# Patient Record
Sex: MALE | Race: WHITE | NOT HISPANIC OR LATINO | Employment: OTHER | URBAN - NONMETROPOLITAN AREA
[De-identification: names, ages, dates, MRNs, and addresses within clinical notes are randomized per-mention and may not be internally consistent; named-entity substitution may affect disease eponyms.]

---

## 2017-03-01 ENCOUNTER — ANTICOAGULATION THERAPY VISIT (OUTPATIENT)
Dept: ANTICOAGULATION | Facility: CLINIC | Age: 67
End: 2017-03-01
Payer: COMMERCIAL

## 2017-03-01 DIAGNOSIS — Z79.01 LONG TERM CURRENT USE OF ANTICOAGULANT THERAPY: ICD-10-CM

## 2017-03-01 DIAGNOSIS — I48.91 ATRIAL FIBRILLATION (H): Primary | ICD-10-CM

## 2017-03-01 LAB — INR POINT OF CARE: 1.8 (ref 0.86–1.14)

## 2017-03-01 PROCEDURE — 85610 PROTHROMBIN TIME: CPT | Mod: QW

## 2017-03-01 PROCEDURE — 36416 COLLJ CAPILLARY BLOOD SPEC: CPT

## 2017-03-01 PROCEDURE — 99207 ZZC NO CHARGE NURSE ONLY: CPT

## 2017-03-01 NOTE — PROGRESS NOTES
ANTICOAGULATION FOLLOW-UP CLINIC VISIT    Patient Name:  Benjamín Hyman  Date:  3/1/2017  Contact Type:  Face to Face    SUBJECTIVE:     Patient Findings     Positives Change in diet/appetite (had some beer Monday with a friend. Having more salads lately.)    Comments Pt prefers not to change dosing at this time as he is quite used to it. Discussed risk of clots with subtherapeutic INR. Pt will return in 6 weeks vs 12 weeks.           OBJECTIVE    INR Protime   Date Value Ref Range Status   03/01/2017 1.8 (A) 0.86 - 1.14 Final       ASSESSMENT / PLAN  INR assessment SUB no change for INR 1.8 - 3.3   Recheck INR In: 6 WEEKS    INR Location Clinic      Anticoagulation Summary as of 3/1/2017     INR goal 2.0-3.0   Today's INR 1.8!   Maintenance plan 3.75 mg (7.5 mg x 0.5) on Mon, Thu; 7.5 mg (7.5 mg x 1) all other days   Full instructions 3.75 mg on Mon, Thu; 7.5 mg all other days   Weekly total 45 mg   No change documented Jing Lozano RN   Plan last modified Ana Conte, [facility] (4/22/2015)   Next INR check 4/12/2017   Priority INR   Target end date Indefinite    Indications   Atrial fibrillation (H) [I48.91]  Long term current use of anticoagulant therapy [Z79.01]         Anticoagulation Episode Summary     INR check location     Preferred lab     Send INR reminders to JOSSIE WILLIAM    Comments * 7.5mg tablets. Dr. Teran Ok with 12 week rechecks.      Anticoagulation Care Providers     Provider Role Specialty Phone number    Ruben Teran MD [facility] [phone]            See the Encounter Report to view Anticoagulation Flowsheet and Dosing Calendar (Go to Encounters tab in chart review, and find the Anticoagulation Therapy Visit)    Jing Lozano RN

## 2017-03-01 NOTE — MR AVS SNAPSHOT
Benjamín SADI Hyman   3/1/2017 9:30 AM   Anticoagulation Therapy Visit    Description:  66 year old male   Provider:  PI ANTI COAG   Department:  [facility]           INR as of 3/1/2017     Today's INR 1.8!      Anticoagulation Summary as of 3/1/2017     INR goal 2.0-3.0   Today's INR 1.8!   Full instructions 3.75 mg on Mon, Thu; 7.5 mg all other days   Next INR check 4/12/2017    Indications   Atrial fibrillation (H) [I48.91]  Long term current use of anticoagulant therapy [Z79.01]         Description     No change, recheck INR in 6 weeks.      Your next Anticoagulation Clinic appointment(s)     Apr 12, 2017  9:30 AM CDT   [facility]agulation [facility] with PI ANTI COAG   [facility]    [address] [place] [id number] [phone]              Contact Numbers     Please call [phone] to cancel and/or reschedule your appointment.  Please call [phone] with any problems or questions regarding your therapy          March 2017 Details    Sun Mon Tue Wed Thu Fri Sat        1      7.5 mg   See details      2      3.75 mg         3      7.5 mg         4      7.5 mg           5      7.5 mg         6      3.75 mg         7      7.5 mg         8      7.5 mg         9      3.75 mg         10      7.5 mg         11      7.5 mg           12      7.5 mg         13      3.75 mg         14      7.5 mg         15      7.5 mg         16      3.75 mg         17      7.5 mg         18      7.5 mg           19      7.5 mg         20      3.75 mg         21      7.5 mg         22      7.5 mg         23      3.75 mg         24      7.5 mg         25      7.5 mg           26      7.5 mg         27      3.75 mg         28      7.5 mg         29      7.5 mg         30      3.75 mg         31      7.5 mg           Date Details   03/01 This INR check               How to take your warfarin dose     To take:  3.75 mg Take 0.5 of a 7.5 mg tablet.    To take:  7.5 mg Take 1 of the 7.5  mg tablets.           April 2017 Details    Sun Mon Tue Wed Thu Fri Sat           1      7.5 mg           2      7.5 mg         3      3.75 mg         4      7.5 mg         5      7.5 mg         6      3.75 mg         7      7.5 mg         8      7.5 mg           9      7.5 mg         10      3.75 mg         11      7.5 mg         12            13               14               15                 16               17               18               19               20               21               22                 23               24               25               26               27               28               29                 30                      Date Details   No additional details    Date of next INR:  4/12/2017         How to take your warfarin dose     To take:  3.75 mg Take 0.5 of a 7.5 mg tablet.    To take:  7.5 mg Take 1 of the 7.5 mg tablets.

## 2017-04-12 ENCOUNTER — ANTICOAGULATION THERAPY VISIT (OUTPATIENT)
Dept: ANTICOAGULATION | Facility: CLINIC | Age: 67
End: 2017-04-12
Payer: COMMERCIAL

## 2017-04-12 DIAGNOSIS — I48.19 PERSISTENT ATRIAL FIBRILLATION (H): ICD-10-CM

## 2017-04-12 DIAGNOSIS — Z79.01 LONG TERM CURRENT USE OF ANTICOAGULANT THERAPY: ICD-10-CM

## 2017-04-12 LAB — INR POINT OF CARE: 1.8 (ref 0.86–1.14)

## 2017-04-12 PROCEDURE — 36416 COLLJ CAPILLARY BLOOD SPEC: CPT

## 2017-04-12 PROCEDURE — 99207 ZZC NO CHARGE NURSE ONLY: CPT

## 2017-04-12 PROCEDURE — 85610 PROTHROMBIN TIME: CPT | Mod: QW

## 2017-04-12 RX ORDER — WARFARIN SODIUM 7.5 MG/1
TABLET ORAL
Qty: 100 TABLET | Refills: 3 | COMMUNITY
Start: 2017-04-12 | End: 2017-04-12

## 2017-04-12 RX ORDER — WARFARIN SODIUM 7.5 MG/1
TABLET ORAL
Qty: 80 TABLET | Refills: 0 | Status: SHIPPED | OUTPATIENT
Start: 2017-04-12 | End: 2017-05-12

## 2017-04-12 NOTE — PROGRESS NOTES
ANTICOAGULATION FOLLOW-UP CLINIC VISIT    Patient Name:  Benjamín Hyman  Date:  4/12/2017  Contact Type:  Face to Face    SUBJECTIVE:     Patient Findings     Positives No Problem Findings    Comments Pt moved from one apt to another (same building - apt # updated in EPIC).             OBJECTIVE    INR Protime   Date Value Ref Range Status   04/12/2017 1.8 (A) 0.86 - 1.14 Final       ASSESSMENT / PLAN  INR assessment SUB increase maintenance dose 8.3%   Recheck INR In: 6 WEEKS    INR Location Clinic      Anticoagulation Summary as of 4/12/2017     INR goal 2.0-3.0   Today's INR 1.8!   Maintenance plan 3.75 mg (7.5 mg x 0.5) on Mon; 7.5 mg (7.5 mg x 1) all other days   Full instructions 3.75 mg on Mon; 7.5 mg all other days   Weekly total 48.75 mg   Plan last modified Jing Lozano RN (4/12/2017)   Next INR check 5/24/2017   Priority INR   Target end date Indefinite    Indications   Atrial fibrillation (H) [I48.91]  Long term current use of anticoagulant therapy [Z79.01]         Anticoagulation Episode Summary     INR check location     Preferred lab     Send INR reminders to JOSSIE WILLIAM    Comments * 7.5mg tablets. Dr. Teran Ok with 12 week rechecks.      Anticoagulation Care Providers     Provider Role Specialty Phone number    Ruben Teran MD [facility] [phone]            See the Encounter Report to view Anticoagulation Flowsheet and Dosing Calendar (Go to Encounters tab in chart review, and find the Anticoagulation Therapy Visit)    Jing Lozano, GABRIELLE

## 2017-04-12 NOTE — MR AVS SNAPSHOT
Benjamín Hyman   4/12/2017 9:30 AM   Anticoagulation Therapy Visit    Description:  66 year old male   Provider:  PI ANTI COAG   Department:  [facility]           INR as of 4/12/2017     Today's INR 1.8!      Anticoagulation Summary as of 4/12/2017     INR goal 2.0-3.0   Today's INR 1.8!   Full instructions 3.75 mg on Mon; 7.5 mg all other days   Next INR check 5/24/2017    Indications   Atrial fibrillation (H) [I48.91]  Long term current use of anticoagulant therapy [Z79.01]         Description     Increase dose to 3.75mg Mondays and 7.5mg all other days.  Recheck INR in 6 weeks.      Your next Anticoagulation Clinic appointment(s)     Apr 12, 2017  9:30 AM CDT   Anticoagulation Visit with STEVEN ANTI COAG   [facility]    [address] [place] [id number] [phone]            May 24, 2017  9:15 AM CDT   Anticoagulation Visit with PI ANTI COAG   [facility]    [address] [place] [id number] [phone]              Contact Numbers     Please call [phone] to cancel and/or reschedule your appointment.  Please call [phone] with any problems or questions regarding your therapy          April 2017 Details    Sun Mon Tue Wed Thu Fri Sat           1                 2               3               4               5               6               7               8                 9               10               11               12      7.5 mg   See details      13      7.5 mg         14      7.5 mg         15      7.5 mg           16      7.5 mg         17      3.75 mg         18      7.5 mg         19      7.5 mg         20      7.5 mg         21      7.5 mg         22      7.5 mg           23      7.5 mg         24      3.75 mg         25      7.5 mg         26      7.5 mg         27      7.5 mg         28      7.5 mg         29      7.5 mg           30      7.5 mg                Date  Details   04/12 This INR check               How to take your warfarin dose     To take:  3.75 mg Take 0.5 of a 7.5 mg tablet.    To take:  7.5 mg Take 1 of the 7.5 mg tablets.           May 2017 Details    Sun Mon Tue Wed Thu Fri Sat      1      3.75 mg         2      7.5 mg         3      7.5 mg         4      7.5 mg         5      7.5 mg         6      7.5 mg           7      7.5 mg         8      3.75 mg         9      7.5 mg         10      7.5 mg         11      7.5 mg         12      7.5 mg         13      7.5 mg           14      7.5 mg         15      3.75 mg         16      7.5 mg         17      7.5 mg         18      7.5 mg         19      7.5 mg         20      7.5 mg           21      7.5 mg         22      3.75 mg         23      7.5 mg         24            25               26               27                 28               29               30               31                   Date Details   No additional details    Date of next INR:  5/24/2017         How to take your warfarin dose     To take:  3.75 mg Take 0.5 of a 7.5 mg tablet.    To take:  7.5 mg Take 1 of the 7.5 mg tablets.

## 2017-04-24 ENCOUNTER — OFFICE VISIT (OUTPATIENT)
Dept: FAMILY MEDICINE | Facility: CLINIC | Age: 67
End: 2017-04-24
Payer: COMMERCIAL

## 2017-04-24 ENCOUNTER — RADIANT APPOINTMENT (OUTPATIENT)
Dept: GENERAL RADIOLOGY | Facility: CLINIC | Age: 67
End: 2017-04-24
Attending: FAMILY MEDICINE
Payer: COMMERCIAL

## 2017-04-24 VITALS
HEART RATE: 67 BPM | TEMPERATURE: 97.6 F | DIASTOLIC BLOOD PRESSURE: 84 MMHG | RESPIRATION RATE: 22 BRPM | BODY MASS INDEX: 46.5 KG/M2 | OXYGEN SATURATION: 96 % | WEIGHT: 273 LBS | SYSTOLIC BLOOD PRESSURE: 122 MMHG

## 2017-04-24 DIAGNOSIS — M25.572 ACUTE LEFT ANKLE PAIN: Primary | ICD-10-CM

## 2017-04-24 DIAGNOSIS — M25.572 ACUTE LEFT ANKLE PAIN: ICD-10-CM

## 2017-04-24 PROCEDURE — 99213 OFFICE O/P EST LOW 20 MIN: CPT | Performed by: FAMILY MEDICINE

## 2017-04-24 PROCEDURE — 73610 X-RAY EXAM OF ANKLE: CPT | Mod: LT

## 2017-04-24 NOTE — MR AVS SNAPSHOT
After Visit Summary   4/24/2017    Benjamín Hyman    MRN: [id number]           Patient Information     Date Of Birth          1950        Visit Information        Provider Department      4/24/2017 1:40 PM Jem Ryder MD [facility]        Today's Diagnoses     Acute left ankle pain    -  1      Care Instructions      Ankle Sprain (Adult)  An ankle sprain is a stretching or tearing of the ligaments that hold the ankle joint together. There are no broken bones.  An ankle sprain is a common injury for both children and adults. It happens when the ankle turns, twists, or rolls in an awkward way. This can be caused by a sports injury. Or it can happen from doing something as simple as stepping on an uneven surface.  Ligaments are made of tough connective tissue. Normally, ligaments stretch a certain amount and then go back to their normal place. A sprain happens when a ligament is forced to stretch more than the normal amount. A severe sprain can actually tear the ligaments. If you have a severe sprain, you may have felt or heard something like a pop when you were injured.  Ankle sprains are given a grade depending on whether they are mild, moderate, or severe:    Grade 1 sprain. A mild sprain with minor stretching and damage to the ligament.    Grade 2 sprain. A moderate sprain where the ligament is partly torn.    Grade 3 sprain. The most severe kind of sprain. The ligament is completely torn.  Most sprains take about 4 to 6 weeks to heal. A severe sprain can take several months to recover.  Your healthcare provider may order X-rays to be sure you don t have a fracture, or broken bone.  The injured area will feel sore.  Swelling and pain may make it hard to walk. You may need crutches if walking is painful. Or your provider may have you use a cast boot or air splint. This will depend on the grade of ankle sprain that you have.    Home care    For a Grade 1 sprain, use RICE  (Rest, Ice, Compression, and Elevation):    Rest your ankle. Don t walk on it.    Ice should be used right away to help control swelling. Place an ice pack over the injured area for 20 minutes. Do this every 3 to 6 hours for the first 24 to 48 hours. Keep using ice packs to ease pain and swelling as needed. To make an ice pack, put ice cubes in a plastic bag that seals at the top. Wrap the bag in a clean, thin towel or cloth. Never put ice or an ice pack directly on the skin. The ice pack can be put right on the cast, bandage, or splint. As the ice melts, be careful that the cast, bandage, or splint doesn t get wet. If you have a boot, open it to apply an ice pack, unless told otherwise by your provider.    Compression devices help to control swelling. They also keep the ankle from moving and support your injured ankle. These devices include dressings, bandages, and wraps.    Elevate or raise your ankle above the level of your heart when sitting or lying down. This is very important for the first 48 hours.    Follow the RICE guidelines for a Grade 2 sprain. This type of sprain will take longer to heal. Your provider may have you wear a splint, cast, or brace to keep your ankle from moving.     If you have a Grade 3 sprain, you are at risk for long-term ankle instability. In rare cases, surgery may be needed. Your provider may have you wear a short leg cast or a walking boot for 2 to 3 weeks.    After 48 hours, it may be helpful to apply heat for 20 minutes several times a day. You can do this with a heating pad or warm compress. Or you may want to go back and forth between using ice and heat. Never apply heat directly to the skin. Always wrap the heating pad or warm compress in a clean, thin towel or cloth.    You may use over-the-counter pain medicine (NSAIDS or nonsteroidal anti-inflammatory drugs) to control pain, unless another pain medicine was prescribed. Talk with your provider beforeusing these medicines if  you have chronic liver or kidney disease, or have ever had a stomach ulcer or GI (gastrointestinal) bleeding.    Follow any rehabilitation exercises your provider gives you. These can help you be more flexible and improve your balance and coordination. This is helpful in preventing long-term ankle problems.  Prevention  To help prevent ankle sprains, it s important to have good strength, balance, and flexibility. Be sure to:    Always warm up before you exercise or do something very active    Be careful when walking or running on uneven or cracked surfaces    Wear shoes that are in good condition and fit well    Listen to your body s signals to slow down when you are in pain or tired  Follow-up care  Follow up with your healthcare provider, or as advised. Check for any warning signs listed below.  If your symptoms don t improve or they get worse, talk with your provider. You may need an X-ray.  When to seek medical advice  Call your healthcare provider right away if any of these occur:    Fever of 100.4 F (38 C) or higher, or as directed    The injury doesn t seem to be healing    The swelling comes back    The cast has a bad smell    The plaster cast or splint gets wet or soft    The fiberglass cast or splint gets wet and does not dry for 24 hours    The pain or swelling increases, or redness appears    Your toes become cold, blue, numb, or tingly    The skin is discolored (looks blue, purple, or gray), has blisters, or is irritated    You re-injure your ankle    6421-8361 [employer]. [address] [place] [address]. All rights reserved. This information is not intended as a substitute for professional medical care. Always follow your healthcare professional's instructions.              Follow-ups after your visit        Your next 10 appointments already scheduled     May 24, 2017  9:15 AM CDT   Anticoagulation Visit with PI ANTI COAG   [facility] ([facility]  "[facility])    [address] [facility] [address]   [phone]              Future tests that were ordered for you today     Open Future Orders        Priority Expected Expires Ordered    XR Ankle Left G/E 3 Views Routine 2017            Who to contact     If you have questions or need follow up information about today's clinic visit or your schedule please contact [facility] directly at [phone].  Normal or non-critical lab and imaging results will be communicated to you by BOARDZhart, letter or phone within 4 business days after the clinic has received the results. If you do not hear from us within 7 days, please contact the clinic through BOARDZhart or phone. If you have a critical or abnormal lab result, we will notify you by phone as soon as possible.  Submit refill requests through [employer] or call your pharmacy and they will forward the refill request to us. Please allow 3 business days for your refill to be completed.          Additional Information About Your Visit        Loop Survey Information     [employer] Survey lets you send messages to your doctor, view your test results, renew your prescriptions, schedule appointments and more. To sign up, go to [email or web address] . Click on \"Log in\" on the left side of the screen, which will take you to the Welcome page. Then click on \"Sign up Now\" on the right side of the page.     You will be asked to enter the access code listed below, as well as some personal information. Please follow the directions to create your username and password.     Your access code is: [email or web address]  Expires: 2017  2:01 PM     Your access code will  in 90 days. If you need help or a new code, please call your [facility] clinic or [phone].        Care EveryWhere ID     This is your Care EveryWhere ID. This could be used by other organizations to access your [facility] medical records  [id number]        Your Vitals Were     Pulse " Temperature Respirations Pulse Oximetry BMI (Body Mass Index)       67 97.6  F (36.4  C) (Tympanic) 22 96% 46.5 kg/m2        Blood Pressure from Last 3 Encounters:   04/24/17 122/84   10/17/16 136/74   05/11/16 108/64    Weight from Last 3 Encounters:   04/24/17 273 lb (123.8 kg)   10/17/16 269 lb (122 kg)   05/11/16 254 lb (115.2 kg)               Primary Care Provider Office Phone # Fax #    Ruben Teran [phone]       [facility] [id number] [facility] [address]        Thank you!     Thank you for choosing [facility]  for your care. Our goal is always to provide you with excellent care. Hearing back from our patients is one way we can continue to improve our services. Please take a few minutes to complete the written survey that you may receive in the mail after your visit with us. Thank you!             Your Updated Medication List - Protect others around you: Learn how to safely use, store and throw away your medicines at www.disposemymeds.org.          This list is accurate as of: 4/24/17  2:01 PM.  Always use your most recent med list.                   Brand Name Dispense Instructions for use    digoxin 250 MCG tablet    LANOXIN    30 tablet    Take 1 tab on even days and 1/2 tab on odd day of the month.  Patient has been on this medication for year and atrial fibrillation is controlled.       lisinopril 10 MG tablet    PRINIVIL/ZESTRIL    30 tablet    Take 1 tablet (10 mg) by mouth daily       metoprolol 200 MG 24 hr tablet    TOPROL-XL    30 tablet    Take 1 tablet (200 mg) by mouth daily       senna 8.6 MG tablet    SENOKOT    60 tablet    Take 1-2 tablets by mouth daily Hold incase you get diarrhea.  Hold on file until needed       simvastatin 40 MG tablet    ZOCOR    15 tablet    Take 1 tablet (40 mg) by mouth every other day       warfarin 7.5 MG tablet    COUMADIN    80 tablet    Take 3.75mg by mouth every Monday and 7.5mg all other days or as  directed by Anticoagulation Clinic.

## 2017-04-24 NOTE — PATIENT INSTRUCTIONS
Ankle Sprain (Adult)  An ankle sprain is a stretching or tearing of the ligaments that hold the ankle joint together. There are no broken bones.  An ankle sprain is a common injury for both children and adults. It happens when the ankle turns, twists, or rolls in an awkward way. This can be caused by a sports injury. Or it can happen from doing something as simple as stepping on an uneven surface.  Ligaments are made of tough connective tissue. Normally, ligaments stretch a certain amount and then go back to their normal place. A sprain happens when a ligament is forced to stretch more than the normal amount. A severe sprain can actually tear the ligaments. If you have a severe sprain, you may have felt or heard something like a pop when you were injured.  Ankle sprains are given a grade depending on whether they are mild, moderate, or severe:    Grade 1 sprain. A mild sprain with minor stretching and damage to the ligament.    Grade 2 sprain. A moderate sprain where the ligament is partly torn.    Grade 3 sprain. The most severe kind of sprain. The ligament is completely torn.  Most sprains take about 4 to 6 weeks to heal. A severe sprain can take several months to recover.  Your healthcare provider may order X-rays to be sure you don t have a fracture, or broken bone.  The injured area will feel sore.  Swelling and pain may make it hard to walk. You may need crutches if walking is painful. Or your provider may have you use a cast boot or air splint. This will depend on the grade of ankle sprain that you have.    Home care    For a Grade 1 sprain, use RICE (Rest, Ice, Compression, and Elevation):    Rest your ankle. Don t walk on it.    Ice should be used right away to help control swelling. Place an ice pack over the injured area for 20 minutes. Do this every 3 to 6 hours for the first 24 to 48 hours. Keep using ice packs to ease pain and swelling as needed. To make an ice pack, put ice cubes in a plastic bag  that seals at the top. Wrap the bag in a clean, thin towel or cloth. Never put ice or an ice pack directly on the skin. The ice pack can be put right on the cast, bandage, or splint. As the ice melts, be careful that the cast, bandage, or splint doesn t get wet. If you have a boot, open it to apply an ice pack, unless told otherwise by your provider.    Compression devices help to control swelling. They also keep the ankle from moving and support your injured ankle. These devices include dressings, bandages, and wraps.    Elevate or raise your ankle above the level of your heart when sitting or lying down. This is very important for the first 48 hours.    Follow the RICE guidelines for a Grade 2 sprain. This type of sprain will take longer to heal. Your provider may have you wear a splint, cast, or brace to keep your ankle from moving.     If you have a Grade 3 sprain, you are at risk for long-term ankle instability. In rare cases, surgery may be needed. Your provider may have you wear a short leg cast or a walking boot for 2 to 3 weeks.    After 48 hours, it may be helpful to apply heat for 20 minutes several times a day. You can do this with a heating pad or warm compress. Or you may want to go back and forth between using ice and heat. Never apply heat directly to the skin. Always wrap the heating pad or warm compress in a clean, thin towel or cloth.    You may use over-the-counter pain medicine (NSAIDS or nonsteroidal anti-inflammatory drugs) to control pain, unless another pain medicine was prescribed. Talk with your provider beforeusing these medicines if you have chronic liver or kidney disease, or have ever had a stomach ulcer or GI (gastrointestinal) bleeding.    Follow any rehabilitation exercises your provider gives you. These can help you be more flexible and improve your balance and coordination. This is helpful in preventing long-term ankle problems.  Prevention  To help prevent ankle sprains, it s  important to have good strength, balance, and flexibility. Be sure to:    Always warm up before you exercise or do something very active    Be careful when walking or running on uneven or cracked surfaces    Wear shoes that are in good condition and fit well    Listen to your body s signals to slow down when you are in pain or tired  Follow-up care  Follow up with your healthcare provider, or as advised. Check for any warning signs listed below.  If your symptoms don t improve or they get worse, talk with your provider. You may need an X-ray.  When to seek medical advice  Call your healthcare provider right away if any of these occur:    Fever of 100.4 F (38 C) or higher, or as directed    The injury doesn t seem to be healing    The swelling comes back    The cast has a bad smell    The plaster cast or splint gets wet or soft    The fiberglass cast or splint gets wet and does not dry for 24 hours    The pain or swelling increases, or redness appears    Your toes become cold, blue, numb, or tingly    The skin is discolored (looks blue, purple, or gray), has blisters, or is irritated    You re-injure your ankle    5360-7833 [employer]. [address] [place] [address], [place] [address]. All rights reserved. This information is not intended as a substitute for professional medical care. Always follow your healthcare professional's instructions.

## 2017-04-24 NOTE — NURSING NOTE
"Chief Complaint   Patient presents with     Musculoskeletal Problem       Initial /84 (BP Location: Right arm, Patient Position: Chair, Cuff Size: Adult Large)  Pulse 67  Temp 97.6  F (36.4  C) (Tympanic)  Resp 22  Wt 273 lb (123.8 kg)  SpO2 96%  BMI 46.5 kg/m2 Estimated body mass index is 46.5 kg/(m^2) as calculated from the following:    Height as of 10/17/16: 5' 4.25\" (1.632 m).    Weight as of this encounter: 273 lb (123.8 kg).  Medication Reconciliation: complete    Health Maintenance that is potentially due pending provider review:  Tetanus, hep c screening, pneumociccal    Will discuss with provider.      "

## 2017-04-24 NOTE — PROGRESS NOTES
SUBJECTIVE:                                                    Benjamín Hyman is a 66 year old male who presents to clinic today for the following health issues:      Musculoskeletal problem/pain      Duration: 1 week    Description  Location: Left Ankle    Intensity:  moderate    Accompanying signs and symptoms: weakness of ankle and swelling    History  Previous similar problem: no   Previous evaluation:  none    Precipitating or alleviating factors:  Trauma or overuse: YES- patient moved about a week and a half ago, doesn't remember doing anything to ankle  Aggravating factors include: walking and climbing stairs    Therapies tried and outcome: nothing    No fall, trauma or other injury     Drinks alcohol occasionally     No previous history of gout          Problem list and histories reviewed & adjusted, as indicated.  Additional history: as documented    Patient Active Problem List   Diagnosis     Atrial fibrillation (H)     Long term current use of anticoagulant therapy     FAMILY HISTORY OF GI NEOPLASM     Immunization (Tdap) not carried out because of patient refusal     [facility]     Advanced directives, counseling/discussion     Hyperlipidemia with target LDL less than 100     Stomach ulcer     Morbid obesity due to excess calories (H)     History reviewed. No pertinent surgical history.    Social History   Substance Use Topics     Smoking status: Former Smoker     Types: Cigarettes     Quit date: 1/1/1996     Smokeless tobacco: Never Used     Alcohol use Yes      Comment: 9/28/2015 Occsional 6 pack of beer, not often.  Quit drinking 4-1-10/  drank around [place] 2010 4- 2-3 beers every other week.      Family History   Problem Relation Age of Onset     Cancer - colorectal Mother      C.A.D. Father      HEART DISEASE Father      Unknown/Adopted Maternal Grandmother      Unknown/Adopted Paternal Grandmother      CEREBROVASCULAR DISEASE Paternal Grandfather           Current Outpatient Prescriptions   Medication Sig Dispense Refill     warfarin (COUMADIN) 7.5 MG tablet Take 3.75mg by mouth every Monday and 7.5mg all other days or as directed by Anticoagulation Clinic. 80 tablet 0     digoxin (LANOXIN) 250 MCG tablet Take 1 tab on even days and 1/2 tab on odd day of the month.  Patient has been on this medication for year and atrial fibrillation is controlled. 30 tablet 11     metoprolol (TOPROL-XL) 200 MG 24 hr tablet Take 1 tablet (200 mg) by mouth daily 30 tablet 11     lisinopril (PRINIVIL,ZESTRIL) 10 MG tablet Take 1 tablet (10 mg) by mouth daily 30 tablet 11     simvastatin (ZOCOR) 40 MG tablet Take 1 tablet (40 mg) by mouth every other day 15 tablet 11     senna (SENOKOT) 8.6 MG tablet Take 1-2 tablets by mouth daily Hold incase you get diarrhea.  Hold on file until needed (Patient not taking: Reported on 4/24/2017) 60 tablet 11     Allergies   Allergen Reactions     Latex      Adhesive Tape Rash     Reacted to the EKG stickers     Recent Labs   Lab Test  05/11/16   1136  04/22/15   1456  03/03/14   1403   02/06/13   1338  06/20/12   1555  01/11/12   0713  07/06/10   0944  06/29/10   0742   A1C   --    --    --    --    --    --    --   5.6   --    LDL  89  95  99   < >   --    --   67   --   79   HDL   --    --    --    --    --    --   32*   --   32*   TRIG   --    --    --    --    --    --   174*   --   200*   ALT   --    --    --    --   36  14  18   --   20   CR  0.90  0.90  1.09   --   0.78  0.95  0.78   --   0.83   GFRESTIMATED  84  85  68   --   >90  81  >90   --   >90   GFRESTBLACK  >90   GFR Calc    >90   GFR Calc    83   --   >90  >90  >90   --   >90   POTASSIUM  4.4  4.1  4.4   --   4.5  4.3  4.6   --   4.7   TSH  0.44   --    --    --    --    --    --    --    --     < > = values in this interval not displayed.      BP Readings from Last 3 Encounters:   04/24/17 122/84   10/17/16 136/74   05/11/16 108/64    Wt Readings  from Last 3 Encounters:   04/24/17 273 lb (123.8 kg)   10/17/16 269 lb (122 kg)   05/11/16 254 lb (115.2 kg)                  Labs reviewed in EPIC    Reviewed and updated as needed this visit by clinical staff  Tobacco  Allergies  Med Hx  Surg Hx  Fam Hx  Soc Hx      Reviewed and updated as needed this visit by Provider         ROS:  Constitutional, HEENT, cardiovascular, pulmonary, gi and gu systems are negative, except as otherwise noted.    OBJECTIVE:                                                    /84 (BP Location: Right arm, Patient Position: Chair, Cuff Size: Adult Large)  Pulse 67  Temp 97.6  F (36.4  C) (Tympanic)  Resp 22  Wt 273 lb (123.8 kg)  SpO2 96%  BMI 46.5 kg/m2  Body mass index is 46.5 kg/(m^2).  GENERAL: alert, no distress and obese  NECK: no adenopathy, no asymmetry, masses, or scars and thyroid normal to palpation  RESP: lungs clear to auscultation - no rales, rhonchi or wheezes  CV: regular rate and rhythm, normal S1 S2, no S3 or S4, no murmur, click or rub, no peripheral edema and peripheral pulses strong  ABDOMEN: soft, nontender, no hepatosplenomegaly, no masses and bowel sounds normal  MS: subtle swelling over left ankle, no tenderness, warmth or skin discoloration noted, ROM normal, able to weight-bear without significant discomfort, pulses 3+, sensation to touch and pressure intact, no calf swelling      X-ray: Left ankle:  HISTORY: Pain.     COMPARISON: None.     FINDINGS: No fracture or osseous lesion is seen. There is a small  ossicle inferior to the medial malleolus. There are prominent  calcaneal spurs. The joint spaces are well preserved. No adjacent soft  tissue pathology is seen.         IMPRESSION: Prominent calcaneal spurs. No acute abnormality is seen.     ASSESSMENT/PLAN:                                                          ICD-10-CM    1. Acute left ankle pain M25.572 XR Ankle Left G/E 3 Views       Discussed in detail differentials and further  management. Symptoms are likely secondary to ankle sprain. Another possibility includes gout and osteoarthritis. Symptoms ongoing for 2 weeks and physical examination unremarkable for warmth, tenderness or decreased range of motion. X-ray findings consistent with prominent calcaneal spurs without any fracture or dislocation. RICE therapy recommended along with Tylenol. Suggested to follow up if symptoms persist or worsen. Patient understood and in agreement with the above plan. All questions answered.      MEDICATIONS:   No orders of the defined types were placed in this encounter.    Patient Instructions     Ankle Sprain (Adult)  An ankle sprain is a stretching or tearing of the ligaments that hold the ankle joint together. There are no broken bones.  An ankle sprain is a common injury for both children and adults. It happens when the ankle turns, twists, or rolls in an awkward way. This can be caused by a sports injury. Or it can happen from doing something as simple as stepping on an uneven surface.  Ligaments are made of tough connective tissue. Normally, ligaments stretch a certain amount and then go back to their normal place. A sprain happens when a ligament is forced to stretch more than the normal amount. A severe sprain can actually tear the ligaments. If you have a severe sprain, you may have felt or heard something like a pop when you were injured.  Ankle sprains are given a grade depending on whether they are mild, moderate, or severe:    Grade 1 sprain. A mild sprain with minor stretching and damage to the ligament.    Grade 2 sprain. A moderate sprain where the ligament is partly torn.    Grade 3 sprain. The most severe kind of sprain. The ligament is completely torn.  Most sprains take about 4 to 6 weeks to heal. A severe sprain can take several months to recover.  Your healthcare provider may order X-rays to be sure you don t have a fracture, or broken bone.  The injured area will feel  sore.  Swelling and pain may make it hard to walk. You may need crutches if walking is painful. Or your provider may have you use a cast boot or air splint. This will depend on the grade of ankle sprain that you have.    Home care    For a Grade 1 sprain, use RICE (Rest, Ice, Compression, and Elevation):    Rest your ankle. Don t walk on it.    Ice should be used right away to help control swelling. Place an ice pack over the injured area for 20 minutes. Do this every 3 to 6 hours for the first 24 to 48 hours. Keep using ice packs to ease pain and swelling as needed. To make an ice pack, put ice cubes in a plastic bag that seals at the top. Wrap the bag in a clean, thin towel or cloth. Never put ice or an ice pack directly on the skin. The ice pack can be put right on the cast, bandage, or splint. As the ice melts, be careful that the cast, bandage, or splint doesn t get wet. If you have a boot, open it to apply an ice pack, unless told otherwise by your provider.    Compression devices help to control swelling. They also keep the ankle from moving and support your injured ankle. These devices include dressings, bandages, and wraps.    Elevate or raise your ankle above the level of your heart when sitting or lying down. This is very important for the first 48 hours.    Follow the RICE guidelines for a Grade 2 sprain. This type of sprain will take longer to heal. Your provider may have you wear a splint, cast, or brace to keep your ankle from moving.     If you have a Grade 3 sprain, you are at risk for long-term ankle instability. In rare cases, surgery may be needed. Your provider may have you wear a short leg cast or a walking boot for 2 to 3 weeks.    After 48 hours, it may be helpful to apply heat for 20 minutes several times a day. You can do this with a heating pad or warm compress. Or you may want to go back and forth between using ice and heat. Never apply heat directly to the skin. Always wrap the heating  pad or warm compress in a clean, thin towel or cloth.    You may use over-the-counter pain medicine (NSAIDS or nonsteroidal anti-inflammatory drugs) to control pain, unless another pain medicine was prescribed. Talk with your provider beforeusing these medicines if you have chronic liver or kidney disease, or have ever had a stomach ulcer or GI (gastrointestinal) bleeding.    Follow any rehabilitation exercises your provider gives you. These can help you be more flexible and improve your balance and coordination. This is helpful in preventing long-term ankle problems.  Prevention  To help prevent ankle sprains, it s important to have good strength, balance, and flexibility. Be sure to:    Always warm up before you exercise or do something very active    Be careful when walking or running on uneven or cracked surfaces    Wear shoes that are in good condition and fit well    Listen to your body s signals to slow down when you are in pain or tired  Follow-up care  Follow up with your healthcare provider, or as advised. Check for any warning signs listed below.  If your symptoms don t improve or they get worse, talk with your provider. You may need an X-ray.  When to seek medical advice  Call your healthcare provider right away if any of these occur:    Fever of 100.4 F (38 C) or higher, or as directed    The injury doesn t seem to be healing    The swelling comes back    The cast has a bad smell    The plaster cast or splint gets wet or soft    The fiberglass cast or splint gets wet and does not dry for 24 hours    The pain or swelling increases, or redness appears    Your toes become cold, blue, numb, or tingly    The skin is discolored (looks blue, purple, or gray), has blisters, or is irritated    You re-injure your ankle    9785-5652 [employer] [address] [place] [address], [place] [address]. All rights reserved. This information is not intended as a substitute for professional medical care. Always follow  your healthcare professional's instructions.            Jem Ryder MD  [facility]

## 2017-05-12 ENCOUNTER — OFFICE VISIT (OUTPATIENT)
Dept: FAMILY MEDICINE | Facility: CLINIC | Age: 67
End: 2017-05-12
Payer: COMMERCIAL

## 2017-05-12 VITALS
SYSTOLIC BLOOD PRESSURE: 102 MMHG | DIASTOLIC BLOOD PRESSURE: 70 MMHG | HEART RATE: 60 BPM | HEIGHT: 65 IN | TEMPERATURE: 98.1 F | BODY MASS INDEX: 44.98 KG/M2 | WEIGHT: 270 LBS

## 2017-05-12 DIAGNOSIS — Z79.01 LONG TERM CURRENT USE OF ANTICOAGULANT THERAPY: ICD-10-CM

## 2017-05-12 DIAGNOSIS — E78.5 HYPERLIPIDEMIA WITH TARGET LDL LESS THAN 100: ICD-10-CM

## 2017-05-12 DIAGNOSIS — E66.01 MORBID OBESITY DUE TO EXCESS CALORIES (H): ICD-10-CM

## 2017-05-12 DIAGNOSIS — I48.19 PERSISTENT ATRIAL FIBRILLATION (H): Primary | ICD-10-CM

## 2017-05-12 LAB
ANION GAP SERPL CALCULATED.3IONS-SCNC: 11 MMOL/L (ref 3–14)
BUN SERPL-MCNC: 16 MG/DL (ref 7–30)
CALCIUM SERPL-MCNC: 9 MG/DL (ref 8.5–10.1)
CHLORIDE SERPL-SCNC: 103 MMOL/L (ref 94–109)
CO2 SERPL-SCNC: 25 MMOL/L (ref 20–32)
CREAT SERPL-MCNC: 0.86 MG/DL (ref 0.66–1.25)
GFR SERPL CREATININE-BSD FRML MDRD: 89 ML/MIN/1.7M2
GLUCOSE SERPL-MCNC: 91 MG/DL (ref 70–99)
LDLC SERPL DIRECT ASSAY-MCNC: 88 MG/DL
POTASSIUM SERPL-SCNC: 4.5 MMOL/L (ref 3.4–5.3)
SODIUM SERPL-SCNC: 139 MMOL/L (ref 133–144)

## 2017-05-12 PROCEDURE — 80048 BASIC METABOLIC PNL TOTAL CA: CPT | Performed by: FAMILY MEDICINE

## 2017-05-12 PROCEDURE — 83721 ASSAY OF BLOOD LIPOPROTEIN: CPT | Performed by: FAMILY MEDICINE

## 2017-05-12 PROCEDURE — 36415 COLL VENOUS BLD VENIPUNCTURE: CPT | Performed by: FAMILY MEDICINE

## 2017-05-12 PROCEDURE — 99214 OFFICE O/P EST MOD 30 MIN: CPT | Performed by: FAMILY MEDICINE

## 2017-05-12 RX ORDER — DIGOXIN 250 MCG
TABLET ORAL
Qty: 30 TABLET | Refills: 11 | Status: SHIPPED | OUTPATIENT
Start: 2017-05-12 | End: 2018-04-16

## 2017-05-12 RX ORDER — WARFARIN SODIUM 7.5 MG/1
TABLET ORAL
Qty: 80 TABLET | Refills: 8 | Status: SHIPPED | OUTPATIENT
Start: 2017-05-12 | End: 2017-08-16

## 2017-05-12 RX ORDER — SIMVASTATIN 40 MG
40 TABLET ORAL EVERY OTHER DAY
Qty: 15 TABLET | Refills: 11 | Status: SHIPPED | OUTPATIENT
Start: 2017-05-12 | End: 2018-04-16

## 2017-05-12 RX ORDER — METOPROLOL SUCCINATE 200 MG/1
200 TABLET, EXTENDED RELEASE ORAL DAILY
Qty: 30 TABLET | Refills: 11 | Status: SHIPPED | OUTPATIENT
Start: 2017-05-12 | End: 2018-04-16

## 2017-05-12 RX ORDER — LISINOPRIL 10 MG/1
10 TABLET ORAL DAILY
Qty: 30 TABLET | Refills: 11 | Status: SHIPPED | OUTPATIENT
Start: 2017-05-12 | End: 2018-04-16

## 2017-05-12 NOTE — PATIENT INSTRUCTIONS
Please go to lab.    I refilled your medications for the year.    Continue to follow up with the INR clinic nurse.          Thank you for choosing [facility].  You may be receiving a survey in the mail from [employer] regarding your visit today.  Please take a few minutes to complete and return the survey to let us know how we are doing.      If you have questions or concerns, please contact us via [facility] or you can contact your care team at [phone].    Our Clinic hours are:  Monday 6:40 am  to 7:00 pm  Tuesday -Friday 6:40 am to 5:00 pm    The [place] outpatient lab hours are:  Monday - Friday 6:10 am to 4:45 pm  Saturdays 7:00 am to 11:00 am  Appointments are required, call [phone]    If you have clinical questions after hours or would like to schedule an appointment,  call the clinic at [phone].

## 2017-05-12 NOTE — MR AVS SNAPSHOT
After Visit Summary   5/12/2017    Benjamín Hyman    MRN: [id number]           Patient Information     Date Of Birth          1950        Visit Information        Provider Department      5/12/2017 10:40 AM Ruben Teran MD [facility]        Today's Diagnoses     Long term current use of anticoagulant therapy    -  1    Persistent atrial fibrillation (H)        Hyperlipidemia with target LDL less than 100          Care Instructions    Please go to lab.    I refilled your medications for the year.    Continue to follow up with the INR clinic nurse.          Thank you for choosing [facility].  You may be receiving a survey in the mail from [employer] regarding your visit today.  Please take a few minutes to complete and return the survey to let us know how we are doing.      If you have questions or concerns, please contact us via [employer] or you can contact your care team at [phone].    Our Clinic hours are:  Monday 6:40 am  to 7:00 pm  Tuesday -Friday 6:40 am to 5:00 pm    The [place] outpatient lab hours are:  Monday - Friday 6:10 am to 4:45 pm  Saturdays 7:00 am to 11:00 am  Appointments are required, call [phone]    If you have clinical questions after hours or would like to schedule an appointment,  call the clinic at [phone].          Follow-ups after your visit        Your next 10 appointments already scheduled     May 24, 2017  9:15 AM CDT   Anticoagulation Visit with PI ANTI COAG   [facility]    [address] [place] [id number] [phone]              Who to contact     If you have questions or need follow up information about today's clinic visit or your schedule please contact [facility] directly at [phone].  Normal or non-critical lab and imaging results will be communicated to you by MyChart, letter or phone within 4 business days after the clinic has received the  "results. If you do not hear from us within 7 days, please contact the clinic through [employer] or phone. If you have a critical or abnormal lab result, we will notify you by phone as soon as possible.  Submit refill requests through [employer] or call your pharmacy and they will forward the refill request to us. Please allow 3 business days for your refill to be completed.          Additional Information About Your Visit        [employer] Information     [employer] lets you send messages to your doctor, view your test results, renew your prescriptions, schedule appointments and more. To sign up, go to [email or web address] . Click on \"Log in\" on the left side of the screen, which will take you to the Welcome page. Then click on \"Sign up Now\" on the right side of the page.     You will be asked to enter the access code listed below, as well as some personal information. Please follow the directions to create your username and password.     Your access code is: [email or web address]  Expires: 2017  2:01 PM     Your access code will  in 90 days. If you need help or a new code, please call your [place] clinic or [phone].        Care EveryWhere ID     This is your Care EveryWhere ID. This could be used by other organizations to access your [place] medical records  [id number]        Your Vitals Were     Pulse Temperature Height BMI (Body Mass Index)          60 98.1  F (36.7  C) (Tympanic) 5' 4.5\" (1.638 m) 45.63 kg/m2         Blood Pressure from Last 3 Encounters:   17 102/70   17 122/84   10/17/16 136/74    Weight from Last 3 Encounters:   17 270 lb (122.5 kg)   17 273 lb (123.8 kg)   10/17/16 269 lb (122 kg)              We Performed the Following     Basic metabolic panel     LDL cholesterol direct          Today's Medication Changes          These changes are accurate as of: 17 11:02 AM.  If you have any questions, ask your nurse or doctor.               These medicines have changed or " have updated prescriptions.        Dose/Directions    digoxin 250 MCG tablet   Commonly known as:  LANOXIN   This may have changed:  additional instructions   Used for:  Persistent atrial fibrillation (H)   Changed by:  Ruben Teran MD        Take 1 tab on even days & 1/2 tab on odd day of the month. Patient has been on this medication for year & atrial fibrillation is controlled.   Quantity:  30 tablet   Refills:  11            Where to get your medicines      These medications were sent to [id number] - [facility] [address] [facility]  [address] [facility] [address]     Phone:  [phone]     digoxin 250 MCG tablet    lisinopril 10 MG tablet    metoprolol 200 MG 24 hr tablet    simvastatin 40 MG tablet    warfarin 7.5 MG tablet                Primary Care Provider Office Phone # Fax #    Ruben Teran [phone]       [facility] [address] [facility] [address]        Thank you!     Thank you for choosing [facility]  for your care. Our goal is always to provide you with excellent care. Hearing back from our patients is one way we can continue to improve our services. Please take a few minutes to complete the written survey that you may receive in the mail after your visit with us. Thank you!             Your Updated Medication List - Protect others around you: Learn how to safely use, store and throw away your medicines at www.disposemymeds.org.          This list is accurate as of: 5/12/17 11:02 AM.  Always use your most recent med list.                   Brand Name Dispense Instructions for use    digoxin 250 MCG tablet    LANOXIN    30 tablet    Take 1 tab on even days & 1/2 tab on odd day of the month. Patient has been on this medication for year & atrial fibrillation is controlled.       lisinopril 10 MG tablet    PRINIVIL/ZESTRIL    30 tablet    Take 1 tablet (10 mg) by mouth daily       metoprolol 200 MG 24 hr tablet     TOPROL-XL    30 tablet    Take 1 tablet (200 mg) by mouth daily       simvastatin 40 MG tablet    ZOCOR    15 tablet    Take 1 tablet (40 mg) by mouth every other day       warfarin 7.5 MG tablet    COUMADIN    80 tablet    Take 3.75mg by mouth every Monday and 7.5mg all other days or as directed by Anticoagulation Clinic.

## 2017-05-12 NOTE — PROGRESS NOTES
"  SUBJECTIVE:                                                    Benjamín Hyman is a 66 year old male who presents to clinic today for the following health issues:    Hyperlipidemia Follow-Up      Rate your low fat/cholesterol diet?: fair    Taking statin?  Yes, is taking every other day due to leg cramping    Other lipid medications/supplements?:  none     Hypertension Follow-up      Outpatient blood pressures are not being checked.    Low Salt Diet: not monitoring salt       Amount of exercise or physical activity: None-knee and ankle pain    Problems taking medications regularly: No    Medication side effects: none    Diet: regular (no restrictions)    Atrial fib is controlled.  No chest pain pressure or tightness.  Has sharp pain that lasts a second intermittently.  No other associated symptoms.    Has left shoulder pain but it is resolved today.    Still has some left ankle pain and right knee pain but was walking on uneven ground this past week.        Problem list and histories reviewed & adjusted, as indicated.  Additional history: as documented        Reviewed and updated as needed this visit by clinical staff  Tobacco  Allergies  Meds  Med Hx  Surg Hx  Fam Hx  Soc Hx      Reviewed and updated as needed this visit by Provider         ROS:  CONSTITUTIONAL:NEGATIVE for fever, chills, change in weight  INTEGUMENTARY/SKIN: NEGATIVE for worrisome rashes, moles or lesions  RESP:NEGATIVE for significant cough or SOB  CV: as above  MUSCULOSKELETAL: NEGATIVE for significant arthralgias or myalgia  NEURO: NEGATIVE for weakness, dizziness or paresthesias  PSYCHIATRIC: NEGATIVE for changes in mood or affect    OBJECTIVE:                                                    /70 (Cuff Size: Adult Large)  Pulse 60  Temp 98.1  F (36.7  C) (Tympanic)  Ht 5' 4.5\" (1.638 m)  Wt 270 lb (122.5 kg)  BMI 45.63 kg/m2  Body mass index is 45.63 kg/(m^2).  GENERAL APPEARANCE: alert, no distress and cooperative  RESP: " lungs clear to auscultation - no rales, rhonchi or wheezes  CV: irregular rate and rhythm  ABDOMEN: soft, nontender, without hepatosplenomegaly or masses and bowel sounds normal  MS: extremities normal- no gross deformities noted  SKIN: no suspicious lesions or rashes  NEURO: Normal strength and tone, mentation intact and speech normal  PSYCH: mentation appears normal and affect normal/bright    Noted to be morbidly obese     ASSESSMENT/PLAN:                                                    (I48.1) Persistent atrial fibrillation (H)  (primary encounter diagnosis)  Comment: controlled and refilled med and check labs  Plan: warfarin (COUMADIN) 7.5 MG tablet, digoxin         (LANOXIN) 250 MCG tablet, metoprolol         (TOPROL-XL) 200 MG 24 hr tablet, lisinopril         (PRINIVIL/ZESTRIL) 10 MG tablet, simvastatin         (ZOCOR) 40 MG tablet, Basic metabolic panel            (E66.01) Morbid obesity due to excess calories (H)  Comment: discussed losing weight  Plan:     (Z79.01) Long term current use of anticoagulant therapy  Comment: noted on warfarin  Plan:     (E78.5) Hyperlipidemia with target LDL less than 100  Comment: will check lab  Plan: LDL cholesterol direct                See Patient Instructions    Ruben Teran MD  [facility]

## 2017-05-24 ENCOUNTER — ANTICOAGULATION THERAPY VISIT (OUTPATIENT)
Dept: ANTICOAGULATION | Facility: CLINIC | Age: 67
End: 2017-05-24
Payer: COMMERCIAL

## 2017-05-24 DIAGNOSIS — Z79.01 LONG TERM CURRENT USE OF ANTICOAGULANT THERAPY: ICD-10-CM

## 2017-05-24 LAB — INR POINT OF CARE: 2.2 (ref 0.86–1.14)

## 2017-05-24 PROCEDURE — 36416 COLLJ CAPILLARY BLOOD SPEC: CPT

## 2017-05-24 PROCEDURE — 99207 ZZC NO CHARGE NURSE ONLY: CPT

## 2017-05-24 PROCEDURE — 85610 PROTHROMBIN TIME: CPT | Mod: QW

## 2017-05-24 NOTE — PROGRESS NOTES
ANTICOAGULATION FOLLOW-UP CLINIC VISIT    Patient Name:  Benjamín Hyman  Date:  5/24/2017  Contact Type:  Face to Face    SUBJECTIVE:     Patient Findings     Positives Change in diet/appetite (has not had any ETOH in 6 weeks. Only has had salad twice in 6 weeks. Eating a lot of fruit. )    Comments Seen by Dr. Teran 5/12/17.  Med review done today.  270# dressed with shoes on.           OBJECTIVE    INR Protime   Date Value Ref Range Status   05/24/2017 2.2 (A) 0.86 - 1.14 Final       ASSESSMENT / PLAN  INR assessment THER    Recheck INR In: 8 WEEKS 12 weeks   INR Location Clinic      Anticoagulation Summary as of 5/24/2017     INR goal 2.0-3.0   Today's INR 2.2   Maintenance plan 3.75 mg (7.5 mg x 0.5) on Mon; 7.5 mg (7.5 mg x 1) all other days   Full instructions 3.75 mg on Mon; 7.5 mg all other days   Weekly total 48.75 mg   No change documented Jing Lozano RN   Plan last modified Jing Lozano RN (4/12/2017)   Next INR check 8/16/2017   Priority INR   Target end date Indefinite    Indications   Atrial fibrillation (H) [I48.91]  Long term current use of anticoagulant therapy [Z79.01]         Anticoagulation Episode Summary     INR check location     Preferred lab     Send INR reminders to JOSSIE WILLIAM    Comments * 7.5mg tablets. Dr. Teran Ok with 12 week rechecks.      Anticoagulation Care Providers     Provider Role Specialty Phone number    Ruben Teran MD [facility] [phone]            See the Encounter Report to view Anticoagulation Flowsheet and Dosing Calendar (Go to Encounters tab in chart review, and find the Anticoagulation Therapy Visit)    Jing Lozano, GABRIELLE

## 2017-05-24 NOTE — MR AVS SNAPSHOT
Benjamín SADI Hyman   5/24/2017 9:15 AM   Anticoagulation Therapy Visit    Description:  66 year old male   Provider:  PI ANTI COAG   Department:  [facility]           INR as of 5/24/2017     Today's INR 2.2      Anticoagulation Summary as of 5/24/2017     INR goal 2.0-3.0   Today's INR 2.2   Full instructions 3.75 mg on Mon; 7.5 mg all other days   Next INR check 8/16/2017    Indications   Atrial fibrillation (H) [I48.91]  Long term current use of anticoagulant therapy [Z79.01]         Description     No change, recheck INR in 12 weeks.      Your next Anticoagulation Clinic appointment(s)     Aug 16, 2017  9:15 AM CDT   An[facility]agulation [facility] with PI ANTI COAG   [facility]    [address] [place] [id number] [phone]              Contact Numbers     Please call [phone] to cancel and/or reschedule your appointment.  Please call [phone] with any problems or questions regarding your therapy          May 2017 Details    Sun Mon Tue Wed Thu Fri Sat      1               2               3               4               5               6                 7               8               9               10               11               12               13                 14               15               16               17               18               19               20                 21               22               23               24      7.5 mg   See details      25      7.5 mg         26      7.5 mg         27      7.5 mg           28      7.5 mg         29      3.75 mg         30      7.5 mg         31      7.5 mg             Date Details   05/24 This INR check               How to take your warfarin dose     To take:  3.75 mg Take 0.5 of a 7.5 mg tablet.    To take:  7.5 mg Take 1 of the 7.5 mg tablets.           June 2017 Details    Sun Mon Tue Wed Thu Fri Sat         1      7.5 mg         2      7.5 mg         3      7.5 mg           4       7.5 mg         5      3.75 mg         6      7.5 mg         7      7.5 mg         8      7.5 mg         9      7.5 mg         10      7.5 mg           11      7.5 mg         12      3.75 mg         13      7.5 mg         14      7.5 mg         15      7.5 mg         16      7.5 mg         17      7.5 mg           18      7.5 mg         19      3.75 mg         20      7.5 mg         21      7.5 mg         22      7.5 mg         23      7.5 mg         24      7.5 mg           25      7.5 mg         26      3.75 mg         27      7.5 mg         28      7.5 mg         29      7.5 mg         30      7.5 mg           Date Details   No additional details            How to take your warfarin dose     To take:  3.75 mg Take 0.5 of a 7.5 mg tablet.    To take:  7.5 mg Take 1 of the 7.5 mg tablets.           July 2017 Details    Sun Mon Tue Wed Thu Fri Sat           1      7.5 mg           2      7.5 mg         3      3.75 mg         4      7.5 mg         5      7.5 mg         6      7.5 mg         7      7.5 mg         8      7.5 mg           9      7.5 mg         10      3.75 mg         11      7.5 mg         12      7.5 mg         13      7.5 mg         14      7.5 mg         15      7.5 mg           16      7.5 mg         17      3.75 mg         18      7.5 mg         19      7.5 mg         20      7.5 mg         21      7.5 mg         22      7.5 mg           23      7.5 mg         24      3.75 mg         25      7.5 mg         26      7.5 mg         27      7.5 mg         28      7.5 mg         29      7.5 mg           30      7.5 mg         31      3.75 mg               Date Details   No additional details            How to take your warfarin dose     To take:  3.75 mg Take 0.5 of a 7.5 mg tablet.    To take:  7.5 mg Take 1 of the 7.5 mg tablets.           August 2017 Details    Sun Mon Tue Wed Thu Fri Sat       1      7.5 mg         2      7.5 mg         3      7.5 mg         4      7.5 mg         5      7.5 mg            6      7.5 mg         7      3.75 mg         8      7.5 mg         9      7.5 mg         10      7.5 mg         11      7.5 mg         12      7.5 mg           13      7.5 mg         14      3.75 mg         15      7.5 mg         16            17               18               19                 20               21               22               23               24               25               26                 27               28               29               30               31                  Date Details   No additional details    Date of next INR:  8/16/2017         How to take your warfarin dose     To take:  3.75 mg Take 0.5 of a 7.5 mg tablet.    To take:  7.5 mg Take 1 of the 7.5 mg tablets.

## 2017-08-16 ENCOUNTER — ANTICOAGULATION THERAPY VISIT (OUTPATIENT)
Dept: ANTICOAGULATION | Facility: CLINIC | Age: 67
End: 2017-08-16
Payer: COMMERCIAL

## 2017-08-16 DIAGNOSIS — Z79.01 LONG TERM CURRENT USE OF ANTICOAGULANT THERAPY: ICD-10-CM

## 2017-08-16 DIAGNOSIS — I48.19 PERSISTENT ATRIAL FIBRILLATION (H): ICD-10-CM

## 2017-08-16 LAB — INR POINT OF CARE: 3.2 (ref 0.86–1.14)

## 2017-08-16 PROCEDURE — 99207 ZZC NO CHARGE NURSE ONLY: CPT

## 2017-08-16 PROCEDURE — 36416 COLLJ CAPILLARY BLOOD SPEC: CPT

## 2017-08-16 PROCEDURE — 85610 PROTHROMBIN TIME: CPT | Mod: QW

## 2017-08-16 RX ORDER — WARFARIN SODIUM 7.5 MG/1
TABLET ORAL
Qty: 80 TABLET | Refills: 1 | Status: SHIPPED | OUTPATIENT
Start: 2017-08-16 | End: 2018-01-31

## 2017-08-16 NOTE — MR AVS SNAPSHOT
Benjamín AVITIA Boogie   8/16/2017 9:15 AM   Anticoagulation Therapy Visit    Description:  66 year old male   Provider:  PI ANTI COAG   Department:  [facility]           INR as of 8/16/2017     Today's INR 3.2!      Anticoagulation Summary as of 8/16/2017     INR goal 2.0-3.0   Today's INR 3.2!   Full instructions 3.75 mg on Mon; 7.5 mg all other days   Next INR check 11/8/2017    Indications   Atrial fibrillation (H) [I48.91]  Long term current use of anticoagulant therapy [Z79.01]         Description     No change, recheck INR in 12 weeks.      Your next Anticoagulation Clinic appointment(s)     Nov 08, 2017  9:15 AM CST   Anticoagulation Visit with PI ANTI COAG   [facility]    [address] [place] [phone]              Contact Numbers     Please call [phone] to cancel and/or reschedule your appointment.  Please call [phone] with any problems or questions regarding your therapy          August 2017 Details    Sun Mon Tue Wed Thu Fri Sat       1               2               3               4               5                 6               7               8               9               10               11               12                 13               14               15               16      7.5 mg   See details      17      7.5 mg         18      7.5 mg         19      7.5 mg           20      7.5 mg         21      3.75 mg         22      7.5 mg         23      7.5 mg         24      7.5 mg         25      7.5 mg         26      7.5 mg           27      7.5 mg         28      3.75 mg         29      7.5 mg         30      7.5 mg         31      7.5 mg            Date Details   08/16 This INR check               How to take your warfarin dose     To take:  3.75 mg Take 0.5 of a 7.5 mg tablet.    To take:  7.5 mg Take 1 of the 7.5 mg tablets.           September 2017 Details    Sun Mon Tue Wed Thu Fri Sat          1      7.5  mg         2      7.5 mg           3      7.5 mg         4      3.75 mg         5      7.5 mg         6      7.5 mg         7      7.5 mg         8      7.5 mg         9      7.5 mg           10      7.5 mg         11      3.75 mg         12      7.5 mg         13      7.5 mg         14      7.5 mg         15      7.5 mg         16      7.5 mg           17      7.5 mg         18      3.75 mg         19      7.5 mg         20      7.5 mg         21      7.5 mg         22      7.5 mg         23      7.5 mg           24      7.5 mg         25      3.75 mg         26      7.5 mg         27      7.5 mg         28      7.5 mg         29      7.5 mg         30      7.5 mg          Date Details   No additional details            How to take your warfarin dose     To take:  3.75 mg Take 0.5 of a 7.5 mg tablet.    To take:  7.5 mg Take 1 of the 7.5 mg tablets.           October 2017 Details    Sun Mon Tue Wed Thu Fri Sat     1      7.5 mg         2      3.75 mg         3      7.5 mg         4      7.5 mg         5      7.5 mg         6      7.5 mg         7      7.5 mg           8      7.5 mg         9      3.75 mg         10      7.5 mg         11      7.5 mg         12      7.5 mg         13      7.5 mg         14      7.5 mg           15      7.5 mg         16      3.75 mg         17      7.5 mg         18      7.5 mg         19      7.5 mg         20      7.5 mg         21      7.5 mg           22      7.5 mg         23      3.75 mg         24      7.5 mg         25      7.5 mg         26      7.5 mg         27      7.5 mg         28      7.5 mg           29      7.5 mg         30      3.75 mg         31      7.5 mg              Date Details   No additional details            How to take your warfarin dose     To take:  3.75 mg Take 0.5 of a 7.5 mg tablet.    To take:  7.5 mg Take 1 of the 7.5 mg tablets.           November 2017 Details    Sun Mon Tue Wed Thu Fri Sat        1      7.5 mg         2      7.5 mg         3       7.5 mg         4      7.5 mg           5      7.5 mg         6      3.75 mg         7      7.5 mg         8            9               10               11                 12               13               14               15               16               17               18                 19               20               21               22               23               24               25                 26               27               28               29               30                  Date Details   No additional details    Date of next INR:  11/8/2017         How to take your warfarin dose     To take:  3.75 mg Take 0.5 of a 7.5 mg tablet.    To take:  7.5 mg Take 1 of the 7.5 mg tablets.

## 2017-08-16 NOTE — PROGRESS NOTES
ANTICOAGULATION FOLLOW-UP CLINIC VISIT    Patient Name:  Benjamín Hyman  Date:  8/16/2017  Contact Type:  Face to Face    SUBJECTIVE:     Patient Findings     Positives Change in diet/appetite (appetite is down with the warmer weather; eating biggest meal in the AM and just light snacking in the day), Other complaints (had a bout of diarrhea/GI illness approx two weeks ago)           OBJECTIVE    INR Protime   Date Value Ref Range Status   08/16/2017 3.2 (A) 0.86 - 1.14 Final       ASSESSMENT / PLAN  INR assessment SUPRA    Recheck INR In: 8 WEEKS 12 weeks   INR Location Clinic      Anticoagulation Summary as of 8/16/2017     INR goal 2.0-3.0   Today's INR 3.2!   Maintenance plan 3.75 mg (7.5 mg x 0.5) on Mon; 7.5 mg (7.5 mg x 1) all other days   Full instructions 3.75 mg on Mon; 7.5 mg all other days   Weekly total 48.75 mg   No change documented Jing Lozano RN   Plan last modified Jing Lozano RN (4/12/2017)   Next INR check 11/8/2017   Priority INR   Target end date Indefinite    Indications   Atrial fibrillation (H) [I48.91]  Long term current use of anticoagulant therapy [Z79.01]         Anticoagulation Episode Summary     INR check location     Preferred lab     Send INR reminders to JOSSIE WILLIAM    Comments * 7.5mg tablets. Dr. Teran Ok with 12 week rechecks.      Anticoagulation Care Providers     Provider Role Specialty Phone number    Ruben Teran MD [facility] [phone]            See the Encounter Report to view Anticoagulation Flowsheet and Dosing Calendar (Go to Encounters tab in chart review, and find the Anticoagulation Therapy Visit)    [facility] [place].    Jing Lozano, RN

## 2017-09-26 ENCOUNTER — OFFICE VISIT (OUTPATIENT)
Dept: FAMILY MEDICINE | Facility: CLINIC | Age: 67
End: 2017-09-26
Payer: COMMERCIAL

## 2017-09-26 ENCOUNTER — TELEPHONE (OUTPATIENT)
Dept: FAMILY MEDICINE | Facility: CLINIC | Age: 67
End: 2017-09-26

## 2017-09-26 VITALS
SYSTOLIC BLOOD PRESSURE: 122 MMHG | WEIGHT: 263 LBS | HEIGHT: 65 IN | TEMPERATURE: 98.9 F | HEART RATE: 56 BPM | DIASTOLIC BLOOD PRESSURE: 64 MMHG | BODY MASS INDEX: 43.82 KG/M2

## 2017-09-26 DIAGNOSIS — M20.5X9: Primary | ICD-10-CM

## 2017-09-26 DIAGNOSIS — Z12.11 SPECIAL SCREENING FOR MALIGNANT NEOPLASMS, COLON: ICD-10-CM

## 2017-09-26 PROCEDURE — 99213 OFFICE O/P EST LOW 20 MIN: CPT | Performed by: FAMILY MEDICINE

## 2017-09-26 RX ORDER — OMEPRAZOLE 20 MG/1
TABLET, DELAYED RELEASE ORAL DAILY
Qty: 30 TABLET | COMMUNITY
Start: 2017-09-26 | End: 2017-11-08

## 2017-09-26 NOTE — PROGRESS NOTES
"  SUBJECTIVE:   Benjamín Hyman is a 66 year old male who presents to clinic today for the following health issues:      Concern - Bilateral Foot Pain  Onset: Years    Description:   Pt states that he has a curving of his first 2 toes on each foot. He states that this causes him pain when he walks or stands.     Intensity: 1-7/10    Progression of Symptoms:  worsening    Accompanying Signs & Symptoms:  Curving of the fist 2 toes on each foot. Limping    Previous history of similar problem:   None, Mother had the same problem. He believes it is hereditary.    Precipitating factors:   Worsened by: Walking or standing    Alleviating factors:  Improved by: Wearing slippers, resting his feet, soaking feet in warm water, regular toenail trimmings. These help the pain.    Therapies Tried and outcome: None. Pt is open to seeing podiatry.          Problem list and histories reviewed & adjusted, as indicated.  Additional history: as documented        Reviewed and updated as needed this visit by clinical staff     Reviewed and updated as needed this visit by Provider         ROS:  CONSTITUTIONAL:NEGATIVE for fever, chills, change in weight  INTEGUMENTARY/SKIN: NEGATIVE for worrisome rashes, moles or lesions  MUSCULOSKELETAL: as above  NEURO: NEGATIVE for weakness, dizziness or paresthesias  PSYCHIATRIC: NEGATIVE for changes in mood or affect    OBJECTIVE:                                                    /64 (BP Location: Right arm, Patient Position: Sitting, Cuff Size: Adult Large)  Pulse 56  Temp 98.9  F (37.2  C) (Tympanic)  Ht 5' 4.5\" (1.638 m)  Wt 263 lb (119.3 kg)  BMI 44.45 kg/m2  Body mass index is 44.45 kg/(m^2).  GENERAL APPEARANCE: alert, no distress and cooperative  MS: noted that on both feet second toe is over riding on big toes bilaterally  SKIN: no suspicious lesions or rashes  NEURO: Normal strength and tone, mentation intact and speech normal  PSYCH: mentation appears normal and affect " normal/bright         ASSESSMENT/PLAN:                                                    1. Over-riding toe, unspecified laterality  Referral is done, discussed shoes and having a wider toe box etc  - ORTHO  REFERRAL    2. Special screening for malignant neoplasms, colon    - Fecal colorectal cancer screen (FIT); Future      See Patient Instructions    Ruben Teran MD  [facility]

## 2017-09-26 NOTE — MR AVS SNAPSHOT
After Visit Summary   9/26/2017    Benjamín Hyman    MRN: [id number]           Patient Information     Date Of Birth          1950        Visit Information        Provider Department      9/26/2017 9:00 AM Ruben Teran MD [facility]        Today's Diagnoses     Over-riding toe, unspecified laterality    -  1      Care Instructions    Please see podiatry regarding your toes and counseling on a permament solution.          Thank you for choosing [facility].  You may be receiving a survey in the mail from [employer] regarding your visit today.  Please take a few minutes to complete and return the survey to let us know how we are doing.      If you have questions or concerns, please contact us via [employer] or you can contact your care team at [phone].    Our Clinic hours are:  Monday 6:40 am  to 7:00 pm  Tuesday -Friday 6:40 am to 5:00 pm    The [place] outpatient lab hours are:  Monday - Friday 6:10 am to 4:45 pm  Saturdays 7:00 am to 11:00 am  Appointments are required, call [phone]    If you have clinical questions after hours or would like to schedule an appointment,  call the clinic at [phone].            Follow-ups after your visit        Additional Services     ORTHO  REFERRAL       [facility] is referring you to the Orthopedic  Services at [facility].       The  Representative will assist you in the coordination of your Orthopedic and Musculoskeletal Care as prescribed by your physician.    The  Representative will call you within 1 business day to help schedule your appointment, or you may contact the  Representative at:    All areas ~ [phone]     Type of Referral : [facility]       Timeframe requested: Routine    Coverage of these services is subject to the terms and limitations of your health insurance plan.  Please call member  "services at your health plan with any benefit or coverage questions.      If X-rays, CT or MRI's have been performed, please contact the facility where they were done to arrange for , prior to your scheduled appointment.  Please bring this referral request to your appointment and present it to your specialist.    Patient has over riding toes, on both feet.  The second toes are on top of the big toes bilaterally                  Follow-up notes from your care team     Return if symptoms worsen or fail to improve.      Your next 10 appointments already scheduled     Nov 08, 2017  9:15 AM CST   Anticoagulation Visit with PI ANTI COAG   [facility]    [address] [address] [phone]              Who to contact     If you have questions or need follow up information about today's clinic visit or your schedule please contact [facility] directly at [phone].  Normal or non-critical lab and imaging results will be communicated to you by MyChart, letter or phone within 4 business days after the clinic has received the results. If you do not hear from us within 7 days, please contact the clinic through MyChart or phone. If you have a critical or abnormal lab result, we will notify you by phone as soon as possible.  Submit refill requests through [employer] or call your pharmacy and they will forward the refill request to us. Please allow 3 business days for your refill to be completed.          Additional Information About Your Visit        [facility] [employer] [employer] Information     [employer] lets you send messages to your doctor, view your test results, renew your prescriptions, schedule appointments and more. To sign up, go to [email or web address] [email or web address] . Click on \"Log in\" on the left side of the screen, which will take you to the Welcome page. Then click on \"Sign up Now\" on the right side of the page.     You will be asked to enter the access code " "listed below, as well as some personal information. Please follow the directions to create your username and password.     Your access code is: [email or web address]  Expires: 2017  9:44 AM     Your access code will  in 90 days. If you need help or a new code, please call your [place] clinic or [phone].        Care EveryWhere ID     This is your Care EveryWhere ID. This could be used by other organizations to access your [place] medical records  [id number]        Your Vitals Were     Pulse Temperature Height BMI (Body Mass Index)          56 98.9  F (37.2  C) (Tympanic) 5' 4.5\" (1.638 m) 44.45 kg/m2         Blood Pressure from Last 3 Encounters:   17 122/64   17 102/70   17 122/84    Weight from Last 3 Encounters:   17 263 lb (119.3 kg)   17 270 lb (122.5 kg)   17 273 lb (123.8 kg)              We Performed the Following     ORTHO  REFERRAL        Primary Care Provider Office Phone # Fax #    Ruben Teran [phone] [address] [address]        Equal Access to Services     NINA SOLANO AH: Hadii juana bravo hadasho Soomaali, waaxda luqadaha, qaybta kaalmada adeegyada, waxay idiin della galaviz. So [facility] [phone].    ATENCIÓN: Si habla español, tiene a zmimerman disposición servicios gratuitos de asistencia lingüística. Llame al [phone].    We comply with applicable federal civil rights laws and [place] laws. We do not discriminate on the basis of race, color, national origin, age, disability sex, sexual orientation or gender identity.            Thank you!     Thank you for choosing [facility]  for your care. Our goal is always to provide you with excellent care. Hearing back from our patients is one way we can continue to improve our services. Please take a few minutes to complete the written survey that you may receive in the mail after your visit with us. Thank you!             Your " Updated Medication List - Protect others around you: Learn how to safely use, store and throw away your medicines at www.disposemymeds.org.          This list is accurate as of: 9/26/17  9:44 AM.  Always use your most recent med list.                   Brand Name Dispense Instructions for use Diagnosis    digoxin 250 MCG tablet    LANOXIN    30 tablet    Take 1 tab on even days & 1/2 tab on odd day of the month. Patient has been on this medication for year & atrial fibrillation is controlled.    Persistent atrial fibrillation (H)       lisinopril 10 MG tablet    PRINIVIL/ZESTRIL    30 tablet    Take 1 tablet (10 mg) by mouth daily    Persistent atrial fibrillation (H)       metoprolol 200 MG 24 hr tablet    TOPROL-XL    30 tablet    Take 1 tablet (200 mg) by mouth daily    Persistent atrial fibrillation (H)       simvastatin 40 MG tablet    ZOCOR    15 tablet    Take 1 tablet (40 mg) by mouth every other day    Persistent atrial fibrillation (H)       warfarin 7.5 MG tablet    COUMADIN    80 tablet    Take 3.75mg by mouth every Monday and 7.5mg all other days or as directed by Anticoagulation Clinic (file until pt calls for refill)    Persistent atrial fibrillation (H)

## 2017-09-26 NOTE — PATIENT INSTRUCTIONS
Please see podiatry regarding your toes and counseling on a permament solution.          Thank you for choosing [facility].  You may be receiving a survey in the mail from [employer] regarding your visit today.  Please take a few minutes to complete and return the survey to let us know how we are doing.      If you have questions or concerns, please contact us via [employer] or you can contact your care team at [phone].    Our Clinic hours are:  Monday 6:40 am  to 7:00 pm  Tuesday -Friday 6:40 am to 5:00 pm    The [place] outpatient lab hours are:  Monday - Friday 6:10 am to 4:45 pm  Saturdays 7:00 am to 11:00 am  Appointments are required, call [phone]    If you have clinical questions after hours or would like to schedule an appointment,  call the clinic at [phone].

## 2017-09-26 NOTE — NURSING NOTE
"Chief Complaint   Patient presents with     Foot problem     Imm/Inj     Pt declined his Tdap, Flu and PCV13 injections today.     Health Maintenance     Wants a FIT Test       Initial /64 (BP Location: Right arm, Patient Position: Sitting, Cuff Size: Adult Large)  Pulse 56  Temp 98.9  F (37.2  C) (Tympanic)  Ht 5' 4.5\" (1.638 m)  Wt 263 lb (119.3 kg)  BMI 44.45 kg/m2 Estimated body mass index is 44.45 kg/(m^2) as calculated from the following:    Height as of this encounter: 5' 4.5\" (1.638 m).    Weight as of this encounter: 263 lb (119.3 kg).  Medication Reconciliation: complete    "

## 2017-09-26 NOTE — TELEPHONE ENCOUNTER
"\"Omeprazole - it is an over the counter drug I bought at [employer] a year ago. I took one per day. \"  He wasn't able to tell me Mg or any other info on it.   \"I can't read the box, it is too small print\"   Dr Teran he says he was supposed to call you back with the name of med, and you are going to prescribe it.     Thanks,   Muna Nguyen RNC      "

## 2017-09-26 NOTE — TELEPHONE ENCOUNTER
Patient requesting  Medication for acid reflex  Patient had appointment  Today  With Dr Teran  He has used   Omeprazole - magnesium   In the past   Please call and advise  Robina Mcqueen- CSS

## 2017-09-26 NOTE — TELEPHONE ENCOUNTER
He was having some stomach issues and he can take this medication.  He can take omeprazole 20 mg a day.  He took it last year and it seemed to help him .  Sincerely,  Ruben Teran MD

## 2017-10-04 ENCOUNTER — OFFICE VISIT (OUTPATIENT)
Dept: PODIATRY | Facility: CLINIC | Age: 67
End: 2017-10-04
Payer: COMMERCIAL

## 2017-10-04 ENCOUNTER — RADIANT APPOINTMENT (OUTPATIENT)
Dept: GENERAL RADIOLOGY | Facility: CLINIC | Age: 67
End: 2017-10-04
Attending: PODIATRIST
Payer: COMMERCIAL

## 2017-10-04 VITALS — HEIGHT: 64 IN | HEART RATE: 64 BPM | WEIGHT: 263 LBS | BODY MASS INDEX: 44.9 KG/M2

## 2017-10-04 DIAGNOSIS — M20.11 HALLUX VALGUS, ACQUIRED, BILATERAL: ICD-10-CM

## 2017-10-04 DIAGNOSIS — M79.672 BILATERAL FOOT PAIN: Primary | ICD-10-CM

## 2017-10-04 DIAGNOSIS — M20.12 HALLUX VALGUS, ACQUIRED, BILATERAL: ICD-10-CM

## 2017-10-04 DIAGNOSIS — M79.671 BILATERAL FOOT PAIN: Primary | ICD-10-CM

## 2017-10-04 DIAGNOSIS — M79.672 BILATERAL FOOT PAIN: ICD-10-CM

## 2017-10-04 DIAGNOSIS — M20.41 HAMMERTOE OF SECOND TOE OF RIGHT FOOT: ICD-10-CM

## 2017-10-04 DIAGNOSIS — M79.671 BILATERAL FOOT PAIN: ICD-10-CM

## 2017-10-04 PROCEDURE — 99204 OFFICE O/P NEW MOD 45 MIN: CPT | Performed by: PODIATRIST

## 2017-10-04 PROCEDURE — 73630 X-RAY EXAM OF FOOT: CPT | Mod: RT

## 2017-10-04 NOTE — PROGRESS NOTES
PATIENT HISTORY:  Benjamín Hyman is a 66 year old male who presents to clinic for a painful right foot .  The patient describes the pain as sharp aching.  The patient relates the pain level is moderate.  The patient relates pain is located over the big toe joint and second toe on the right..  The patient relates the pain has been present for the past several months.  The patient relates pain with ambulation.  The patient has tried wider shoes with little relief.       REVIEW OF SYSTEMS:  Constitutional, HEENT, cardiovascular, pulmonary, GI, , musculoskeletal, neuro, skin, endocrine and psych systems are negative, except as otherwise noted.     PAST MEDICAL HISTORY:   Past Medical History:   Diagnosis Date     Atrial fibrillation (H)      Cardiomyopathy in other diseases classified elsewhere         PAST SURGICAL HISTORY: No past surgical history on file.     MEDICATIONS:   Current Outpatient Prescriptions:      omeprazole (PRILOSEC OTC) 20 MG tablet, Take by mouth daily, Disp: 30 tablet, Rfl:      warfarin (COUMADIN) 7.5 MG tablet, Take 3.75mg by mouth every Monday and 7.5mg all other days or as directed by Anticoagulation Clinic (file until pt calls for refill), Disp: 80 tablet, Rfl: 1     digoxin (LANOXIN) 250 MCG tablet, Take 1 tab on even days & 1/2 tab on odd day of the month. Patient has been on this medication for year & atrial fibrillation is controlled., Disp: 30 tablet, Rfl: 11     metoprolol (TOPROL-XL) 200 MG 24 hr tablet, Take 1 tablet (200 mg) by mouth daily, Disp: 30 tablet, Rfl: 11     lisinopril (PRINIVIL/ZESTRIL) 10 MG tablet, Take 1 tablet (10 mg) by mouth daily, Disp: 30 tablet, Rfl: 11     simvastatin (ZOCOR) 40 MG tablet, Take 1 tablet (40 mg) by mouth every other day, Disp: 15 tablet, Rfl: 11     ALLERGIES:    Allergies   Allergen Reactions     Latex      Adhesive Tape Rash     Reacted to the EKG stickers        SOCIAL HISTORY:   Social History     Social History     Marital status: Single  "    Spouse name: N/A     Number of children: N/A     Years of education: N/A     Occupational History     Not on file.     Social History Main Topics     Smoking status: Former Smoker     Types: Cigarettes     Quit date: 1/1/1996     Smokeless tobacco: Never Used     Alcohol use Yes      Comment: 9/28/2015 Occsional beer, not often.  Quit drinking 4-1-10/  drank around [place] 2010 4- 2-3 beers every other week.      Drug use: No     Sexual activity: Not Currently     Other Topics Concern     Parent/Sibling W/ Cabg, Mi Or Angioplasty Before 65f 55m? Yes     Father fatal MI at 46yo     Social History Narrative        FAMILY HISTORY:   Family History   Problem Relation Age of Onset     Cancer - colorectal Mother      C.A.D. Father      HEART DISEASE Father      Unknown/Adopted Maternal Grandmother      Unknown/Adopted Paternal Grandmother      CEREBROVASCULAR DISEASE Paternal Grandfather         EXAM:Vitals: Pulse 64  Ht 1.626 m (5' 4\")  Wt 119.3 kg (263 lb)  BMI 45.14 kg/m2  BMI= Body mass index is 45.14 kg/(m^2).    Weight management plan: Patient was referred to their PCP to discuss a diet and exercise plan.    General appearance: Patient is alert and fully cooperative with history & exam.  No sign of distress is noted during the visit.     Psychiatric: Affect is pleasant & appropriate.  Patient appears motivated to improve health.     Respiratory: Breathing is regular & unlabored while sitting.     HEENT: Hearing is intact to spoken word.  Speech is clear.  No gross evidence of visual impairment that would impact ambulation.     Dermatologic: Skin is intact to both lower extremities without significant lesions, rash or abrasion.  No paronychia or evidence of soft tissue infection is noted.     Vascular: DP & PT pulses are intact & regular bilaterally.  No significant edema or varicosities noted.  CFT and skin temperature is normal to both lower extremities.     Neurologic: Lower extremity sensation " is intact to light touch.  No evidence of weakness or contracture in the lower extremities.  No evidence of neuropathy.     Musculoskeletal: Patient is ambulatory without assistive device or brace.  No gross ankle deformity noted.  No foot or ankle joint effusion is noted.  Noted moderate bunion deformity with an overlaping second toe hammertoe deformity on the right.  No surrounding erythema or edema noted.    Radiographs were evaluated including AP, lateral and medial oblique views of the right foot reveals a moderate bunion deformity and hammertoe deformity of the second toe.  No cortical erosions or periosteal elevation.  All joint margins appear stable.  There is no apparent fracture or tumor formation noted.  There is no evidence of foreign body.    ASSESSMENT / PLAN:     ICD-10-CM    1. Bilateral foot pain M79.671 XR Foot Bilateral G/E 3 Views    M79.672    2. Hallux valgus, acquired, bilateral M20.11     M20.12    3. Hammertoe of second toe of right foot M20.41        I have explained to Benjamín  about the conditions.  We discussed the nature of the condition as well as the treatment plan and expected length of recovery.  At this time, the patient would like to proceed with surgery on the right foot.  At this point, I am recommending surgical treatment of the condition involving a Lapidus bunionectomy and second toe hammertoe correction on the right foot.  I informed the patient in risks and benefits of the procedure including but not limited to infection, wound complications, swelling, pain, diminished range of motion and function, DVT and reoccurrence of condition.  The procedure will be performed under local MAC with popliteal block anesthesia.  The patient will obtain a preoperative history and physical by the primary care provider as well as .  Consents will be reviewed and signed on the day of surgery.        Disclaimer: This note consists of symbols derived from keyboarding, dictation and/or voice  recognition software. As a result, there may be errors in the script that have gone undetected. Please consider this when interpreting information found in this chart.       MODE Lxu D.P.M., BIRD.F.A.S.

## 2017-10-04 NOTE — PATIENT INSTRUCTIONS
You have elected to proceed with Surgery for a Lapidus bunionectomy and second toe hammertoe repair of the right foot.  Surgeries are performed on Tuesdays at [facility].   To schedule your surgery date please call [phone].  Please leave a message with a good time for our staff to call you back.    - Please have a date in mind for your surgery, you can feel free to leave that date on the message, and we will schedule and call back to confirm.     You can expect receive a call back the same day or on the next business day from Dr. Lux s team to assist in the scheduling.   - We will schedule the date of your surgery.  The time will be determined a few days ahead of time.  You can expect a call from Same Day Surgery 2-3 days ahead of time with specific instructions for what time to arrive at the hospital as well as any other preparations you should take prior to surgery.    - You may need to obtain a pre-operative physical from your primary medical provider. This must be done within 30 days of your surgery date.    - We will also schedule your first post-operative appointment for a bandage and wound check for the Monday following your surgery at the [place] location.    - You may be non-weight bearing for a period of up to 6 weeks.  Options for this include: (Please indicate which you would prefer so we can provide you with an order and instructions)  o Crutches  o Walker  o Roll-a-bout knee walker.    - If you will need paperwork filled out for your employer you may drop those off at the clinic directly or you may have those faxed to us at [phone].  Please indicate on the form the date you would like the FMLA to begin if it will not be your surgery date.    The forms are typically filled out for up to 12 weeks, however you may be cleared to return prior to that time depending on your individual healing and job requirements.

## 2017-10-04 NOTE — LETTER
SURGERYPLANNING/SCHEDULING WORKSHEET                              [facility]  [address] [place] [address]  [phone]                          Benjamín Hyman                :  1950  MRN:  [id number]  Phone: [phone] (home)     Same Day Surgery   Surgeon: Mandeep Lux DPM  Diagnosis:   1.  Hallux valgus right foot.  2. Hammertoe second toe right foot  Allergies:  Latex and Adhesive tape   A preoperative evaluation by the primary care provider has been requested to summarize and modify, where possible, medical risks to the proposed surgery.  Specific risks requiring evaluation include   Patient Active Problem List    Diagnosis     Morbid obesity due to excess calories (H)     Stomach ulcer     Advanced directives, counseling/discussion     Hyperlipidemia with target LDL less than 100     Health Care Home     Umbilical hernia     Immunization (Tdap) not carried out because of patient refusal     FAMILY HISTORY OF GI NEOPLASM     Atrial fibrillation (H)     Long term current use of anticoagulant therapy     ====================================================  Surgical Procedure:  Orthopedics:  1.  Lapidus bunionectomy right foot  2. Hammertoe correction, second toe right foot  Length of Procedure:  90  Type of anesthesia:  Local with MAC and Popliteal block    The proposed surgical procedure is considered INTERMEDIATE risk.  Date of Procedure:________________    Time: _____________________       Special Equipment: Arthrex large comprehensive plate set; mini c-arm  Informed Consent Obtained and Signed:  NO  ====================================================  Instructions to Same Day Surgery Staff  none  Preop Antibiotic:  Ancef 2 gm IV  pre-op within one hour prior to incision For > 80 kg  Preop Pain Meds:  None  Preop Orders:  Routine Standing  Orders.  ====================================================  Instructions to the patient:  Preop physical exam scheduled (within 30 days or 7 days prior) with:   ____________________  Clinic:  ____________________                                         Date______________Time_________________________  Come to the hospital at: ________________________________  HOME PREPARATION:   Shower with Hibiclens the night before or the morning of surgery, gently cleaning skin from neck to feet  Bathe and brush teeth the morning of surgery.  Take medications with a sip of water the morning of surgery:   Check with  if taking insulin.  May have  a light meal, toast and clear liquids, up to 8 hrs before surgery  May have clear liquids (liquids one can read through) up to 4 hrs before surgery  NOTHING after 4 hrs before surgery  Stop aspirin 7-10 days before surgery  Stop NSAIDS (Ibuproven, Naproxen, etc) 5 days before surgery  Stop Plavix 7-10 days before surgery      Mandeep Lux DPM    10/4/2017  This form was electronically signed at chart closure                                                                        Chart Copy

## 2017-10-04 NOTE — MR AVS SNAPSHOT
After Visit Summary   10/4/2017    Benjamín Hyman    MRN: [id number]           Patient Information     Date Of Birth          1950        Visit Information        Provider Department      10/4/2017 10:20 AM Mandeep Lux DPM [facility]        Today's Diagnoses     Bilateral foot pain    -  1    Hallux valgus, acquired, bilateral        Hammertoe of second toe of right foot          Care Instructions    You have elected to proceed with Surgery for a Lapidus bunionectomy and second toe hammertoe repair of the right foot.  Surgeries are performed on Tuesdays at [facility].   To schedule your surgery date please call [phone].  Please leave a message with a good time for our staff to call you back.    - Please have a date in mind for your surgery, you can feel free to leave that date on the message, and we will schedule and call back to confirm.     You can expect receive a call back the same day or on the next business day from Dr. Lux s team to assist in the scheduling.   - We will schedule the date of your surgery.  The time will be determined a few days ahead of time.  You can expect a call from Same Day Surgery 2-3 days ahead of time with specific instructions for what time to arrive at the hospital as well as any other preparations you should take prior to surgery.    - You may need to obtain a pre-operative physical from your primary medical provider. This must be done within 30 days of your surgery date.    - We will also schedule your first post-operative appointment for a bandage and wound check for the Monday following your surgery at the [place] location.    - You may be non-weight bearing for a period of up to 6 weeks.  Options for this include: (Please indicate which you would prefer so we can provide you with an order and instructions)  o Crutches  o Walker  o Roll-a-bout knee walker.    - If you will need paperwork filled out for  "your employer you may drop those off at the clinic directly or you may have those faxed to us at [phone].  Please indicate on the form the date you would like the FMLA to begin if it will not be your surgery date.    The forms are typically filled out for up to 12 weeks, however you may be cleared to return prior to that time depending on your individual healing and job requirements.              Follow-ups after your visit        Your next 10 appointments already scheduled     Nov 08, 2017  9:15 AM CST   [facility] with [facility]   [facility]    [address] [facility] [id number] [phone]              Who to contact     If you have questions or need follow up information about today's clinic visit or your schedule please contact [facility] directly at [phone].  Normal or non-critical lab and imaging results will be communicated to you by MyChart, letter or phone within 4 business days after the clinic has received the results. If you do not hear from us within 7 days, please contact the clinic through MyChart or phone. If you have a critical or abnormal lab result, we will notify you by phone as soon as possible.  Submit refill requests through [employer] or call your pharmacy and they will forward the refill request to us. Please allow 3 business days for your refill to be completed.          Additional Information About Your Visit        MyChart Information     [employer] lets you send messages to your doctor, view your test results, renew your prescriptions, schedule appointments and more. To sign up, go to [email or web address] . Click on \"Log in\" on the left side of the screen, which will take you to the Welcome page. Then click on \"Sign up Now\" on the right side of the page.     You will be asked to enter the access code listed below, as well as some personal information. Please follow the directions to " "create your username and password.     Your access code is: [email or web address]  Expires: 2017  9:44 AM     Your access code will  in 90 days. If you need help or a new code, please call your [place] clinic or [phone].        Care EveryWhere ID     This is your Care EveryWhere ID. This could be used by other organizations to access your [facility] medical records  [id number]        Your Vitals Were     Pulse Height BMI (Body Mass Index)             64 1.626 m (5' 4\") 45.14 kg/m2          Blood Pressure from Last 3 Encounters:   17 122/64   17 102/70   17 122/84    Weight from Last 3 Encounters:   10/04/17 119.3 kg (263 lb)   17 119.3 kg (263 lb)   17 122.5 kg (270 lb)               Primary Care Provider Office Phone # Fax #    Ruben Teran [phone] [address] [address]        Equal Access to Services     [facility] : Hadii juana ku hadasho Soomaali, waaxda luqadaha, qaybta kaalmada desire, sagar galaviz. So [facility] [place] [phone].    ATENCIÓN: Si habla español, tiene a zimmerman disposición servicios gratuitos de asistencia lingüística. [facility] [phone].    We comply with applicable federal civil rights laws and [place] laws. We do not discriminate on the basis of race, color, national origin, age, disability, sex, sexual orientation, or gender identity.            Thank you!     Thank you for choosing [facility]  for your care. Our goal is always to provide you with excellent care. Hearing back from our patients is one way we can continue to improve our services. Please take a few minutes to complete the written survey that you may receive in the mail after your visit with us. Thank you!             Your Updated Medication List - Protect others around you: Learn how to safely use, store and throw away your medicines at www.disposemymeds.org.          This list is accurate as of: " 10/4/17 11:11 AM.  Always use your most recent med list.                   Brand Name Dispense Instructions for use Diagnosis    digoxin 250 MCG tablet    LANOXIN    30 tablet    Take 1 tab on even days & 1/2 tab on odd day of the month. Patient has been on this medication for year & atrial fibrillation is controlled.    Persistent atrial fibrillation (H)       lisinopril 10 MG tablet    PRINIVIL/ZESTRIL    30 tablet    Take 1 tablet (10 mg) by mouth daily    Persistent atrial fibrillation (H)       metoprolol 200 MG 24 hr tablet    TOPROL-XL    30 tablet    Take 1 tablet (200 mg) by mouth daily    Persistent atrial fibrillation (H)       omeprazole 20 MG tablet    priLOSEC OTC    30 tablet    Take by mouth daily        simvastatin 40 MG tablet    ZOCOR    15 tablet    Take 1 tablet (40 mg) by mouth every other day    Persistent atrial fibrillation (H)       warfarin 7.5 MG tablet    COUMADIN    80 tablet    Take 3.75mg by mouth every Monday and 7.5mg all other days or as directed by Anticoagulation Clinic (file until pt calls for refill)    Persistent atrial fibrillation (H)

## 2017-10-04 NOTE — LETTER
10/4/2017         RE: Benjamín Hyman  [address] [place] [address] [address] BRIAN TOMLIN [address] [place] [address]        Dear Colleague,    Thank you for referring your patient, Benjamín Hyman, to the [facility]. Please see a copy of my visit note below.    PATIENT HISTORY:  Benjamín Hyman is a 66 year old male who presents to clinic for a painful right foot .  The patient describes the pain as sharp aching.  The patient relates the pain level is moderate.  The patient relates pain is located over the big toe joint and second toe on the right..  The patient relates the pain has been present for the past several months.  The patient relates pain with ambulation.  The patient has tried wider shoes with little relief.       REVIEW OF SYSTEMS:  Constitutional, HEENT, cardiovascular, pulmonary, GI, , musculoskeletal, neuro, skin, endocrine and psych systems are negative, except as otherwise noted.     PAST MEDICAL HISTORY:   Past Medical History:   Diagnosis Date     Atrial fibrillation (H)      Cardiomyopathy in other diseases classified elsewhere         PAST SURGICAL HISTORY: No past surgical history on file.     MEDICATIONS:   Current Outpatient Prescriptions:      omeprazole (PRILOSEC OTC) 20 MG tablet, Take by mouth daily, Disp: 30 tablet, Rfl:      warfarin (COUMADIN) 7.5 MG tablet, Take 3.75mg by mouth every Monday and 7.5mg all other days or as directed by Anticoagulation Clinic (file until pt calls for refill), Disp: 80 tablet, Rfl: 1     digoxin (LANOXIN) 250 MCG tablet, Take 1 tab on even days & 1/2 tab on odd day of the month. Patient has been on this medication for year & atrial fibrillation is controlled., Disp: 30 tablet, Rfl: 11     metoprolol (TOPROL-XL) 200 MG 24 hr tablet, Take 1 tablet (200 mg) by mouth daily, Disp: 30 tablet, Rfl: 11     lisinopril (PRINIVIL/ZESTRIL) 10 MG tablet, Take 1 tablet (10 mg) by mouth daily, Disp: 30 tablet, Rfl: 11     simvastatin (ZOCOR) 40 MG tablet, Take 1 tablet (40  "mg) by mouth every other day, Disp: 15 tablet, Rfl: 11     ALLERGIES:    Allergies   Allergen Reactions     Latex      Adhesive Tape Rash     Reacted to the EKG stickers        SOCIAL HISTORY:   Social History     Social History     Marital status: Single     Spouse name: N/A     Number of children: N/A     Years of education: N/A     Occupational History     Not on file.     Social History Main Topics     Smoking status: Former Smoker     Types: Cigarettes     Quit date: 1/1/1996     Smokeless tobacco: Never Used     Alcohol use Yes      Comment: 9/28/2015 Occsional beer, not often.  Quit drinking 4-1-10/  drank around [place] 2010 4- 2-3 beers every other week.      Drug use: No     Sexual activity: Not Currently     Other Topics Concern     Parent/Sibling W/ Cabg, Mi Or Angioplasty Before 65f 55m? Yes     Father fatal MI at 46yo     Social History Narrative        FAMILY HISTORY:   Family History   Problem Relation Age of Onset     Cancer - colorectal Mother      C.A.D. Father      HEART DISEASE Father      Unknown/Adopted Maternal Grandmother      Unknown/Adopted Paternal Grandmother      CEREBROVASCULAR DISEASE Paternal Grandfather         EXAM:Vitals: Pulse 64  Ht 1.626 m (5' 4\")  Wt 119.3 kg (263 lb)  BMI 45.14 kg/m2  BMI= Body mass index is 45.14 kg/(m^2).    Weight management plan: Patient was referred to their PCP to discuss a diet and exercise plan.    General appearance: Patient is alert and fully cooperative with history & exam.  No sign of distress is noted during the visit.     Psychiatric: Affect is pleasant & appropriate.  Patient appears motivated to improve health.     Respiratory: Breathing is regular & unlabored while sitting.     HEENT: Hearing is intact to spoken word.  Speech is clear.  No gross evidence of visual impairment that would impact ambulation.     Dermatologic: Skin is intact to both lower extremities without significant lesions, rash or abrasion.  No paronychia or " evidence of soft tissue infection is noted.     Vascular: DP & PT pulses are intact & regular bilaterally.  No significant edema or varicosities noted.  CFT and skin temperature is normal to both lower extremities.     Neurologic: Lower extremity sensation is intact to light touch.  No evidence of weakness or contracture in the lower extremities.  No evidence of neuropathy.     Musculoskeletal: Patient is ambulatory without assistive device or brace.  No gross ankle deformity noted.  No foot or ankle joint effusion is noted.  Noted moderate bunion deformity with an overlaping second toe hammertoe deformity on the right.  No surrounding erythema or edema noted.    Radiographs were evaluated including AP, lateral and medial oblique views of the right foot reveals a moderate bunion deformity and hammertoe deformity of the second toe.  No cortical erosions or periosteal elevation.  All joint margins appear stable.  There is no apparent fracture or tumor formation noted.  There is no evidence of foreign body.    ASSESSMENT / PLAN:     ICD-10-CM    1. Bilateral foot pain M79.671 XR Foot Bilateral G/E 3 Views    M79.672    2. Hallux valgus, acquired, bilateral M20.11     M20.12    3. Hammertoe of second toe of right foot M20.41        I have explained to Benjamín  about the conditions.  We discussed the nature of the condition as well as the treatment plan and expected length of recovery.  At this time, the patient would like to proceed with surgery on the right foot.  At this point, I am recommending surgical treatment of the condition involving a Lapidus bunionectomy and second toe hammertoe correction on the right foot.  I informed the patient in risks and benefits of the procedure including but not limited to infection, wound complications, swelling, pain, diminished range of motion and function, DVT and reoccurrence of condition.  The procedure will be performed under local MAC with popliteal block anesthesia.  The  patient will obtain a preoperative history and physical by the primary care provider as well as .  Consents will be reviewed and signed on the day of surgery.        Disclaimer: This note consists of symbols derived from keyboarding, dictation and/or voice recognition software. As a result, there may be errors in the script that have gone undetected. Please consider this when interpreting information found in this chart.       MODE Lux D.P.M., F.A.C.F.A.S.        Again, thank you for allowing me to participate in the care of your patient.        Sincerely,        Mandeep Lux DPM

## 2017-11-08 ENCOUNTER — ANTICOAGULATION THERAPY VISIT (OUTPATIENT)
Dept: ANTICOAGULATION | Facility: CLINIC | Age: 67
End: 2017-11-08
Payer: COMMERCIAL

## 2017-11-08 DIAGNOSIS — Z79.01 LONG TERM CURRENT USE OF ANTICOAGULANT THERAPY: ICD-10-CM

## 2017-11-08 LAB — INR POINT OF CARE: 3.1 (ref 0.86–1.14)

## 2017-11-08 PROCEDURE — 99207 ZZC NO CHARGE NURSE ONLY: CPT

## 2017-11-08 PROCEDURE — 85610 PROTHROMBIN TIME: CPT | Mod: QW

## 2017-11-08 PROCEDURE — 36416 COLLJ CAPILLARY BLOOD SPEC: CPT

## 2017-11-08 RX ORDER — OMEPRAZOLE 20 MG/1
20 TABLET, DELAYED RELEASE ORAL DAILY PRN
Qty: 30 TABLET | COMMUNITY
Start: 2017-11-08

## 2017-11-08 NOTE — MR AVS SNAPSHOT
Benjamín AVITIA Boogie   11/8/2017 9:15 AM   Anticoagulation Therapy Visit    Description:  66 year old male   Provider:  PI ANTI COAG   Department:  [facility]           INR as of 11/8/2017     Today's INR 3.1!      Anticoagulation Summary as of 11/8/2017     INR goal 2.0-3.0   Today's INR 3.1!   Full instructions 3.75 mg on Mon; 7.5 mg all other days   Next INR check 1/31/2018    Indications   Atrial fibrillation (H) [I48.91]  Long term current use of anticoagulant therapy [Z79.01]         Description     No change, recheck INR in 12 weeks.      Your next Anticoagulation Clinic appointment(s)     Jan 31, 2018  9:15 AM CST   [facility]agulation [facility] with PI ANTI COAG   [facility]    [address] [place] [id number] [phone]              Contact Numbers     Please call [phone] to cancel and/or reschedule your appointment.  Please call [phone] with any problems or questions regarding your therapy          November 2017 Details    Sun Mon Tue Wed Thu Fri Sat        1               2               3               4                 5               6               7               8      7.5 mg   See details      9      7.5 mg         10      7.5 mg         11      7.5 mg           12      7.5 mg         13      3.75 mg         14      7.5 mg         15      7.5 mg         16      7.5 mg         17      7.5 mg         18      7.5 mg           19      7.5 mg         20      3.75 mg         21      7.5 mg         22      7.5 mg         23      7.5 mg         24      7.5 mg         25      7.5 mg           26      7.5 mg         27      3.75 mg         28      7.5 mg         29      7.5 mg         30      7.5 mg            Date Details   11/08 This INR check               How to take your warfarin dose     To take:  3.75 mg Take 0.5 of a 7.5 mg tablet.    To take:  7.5 mg Take 1 of the 7.5 mg tablets.           December 2017 Details    Sun Mon Tue Wed Thu  Fri Sat          1      7.5 mg         2      7.5 mg           3      7.5 mg         4      3.75 mg         5      7.5 mg         6      7.5 mg         7      7.5 mg         8      7.5 mg         9      7.5 mg           10      7.5 mg         11      3.75 mg         12      7.5 mg         13      7.5 mg         14      7.5 mg         15      7.5 mg         16      7.5 mg           17      7.5 mg         18      3.75 mg         19      7.5 mg         20      7.5 mg         21      7.5 mg         22      7.5 mg         23      7.5 mg           24      7.5 mg         25      3.75 mg         26      7.5 mg         27      7.5 mg         28      7.5 mg         29      7.5 mg         30      7.5 mg           31      7.5 mg                Date Details   No additional details            How to take your warfarin dose     To take:  3.75 mg Take 0.5 of a 7.5 mg tablet.    To take:  7.5 mg Take 1 of the 7.5 mg tablets.           January 2018 Details    [facility] Sat      1      3.75 mg         2      7.5 mg         3      7.5 mg         4      7.5 mg         5      7.5 mg         6      7.5 mg           7      7.5 mg         8      3.75 mg         9      7.5 mg         10      7.5 mg         11      7.5 mg         12      7.5 mg         13      7.5 mg           14      7.5 mg         15      3.75 mg         16      7.5 mg         17      7.5 mg         18      7.5 mg         19      7.5 mg         20      7.5 mg           21      7.5 mg         22      3.75 mg         23      7.5 mg         24      7.5 mg         25      7.5 mg         26      7.5 mg         27      7.5 mg           28      7.5 mg         29      3.75 mg         30      7.5 mg         31                Date Details   No additional details    Date of next INR:  1/31/2018         How to take your warfarin dose     To take:  3.75 mg Take 0.5 of a 7.5 mg tablet.    To take:  7.5 mg Take 1 of the 7.5 mg tablets.

## 2017-11-20 ENCOUNTER — MEDICAL CORRESPONDENCE (OUTPATIENT)
Dept: HEALTH INFORMATION MANAGEMENT | Facility: CLINIC | Age: 67
End: 2017-11-20

## 2017-11-20 ENCOUNTER — TELEPHONE (OUTPATIENT)
Dept: FAMILY MEDICINE | Facility: CLINIC | Age: 67
End: 2017-11-20

## 2018-01-25 ENCOUNTER — TELEPHONE (OUTPATIENT)
Dept: FAMILY MEDICINE | Facility: CLINIC | Age: 68
End: 2018-01-25

## 2018-01-25 NOTE — TELEPHONE ENCOUNTER
Reason for call:  Patient reporting a symptom    Symptom or request: cough    Duration (how long have symptoms been present): 7 days    Have you been treated for this before? No    Additional comments: pt calling stating he has had a cold for about 7 days and is still having trouble with the cough. It's worse at night and he has tried cough syrup, lozenges and nothing seems to help. He is wondering if there is something else he can try. He states he usually can shake this but the cough won't go away.    Phone Number patient can be reached at:  Home number on file [phone] (home)    Best Time:  any    Can we leave a detailed message on this number:  YES    Call taken on 1/25/2018 at 11:40 AM by Alessandra Morel

## 2018-01-25 NOTE — TELEPHONE ENCOUNTER
Advised appt due to wheeze occasionally. No sob, sits up at times at night. Seems better today but nights are bad. He will get in today or tomorrow depending on his car being in the shop.No chest pain no fever aches or chills.  Cary Lee RN

## 2018-01-26 ENCOUNTER — OFFICE VISIT (OUTPATIENT)
Dept: FAMILY MEDICINE | Facility: CLINIC | Age: 68
End: 2018-01-26
Payer: COMMERCIAL

## 2018-01-26 VITALS
HEIGHT: 65 IN | OXYGEN SATURATION: 98 % | TEMPERATURE: 97.2 F | WEIGHT: 263 LBS | SYSTOLIC BLOOD PRESSURE: 128 MMHG | DIASTOLIC BLOOD PRESSURE: 52 MMHG | HEART RATE: 68 BPM | BODY MASS INDEX: 43.82 KG/M2

## 2018-01-26 DIAGNOSIS — Z12.11 SPECIAL SCREENING FOR MALIGNANT NEOPLASMS, COLON: ICD-10-CM

## 2018-01-26 DIAGNOSIS — J20.8 ACUTE VIRAL BRONCHITIS: Primary | ICD-10-CM

## 2018-01-26 PROCEDURE — 99213 OFFICE O/P EST LOW 20 MIN: CPT | Performed by: FAMILY MEDICINE

## 2018-01-26 PROCEDURE — 82274 ASSAY TEST FOR BLOOD FECAL: CPT | Performed by: FAMILY MEDICINE

## 2018-01-26 RX ORDER — ALBUTEROL SULFATE 90 UG/1
2 AEROSOL, METERED RESPIRATORY (INHALATION) EVERY 4 HOURS PRN
Qty: 1 INHALER | Refills: 3 | Status: SHIPPED | OUTPATIENT
Start: 2018-01-26 | End: 2018-04-16

## 2018-01-26 RX ORDER — PREDNISONE 20 MG/1
60 TABLET ORAL DAILY
Qty: 15 TABLET | Refills: 0 | Status: SHIPPED | OUTPATIENT
Start: 2018-01-26 | End: 2018-01-31

## 2018-01-26 NOTE — PROGRESS NOTES
SUBJECTIVE:   Benjamín Hyman is a 67 year old male who presents to clinic today for the following health issues:    ENT Symptoms    Patient had a cold about a week ago but can not get rid of the cough.  Pt. States symptoms have improved but he still has the cough.             Symptoms: cc Present Absent Comment   Fever/Chills   x    Fatigue   x    Muscle Aches   x    Eye Irritation   x    Sneezing   x    Nasal Freeman/Drg  x     Sinus Pressure/Pain   x    Loss of smell   x    Dental pain   x    Sore Throat   x    Swollen Glands   x    Ear Pain/Fullness   x    Cough  x     Wheeze  x     Chest Pain   x    Shortness of breath  x     Rash   x    Other   x      Symptom duration:  a little over a week   Symptom severity:  moderate   Treatments tried:  OTC robatussin   Contacts:  people in his housing complex         Patient is a 67 yr old male here for a cough and URI symptoms. Ongoing for about a week. Mostly dry cough. Denies any fevers. Has tried over the counter cough syrup.     Problem list and histories reviewed & adjusted, as indicated.  Additional history: as documented    Patient Active Problem List   Diagnosis     Atrial fibrillation (H)     Long term current use of anticoagulant therapy     FAMILY HISTORY OF GI NEOPLASM     Immunization (Tdap) not carried out because of patient refusal     [facility]     Advanced directives, counseling/discussion     Hyperlipidemia with target LDL less than 100     Stomach ulcer     Morbid obesity due to excess calories (H)     History reviewed. No pertinent surgical history.    Social History   Substance Use Topics     Smoking status: Former Smoker     Types: Cigarettes     Quit date: 1/1/1996     Smokeless tobacco: Never Used     Alcohol use Yes      Comment: 9/28/2015 Occsional beer, not often.  Quit drinking 4-1-10/  drank around [place] 2010 4- 2-3 beers every other week.      Family History   Problem Relation Age of Onset     Cancer  "- colorectal Mother      C.A.D. Father      HEART DISEASE Father      Unknown/Adopted Maternal Grandmother      Unknown/Adopted Paternal Grandmother      CEREBROVASCULAR DISEASE Paternal Grandfather          Current Outpatient Prescriptions   Medication Sig Dispense Refill     albuterol (PROAIR HFA/PROVENTIL HFA/VENTOLIN HFA) 108 (90 BASE) MCG/ACT Inhaler Inhale 2 puffs into the lungs every 4 hours as needed for shortness of breath / dyspnea 1 Inhaler 3     predniSONE (DELTASONE) 20 MG tablet Take 3 tablets (60 mg) by mouth daily 15 tablet 0     omeprazole (PRILOSEC OTC) 20 MG tablet Takes PRN 30 tablet      warfarin (COUMADIN) 7.5 MG tablet Take 3.75mg by mouth every Monday and 7.5mg all other days or as directed by Anticoagulation Clinic (file until pt calls for refill) 80 tablet 1     digoxin (LANOXIN) 250 MCG tablet Take 1 tab on even days & 1/2 tab on odd day of the month. Patient has been on this medication for year & atrial fibrillation is controlled. 30 tablet 11     metoprolol (TOPROL-XL) 200 MG 24 hr tablet Take 1 tablet (200 mg) by mouth daily 30 tablet 11     lisinopril (PRINIVIL/ZESTRIL) 10 MG tablet Take 1 tablet (10 mg) by mouth daily 30 tablet 11     simvastatin (ZOCOR) 40 MG tablet Take 1 tablet (40 mg) by mouth every other day 15 tablet 11     Allergies   Allergen Reactions     Latex      Adhesive Tape Rash     Reacted to the EKG stickers       Reviewed and updated as needed this visit by clinical staff       Reviewed and updated as needed this visit by Provider         ROS:  Constitutional, HEENT, cardiovascular, pulmonary, gi and gu systems are negative, except as otherwise noted.    OBJECTIVE:     /52  Pulse 68  Temp 97.2  F (36.2  C) (Tympanic)  Ht 5' 4.5\" (1.638 m)  Wt 263 lb (119.3 kg)  SpO2 98%  BMI 44.45 kg/m2  Body mass index is 44.45 kg/(m^2).  GENERAL: healthy, alert and no distress  EYES: Eyes grossly normal to inspection, PERRL and conjunctivae and sclerae normal  HENT: ear " canals and TM's normal, nose and mouth without ulcers or lesions  NECK: no adenopathy, no asymmetry, masses, or scars and thyroid normal to palpation  RESP: lungs clear to auscultation - no rales, rhonchi or wheezes  CV: regular rate and rhythm, normal S1 S2, no S3 or S4, no murmur, click or rub, no peripheral edema and peripheral pulses strong  MS: no gross musculoskeletal defects noted, no edema    Diagnostic Test Results:  none     ASSESSMENT/PLAN:   1. Acute viral bronchitis  Asked to use prednisone and also an albuterol inhaler, if symptoms persist to call the clinic  - albuterol (PROAIR HFA/PROVENTIL HFA/VENTOLIN HFA) 108 (90 BASE) MCG/ACT Inhaler; Inhale 2 puffs into the lungs every 4 hours as needed for shortness of breath / dyspnea  Dispense: 1 Inhaler; Refill: 3  - predniSONE (DELTASONE) 20 MG tablet; Take 3 tablets (60 mg) by mouth daily  Dispense: 15 tablet; Refill: 0    2. Special screening for malignant neoplasms, colon  FIT screen ordered.   - Fecal colorectal cancer screen (FIT); Future    FUTURE APPOINTMENTS:       - Follow-up visit as needed.    Mk Babin MD  [facility]

## 2018-01-26 NOTE — NURSING NOTE
"Chief Complaint   Patient presents with     Cough       Initial There were no vitals taken for this visit. Estimated body mass index is 45.14 kg/(m^2) as calculated from the following:    Height as of 10/4/17: 5' 4\" (1.626 m).    Weight as of 10/4/17: 263 lb (119.3 kg).  Medication Reconciliation: complete   Bing Vasquez CMA      "

## 2018-01-26 NOTE — MR AVS SNAPSHOT
After Visit Summary   1/26/2018    Benjamín Hyman    MRN: [id number]           Patient Information     Date Of Birth          1950        Visit Information        Provider Department      1/26/2018 10:40 AM Mk Babin MD [facility]        Today's Diagnoses     Acute viral bronchitis    -  1      Care Instructions      Bronchitis with Wheezing (Viral or Bacterial: Adult)    Bronchitis is an infection of the air passages. It often occurs during a cold and is usually caused by a virus. Symptoms include cough with mucus (phlegm) and low-grade fever. This illness is contagious during the first few days and is spread through the air by coughing and sneezing, or by direct contact (touching the sick person and then touching your own eyes, nose, or mouth).  If there is a lot of inflammation, air flow is restricted. The air passages may also go into spasm, especially if you have asthma. This causes wheezing and difficulty breathing even in people who do not have asthma.  Bronchitis usually lasts 7 to 14 days. The wheezing should improve with treatment during the first week. An inhaler is often prescribed to relax the air passages and stop wheezing. Antibiotics will be prescribed if your doctor thinks there is also a secondary bacterial infection.  Home care    If symptoms are severe, rest at home for the first 2 to 3 days. When you go back to your usual activities, don't let yourself get too tired.    Do not smoke. Also avoid being exposed to secondhand smoke.    You may use over-the-counter medicine to control fever or pain, unless another medicine was prescribed. Note: If you have chronic liver or kidney disease or have ever had a stomach ulcer or gastrointestinal bleeding, talk with your healthcare provider before using these medicines. Also talk to your provider if you are taking medicine to prevent blood clots.) Aspirin should never be given to anyone younger than 18  years of age who is ill with a viral infection or fever. It may cause severe liver or brain damage.    Your appetite may be poor, so a light diet is fine. Avoid dehydration by drinking 6 to 8 glasses of fluids per day (such as water, soft drinks, sports drinks, juices, tea, or soup). Extra fluids will help loosen secretions in the nose and lungs.    Over-the-counter cough, cold, and sore-throat medicines will not shorten the length of the illness, but they may be helpful to reduce symptoms. (Note: Do not use decongestants if you have high blood pressure.)    If you were given an inhaler, use it exactly as directed. If you need to use it more often than prescribed, your condition may be worsening. If this happens, contact your healthcare provider.    If prescribed, finish all antibiotic medicine, even if you are feeling better after only a few days.  Follow-up care  Follow up with your healthcare provider, or as advised. If you had an X-ray or ECG (electrocardiogram), a specialist will review it. You will be notified of any new findings that may affect your care.  Note: If you are age 65 or older, or if you have a chronic lung disease or condition that affects your immune system, or you smoke, talk to your healthcare provider about having a pneumococcal vaccinations and a yearly influenza vaccination (flu shot).  When to seek medical advice  Call your healthcare provider right away if any of these occur:    Fever of 100.4 F (38 C) or higher    Coughing up increasing amounts of colored sputum    Weakness, drowsiness, headache, facial pain, ear pain, or a stiff neck  Call 911, or get immediate medical care  Contact emergency services right away if any of these occur.    Coughing up blood    Worsening weakness, drowsiness, headache, or stiff neck    Increased wheezing not helped with medication, shortness of breath, or pain with breathing  Date Last Reviewed: 9/13/2015 2000-2017 [employer] [address]  "[place] [address]. All rights reserved. This information is not intended as a substitute for professional medical care. Always follow your healthcare professional's instructions.                Follow-ups after your visit        Your next 10 appointments already scheduled     2018  9:15 AM CST   [facility] with PI ANTI COAG   [facility]    [address] [id number] [phone]              Who to contact     If you have questions or need follow up information about today's clinic visit or your schedule please contact [facility] directly at [phone].  Normal or non-critical lab and imaging results will be communicated to you by MyChart, letter or phone within 4 business days after the clinic has received the results. If you do not hear from us within 7 days, please contact the clinic through Info Assemblyhart or phone. If you have a critical or abnormal lab result, we will notify you by phone as soon as possible.  Submit refill requests through 7write or call your pharmacy and they will forward the refill request to us. Please allow 3 business days for your refill to be completed.          Additional Information About Your Visit        Info Assemblyhart Information     7write lets you send messages to your doctor, view your test results, renew your prescriptions, schedule appointments and more. To sign up, go to [email or web address] . Click on \"Log in\" on the left side of the screen, which will take you to the Welcome page. Then click on \"Sign up Now\" on the right side of the page.     You will be asked to enter the access code listed below, as well as some personal information. Please follow the directions to create your username and password.     Your access code is: [email or web address]  Expires: 2018 10:48 AM     Your access code will  in 90 days. If you need help or a new code, please call your " "[facility] or [phone].        Care EveryWhere ID     This is your Care EveryWhere ID. This could be used by other organizations to access your [place] medical records  [id number]        Your Vitals Were     Pulse Temperature Height Pulse Oximetry BMI (Body Mass Index)       68 97.2  F (36.2  C) (Tympanic) 5' 4.5\" (1.638 m) 98% 44.45 kg/m2        Blood Pressure from Last 3 Encounters:   01/26/18 128/52   09/26/17 122/64   05/12/17 102/70    Weight from Last 3 Encounters:   01/26/18 263 lb (119.3 kg)   10/04/17 263 lb (119.3 kg)   09/26/17 263 lb (119.3 kg)              Today, you had the following     No orders found for display         Today's Medication Changes          These changes are accurate as of 1/26/18 10:48 AM.  If you have any questions, ask your nurse or doctor.               Start taking these medicines.        Dose/Directions    albuterol 108 (90 BASE) MCG/ACT Inhaler   Commonly known as:  PROAIR HFA/PROVENTIL HFA/VENTOLIN HFA   Used for:  Acute viral bronchitis   Started by:  Mk Babin MD        Dose:  2 puff   Inhale 2 puffs into the lungs every 4 hours as needed for shortness of breath / dyspnea   Quantity:  1 Inhaler   Refills:  3       predniSONE 20 MG tablet   Commonly known as:  DELTASONE   Used for:  Acute viral bronchitis   Started by:  Mk Babin MD        Dose:  60 mg   Take 3 tablets (60 mg) by mouth daily   Quantity:  15 tablet   Refills:  0            Where to get your medicines      [facility] [address] [facility]  [address] [facility] [address]     Phone:  [phone]     albuterol 108 (90 BASE) MCG/ACT Inhaler    predniSONE 20 MG tablet                Primary Care Provider Office Phone # Fax #    Ruben Teran [phone] [address] [facility] [address]        Equal Access to Services     NINA SOLANO AH: kj Christopher " davey fadisilvia yesagar garcia ah. So [facility] [phone].    ATENCIÓN: Si naresh ward, tiene a zimmerman disposición servicios gratuitos de asistencia lingüística. Elsie al [phone].    We comply with applicable federal civil rights laws and [place] laws. We do not discriminate on the basis of race, color, national origin, age, disability, sex, sexual orientation, or gender identity.            Thank you!     Thank you for choosing [facility]  for your care. Our goal is always to provide you with excellent care. Hearing back from our patients is one way we can continue to improve our services. Please take a few minutes to complete the written survey that you may receive in the mail after your visit with us. Thank you!             Your Updated Medication List - Protect others around you: Learn how to safely use, store and throw away your medicines at www.disposemymeds.org.          This list is accurate as of 1/26/18 10:48 AM.  Always use your most recent med list.                   Brand Name Dispense Instructions for use Diagnosis    albuterol 108 (90 BASE) MCG/ACT Inhaler    PROAIR HFA/PROVENTIL HFA/VENTOLIN HFA    1 Inhaler    Inhale 2 puffs into the lungs every 4 hours as needed for shortness of breath / dyspnea    Acute viral bronchitis       digoxin 250 MCG tablet    LANOXIN    30 tablet    Take 1 tab on even days & 1/2 tab on odd day of the month. Patient has been on this medication for year & atrial fibrillation is controlled.    Persistent atrial fibrillation (H)       lisinopril 10 MG tablet    PRINIVIL/ZESTRIL    30 tablet    Take 1 tablet (10 mg) by mouth daily    Persistent atrial fibrillation (H)       metoprolol succinate 200 MG 24 hr tablet    TOPROL-XL    30 tablet    Take 1 tablet (200 mg) by mouth daily    Persistent atrial fibrillation (H)       omeprazole 20 MG tablet    priLOSEC OTC    30 tablet    Takes PRN        predniSONE 20 MG  tablet    DELTASONE    15 tablet    Take 3 tablets (60 mg) by mouth daily    Acute viral bronchitis       simvastatin 40 MG tablet    ZOCOR    15 tablet    Take 1 tablet (40 mg) by mouth every other day    Persistent atrial fibrillation (H)       warfarin 7.5 MG tablet    COUMADIN    80 tablet    Take 3.75mg by mouth every Monday and 7.5mg all other days or as directed by Anticoagulation Clinic (file until pt calls for refill)    Persistent atrial fibrillation (H)

## 2018-01-26 NOTE — PATIENT INSTRUCTIONS
Bronchitis with Wheezing (Viral or Bacterial: Adult)    Bronchitis is an infection of the air passages. It often occurs during a cold and is usually caused by a virus. Symptoms include cough with mucus (phlegm) and low-grade fever. This illness is contagious during the first few days and is spread through the air by coughing and sneezing, or by direct contact (touching the sick person and then touching your own eyes, nose, or mouth).  If there is a lot of inflammation, air flow is restricted. The air passages may also go into spasm, especially if you have asthma. This causes wheezing and difficulty breathing even in people who do not have asthma.  Bronchitis usually lasts 7 to 14 days. The wheezing should improve with treatment during the first week. An inhaler is often prescribed to relax the air passages and stop wheezing. Antibiotics will be prescribed if your doctor thinks there is also a secondary bacterial infection.  Home care    If symptoms are severe, rest at home for the first 2 to 3 days. When you go back to your usual activities, don't let yourself get too tired.    Do not smoke. Also avoid being exposed to secondhand smoke.    You may use over-the-counter medicine to control fever or pain, unless another medicine was prescribed. Note: If you have chronic liver or kidney disease or have ever had a stomach ulcer or gastrointestinal bleeding, talk with your healthcare provider before using these medicines. Also talk to your provider if you are taking medicine to prevent blood clots.) Aspirin should never be given to anyone younger than 18 years of age who is ill with a viral infection or fever. It may cause severe liver or brain damage.    Your appetite may be poor, so a light diet is fine. Avoid dehydration by drinking 6 to 8 glasses of fluids per day (such as water, soft drinks, sports drinks, juices, tea, or soup). Extra fluids will help loosen secretions in the nose and lungs.    Over-the-counter  cough, cold, and sore-throat medicines will not shorten the length of the illness, but they may be helpful to reduce symptoms. (Note: Do not use decongestants if you have high blood pressure.)    If you were given an inhaler, use it exactly as directed. If you need to use it more often than prescribed, your condition may be worsening. If this happens, contact your healthcare provider.    If prescribed, finish all antibiotic medicine, even if you are feeling better after only a few days.  Follow-up care  Follow up with your healthcare provider, or as advised. If you had an X-ray or ECG (electrocardiogram), a specialist will review it. You will be notified of any new findings that may affect your care.  Note: If you are age 65 or older, or if you have a chronic lung disease or condition that affects your immune system, or you smoke, talk to your healthcare provider about having a pneumococcal vaccinations and a yearly influenza vaccination (flu shot).  When to seek medical advice  Call your healthcare provider right away if any of these occur:    Fever of 100.4 F (38 C) or higher    Coughing up increasing amounts of colored sputum    Weakness, drowsiness, headache, facial pain, ear pain, or a stiff neck  Call 911, or get immediate medical care  Contact emergency services right away if any of these occur.    Coughing up blood    Worsening weakness, drowsiness, headache, or stiff neck    Increased wheezing not helped with medication, shortness of breath, or pain with breathing  Date Last Reviewed: 9/13/2015 2000-2017 [employer]. [address] [place] [address], [place] [address]. All rights reserved. This information is not intended as a substitute for professional medical care. Always follow your healthcare professional's instructions.

## 2018-01-27 ASSESSMENT — PATIENT HEALTH QUESTIONNAIRE - PHQ9: SUM OF ALL RESPONSES TO PHQ QUESTIONS 1-9: 8

## 2018-01-28 LAB — HEMOCCULT STL QL IA: NEGATIVE

## 2018-01-29 DIAGNOSIS — Z12.11 SPECIAL SCREENING FOR MALIGNANT NEOPLASMS, COLON: ICD-10-CM

## 2018-01-30 NOTE — PROGRESS NOTES
Please inform patient that test result ( FIT )  was within normal parameters.   Thank you.     Mk Babin M.D.

## 2018-01-31 ENCOUNTER — ANTICOAGULATION THERAPY VISIT (OUTPATIENT)
Dept: ANTICOAGULATION | Facility: CLINIC | Age: 68
End: 2018-01-31
Payer: COMMERCIAL

## 2018-01-31 DIAGNOSIS — Z79.01 LONG TERM CURRENT USE OF ANTICOAGULANT THERAPY: ICD-10-CM

## 2018-01-31 DIAGNOSIS — I48.19 PERSISTENT ATRIAL FIBRILLATION (H): ICD-10-CM

## 2018-01-31 LAB — INR POINT OF CARE: 2.3 (ref 0.86–1.14)

## 2018-01-31 PROCEDURE — 85610 PROTHROMBIN TIME: CPT | Mod: QW

## 2018-01-31 PROCEDURE — 99207 ZZC NO CHARGE NURSE ONLY: CPT

## 2018-01-31 PROCEDURE — 36416 COLLJ CAPILLARY BLOOD SPEC: CPT

## 2018-01-31 RX ORDER — WARFARIN SODIUM 7.5 MG/1
TABLET ORAL
Qty: 80 TABLET | Refills: 0 | Status: SHIPPED | OUTPATIENT
Start: 2018-01-31 | End: 2018-04-16

## 2018-01-31 NOTE — MR AVS SNAPSHOT
"              After Visit Summary   1/31/2018    Benjamín Hyman    MRN: [id number]           Patient Information     Date Of Birth          1950        Visit Information        Provider Department      1/31/2018 9:15 AM JOSSIE ANTI SHANA [facility]        Today's Diagnoses     Long term current use of anticoagulant therapy           Follow-ups after your visit        Your next 10 appointments already scheduled     Apr 25, 2018  9:15 AM CDT   [facility]    [address] [facility] [id number] [phone]              Who to contact     If you have questions or need follow up information about today's clinic visit or your schedule please contact [facility] directly at [phone].  Normal or non-critical lab and imaging results will be communicated to you by MyChart, letter or phone within 4 business days after the clinic has received the results. If you do not hear from us within 7 days, please contact the clinic through MyChart or phone. If you have a critical or abnormal lab result, we will notify you by phone as soon as possible.  Submit refill requests through MyoKardia or call your pharmacy and they will forward the refill request to us. Please allow 3 business days for your refill to be completed.          Additional Information About Your Visit        MyCharWiredBenefits Information     MyoKardia lets you send messages to your doctor, view your test results, renew your prescriptions, schedule appointments and more. To sign up, go to [email or web address] . Click on \"Log in\" on the left side of the screen, which will take you to the Welcome page. Then click on \"Sign up Now\" on the right side of the page.     You will be asked to enter the access code listed below, as well as some personal information. Please follow the directions to create your username and password.     Your access code is: " [email or web address]  Expires: 2018 10:48 AM     Your access code will  in 90 days. If you need help or a new code, please call your [place] clinic or [phone].        Care EveryWhere ID     This is your Care EveryWhere ID. This could be used by other organizations to access your [place] medical records  [id number]         Blood Pressure from Last 3 Encounters:   18 128/52   17 122/64   17 102/70    Weight from Last 3 Encounters:   18 263 lb (119.3 kg)   10/04/17 263 lb (119.3 kg)   17 263 lb (119.3 kg)              We Performed the Following     INR point of care        Primary Care Provider Office Phone # Fax #    Ruben Teran [phone] [address] [facility] [address]        Equal Access to Services     NINA SOLANO AH: Hadii aad ku hadasho Soomaali, waaxda luqadaha, qaybta kaalmada adeegyada, waxay idiin hayayeshan katie villegas . So [facility] [phone].    ATENCIÓN: Si habla español, tiene a zimmerman disposición servicios gratuitos de asistencia lingüística. Llame al [phone].    We comply with applicable federal civil rights laws and [place] laws. We do not discriminate on the basis of race, color, national origin, age, disability, sex, sexual orientation, or gender identity.            Thank you!     Thank you for choosing [facility]  for your care. Our goal is always to provide you with excellent care. Hearing back from our patients is one way we can continue to improve our services. Please take a few minutes to complete the written survey that you may receive in the mail after your visit with us. Thank you!             Your Updated Medication List - Protect others around you: Learn how to safely use, store and throw away your medicines at www.disposemymeds.org.          This list is accurate as of 18  9:21 AM.  Always use your most recent med list.                   Brand Name Dispense Instructions for use  Diagnosis    albuterol 108 (90 BASE) MCG/ACT Inhaler    PROAIR HFA/PROVENTIL HFA/VENTOLIN HFA    1 Inhaler    Inhale 2 puffs into the lungs every 4 hours as needed for shortness of breath / dyspnea    Acute viral bronchitis       digoxin 250 MCG tablet    LANOXIN    30 tablet    Take 1 tab on even days & 1/2 tab on odd day of the month. Patient has been on this medication for year & atrial fibrillation is controlled.    Persistent atrial fibrillation (H)       lisinopril 10 MG tablet    PRINIVIL/ZESTRIL    30 tablet    Take 1 tablet (10 mg) by mouth daily    Persistent atrial fibrillation (H)       metoprolol succinate 200 MG 24 hr tablet    TOPROL-XL    30 tablet    Take 1 tablet (200 mg) by mouth daily    Persistent atrial fibrillation (H)       omeprazole 20 MG tablet    priLOSEC OTC    30 tablet    Takes PRN        simvastatin 40 MG tablet    ZOCOR    15 tablet    Take 1 tablet (40 mg) by mouth every other day    Persistent atrial fibrillation (H)       warfarin 7.5 MG tablet    COUMADIN    80 tablet    Take 3.75mg by mouth every Monday and 7.5mg all other days or as directed by Anticoagulation Clinic (file until pt calls for refill)    Persistent atrial fibrillation (H)

## 2018-01-31 NOTE — PROGRESS NOTES
ANTICOAGULATION FOLLOW-UP CLINIC VISIT    Patient Name:  Benjamín Hyman  Date:  1/31/2018  Contact Type:  Face to Face    SUBJECTIVE:     Patient Findings     Positives Change in medications (1/26/18: predniSONE (DELTASONE) 20 MG tablet; Take 3 tablets (60 mg) by mouth daily  (5 day course, took last dose today). Also, albuterol inhaler. ), Change in diet/appetite (reports he had a poor appetite but now is getting back to normal), Inflammation (bronchitis)    Comments Seen in clinic 1/26/18.   Refill sent to [facility].  Med review done today.            OBJECTIVE    INR Protime   Date Value Ref Range Status   01/31/2018 2.3 (A) 0.86 - 1.14 Final       ASSESSMENT / PLAN  INR assessment THER    Recheck INR In: 12 WEEKS    INR Location Clinic      Anticoagulation Summary as of 1/31/2018     INR goal 2.0-3.0   Today's INR 2.3   Maintenance plan 3.75 mg (7.5 mg x 0.5) on Mon; 7.5 mg (7.5 mg x 1) all other days   Full instructions 3.75 mg on Mon; 7.5 mg all other days   Weekly total 48.75 mg   No change documented Jing Lozano RN   Plan last modified Jing Lozano RN (4/12/2017)   Next INR check 4/25/2018   Priority INR   Target end date Indefinite    Indications   Atrial fibrillation (H) [I48.91]  Long term current use of anticoagulant therapy [Z79.01]         Anticoagulation Episode Summary     INR check location     Preferred lab     Send INR reminders to JOSSIE WILLIAM    Comments * 7.5mg tablets. Dr. Teran Ok with 12 week rechecks.      Anticoagulation Care Providers     Provider Role Specialty Phone number    Ruben Teran MD [facility] [phone]            See the Encounter Report to view Anticoagulation Flowsheet and Dosing Calendar (Go to Encounters tab in chart review, and find the Anticoagulation Therapy Visit)      Jing Lozano, GABRIELLE

## 2018-01-31 NOTE — MR AVS SNAPSHOT
Benjamín Hyman   1/31/2018 9:15 AM   Anticoagulation Therapy Visit    Description:  67 year old male   Provider:  PI ANTI COAG   Department:  [facility]           INR as of 1/31/2018     Today's INR 2.3      Anticoagulation Summary as of 1/31/2018     INR goal 2.0-3.0   Today's INR 2.3   Full instructions 3.75 mg on Mon; 7.5 mg all other days   Next INR check 4/25/2018    Indications   Atrial fibrillation (H) [I48.91]  Long term current use of anticoagulant therapy [Z79.01]         Description     No change, recheck INR in 12 weeks.      Your next Anticoagulation Clinic appointment(s)     Apr 25, 2018  9:15 AM CDT   [facility]agulation Visit with PI ANTI COAG   [facility]    [address] [address] [phone]              Contact Numbers     Please call [phone] with any problems or questions regarding your therapy.    If you need to cancel and/or reschedule your appointment please call one of the following numbers:  [facility] [phone]  [place] - [phone]  [facility] [phone]  [facility] [place] [facility] [phone]  [place] - [phone]            January 2018 Details    Sun Mon Tue Wed Thu Fri Sat      1               2               3               4               5               6                 7               8               9               10               11               12               13                 14               15               16               17               18               19               20                 21               22               23               24               25               26               27                 28               29               30               31      7.5 mg   See details          Date Details   01/31 This INR check               How to take your warfarin dose     To take:  7.5 mg Take 1 of the 7.5 mg tablets.           February 2018 Details    Sun Mon Tue Wed Thu Fri Sat          1      7.5 mg         2      7.5 mg         3      7.5 mg           4      7.5 mg         5      3.75 mg         6      7.5 mg         7      7.5 mg         8      7.5 mg         9      7.5 mg         10      7.5 mg           11      7.5 mg         12      3.75 mg         13      7.5 mg         14      7.5 mg         15      7.5 mg         16      7.5 mg         17      7.5 mg           18      7.5 mg         19      3.75 mg         20      7.5 mg         21      7.5 mg         22      7.5 mg         23      7.5 mg         24      7.5 mg           25      7.5 mg         26      3.75 mg         27      7.5 mg         28      7.5 mg             Date Details   No additional details            How to take your warfarin dose     To take:  3.75 mg Take 0.5 of a 7.5 mg tablet.    To take:  7.5 mg Take 1 of the 7.5 mg tablets.           March 2018 Details    Sun Mon Tue Wed Thu Fri Sat         1      7.5 mg         2      7.5 mg         3      7.5 mg           4      7.5 mg         5      3.75 mg         6      7.5 mg         7      7.5 mg         8      7.5 mg         9      7.5 mg         10      7.5 mg           11      7.5 mg         12      3.75 mg         13      7.5 mg         14      7.5 mg         15      7.5 mg         16      7.5 mg         17      7.5 mg           18      7.5 mg         19      3.75 mg         20      7.5 mg         21      7.5 mg         22      7.5 mg         23      7.5 mg         24      7.5 mg           25      7.5 mg         26      3.75 mg         27      7.5 mg         28      7.5 mg         29      7.5 mg         30      7.5 mg         31      7.5 mg          Date Details   No additional details            How to take your warfarin dose     To take:  3.75 mg Take 0.5 of a 7.5 mg tablet.    To take:  7.5 mg Take 1 of the 7.5 mg tablets.           April 2018 Details    Sun Mon Tue Wed Thu Fri Sat     1      7.5 mg         2      3.75 mg         3      7.5 mg         4      7.5 mg          5      7.5 mg         6      7.5 mg         7      7.5 mg           8      7.5 mg         9      3.75 mg         10      7.5 mg         11      7.5 mg         12      7.5 mg         13      7.5 mg         14      7.5 mg           15      7.5 mg         16      3.75 mg         17      7.5 mg         18      7.5 mg         19      7.5 mg         20      7.5 mg         21      7.5 mg           22      7.5 mg         23      3.75 mg         24      7.5 mg         25            26               27               28                 29               30                     Date Details   No additional details    Date of next INR:  4/25/2018         How to take your warfarin dose     To take:  3.75 mg Take 0.5 of a 7.5 mg tablet.    To take:  7.5 mg Take 1 of the 7.5 mg tablets.

## 2018-04-16 ENCOUNTER — OFFICE VISIT (OUTPATIENT)
Dept: FAMILY MEDICINE | Facility: CLINIC | Age: 68
End: 2018-04-16
Payer: COMMERCIAL

## 2018-04-16 VITALS
SYSTOLIC BLOOD PRESSURE: 128 MMHG | HEIGHT: 65 IN | TEMPERATURE: 98.7 F | DIASTOLIC BLOOD PRESSURE: 76 MMHG | HEART RATE: 68 BPM | WEIGHT: 265 LBS | BODY MASS INDEX: 44.15 KG/M2

## 2018-04-16 DIAGNOSIS — Z12.5 SCREENING FOR PROSTATE CANCER: ICD-10-CM

## 2018-04-16 DIAGNOSIS — E78.5 HYPERLIPIDEMIA WITH TARGET LDL LESS THAN 100: ICD-10-CM

## 2018-04-16 DIAGNOSIS — E66.01 MORBID OBESITY DUE TO EXCESS CALORIES (H): ICD-10-CM

## 2018-04-16 DIAGNOSIS — I48.21 PERMANENT ATRIAL FIBRILLATION (H): Primary | ICD-10-CM

## 2018-04-16 DIAGNOSIS — I48.19 PERSISTENT ATRIAL FIBRILLATION (H): ICD-10-CM

## 2018-04-16 DIAGNOSIS — Z79.01 LONG TERM CURRENT USE OF ANTICOAGULANT THERAPY: ICD-10-CM

## 2018-04-16 LAB
ANION GAP SERPL CALCULATED.3IONS-SCNC: 4 MMOL/L (ref 3–14)
BUN SERPL-MCNC: 14 MG/DL (ref 7–30)
CALCIUM SERPL-MCNC: 8.4 MG/DL (ref 8.5–10.1)
CHLORIDE SERPL-SCNC: 104 MMOL/L (ref 94–109)
CHOLEST SERPL-MCNC: 129 MG/DL
CO2 SERPL-SCNC: 25 MMOL/L (ref 20–32)
CREAT SERPL-MCNC: 0.8 MG/DL (ref 0.66–1.25)
DIGOXIN SERPL-MCNC: 0.6 UG/L (ref 0.5–2)
GFR SERPL CREATININE-BSD FRML MDRD: >90 ML/MIN/1.7M2
GLUCOSE SERPL-MCNC: 96 MG/DL (ref 70–99)
HDLC SERPL-MCNC: 29 MG/DL
LDLC SERPL CALC-MCNC: 56 MG/DL
NONHDLC SERPL-MCNC: 100 MG/DL
POTASSIUM SERPL-SCNC: 4.6 MMOL/L (ref 3.4–5.3)
PSA SERPL-ACNC: 0.98 UG/L (ref 0–4)
SODIUM SERPL-SCNC: 133 MMOL/L (ref 133–144)
TRIGL SERPL-MCNC: 222 MG/DL

## 2018-04-16 PROCEDURE — 80162 ASSAY OF DIGOXIN TOTAL: CPT | Performed by: FAMILY MEDICINE

## 2018-04-16 PROCEDURE — 36415 COLL VENOUS BLD VENIPUNCTURE: CPT | Performed by: FAMILY MEDICINE

## 2018-04-16 PROCEDURE — 80048 BASIC METABOLIC PNL TOTAL CA: CPT | Performed by: FAMILY MEDICINE

## 2018-04-16 PROCEDURE — G0103 PSA SCREENING: HCPCS | Performed by: FAMILY MEDICINE

## 2018-04-16 PROCEDURE — 80061 LIPID PANEL: CPT | Performed by: FAMILY MEDICINE

## 2018-04-16 PROCEDURE — 99214 OFFICE O/P EST MOD 30 MIN: CPT | Performed by: FAMILY MEDICINE

## 2018-04-16 RX ORDER — WARFARIN SODIUM 7.5 MG/1
TABLET ORAL
Qty: 80 TABLET | Refills: 0 | Status: SHIPPED | OUTPATIENT
Start: 2018-04-16 | End: 2018-05-02

## 2018-04-16 RX ORDER — LISINOPRIL 10 MG/1
10 TABLET ORAL DAILY
Qty: 30 TABLET | Refills: 11 | Status: SHIPPED | OUTPATIENT
Start: 2018-04-16 | End: 2019-02-21

## 2018-04-16 RX ORDER — DIGOXIN 250 MCG
TABLET ORAL
Qty: 30 TABLET | Refills: 11 | Status: SHIPPED | OUTPATIENT
Start: 2018-04-16 | End: 2019-04-22

## 2018-04-16 RX ORDER — METOPROLOL SUCCINATE 200 MG/1
200 TABLET, EXTENDED RELEASE ORAL DAILY
Qty: 30 TABLET | Refills: 11 | Status: SHIPPED | OUTPATIENT
Start: 2018-04-16 | End: 2019-04-22

## 2018-04-16 RX ORDER — SIMVASTATIN 40 MG
40 TABLET ORAL EVERY OTHER DAY
Qty: 15 TABLET | Refills: 11 | Status: SHIPPED | OUTPATIENT
Start: 2018-04-16 | End: 2019-04-22

## 2018-04-16 NOTE — LETTER
"[facility]  [address]  [facility] [address]  Phone: [phone]    April 17, 2018    Benjamín Hyman  [address] [place] [address] [address]  [facility] [address]          Dear Delmy Hyman,    I am writing to inform you of the results of the laboratory tests you had done recently.     Lab Results   Component Value Date    CHOL 129 04/16/2018          Lab Results   Component Value Date    HDL 29 04/16/2018            Lab Results   Component Value Date    LDL 56 04/16/2018        Lab Results   Component Value Date    TRIG 222 04/16/2018         HDL is the \"good\" cholesterol and when it is high (over 60), it decreases the risk for heart attack and stroke. When the HDL is less than 40, it increases the risk for these problems. The HDL can be raised by regular exercise and sometimes medications, such as niacin.    LDL is the \"bad\" cholesterol linked to heart disease and stroke. For those who have heart disease or diabetes, their LDL should be less than 100. For most everyone else, the LDL should be less than 130. For those with no risk factors, under 160 is acceptable.    Your triglycerides should be less than 150. These can be lowered with a low fat diet, reducing alcohol use, losing weight and, for those with diabetes, by improving blood sugar control.    Slight variations and daily fluctuations are normal and should cause little concern. As you know, an elevated cholesterol is one factor in increasing your risk of heart disease or stroke. You can improve your cholesterol by controlling the amount and type of fat you eat and by increasing your daily activity level.    Based on these results:      Please follow these recommendations:  {FL NB RECOMMENDATIONS:967177}    It is my pleasure to help partake in your healthcare needs. Please feel free to call the clinic if you have any further questions or problems.    Sincerely,      {PROVIDERS ALL CLINICS:549388}/ ***        "

## 2018-04-16 NOTE — PATIENT INSTRUCTIONS
Please go to lab.    I refilled your medications.          Thank you for choosing [facility].  You may be receiving a survey in the mail from [facility] regarding your visit today.  Please take a few minutes to complete and return the survey to let us know how we are doing.      If you have questions or concerns, please contact us via Tellja or you can contact your care team at [phone].    Our Clinic hours are:  Monday 6:40 am  to 7:00 pm  Tuesday -Friday 6:40 am to 5:00 pm    The [place] outpatient lab hours are:  Monday - Friday 6:10 am to 4:45 pm  Saturdays 7:00 am to 11:00 am  Appointments are required, call [phone]    If you have clinical questions after hours or would like to schedule an appointment,  call the clinic at [phone].

## 2018-04-16 NOTE — PROGRESS NOTES
"  SUBJECTIVE:   Benjamín Hyman is a 67 year old male who presents to clinic today for the following health issues:  Chief Complaint   Patient presents with     Hypertension     needs refill of meds     Lipids     refill med         Hyperlipidemia Follow-Up      Rate your low fat/cholesterol diet?: fair    Taking statin?  Yes, muscle aches after starting statin- he has gone to taking it every other day-that helps with the pain in his legs    Other lipid medications/supplements?:  none    Hypertension Follow-up      Outpatient blood pressures are not being checked.    Low Salt Diet: low salt      Amount of exercise or physical activity: tries to do some movement every day    Problems taking medications regularly: No    Medication side effects: leg aches    Diet: low salt            Problem list and histories reviewed & adjusted, as indicated.  Additional history: as documented        Reviewed and updated as needed this visit by clinical staff       Reviewed and updated as needed this visit by Provider         ROS:  CONSTITUTIONAL:NEGATIVE for fever, chills, change in weight  INTEGUMENTARY/SKIN: NEGATIVE for worrisome rashes, moles or lesions  RESP:NEGATIVE for significant cough or SOB  CV: NEGATIVE for chest pain, palpitations or peripheral edema  MUSCULOSKELETAL: NEGATIVE for significant arthralgias or myalgia  NEURO: NEGATIVE for weakness, dizziness or paresthesias  HEME/ALLERGY/IMMUNE: sees inr clinic, no problems    OBJECTIVE:                                                    /76 (Cuff Size: Adult Large)  Pulse 68  Temp 98.7  F (37.1  C) (Tympanic)  Ht 5' 4.5\" (1.638 m)  Wt 265 lb (120.2 kg)  BMI 44.78 kg/m2  Body mass index is 44.78 kg/(m^2).  GENERAL APPEARANCE: alert, no distress and cooperative  RESP: lungs clear to auscultation - no rales, rhonchi or wheezes  CV: irregular rate and rhythm, no murmur  ABDOMEN: soft, nontender, without hepatosplenomegaly or masses and bowel sounds normal  MS: " extremities normal- no gross deformities noted  SKIN: no suspicious lesions or rashes  NEURO: Normal strength and tone, mentation intact and speech normal  PSYCH: mentation appears normal and affect normal/bright         ASSESSMENT/PLAN:                                                    1. Permanent atrial fibrillation (H)  Stable on meds, refilled  - Basic metabolic panel  - Lipid panel reflex to direct LDL Fasting  - Digoxin level    2. Long term current use of anticoagulant therapy  noted    3. Hyperlipidemia with target LDL less than 100  Check lab  - Lipid panel reflex to direct LDL Fasting    4. Screening for prostate cancer  Check lab  - Prostate spec antigen screen    5. Persistent atrial fibrillation (H)  No issues, check labs,   - digoxin (LANOXIN) 250 MCG tablet; Take 1 tab on even days & 1/2 tab on odd day of the month. Patient has been on this medication for year & atrial fibrillation is controlled.  Dispense: 30 tablet; Refill: 11  - metoprolol succinate (TOPROL-XL) 200 MG 24 hr tablet; Take 1 tablet (200 mg) by mouth daily  Dispense: 30 tablet; Refill: 11  - lisinopril (PRINIVIL/ZESTRIL) 10 MG tablet; Take 1 tablet (10 mg) by mouth daily  Dispense: 30 tablet; Refill: 11  - simvastatin (ZOCOR) 40 MG tablet; Take 1 tablet (40 mg) by mouth every other day  Dispense: 15 tablet; Refill: 11  - warfarin (COUMADIN) 7.5 MG tablet; Take 3.75mg by mouth every Monday and 7.5mg all other days or as directed by Anticoagulation Clinic (file until pt calls for refill)  Dispense: 80 tablet; Refill: 0    Recommend to lose weight and discussed diet changes, he is working on, he knows he is obese.    See Patient Instructions    Ruben Teran MD  [facility]

## 2018-04-16 NOTE — NURSING NOTE
"Chief Complaint   Patient presents with     Hypertension     needs refill of meds     Lipids     refill med     questions about prostate     hasn't had PSA done \"forever\"       Initial /76 (Cuff Size: Adult Large)  Pulse 68  Temp 98.7  F (37.1  C) (Tympanic)  Ht 5' 4.5\" (1.638 m)  Wt 265 lb (120.2 kg)  BMI 44.78 kg/m2 Estimated body mass index is 44.78 kg/(m^2) as calculated from the following:    Height as of this encounter: 5' 4.5\" (1.638 m).    Weight as of this encounter: 265 lb (120.2 kg).  Medication Reconciliation: complete  "

## 2018-04-16 NOTE — MR AVS SNAPSHOT
After Visit Summary   4/16/2018    Benjamín Hyman    MRN: [id number]           Patient Information     Date Of Birth          1950        Visit Information        Provider Department      4/16/2018 3:20 PM Ruben Teran MD [facility]        Today's Diagnoses     Permanent atrial fibrillation (H)    -  1    Long term current use of anticoagulant therapy        Hyperlipidemia with target LDL less than 100        Screening for prostate cancer        Persistent atrial fibrillation (H)          Care Instructions    Please go to lab.    I refilled your medications.          Thank you for choosing [facility].  You may be receiving a survey in the mail from [email or web address] regarding your visit today.  Please take a few minutes to complete and return the survey to let us know how we are doing.      If you have questions or concerns, please contact us via Olark or you can contact your care team at [phone].    Our Clinic hours are:  Monday 6:40 am  to 7:00 pm  Tuesday -Friday 6:40 am to 5:00 pm    The [place] outpatient lab hours are:  Monday - Friday 6:10 am to 4:45 pm  Saturdays 7:00 am to 11:00 am  Appointments are required, call [phone]    If you have clinical questions after hours or would like to schedule an appointment,  call the clinic at [phone].            Follow-ups after your visit        Your next 10 appointments already scheduled     Apr 25, 2018  9:15 AM CDT   An[facility]agulation Visit with PI ANTI COAG   [facility]    [address] [place] [id number] [phone]              Who to contact     If you have questions or need follow up information about today's clinic visit or your schedule please contact [facility] directly at [phone].  Normal or non-critical lab and imaging results will be communicated to you by MyChart, letter or phone within 4 business days after the clinic  "has received the results. If you do not hear from us within 7 days, please contact the clinic through [employer] or phone. If you have a critical or abnormal lab result, we will notify you by phone as soon as possible.  Submit refill requests through [employer] or call your pharmacy and they will forward the refill request to us. Please allow 3 business days for your refill to be completed.          Additional Information About Your Visit        [employer] Information     [employer] lets you send messages to your doctor, view your test results, renew your prescriptions, schedule appointments and more. To sign up, go to [email or web address] . Click on \"Log in\" on the left side of the screen, which will take you to the Welcome page. Then click on \"Sign up Now\" on the right side of the page.     You will be asked to enter the access code listed below, as well as some personal information. Please follow the directions to create your username and password.     Your access code is: [email or web address]  Expires: 2018 11:48 AM     Your access code will  in 90 days. If you need help or a new code, please call your [place] clinic or [phone].        Care EveryWhere ID     This is your Care EveryWhere ID. This could be used by other organizations to access your [place] medical records  [id number]        Your Vitals Were     Pulse Temperature Height BMI (Body Mass Index)          68 98.7  F (37.1  C) (Tympanic) 5' 4.5\" (1.638 m) 44.78 kg/m2         Blood Pressure from Last 3 Encounters:   18 128/76   18 128/52   17 122/64    Weight from Last 3 Encounters:   18 265 lb (120.2 kg)   18 263 lb (119.3 kg)   10/04/17 263 lb (119.3 kg)              We Performed the Following     Basic metabolic panel     Digoxin level     Lipid panel reflex to direct LDL Fasting     Prostate spec antigen screen          Where to get your medicines      These medications were sent to [facility], " [place] - [address] [place] [address]  [address] [facility] [address]     Phone:  [phone]     digoxin 250 MCG tablet    lisinopril 10 MG tablet    metoprolol succinate 200 MG 24 hr tablet    simvastatin 40 MG tablet    warfarin 7.5 MG tablet          Primary Care Provider Office Phone # Fax #    Ruben Teran [phone] [address] [facility] [address]        Equal Access to Services     NINA SOLANO : Hadii aad ku hadasho Soomaali, waaxda luqadaha, qaybta kaalmada adeegyada, waxay idiin hayaan adeeg kharash la'veronika . So [facility] [phone].    ATENCIÓN: Si naresh ward, tiene a zimmerman disposición servicios gratuitos de asistencia lingüística. [facility] [phone].    We comply with applicable federal civil rights laws and [place] laws. We do not discriminate on the basis of race, color, national origin, age, disability, sex, sexual orientation, or gender identity.            Thank you!     Thank you for choosing [facility]  for your care. Our goal is always to provide you with excellent care. Hearing back from our patients is one way we can continue to improve our services. Please take a few minutes to complete the written survey that you may receive in the mail after your visit with us. Thank you!             Your Updated Medication List - Protect others around you: Learn how to safely use, store and throw away your medicines at www.disposemymeds.org.          This list is accurate as of 4/16/18  3:48 PM.  Always use your most recent med list.                   Brand Name Dispense Instructions for use Diagnosis    digoxin 250 MCG tablet    LANOXIN    30 tablet    Take 1 tab on even days & 1/2 tab on odd day of the month. Patient has been on this medication for year & atrial fibrillation is controlled.    Persistent atrial fibrillation (H)       lisinopril 10 MG tablet    PRINIVIL/ZESTRIL    30 tablet    Take 1 tablet (10 mg) by mouth daily    Persistent atrial fibrillation  (H)       metoprolol succinate 200 MG 24 hr tablet    TOPROL-XL    30 tablet    Take 1 tablet (200 mg) by mouth daily    Persistent atrial fibrillation (H)       omeprazole 20 MG tablet    priLOSEC OTC    30 tablet    Takes PRN        simvastatin 40 MG tablet    ZOCOR    15 tablet    Take 1 tablet (40 mg) by mouth every other day    Persistent atrial fibrillation (H)       warfarin 7.5 MG tablet    COUMADIN    80 tablet    Take 3.75mg by mouth every Monday and 7.5mg all other days or as directed by Anticoagulation Clinic (file until pt calls for refill)    Persistent atrial fibrillation (H)

## 2018-04-25 ENCOUNTER — ANTICOAGULATION THERAPY VISIT (OUTPATIENT)
Dept: ANTICOAGULATION | Facility: CLINIC | Age: 68
End: 2018-04-25
Payer: COMMERCIAL

## 2018-04-25 DIAGNOSIS — Z79.01 LONG TERM CURRENT USE OF ANTICOAGULANT THERAPY: ICD-10-CM

## 2018-04-25 DIAGNOSIS — I48.21 PERMANENT ATRIAL FIBRILLATION (H): ICD-10-CM

## 2018-04-25 LAB — INR POINT OF CARE: 2.7 (ref 0.86–1.14)

## 2018-04-25 PROCEDURE — 99207 ZZC NO CHARGE NURSE ONLY: CPT

## 2018-04-25 PROCEDURE — 36416 COLLJ CAPILLARY BLOOD SPEC: CPT

## 2018-04-25 PROCEDURE — 85610 PROTHROMBIN TIME: CPT | Mod: QW

## 2018-04-25 NOTE — MR AVS SNAPSHOT
Benjamín Hyman   4/25/2018 9:15 AM   Anticoagulation Therapy Visit    Description:  67 year old male   Provider:  PI ANTI COAG   Department:  [facility]           INR as of 4/25/2018     Today's INR 2.7      Anticoagulation Summary as of 4/25/2018     INR goal 2.0-3.0   Today's INR 2.7   Full instructions 3.75 mg on Mon; 7.5 mg all other days   Next INR check 7/18/2018    Indications   Atrial fibrillation (H) [I48.91]  Long term current use of anticoagulant therapy [Z79.01]         Your next Anticoagulation Clinic appointment(s)     Jul 18, 2018  8:30 AM CDT   [facility]agulation [facility] with PI ANTI COAG   [facility]    [address] [facility] [address] [phone]              Contact Numbers     Please call [phone] with any problems or questions regarding your therapy.    If you need to cancel and/or reschedule your appointment please call one of the following numbers:  [facility] [phone]  [place] - [phone]  [place] - [phone]  \[facility] [place] [facility]\"" [phone]  [place] - [phone]            April 2018 Details    Sun Mon Tue Wed Thu Fri Sat     1               2               3               4               5               6               7                 8               9               10               11               12               13               14                 15               16               17               18               19               20               21                 22               23               24               25      7.5 mg   See details      26      7.5 mg         27      7.5 mg         28      7.5 mg           29      7.5 mg         30      3.75 mg               Date Details   04/25 This INR check               How to take your warfarin dose     To take:  3.75 mg Take 0.5 of a 7.5 mg tablet.    To take:  7.5 mg Take 1 of the 7.5 mg tablets.           May 2018 Details    Sun Mon Tue Wed Thu Fri Sat        1      7.5 mg         2      7.5 mg         3      7.5 mg         4      7.5 mg         5      7.5 mg           6      7.5 mg         7      3.75 mg         8      7.5 mg         9      7.5 mg         10      7.5 mg         11      7.5 mg         12      7.5 mg           13      7.5 mg         14      3.75 mg         15      7.5 mg         16      7.5 mg         17      7.5 mg         18      7.5 mg         19      7.5 mg           20      7.5 mg         21      3.75 mg         22      7.5 mg         23      7.5 mg         24      7.5 mg         25      7.5 mg         26      7.5 mg           27      7.5 mg         28      3.75 mg         29      7.5 mg         30      7.5 mg         31      7.5 mg            Date Details   No additional details            How to take your warfarin dose     To take:  3.75 mg Take 0.5 of a 7.5 mg tablet.    To take:  7.5 mg Take 1 of the 7.5 mg tablets.           June 2018 Details    Sun Mon Tue Wed Thu Fri Sat          1      7.5 mg         2      7.5 mg           3      7.5 mg         4      3.75 mg         5      7.5 mg         6      7.5 mg         7      7.5 mg         8      7.5 mg         9      7.5 mg           10      7.5 mg         11      3.75 mg         12      7.5 mg         13      7.5 mg         14      7.5 mg         15      7.5 mg         16      7.5 mg           17      7.5 mg         18      3.75 mg         19      7.5 mg         20      7.5 mg         21      7.5 mg         22      7.5 mg         23      7.5 mg           24      7.5 mg         25      3.75 mg         26      7.5 mg         27      7.5 mg         28      7.5 mg         29      7.5 mg         30      7.5 mg          Date Details   No additional details            How to take your warfarin dose     To take:  3.75 mg Take 0.5 of a 7.5 mg tablet.    To take:  7.5 mg Take 1 of the 7.5 mg tablets.           July 2018 Details    Sun Mon Tue Wed Thu Fri Sat     1      7.5 mg         2      3.75 mg          3      7.5 mg         4      7.5 mg         5      7.5 mg         6      7.5 mg         7      7.5 mg           8      7.5 mg         9      3.75 mg         10      7.5 mg         11      7.5 mg         12      7.5 mg         13      7.5 mg         14      7.5 mg           15      7.5 mg         16      3.75 mg         17      7.5 mg         18            19               20               21                 22               23               24               25               26               27               28                 29               30               31                    Date Details   No additional details    Date of next INR:  7/18/2018         How to take your warfarin dose     To take:  3.75 mg Take 0.5 of a 7.5 mg tablet.    To take:  7.5 mg Take 1 of the 7.5 mg tablets.

## 2018-04-25 NOTE — PROGRESS NOTES
ANTICOAGULATION FOLLOW-UP CLINIC VISIT    Patient Name:  Benjamín Hyman  Date:  4/25/2018  Contact Type:  Face to Face    SUBJECTIVE:     Patient Findings     Positives No Problem Findings    Comments No changes in medications, activity, or diet noted. No bleeding or increased bruising noted. Took warfarin as prescribed.  Patient is to continue maintenance warfarin plan, and check INR in 12 weeks - okay in chart per Dr. Teran.  Patient verbalizes understanding and agrees to plan. No further questions or concerns.             OBJECTIVE    INR Protime   Date Value Ref Range Status   04/25/2018 2.7 (A) 0.86 - 1.14 Final       ASSESSMENT / PLAN  INR assessment THER    Recheck INR In: 12 WEEKS    INR Location Clinic      Anticoagulation Summary as of 4/25/2018     INR goal 2.0-3.0   Today's INR 2.7   Maintenance plan 3.75 mg (7.5 mg x 0.5) on Mon; 7.5 mg (7.5 mg x 1) all other days   Full instructions 3.75 mg on Mon; 7.5 mg all other days   Weekly total 48.75 mg   No change documented Kirti Merrill RN   Plan last modified Jing Lozano RN (4/12/2017)   Next INR check 7/18/2018   Priority INR   Target end date Indefinite    Indications   Atrial fibrillation (H) [I48.91]  Long term current use of anticoagulant therapy [Z79.01]         Anticoagulation Episode Summary     INR check location     Preferred lab     Send INR reminders to JOSSIE WILLIAM    Comments * 7.5mg tablets. Dr. Teran Ok with 12 week rechecks.      Anticoagulation Care Providers     Provider Role Specialty Phone number    Ruben Teran MD [facility] [phone]            See the Encounter Report to view Anticoagulation Flowsheet and Dosing Calendar (Go to Encounters tab in chart review, and find the Anticoagulation Therapy Visit)        Kirti Merrill, RN

## 2018-05-02 ENCOUNTER — ANTICOAGULATION THERAPY VISIT (OUTPATIENT)
Dept: ANTICOAGULATION | Facility: CLINIC | Age: 68
End: 2018-05-02

## 2018-05-02 DIAGNOSIS — Z79.01 LONG TERM CURRENT USE OF ANTICOAGULANT THERAPY: ICD-10-CM

## 2018-05-02 DIAGNOSIS — I48.19 PERSISTENT ATRIAL FIBRILLATION (H): ICD-10-CM

## 2018-05-02 DIAGNOSIS — I48.21 PERMANENT ATRIAL FIBRILLATION (H): ICD-10-CM

## 2018-05-02 RX ORDER — WARFARIN SODIUM 7.5 MG/1
TABLET ORAL
Qty: 85 TABLET | Refills: 0 | Status: SHIPPED | OUTPATIENT
Start: 2018-05-02 | End: 2018-09-05

## 2018-05-02 NOTE — PROGRESS NOTES
Pt walked into the clinic and stated that he had a question for the INR nurse. RN was unavailable to talk with patient.  May have had a medication refill request.  VM left for pt to please call [phone] for further questions.  Kirti Merrill RN on 5/2/2018 at 12:46 PM    Pt returned call to clinic and left a VM. RN returned call to pt and he requested a refill to be sent in to the pharmacy for his coumadin.  Refill sent.  Patient verbalizes understanding and agrees to plan. No further questions or concerns.  Kirti Merrill RN on 5/2/2018 at 1:25 PM

## 2018-05-02 NOTE — MR AVS SNAPSHOT
Benjamín Hyman   5/2/2018   Anticoagulation Therapy Visit    Description:  67 year old male   Provider:  Kirti Merrill, RN   Department:  [facility]           INR as of 5/2/2018         The patient was not given dosing instructions in this encounter.      Anticoagulation Summary as of 5/2/2018         The patient was not given dosing instructions in this encounter.      Your next Anticoagulation Clinic appointment(s)     Jul 18, 2018  8:30 AM CDT   [facility]    [address] [address]   [phone]              April 2018 Details    Sun Mon Tue Wed Thu Fri Sat     1               2               3               4               5               6               7                 8               9               10               11               12               13               14                 15               16               17               18               19               20               21                 22               23               24               25      7.5 mg   See details      26      7.5 mg         27      7.5 mg         28      7.5 mg           29      7.5 mg         30      3.75 mg               Date Details   04/25 This INR check               How to take your warfarin dose     To take:  3.75 mg Take 0.5 of a 7.5 mg tablet.    To take:  7.5 mg Take 1 of the 7.5 mg tablets.           May 2018 Details    Sun Mon Tue Wed Thu Fri Sat       1      7.5 mg         2      7.5 mg         3      7.5 mg         4      7.5 mg         5      7.5 mg           6      7.5 mg         7      3.75 mg         8      7.5 mg         9      7.5 mg         10      7.5 mg         11      7.5 mg         12      7.5 mg           13      7.5 mg         14      3.75 mg         15      7.5 mg         16      7.5 mg         17      7.5 mg         18      7.5 mg         19      7.5 mg           20      7.5 mg          21      3.75 mg         22      7.5 mg         23      7.5 mg         24      7.5 mg         25      7.5 mg         26      7.5 mg           27      7.5 mg         28      3.75 mg         29      7.5 mg         30      7.5 mg         31      7.5 mg            Date Details   No additional details            How to take your warfarin dose     To take:  3.75 mg Take 0.5 of a 7.5 mg tablet.    To take:  7.5 mg Take 1 of the 7.5 mg tablets.           June 2018 Details    Sun Mon Tue Wed Thu Fri Sat          1      7.5 mg         2      7.5 mg           3      7.5 mg         4      3.75 mg         5      7.5 mg         6      7.5 mg         7      7.5 mg         8      7.5 mg         9      7.5 mg           10      7.5 mg         11      3.75 mg         12      7.5 mg         13      7.5 mg         14      7.5 mg         15      7.5 mg         16      7.5 mg           17      7.5 mg         18      3.75 mg         19      7.5 mg         20      7.5 mg         21      7.5 mg         22      7.5 mg         23      7.5 mg           24      7.5 mg         25      3.75 mg         26      7.5 mg         27      7.5 mg         28      7.5 mg         29      7.5 mg         30      7.5 mg          Date Details   No additional details            How to take your warfarin dose     To take:  3.75 mg Take 0.5 of a 7.5 mg tablet.    To take:  7.5 mg Take 1 of the 7.5 mg tablets.           July 2018 Details    Sun Mon Tue Wed Thu Fri Sat     1      7.5 mg         2      3.75 mg         3      7.5 mg         4      7.5 mg         5      7.5 mg         6      7.5 mg         7      7.5 mg           8      7.5 mg         9      3.75 mg         10      7.5 mg         11      7.5 mg         12      7.5 mg         13      7.5 mg         14      7.5 mg           15      7.5 mg         16      3.75 mg         17      7.5 mg         18            19               20               21                 22               23               24                25               26               27               28                 29               30               31                    Date Details   No additional details    Date of next INR:  7/18/2018         How to take your warfarin dose     To take:  3.75 mg Take 0.5 of a 7.5 mg tablet.    To take:  7.5 mg Take 1 of the 7.5 mg tablets.

## 2018-07-18 ENCOUNTER — ANTICOAGULATION THERAPY VISIT (OUTPATIENT)
Dept: ANTICOAGULATION | Facility: CLINIC | Age: 68
End: 2018-07-18
Payer: COMMERCIAL

## 2018-07-18 DIAGNOSIS — I48.21 PERMANENT ATRIAL FIBRILLATION (H): ICD-10-CM

## 2018-07-18 DIAGNOSIS — Z79.01 LONG TERM CURRENT USE OF ANTICOAGULANT THERAPY: ICD-10-CM

## 2018-07-18 LAB — INR POINT OF CARE: 2.7 (ref 0.86–1.14)

## 2018-07-18 PROCEDURE — 99207 ZZC NO CHARGE NURSE ONLY: CPT

## 2018-07-18 PROCEDURE — 85610 PROTHROMBIN TIME: CPT | Mod: QW

## 2018-07-18 PROCEDURE — 36416 COLLJ CAPILLARY BLOOD SPEC: CPT

## 2018-07-18 NOTE — MR AVS SNAPSHOT
Benjamín Hyman   7/18/2018 8:30 AM   Anticoagulation Therapy Visit    Description:  67 year old male   Provider:  [facility]   Department:  [facility]           INR as of 7/18/2018     Today's INR 2.7      Anticoagulation Summary as of 7/18/2018     INR goal 2.0-3.0   Today's INR 2.7   Full warfarin instructions 3.75 mg on Mon; 7.5 mg all other days   Next INR check 10/10/2018    Indications   Atrial fibrillation (H) [I48.91]  Long term current use of anticoagulant therapy [Z79.01]         Your next Anticoagulation Clinic appointment(s)     Oct 10, 2018  8:30 AM CDT   Anticoagulation Visit with [facility]   [facility]    [address] [address] [phone]              Contact Numbers     Please call [phone] with any problems or questions regarding your therapy.    If you need to cancel and/or reschedule your appointment please call one of the following numbers:  [facility] [phone]  [place] - [phone]  [place] - [phone]  \[facility]\"" [phone]  [place] - [phone]            July 2018 Details    Sun Mon Tue Wed Thu Fri Sat     1               2               3               4               5               6               7                 8               9               10               11               12               13               14                 15               16               17               18      7.5 mg   See details      19      7.5 mg         20      7.5 mg         21      7.5 mg           22      7.5 mg         23      3.75 mg         24      7.5 mg         25      7.5 mg         26      7.5 mg         27      7.5 mg         28      7.5 mg           29      7.5 mg         30      3.75 mg         31      7.5 mg              Date Details   07/18 This INR check               How to take your warfarin dose     To take:  3.75 mg Take 0.5 of a 7.5 mg tablet.    To take:  7.5 mg Take 1 of the  7.5 mg tablets.           August 2018 Details    Sun Mon Tue Wed Thu Fri Sat        1      7.5 mg         2      7.5 mg         3      7.5 mg         4      7.5 mg           5      7.5 mg         6      3.75 mg         7      7.5 mg         8      7.5 mg         9      7.5 mg         10      7.5 mg         11      7.5 mg           12      7.5 mg         13      3.75 mg         14      7.5 mg         15      7.5 mg         16      7.5 mg         17      7.5 mg         18      7.5 mg           19      7.5 mg         20      3.75 mg         21      7.5 mg         22      7.5 mg         23      7.5 mg         24      7.5 mg         25      7.5 mg           26      7.5 mg         27      3.75 mg         28      7.5 mg         29      7.5 mg         30      7.5 mg         31      7.5 mg           Date Details   No additional details            How to take your warfarin dose     To take:  3.75 mg Take 0.5 of a 7.5 mg tablet.    To take:  7.5 mg Take 1 of the 7.5 mg tablets.           September 2018 Details    Sun Mon Tue Wed u Fri Sat           1      7.5 mg           2      7.5 mg         3      3.75 mg         4      7.5 mg         5      7.5 mg         6      7.5 mg         7      7.5 mg         8      7.5 mg           9      7.5 mg         10      3.75 mg         11      7.5 mg         12      7.5 mg         13      7.5 mg         14      7.5 mg         15      7.5 mg           16      7.5 mg         17      3.75 mg         18      7.5 mg         19      7.5 mg         20      7.5 mg         21      7.5 mg         22      7.5 mg           23      7.5 mg         24      3.75 mg         25      7.5 mg         26      7.5 mg         27      7.5 mg         28      7.5 mg         29      7.5 mg           30      7.5 mg                Date Details   No additional details            How to take your warfarin dose     To take:  3.75 mg Take 0.5 of a 7.5 mg tablet.    To take:  7.5 mg Take 1 of the 7.5 mg tablets.            October 2018 Details    Sun Mon Tue Wed Thu Fri Sat      1      3.75 mg         2      7.5 mg         3      7.5 mg         4      7.5 mg         5      7.5 mg         6      7.5 mg           7      7.5 mg         8      3.75 mg         9      7.5 mg         10            11               12               13                 14               15               16               17               18               19               20                 21               22               23               24               25               26               27                 28               29               30               31                   Date Details   No additional details    Date of next INR:  10/10/2018         How to take your warfarin dose     To take:  3.75 mg Take 0.5 of a 7.5 mg tablet.    To take:  7.5 mg Take 1 of the 7.5 mg tablets.

## 2018-07-18 NOTE — PROGRESS NOTES
ANTICOAGULATION FOLLOW-UP CLINIC VISIT    Patient Name:  Benjamín Hyman  Date:  7/18/2018  Contact Type:  Face to Face    SUBJECTIVE:     Patient Findings     Positives No Problem Findings    Comments No changes in medications, activity, or diet noted. No bleeding or increased bruising noted. Took warfarin as prescribed.  Patient is to continue maintenance warfarin plan, and check INR in 12 weeks.  Patient verbalizes understanding and agrees to plan. No further questions or concerns.           OBJECTIVE    INR Protime   Date Value Ref Range Status   07/18/2018 2.7 (A) 0.86 - 1.14 Final       ASSESSMENT / PLAN  INR assessment THER    Recheck INR In: 12 WEEKS    INR Location Clinic      Anticoagulation Summary as of 7/18/2018     INR goal 2.0-3.0   Today's INR 2.7   Warfarin maintenance plan 3.75 mg (7.5 mg x 0.5) on Mon; 7.5 mg (7.5 mg x 1) all other days   Full warfarin instructions 3.75 mg on Mon; 7.5 mg all other days   Weekly warfarin total 48.75 mg   No change documented Kirti Merrill RN   Plan last modified Jing Lozano RN (4/12/2017)   Next INR check 10/10/2018   Priority INR   Target end date Indefinite    Indications   Atrial fibrillation (H) [I48.91]  Long term current use of anticoagulant therapy [Z79.01]         Anticoagulation Episode Summary     INR check location     Preferred lab     Send INR reminders to JOSSIE WILLIAM    Comments * 7.5mg tablets. Dr. Teran Ok with 12 week rechecks.      Anticoagulation Care Providers     Provider Role Specialty Phone number    Ruben Teran MD [facility] [phone]            See the Encounter Report to view Anticoagulation Flowsheet and Dosing Calendar (Go to Encounters tab in chart review, and find the Anticoagulation Therapy Visit)        Kirti Merrill, GABRIELLE

## 2018-09-05 DIAGNOSIS — I48.19 PERSISTENT ATRIAL FIBRILLATION (H): ICD-10-CM

## 2018-09-05 RX ORDER — WARFARIN SODIUM 7.5 MG/1
TABLET ORAL
Qty: 85 TABLET | Refills: 0 | Status: SHIPPED | OUTPATIENT
Start: 2018-09-05 | End: 2018-12-06

## 2018-10-10 ENCOUNTER — ANTICOAGULATION THERAPY VISIT (OUTPATIENT)
Dept: ANTICOAGULATION | Facility: CLINIC | Age: 68
End: 2018-10-10
Payer: COMMERCIAL

## 2018-10-10 DIAGNOSIS — I48.19 PERSISTENT ATRIAL FIBRILLATION (H): ICD-10-CM

## 2018-10-10 DIAGNOSIS — Z79.01 LONG TERM CURRENT USE OF ANTICOAGULANT THERAPY: ICD-10-CM

## 2018-10-10 DIAGNOSIS — I48.21 PERMANENT ATRIAL FIBRILLATION (H): ICD-10-CM

## 2018-10-10 LAB — INR POINT OF CARE: 2.9 (ref 0.86–1.14)

## 2018-10-10 PROCEDURE — 85610 PROTHROMBIN TIME: CPT | Mod: QW

## 2018-10-10 PROCEDURE — 36416 COLLJ CAPILLARY BLOOD SPEC: CPT

## 2018-10-10 PROCEDURE — 99207 ZZC NO CHARGE NURSE ONLY: CPT

## 2018-10-10 NOTE — PROGRESS NOTES
Addendum 12/6/2018:  Pt called [facility] requesting a refill of his coumadin - refill sent in and pt notified.  Patient verbalizes understanding and agrees to plan. No further questions or concerns.  Kirti Merrill, GABRIELLE on 12/6/2018 at 9:16 AM      ANTICOAGULATION FOLLOW-UP CLINIC VISIT    Patient Name:  Benjamín Hyman  Date:  10/10/2018  Contact Type:  Face to Face    SUBJECTIVE:     Patient Findings     Positives No Problem Findings    Comments No changes in medications, diet, or activity. No concerns with bleeding or bruising. Took warfarin as prescribed.   Continue maintenance warfarin dose. Will recheck INR in 12 weeks per MD order.   Patient verbalizes understanding and agrees with plan. No further questions at this time.              OBJECTIVE    INR Protime   Date Value Ref Range Status   10/10/2018 2.9 (A) 0.86 - 1.14 Final       ASSESSMENT / PLAN  INR assessment THER    Recheck INR In: 12 WEEKS per MD order   INR Location Clinic      Anticoagulation Summary as of 10/10/2018     INR goal 2.0-3.0   Today's INR 2.9   Warfarin maintenance plan 3.75 mg (7.5 mg x 0.5) on Mon; 7.5 mg (7.5 mg x 1) all other days   Full warfarin instructions 3.75 mg on Mon; 7.5 mg all other days   Weekly warfarin total 48.75 mg   No change documented Emily Schreiber RN   Plan last modified Jing Lozano RN (4/12/2017)   Next INR check 1/2/2019   Priority INR   Target end date Indefinite    Indications   Atrial fibrillation (H) [I48.91]  Long term current use of anticoagulant therapy [Z79.01]         Anticoagulation Episode Summary     INR check location     Preferred lab     Send INR reminders to JOSSIE WILLIAM    Comments * 7.5mg tablets. Dr. Teran Ok with 12 week rechecks.      Anticoagulation Care Providers     Provider Role Specialty Phone number    Ruben Teran MD [facility] [phone]            See the Encounter Report to view Anticoagulation Flowsheet and Dosing Calendar (Go to Encounters tab in  chart review, and find the Anticoagulation Therapy Visit)        Emily Schreiber RN

## 2018-10-10 NOTE — MR AVS SNAPSHOT
Benjamín AVITIA Boogie   10/10/2018 8:30 AM   Anticoagulation Therapy Visit    Description:  67 year old male   Provider:  [facility]   Department:  [facility]           INR as of 10/10/2018     Today's INR 2.9      Anticoagulation Summary as of 10/10/2018     INR goal 2.0-3.0   Today's INR 2.9   Full warfarin instructions 3.75 mg on Mon; 7.5 mg all other days   Next INR check 1/2/2019    Indications   Atrial fibrillation (H) [I48.91]  Long term current use of anticoagulant therapy [Z79.01]         Your next Anticoagulation Clinic appointment(s)     Jan 02, 2019  8:30 AM CST   Anticoagulation Visit with [facility]   [facility]    [address] [id number] [phone]              Contact Numbers     Please call [phone] with any problems or questions regarding your therapy.    If you need to cancel and/or reschedule your appointment please call one of the following numbers:  [facility] [phone]  [place] - [phone]  [facility] [phone]  \[facility]\"" [phone]  [place] - [phone]            October 2018 Details    Sun Mon Tue Wed Thu Fri Sat      1               2               3               4               5               6                 7               8               9               10      7.5 mg   See details      11      7.5 mg         12      7.5 mg         13      7.5 mg           14      7.5 mg         15      3.75 mg         16      7.5 mg         17      7.5 mg         18      7.5 mg         19      7.5 mg         20      7.5 mg           21      7.5 mg         22      3.75 mg         23      7.5 mg         24      7.5 mg         25      7.5 mg         26      7.5 mg         27      7.5 mg           28      7.5 mg         29      3.75 mg         30      7.5 mg         31      7.5 mg             Date Details   10/10 This INR check               How to take your warfarin dose     To take:  3.75 mg Take 0.5  of a 7.5 mg tablet.    To take:  7.5 mg Take 1 of the 7.5 mg tablets.           November 2018 Details    Sun Mon Tue Wed Thu Fri Sat         1      7.5 mg         2      7.5 mg         3      7.5 mg           4      7.5 mg         5      3.75 mg         6      7.5 mg         7      7.5 mg         8      7.5 mg         9      7.5 mg         10      7.5 mg           11      7.5 mg         12      3.75 mg         13      7.5 mg         14      7.5 mg         15      7.5 mg         16      7.5 mg         17      7.5 mg           18      7.5 mg         19      3.75 mg         20      7.5 mg         21      7.5 mg         22      7.5 mg         23      7.5 mg         24      7.5 mg           25      7.5 mg         26      3.75 mg         27      7.5 mg         28      7.5 mg         29      7.5 mg         30      7.5 mg           Date Details   No additional details            How to take your warfarin dose     To take:  3.75 mg Take 0.5 of a 7.5 mg tablet.    To take:  7.5 mg Take 1 of the 7.5 mg tablets.           December 2018 Details    Sun Mon Tue Wed Thu Fri Sat           1      7.5 mg           2      7.5 mg         3      3.75 mg         4      7.5 mg         5      7.5 mg         6      7.5 mg         7      7.5 mg         8      7.5 mg           9      7.5 mg         10      3.75 mg         11      7.5 mg         12      7.5 mg         13      7.5 mg         14      7.5 mg         15      7.5 mg           16      7.5 mg         17      3.75 mg         18      7.5 mg         19      7.5 mg         20      7.5 mg         21      7.5 mg         22      7.5 mg           23      7.5 mg         24      3.75 mg         25      7.5 mg         26      7.5 mg         27      7.5 mg         28      7.5 mg         29      7.5 mg           30      7.5 mg         31      3.75 mg               Date Details   No additional details            How to take your warfarin dose     To take:  3.75 mg Take 0.5 of a 7.5 mg tablet.    To  take:  7.5 mg Take 1 of the 7.5 mg tablets.           January 2019 Details    Sun Mon Tue Wed Thu Fri Sat       1      7.5 mg         2            3               4               5                 6               7               8               9               10               11               12                 13               14               15               16               17               18               19                 20               21               22               23               24               25               26                 27               28               29               30               31                  Date Details   No additional details    Date of next INR:  1/2/2019         How to take your warfarin dose     To take:  7.5 mg Take 1 of the 7.5 mg tablets.

## 2018-10-24 ENCOUNTER — TELEPHONE (OUTPATIENT)
Dept: FAMILY MEDICINE | Facility: CLINIC | Age: 68
End: 2018-10-24

## 2018-10-24 NOTE — TELEPHONE ENCOUNTER
"S-(situation): diarrhea x1    B-(background): LOV 4/16/18    A-(assessment): Patient calling stating he had an episode of diarrhea this morning. He states he must have \"ate some bad food, or something\". No abdominal pain, swelling, fever, weakness, SOB, signs of dehydration, vomiting, or recent changes in diet.    R-(recommendations): Drink fluids to stay hydrated. Avoid dairy products, citrus juices, raw furits and vegetables, and fried or spicy foods for 2 to 5 days. As advised by Telephone Triage Protocols for Nurses (Dereck, 2016) if diarrhea continues for > 5 days call back and seek medical care within 24 hours.  Arlen SELBY RN      "

## 2018-10-24 NOTE — TELEPHONE ENCOUNTER
Reason for call:  Patient reporting a symptom    Symptom or request: Pt had diarrhea x1 this am, 30 mins after taking his regular morning meds.  This has never happened before.  He is concerned - No fever, no vomiting, no other symptoms and no new meds.  Please call patient and advise.      Duration (how long have symptoms been present): today    Have you been treated for this before? Yes    Additional comments:     Phone Number patient can be reached at:  Home number on file [phone] (home)    Best Time:  any    Can we leave a detailed message on this number:  YES    Call taken on 10/24/2018 at 11:13 AM by Natalie Lehman

## 2018-11-13 DIAGNOSIS — I48.19 PERSISTENT ATRIAL FIBRILLATION (H): ICD-10-CM

## 2018-11-13 RX ORDER — LISINOPRIL 10 MG/1
10 TABLET ORAL DAILY
Qty: 30 TABLET | Refills: 11 | Status: CANCELLED | OUTPATIENT
Start: 2018-11-13

## 2018-11-13 RX ORDER — SIMVASTATIN 40 MG
40 TABLET ORAL EVERY OTHER DAY
Qty: 15 TABLET | Refills: 11 | Status: CANCELLED | OUTPATIENT
Start: 2018-11-13

## 2018-11-13 NOTE — TELEPHONE ENCOUNTER
"Requested Prescriptions   Pending Prescriptions Disp Refills     lisinopril (PRINIVIL/ZESTRIL) 10 MG tablet 30 tablet 11    Last Written Prescription Date:  4/16/18  Last Fill Quantity: 30,  # refills: 11   Last office visit: 4/16/2018 with prescribing provider: Jeffry  Future Office Visit:     Sig: Take 1 tablet (10 mg) by mouth daily    ACE Inhibitors (Including Combos) Protocol Passed    11/13/2018  2:54 PM       Passed - Blood pressure under 140/90 in past 12 months    BP Readings from Last 3 Encounters:   04/16/18 128/76   01/26/18 128/52   09/26/17 122/64                Passed - Recent (12 mo) or future (30 days) visit within the authorizing provider's specialty    Patient had office visit in the last 12 months or has a visit in the next 30 days with authorizing provider or within the authorizing provider's specialty.  See \"Patient Info\" tab in inbasket, or \"Choose Columns\" in Meds & Orders section of the refill encounter.             Passed - Patient is age 18 or older       Passed - Normal serum creatinine on file in past 12 months    Recent Labs   Lab Test  04/16/18   1546   CR  0.80            Passed - Normal serum potassium on file in past 12 months    Recent Labs   Lab Test  04/16/18   1546   POTASSIUM  4.6             simvastatin (ZOCOR) 40 MG tablet 15 tablet 11    Last Written Prescription Date:  4/16/18  Last Fill Quantity: 15,  # refills: 11   Last office visit: 4/16/2018 with prescribing provider:  Jeffry   Future Office Visit:     Sig: Take 1 tablet (40 mg) by mouth every other day    Statins Protocol Passed    11/13/2018  2:54 PM       Passed - LDL on file in past 12 months    Recent Labs   Lab Test  04/16/18   1546   LDL  56            Passed - No abnormal creatine kinase in past 12 months    No lab results found.            Passed - Recent (12 mo) or future (30 days) visit within the authorizing provider's specialty    Patient had office visit in the last 12 months or has a visit in the " "next 30 days with authorizing provider or within the authorizing provider's specialty.  See \"Patient Info\" tab in inbasket, or \"Choose Columns\" in Meds & Orders section of the refill encounter.             Passed - Patient is age 18 or older          "

## 2018-12-06 RX ORDER — WARFARIN SODIUM 7.5 MG/1
TABLET ORAL
Qty: 90 TABLET | Refills: 0 | Status: SHIPPED | OUTPATIENT
Start: 2018-12-06 | End: 2019-04-22

## 2019-01-02 ENCOUNTER — ANTICOAGULATION THERAPY VISIT (OUTPATIENT)
Dept: ANTICOAGULATION | Facility: CLINIC | Age: 69
End: 2019-01-02
Payer: COMMERCIAL

## 2019-01-02 DIAGNOSIS — I48.21 PERMANENT ATRIAL FIBRILLATION (H): ICD-10-CM

## 2019-01-02 DIAGNOSIS — Z79.01 LONG TERM CURRENT USE OF ANTICOAGULANT THERAPY: ICD-10-CM

## 2019-01-02 LAB — INR POINT OF CARE: 2 (ref 0.86–1.14)

## 2019-01-02 PROCEDURE — 99207 ZZC NO CHARGE NURSE ONLY: CPT

## 2019-01-02 PROCEDURE — 85610 PROTHROMBIN TIME: CPT | Mod: QW

## 2019-01-02 PROCEDURE — 36416 COLLJ CAPILLARY BLOOD SPEC: CPT

## 2019-01-02 NOTE — PROGRESS NOTES
ANTICOAGULATION FOLLOW-UP CLINIC VISIT    Patient Name:  Benjamín Hyman  Date:  2019  Contact Type:  Face to Face    SUBJECTIVE:     Patient Findings     Positives:   No Problem Findings    Comments:   No changes in medications, diet, or activity. No concerns with bleeding or bruising. Took warfarin as prescribed.   Continue maintenance warfarin dose. Will recheck INR in 12 weeks with MD fiore.   Patient verbalizes understanding and agrees with plan. No further questions at this time.              OBJECTIVE    INR Protime   Date Value Ref Range Status   2019 2.0 (A) 0.86 - 1.14 Final       ASSESSMENT / PLAN  INR assessment THER    Recheck INR In: 12 WEEKS With MD fiore   INR Location Clinic      Anticoagulation Summary  As of 2019    INR goal:   2.0-3.0   TTR:   74.9 % (3.7 y)   INR used for dosin.0 (2019)   Warfarin maintenance plan:   3.75 mg (7.5 mg x 0.5) every Mon; 7.5 mg (7.5 mg x 1) all other days   Full warfarin instructions:   3.75 mg every Mon; 7.5 mg all other days   Weekly warfarin total:   48.75 mg   No change documented:   Emily Schreiber RN   Plan last modified:   Jing Lozano RN (2017)   Next INR check:   3/27/2019   Priority:   INR   Target end date:   Indefinite    Indications    Atrial fibrillation (H) [I48.91]  Long term current use of anticoagulant therapy [Z79.01]             Anticoagulation Episode Summary     INR check location:       Preferred lab:       Send INR reminders to:   PI ANTICOAG POOL    Comments:   * 7.5mg tablets. Dr. Jeffry English with 12 week rechecks.      Anticoagulation Care Providers     Provider Role Specialty Phone number    Ruben Teran MD [facility] [phone]            See the Encounter Report to view Anticoagulation Flowsheet and Dosing Calendar (Go to Encounters tab in chart review, and find the Anticoagulation Therapy Visit)        Emily Schreiber, GABRIELLE

## 2019-02-21 DIAGNOSIS — I48.19 PERSISTENT ATRIAL FIBRILLATION (H): ICD-10-CM

## 2019-02-21 RX ORDER — LISINOPRIL 10 MG/1
10 TABLET ORAL DAILY
Qty: 90 TABLET | Refills: 0 | Status: SHIPPED | OUTPATIENT
Start: 2019-02-21 | End: 2019-04-22

## 2019-02-21 NOTE — LETTER
February 21, 2019      Benjamín Hyman  [address] [place] [address] [place] [id number]        Dear Benjamín,     Your lisinopril medication is being filled for one 90-day refill only due to:  reminder that pt will be due to be seen again in April/May.    Please call [phone] to arrange your appointment with Dr Teran.    Thank you.    Sincerely,        Ruben Teran MD/Blanca Voss RN        lar

## 2019-02-22 NOTE — TELEPHONE ENCOUNTER
lisinopril  Last Written Prescription Date:  4/16/18  Last Fill Quantity: 30,  # refills: 11   Last office visit: 4/16/2018 with prescribing provider:      Future Office Visit:      Prescription approved per [facility] Refill Protocol.

## 2019-02-22 NOTE — TELEPHONE ENCOUNTER
"Requested Prescriptions   Pending Prescriptions Disp Refills     lisinopril (PRINIVIL/ZESTRIL) 10 MG tablet 30 tablet 11     Sig: Take 1 tablet (10 mg) by mouth daily    ACE Inhibitors (Including Combos) Protocol Passed - 2/21/2019  6:04 PM       Passed - Blood pressure under 140/90 in past 12 months    BP Readings from Last 3 Encounters:   04/16/18 128/76   01/26/18 128/52   09/26/17 122/64                Passed - Recent (12 mo) or future (30 days) visit within the authorizing provider's specialty    Patient had office visit in the last 12 months or has a visit in the next 30 days with authorizing provider or within the authorizing provider's specialty.  See \"Patient Info\" tab in inbasket, or \"Choose Columns\" in Meds & Orders section of the refill encounter.             Passed - Medication is active on med list       Passed - Patient is age 18 or older       Passed - Normal serum creatinine on file in past 12 months    Recent Labs   Lab Test 04/16/18  1546   CR 0.80            Passed - Normal serum potassium on file in past 12 months    Recent Labs   Lab Test 04/16/18  1546   POTASSIUM 4.6               "

## 2019-03-27 ENCOUNTER — ANTICOAGULATION THERAPY VISIT (OUTPATIENT)
Dept: ANTICOAGULATION | Facility: CLINIC | Age: 69
End: 2019-03-27
Payer: COMMERCIAL

## 2019-03-27 DIAGNOSIS — I48.21 PERMANENT ATRIAL FIBRILLATION (H): ICD-10-CM

## 2019-03-27 DIAGNOSIS — Z79.01 LONG TERM CURRENT USE OF ANTICOAGULANT THERAPY: ICD-10-CM

## 2019-03-27 LAB — INR POINT OF CARE: 2.5 (ref 0.86–1.14)

## 2019-03-27 PROCEDURE — 36416 COLLJ CAPILLARY BLOOD SPEC: CPT

## 2019-03-27 PROCEDURE — 85610 PROTHROMBIN TIME: CPT | Mod: QW

## 2019-03-27 PROCEDURE — 99207 ZZC NO CHARGE NURSE ONLY: CPT

## 2019-03-27 NOTE — PROGRESS NOTES
ANTICOAGULATION FOLLOW-UP CLINIC VISIT    Patient Name:  Benjamín Hymna  Date:  3/27/2019  Contact Type:  Face to Face    SUBJECTIVE:     Patient Findings     Comments:   No changes in medications, diet, or activity. No concerns with bleeding or bruising. Took warfarin as prescribed.   Continue maintenance warfarin dose. Will recheck INR in 12 weeks per PCP approval.   Patient verbalizes understanding and agrees with plan. No further questions at this time.              OBJECTIVE    INR Protime   Date Value Ref Range Status   2019 2.5 (A) 0.86 - 1.14 Final       ASSESSMENT / PLAN  INR assessment THER    Recheck INR In: 12 WEEKS per PCP approval   INR Location Clinic      Anticoagulation Summary  As of 3/27/2019    INR goal:   2.0-3.0   TTR:   76.4 % (4 y)   INR used for dosin.5 (3/27/2019)   Warfarin maintenance plan:   3.75 mg (7.5 mg x 0.5) every Mon; 7.5 mg (7.5 mg x 1) all other days   Full warfarin instructions:   3.75 mg every Mon; 7.5 mg all other days   Weekly warfarin total:   48.75 mg   No change documented:   Emily Schreiber RN   Plan last modified:   Jing Lozano RN (2017)   Next INR check:   2019   Priority:   INR   Target end date:   Indefinite    Indications    Atrial fibrillation (H) [I48.91]  Long term current use of anticoagulant therapy [Z79.01]             Anticoagulation Episode Summary     INR check location:       Preferred lab:       Send INR reminders to:   PI ANTICOAG POOL    Comments:   * 7.5mg tablets. Dr. Teran Ok with 12 week rechecks.      Anticoagulation Care Providers     Provider Role Specialty Phone number    Ruben Teran MD [facility] [phone]            See the Encounter Report to view Anticoagulation Flowsheet and Dosing Calendar (Go to Encounters tab in chart review, and find the Anticoagulation Therapy Visit)        Emily Schreiber, GABRIELLE

## 2019-04-22 ENCOUNTER — OFFICE VISIT (OUTPATIENT)
Dept: FAMILY MEDICINE | Facility: CLINIC | Age: 69
End: 2019-04-22
Payer: COMMERCIAL

## 2019-04-22 ENCOUNTER — ANCILLARY PROCEDURE (OUTPATIENT)
Dept: GENERAL RADIOLOGY | Facility: CLINIC | Age: 69
End: 2019-04-22
Attending: FAMILY MEDICINE
Payer: COMMERCIAL

## 2019-04-22 VITALS
SYSTOLIC BLOOD PRESSURE: 118 MMHG | DIASTOLIC BLOOD PRESSURE: 68 MMHG | OXYGEN SATURATION: 99 % | HEART RATE: 49 BPM | WEIGHT: 267 LBS | BODY MASS INDEX: 45.58 KG/M2 | TEMPERATURE: 98.7 F | HEIGHT: 64 IN

## 2019-04-22 DIAGNOSIS — E66.01 MORBID OBESITY DUE TO EXCESS CALORIES (H): ICD-10-CM

## 2019-04-22 DIAGNOSIS — I48.19 PERSISTENT ATRIAL FIBRILLATION (H): ICD-10-CM

## 2019-04-22 DIAGNOSIS — E78.5 HYPERLIPIDEMIA WITH TARGET LDL LESS THAN 100: ICD-10-CM

## 2019-04-22 DIAGNOSIS — Z12.11 COLON CANCER SCREENING: ICD-10-CM

## 2019-04-22 DIAGNOSIS — R07.9 CHEST PAIN, UNSPECIFIED TYPE: Primary | ICD-10-CM

## 2019-04-22 DIAGNOSIS — I48.91 ATRIAL FIBRILLATION, UNSPECIFIED TYPE (H): ICD-10-CM

## 2019-04-22 DIAGNOSIS — R07.9 CHEST PAIN, UNSPECIFIED TYPE: ICD-10-CM

## 2019-04-22 LAB
ANION GAP SERPL CALCULATED.3IONS-SCNC: 5 MMOL/L (ref 3–14)
BASOPHILS # BLD AUTO: 0 10E9/L (ref 0–0.2)
BASOPHILS NFR BLD AUTO: 0.3 %
BUN SERPL-MCNC: 15 MG/DL (ref 7–30)
CALCIUM SERPL-MCNC: 8.9 MG/DL (ref 8.5–10.1)
CHLORIDE SERPL-SCNC: 104 MMOL/L (ref 94–109)
CHOLEST SERPL-MCNC: 135 MG/DL
CO2 SERPL-SCNC: 28 MMOL/L (ref 20–32)
CREAT SERPL-MCNC: 0.86 MG/DL (ref 0.66–1.25)
DIFFERENTIAL METHOD BLD: ABNORMAL
DIGOXIN SERPL-MCNC: 0.6 UG/L (ref 0.5–2)
EOSINOPHIL # BLD AUTO: 0.1 10E9/L (ref 0–0.7)
EOSINOPHIL NFR BLD AUTO: 1.1 %
ERYTHROCYTE [DISTWIDTH] IN BLOOD BY AUTOMATED COUNT: 12.2 % (ref 10–15)
GFR SERPL CREATININE-BSD FRML MDRD: 89 ML/MIN/{1.73_M2}
GLUCOSE SERPL-MCNC: 99 MG/DL (ref 70–99)
HCT VFR BLD AUTO: 47.6 % (ref 40–53)
HDLC SERPL-MCNC: 33 MG/DL
HGB BLD-MCNC: 15.8 G/DL (ref 13.3–17.7)
LDLC SERPL CALC-MCNC: 64 MG/DL
LYMPHOCYTES # BLD AUTO: 2.4 10E9/L (ref 0.8–5.3)
LYMPHOCYTES NFR BLD AUTO: 20.3 %
MCH RBC QN AUTO: 31.5 PG (ref 26.5–33)
MCHC RBC AUTO-ENTMCNC: 33.2 G/DL (ref 31.5–36.5)
MCV RBC AUTO: 95 FL (ref 78–100)
MONOCYTES # BLD AUTO: 0.8 10E9/L (ref 0–1.3)
MONOCYTES NFR BLD AUTO: 6.9 %
NEUTROPHILS # BLD AUTO: 8.3 10E9/L (ref 1.6–8.3)
NEUTROPHILS NFR BLD AUTO: 71.4 %
NONHDLC SERPL-MCNC: 102 MG/DL
PLATELET # BLD AUTO: 243 10E9/L (ref 150–450)
POTASSIUM SERPL-SCNC: 4.2 MMOL/L (ref 3.4–5.3)
RBC # BLD AUTO: 5.02 10E12/L (ref 4.4–5.9)
SODIUM SERPL-SCNC: 137 MMOL/L (ref 133–144)
TRIGL SERPL-MCNC: 192 MG/DL
WBC # BLD AUTO: 11.7 10E9/L (ref 4–11)

## 2019-04-22 PROCEDURE — 85025 COMPLETE CBC W/AUTO DIFF WBC: CPT | Performed by: FAMILY MEDICINE

## 2019-04-22 PROCEDURE — 99214 OFFICE O/P EST MOD 30 MIN: CPT | Performed by: FAMILY MEDICINE

## 2019-04-22 PROCEDURE — 80162 ASSAY OF DIGOXIN TOTAL: CPT | Performed by: FAMILY MEDICINE

## 2019-04-22 PROCEDURE — 80048 BASIC METABOLIC PNL TOTAL CA: CPT | Performed by: FAMILY MEDICINE

## 2019-04-22 PROCEDURE — 36415 COLL VENOUS BLD VENIPUNCTURE: CPT | Performed by: FAMILY MEDICINE

## 2019-04-22 PROCEDURE — 93000 ELECTROCARDIOGRAM COMPLETE: CPT | Performed by: FAMILY MEDICINE

## 2019-04-22 PROCEDURE — 71046 X-RAY EXAM CHEST 2 VIEWS: CPT

## 2019-04-22 PROCEDURE — 80061 LIPID PANEL: CPT | Performed by: FAMILY MEDICINE

## 2019-04-22 PROCEDURE — 82274 ASSAY TEST FOR BLOOD FECAL: CPT | Performed by: FAMILY MEDICINE

## 2019-04-22 RX ORDER — SIMVASTATIN 40 MG
40 TABLET ORAL EVERY OTHER DAY
Qty: 15 TABLET | Refills: 11 | Status: SHIPPED | OUTPATIENT
Start: 2019-04-22 | End: 2020-01-16

## 2019-04-22 RX ORDER — WARFARIN SODIUM 7.5 MG/1
TABLET ORAL
Qty: 90 TABLET | Refills: 0 | Status: SHIPPED | OUTPATIENT
Start: 2019-04-22 | End: 2019-05-23

## 2019-04-22 RX ORDER — METOPROLOL SUCCINATE 200 MG/1
200 TABLET, EXTENDED RELEASE ORAL DAILY
Qty: 30 TABLET | Refills: 11 | Status: SHIPPED | OUTPATIENT
Start: 2019-04-22 | End: 2020-04-24

## 2019-04-22 RX ORDER — DIGOXIN 250 MCG
TABLET ORAL
Qty: 30 TABLET | Refills: 11 | Status: SHIPPED | OUTPATIENT
Start: 2019-04-22 | End: 2020-04-24

## 2019-04-22 RX ORDER — LISINOPRIL 10 MG/1
10 TABLET ORAL DAILY
Qty: 30 TABLET | Refills: 11 | Status: SHIPPED | OUTPATIENT
Start: 2019-04-22 | End: 2019-06-12

## 2019-04-22 ASSESSMENT — MIFFLIN-ST. JEOR: SCORE: 1896.07

## 2019-04-22 NOTE — PROGRESS NOTES
"  SUBJECTIVE:   Benjamín Hyman is a 68 year old male who presents to clinic today for the following   health issues:  Chief Complaint   Patient presents with     Medication Refill     is fasting for labs.     Chest Pain     with exertion he has been having pain in left axillary region. No SOB when it occurs. No pain in the chest when it occurs, but is always on the left side.         Hyperlipidemia Follow-Up      Rate your low fat/cholesterol diet?: fair    Taking statin?  Yes, muscle aches after starting statin-so he taking every other day due to the muscle cramping    Other lipid medications/supplements?:  none    Hypertension Follow-up      Outpatient blood pressures are not being checked.    Low Salt Diet: no added salt      Amount of exercise or physical activity: occasional walks    Problems taking medications regularly: No-using an zeb on his phone    Medication side effects: none    Diet: low salt and low fat/cholesterol      He also reports having some chest pain with activity.  Seems to be in left axilla.  No nausea vomiting or sweats.  This has been going for some time.  No trauma or overuse.  No pedal edema.       Additional history:     Reviewed  and updated as needed this visit by clinical staff  Tobacco  Allergies  Meds  Med Hx  Surg Hx  Fam Hx  Soc Hx        Reviewed and updated as needed this visit by Provider             ROS:  CONSTITUTIONAL:NEGATIVE for fever, chills, change in weight  INTEGUMENTARY/SKIN: NEGATIVE for worrisome rashes, moles or lesions  ENT/MOUTH: NEGATIVE for ear, mouth and throat problems  RESP:as above  CV: as above  MUSCULOSKELETAL: no trauma to left side of chest  NEURO: NEGATIVE for weakness, dizziness or paresthesias  PSYCHIATRIC: NEGATIVE for changes in mood or affect    OBJECTIVE:                                                    /68 (Cuff Size: Adult Large)   Pulse (!) 49   Temp 98.7  F (37.1  C) (Tympanic)   Ht 1.632 m (5' 4.25\")   Wt 121.1 kg (267 lb) "   SpO2 99%   BMI 45.47 kg/m    Body mass index is 45.47 kg/m .  GENERAL APPEARANCE: alert, no distress, cooperative and Nourishment morbidly obese   RESP: lungs clear to auscultation - no rales, rhonchi or wheezes  CV: irregular rate and rhythm, rate controlled  ABDOMEN: mild left sided abdominal pain no rebound or guarding  MS: extremities normal- no gross deformities noted  SKIN: no suspicious lesions or rashes  NEURO: Normal strength and tone, mentation intact and speech normal  PSYCH: mentation appears normal and affect normal/bright    I have personally reviewed the xray and my interpretation is the following:  Atrial fib, rate controlled, st changes noted non diagnostic      I have personally reviewed the xray and my interpretation is the following:  Mild cardiomegaly, no signs of heart failure       ASSESSMENT/PLAN:                                                    1. Chest pain, unspecified type  Unclear etiology, concern for cardiac etiology, get stress test  - EKG 12-lead complete w/read - Clinics  - XR Chest 2 Views; Future  - CBC with platelets differential    2. Atrial fibrillation, unspecified type (H)  stable  - Basic metabolic panel  - CBC with platelets differential  - Digoxin level    3. Hyperlipidemia with target LDL less than 100  Check level and labs  - Digoxin level  - Lipid panel reflex to direct LDL Fasting    4. Morbid obesity due to excess calories (H)  Work on weight loss    5. Colon cancer screening  He will return in the near future  - Fecal colorectal cancer screen (FIT); Future      See Patient Instructions    Ruben Teran MD  [facility]

## 2019-04-22 NOTE — PATIENT INSTRUCTIONS
Please go to lab.    I refilled your medications.    Due to your symptoms, I do want to get a stress test with imaging.  Call cardiology at [phone] to schedule the test.    Please schedule a stress with images.    Follow up in 4 weeks to see how you are doing.        Thank you for choosing [facility].  You may be receiving an email and/or telephone survey request from [facility] Customer Experience regarding your visit today.  Please take a few minutes to respond to the survey to let us know how we are doing.      If you have questions or concerns, please contact us via Advanced Patient Care or you can contact your care team at [phone].    Our Clinic hours are:  Monday 6:40 am  to 7:00 pm  Tuesday -Friday 6:40 am to 5:00 pm    The [place] outpatient lab hours are:  Monday - Friday 6:10 am to 4:45 pm  Saturdays 7:00 am to 11:00 am  Appointments are required, call [phone]    If you have clinical questions after hours or would like to schedule an appointment,  call the clinic at [phone].

## 2019-04-26 DIAGNOSIS — Z12.11 COLON CANCER SCREENING: ICD-10-CM

## 2019-04-26 LAB — HEMOCCULT STL QL IA: NEGATIVE

## 2019-05-15 ENCOUNTER — HOSPITAL ENCOUNTER (OUTPATIENT)
Dept: NUCLEAR MEDICINE | Facility: CLINIC | Age: 69
Setting detail: NUCLEAR MEDICINE
Discharge: HOME | End: 2019-05-15
Attending: FAMILY MEDICINE | Admitting: FAMILY MEDICINE
Payer: MEDICARE

## 2019-05-15 DIAGNOSIS — R07.9 CHEST PAIN, UNSPECIFIED TYPE: ICD-10-CM

## 2019-05-15 DIAGNOSIS — I48.91 ATRIAL FIBRILLATION, UNSPECIFIED TYPE (H): ICD-10-CM

## 2019-05-15 DIAGNOSIS — E66.01 MORBID OBESITY DUE TO EXCESS CALORIES (H): ICD-10-CM

## 2019-05-15 PROCEDURE — 93017 CV STRESS TEST TRACING ONLY: CPT

## 2019-05-15 PROCEDURE — 78452 HT MUSCLE IMAGE SPECT MULT: CPT | Mod: 26 | Performed by: INTERNAL MEDICINE

## 2019-05-15 PROCEDURE — 93016 CV STRESS TEST SUPVJ ONLY: CPT

## 2019-05-15 PROCEDURE — 34300033 ZZH RX 343

## 2019-05-15 PROCEDURE — A9502 TC99M TETROFOSMIN: HCPCS

## 2019-05-15 PROCEDURE — 78452 HT MUSCLE IMAGE SPECT MULT: CPT

## 2019-05-15 PROCEDURE — 25000128 H RX IP 250 OP 636: Performed by: FAMILY MEDICINE

## 2019-05-15 PROCEDURE — 93018 CV STRESS TEST I&R ONLY: CPT | Performed by: INTERNAL MEDICINE

## 2019-05-15 RX ORDER — REGADENOSON 0.08 MG/ML
0.4 INJECTION, SOLUTION INTRAVENOUS ONCE
Status: COMPLETED | OUTPATIENT
Start: 2019-05-15 | End: 2019-05-15

## 2019-05-15 RX ADMIN — TETROFOSMIN 36.6 MCI.: 1.38 INJECTION, POWDER, LYOPHILIZED, FOR SOLUTION INTRAVENOUS at 13:44

## 2019-05-15 RX ADMIN — REGADENOSON 0.4 MG: 0.08 INJECTION, SOLUTION INTRAVENOUS at 13:36

## 2019-05-15 RX ADMIN — TETROFOSMIN 12.2 MCI.: 1.38 INJECTION, POWDER, LYOPHILIZED, FOR SOLUTION INTRAVENOUS at 12:20

## 2019-05-16 DIAGNOSIS — I99.8 ISCHEMIA: Primary | ICD-10-CM

## 2019-05-20 ENCOUNTER — OFFICE VISIT (OUTPATIENT)
Dept: CARDIOLOGY | Facility: CLINIC | Age: 69
End: 2019-05-20
Attending: FAMILY MEDICINE
Payer: COMMERCIAL

## 2019-05-20 VITALS
SYSTOLIC BLOOD PRESSURE: 110 MMHG | HEART RATE: 45 BPM | HEIGHT: 65 IN | WEIGHT: 271 LBS | DIASTOLIC BLOOD PRESSURE: 70 MMHG | OXYGEN SATURATION: 96 % | BODY MASS INDEX: 45.15 KG/M2

## 2019-05-20 DIAGNOSIS — I25.10 CORONARY ARTERY DISEASE INVOLVING NATIVE CORONARY ARTERY, ANGINA PRESENCE UNSPECIFIED, UNSPECIFIED WHETHER NATIVE OR TRANSPLANTED HEART: Primary | ICD-10-CM

## 2019-05-20 DIAGNOSIS — I48.20 CHRONIC ATRIAL FIBRILLATION (H): ICD-10-CM

## 2019-05-20 DIAGNOSIS — I50.22 CHRONIC SYSTOLIC HEART FAILURE (H): ICD-10-CM

## 2019-05-20 PROCEDURE — 99204 OFFICE O/P NEW MOD 45 MIN: CPT | Performed by: INTERNAL MEDICINE

## 2019-05-20 ASSESSMENT — MIFFLIN-ST. JEOR: SCORE: 1918.19

## 2019-05-20 NOTE — PROGRESS NOTES
Service Date: 05/20/2019      PRIMARY CARE PHYSICIAN:  Dr. Ruben Teran.      HISTORY OF PRESENT ILLNESS:  Benjamín Hyman, a 68-year-old man with morbid obesity, dyslipidemia, and chronic atrial fibrillation, was evaluated in consultation at your request for exertional left  axillary discomfort and an abnormal nuclear stress test.      For the last 2-3 years, Mr. Hyman has noted left axillary pressure with exertion.  This discomfort would occur on an intermittent basis 2-3 years ago, but over the last year occurs on a regular basis with more than moderate activity.  For example, when he goes out to take care of his car in cold weather he notes left arm and axillary discomfort relieved by rest. Although the symptoms are more easily provoked than in the past, there have been no rest symptoms..      The patient saw you in late April 2019 and you ordered a nuclear stress test that showed an ejection fraction of 33% with LV chamber dilation and reduced left ventricular systolic performance.  There were reversible distal anterior and  apical defects  and a second fixed inferior wall defect.  Cardiology consultation was requested.        SOCIAL HISTORY:  The patient is retired.  He is a .  He has 3 children.  He describes himself as sedentary and enjoys reading, but does not have regular physical activity.      HABITS:  The patient smoked for about 30 years, 1 pack per day, but stopped 20 years ago.  He used to drink alcohol more heavily, but has not drank any alcohol for 2 years.      SURGICAL HISTORY:  None.      ALLERGIES:  None known.      CARDIAC RISK FACTORS:   1.  Hypertension.   2.  Elevated cholesterol level.  The patient denies diabetes mellitus, known previous infarction or a family history of premature coronary disease.      REVIEW OF SYSTEMS:  A 12-point review of systems was performed.  Outside the issues mentioned in the HPI, there are no other complaints.      PAST MEDICAL HISTORY:   1.   Chronic atrial fibrillation - diagnosed 2008.  On rate control and warfarin strategy.   2.  Morbid obesity.   3.  Hypertension - well controlled.      PHYSICAL EXAMINATION:   GENERAL:  Exam today demonstrates a very pleasant and cooperative 68-year-old man.   VITAL SIGNS:  His blood pressure was 110/70, his heart rate is 45 and irregular.  His height is 1.6 meters, his weight is 122.9 kg for a BMI of 45.   LUNGS:  Clear to percussion and auscultation.   CARDIOVASCULAR:  Shows an irregular S1 and S2.  There is no S3.  There is no murmur, rub or click.   ABDOMEN:  There is morbid obesity.  I cannot palpate any internal organs.   NEUROLOGIC:  Cranial nerves II-XII are intact.  His strength is equal and symmetrical.  He displays normal insight and judgment.      LABORATORY STUDIES:  His cholesterol level was 135 with an HDL of 33, LDL 64.  His potassium is 4.2 with a creatinine of 0.86.  His most recent INR was 2.5.  His ECG shows atrial fibrillation with a ventricular response rate of 94 beats per minute and occasional PVCs.  There is poor R-wave progression and a nonspecific intraventricular conduction delay.      The results of the patient's nuclear stress test are in the above text.  The ejection fraction was 33% with 2 defects seen.      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  Nuclear stress test shows left ventricular dysfunction with 2 perfusion defects.  He is on excellent and appropriate medical therapy including a beta blocker, ACE inhibitor, statin drug and warfarin.  His systolic blood pressure and LDL cholesterols are optimal.      In view of his left ventricular function and worsening anginal symptoms, I would advise diagnostic left heart catheterization, left ventriculogram, coronary angiography and then a decision regarding his need for mechanical revascularization. Given the challenges associated with assessing LV function with  nuclear stress testing in face of morbid obesity, I would advise an LV gram at the time of his diagnostic procedure. The patient will need to hold his warfarin prior to the procedure.      RECOMMENDATIONS:   1.  Continue present excellent medical therapy prescribed by Dr. Teran.   2.  Diagnostic left heart catheterization, left ventricular angiography and possible intervention.  The patient will need to hold his warfarin for 3 days prior to the procedure.  I have explained the risks of death, myocardial infarction, stroke, hematoma, bleeding, embolus, arrhythmia, need for bypass surgery, the use of dual antiplatelet therapy should a stent be placed and the option of medical therapy alone.  The patient voiced understanding and wishes to proceed.   3.  Continue present medical therapy.       We greatly appreciate the opportunity to be involved in the care of your patient, Mr. Marian Mohan.      cc:   Ruben Teran MD   [facility]    [address] [place]  [address]         JE PERRY MD             D: 2019   T: 2019   MT: MARELY      Name:     MARIAN MOHAN   MRN:      [id number]        Account:      [id number]   :      1950           Service Date: 2019      Document: [id number]

## 2019-05-20 NOTE — PROGRESS NOTES
"HISTORY OF PRESENT ILLNESS:  Exertional axillary discomfort. No rest pain. Worse over last year. Nuclear stress test shows two defects and decreased LVEF.     Orders this Visit:  No orders of the defined types were placed in this encounter.    No orders of the defined types were placed in this encounter.    There are no discontinued medications.    No diagnosis found.    CURRENT MEDICATIONS:  Current Outpatient Medications   Medication Sig Dispense Refill     digoxin (LANOXIN) 250 MCG tablet Take 1 tab on even days & 1/2 tab on odd day of the month. Patient has been on this medication for year & atrial fibrillation is controlled. 30 tablet 11     lisinopril (PRINIVIL/ZESTRIL) 10 MG tablet Take 1 tablet (10 mg) by mouth daily 30 tablet 11     metoprolol succinate ER (TOPROL-XL) 200 MG 24 hr tablet Take 1 tablet (200 mg) by mouth daily 30 tablet 11     omeprazole (PRILOSEC OTC) 20 MG tablet Takes PRN 30 tablet      simvastatin (ZOCOR) 40 MG tablet Take 1 tablet (40 mg) by mouth every other day 15 tablet 11     warfarin (COUMADIN) 7.5 MG tablet Take 3.75mg by mouth every Monday and 7.5mg all other days or as directed by Anticoagulation Clinic (file until pt calls for refill) 90 tablet 0       ALLERGIES     Allergies   Allergen Reactions     Latex      Adhesive Tape Rash     Reacted to the EKG stickers       PAST MEDICAL, SURGICAL, FAMILY, SOCIAL HISTORY:  History was reviewed and updated as needed, see medical record.    Review of Systems:  A 12-point review of systems was completed, see medical record for detailed review of systems information.    Physical Exam:  Vitals: /70 (BP Location: Right arm, Patient Position: Sitting, Cuff Size: Adult Regular)   Pulse (!) 45   Ht 1.638 m (5' 4.5\")   Wt 122.9 kg (271 lb)   SpO2 96%   BMI 45.80 kg/m      Constitutional:           Skin:           Head:           Eyes:           ENT:           Neck:           Chest:           Cardiac:                    Abdomen:       "     Vascular:                                        Extremities and Back:           Neurological:           ASSESSMENT:  Continue current guideline directed medical therapy         RECOMMENDATIONS:   Heart cath possible intervention  Need to hold warfarin before      Recent Lab Results:  LIPID RESULTS:  Lab Results   Component Value Date    CHOL 135 04/22/2019    HDL 33 (L) 04/22/2019    LDL 64 04/22/2019    TRIG 192 (H) 04/22/2019    CHOLHDLRATIO 4.0 01/11/2012       LIVER ENZYME RESULTS:  Lab Results   Component Value Date    AST 24 02/06/2013    ALT 36 02/06/2013       CBC RESULTS:  Lab Results   Component Value Date    WBC 11.7 (H) 04/22/2019    RBC 5.02 04/22/2019    HGB 15.8 04/22/2019    HCT 47.6 04/22/2019    MCV 95 04/22/2019    MCH 31.5 04/22/2019    MCHC 33.2 04/22/2019    RDW 12.2 04/22/2019     04/22/2019       BMP RESULTS:  Lab Results   Component Value Date     04/22/2019    POTASSIUM 4.2 04/22/2019    CHLORIDE 104 04/22/2019    CO2 28 04/22/2019    ANIONGAP 5 04/22/2019    GLC 99 04/22/2019    BUN 15 04/22/2019    CR 0.86 04/22/2019    GFRESTIMATED 89 04/22/2019    GFRESTBLACK >90 04/22/2019    LIMA 8.9 04/22/2019        A1C RESULTS:  Lab Results   Component Value Date    A1C 5.6 07/06/2010       INR RESULTS:  Lab Results   Component Value Date    INR 2.5 (A) 03/27/2019    INR 2.0 (A) 01/02/2019    INR 2.15 (H) 05/11/2016    INR 1.86 (H) 07/19/2013       We greatly appreciate the opportunity to be involved in the care of your patient, Benjamín Hyman.    Sincerely,  Bogdan Ernandez MD        Ruben Teran MD  [address] [place] [address]

## 2019-05-20 NOTE — LETTER
5/20/2019      Ruben Teran MD  [address] [facility] [address]      RE: Benjamín Hyman       Dear Colleague,    I had the pleasure of seeing Benjamín Hyman in the [facility].    Service Date: 05/20/2019      PRIMARY CARE PHYSICIAN:  Dr. Ruben Teran.      HISTORY OF PRESENT ILLNESS:  Benjamín Hyman, a 68-year-old man with morbid obesity, dyslipidemia, and chronic atrial fibrillation, was evaluated in consultation at your request for exertional left  axillary discomfort and an abnormal nuclear stress test.      For the last 2-3 years, Mr. Hyman has noted left axillary pressure with exertion.  This discomfort would occur on an intermittent basis 2-3 years ago, but over the last year occurs on a regular basis with more than moderate activity.  For example, when he goes out to take care of his car in cold weather he notes left arm and axillary discomfort relieved by rest. Although the symptoms are more easily provoked than in the past, there have been no rest symptoms..      The patient saw you in late April 2019 and you ordered a nuclear stress test that showed an ejection fraction of 33% with LV chamber dilation and reduced left ventricular systolic performance.  There were reversible distal anterior and  apical defects  and a second fixed inferior wall defect.  Cardiology consultation was requested.        SOCIAL HISTORY:  The patient is retired.  He is a .  He has 3 children.  He describes himself as sedentary and enjoys reading, but does not have regular physical activity.      HABITS:  The patient smoked for about 30 years, 1 pack per day, but stopped 20 years ago.  He used to drink alcohol more heavily, but has not drank any alcohol for 2 years.      SURGICAL HISTORY:  None.      ALLERGIES:  None known.      CARDIAC RISK FACTORS:   1.  Hypertension.   2.  Elevated cholesterol level.  The patient denies diabetes mellitus, known previous infarction or a  family history of premature coronary disease.      REVIEW OF SYSTEMS:  A 12-point review of systems was performed.  Outside the issues mentioned in the HPI, there are no other complaints.      PAST MEDICAL HISTORY:   1.  Chronic atrial fibrillation - diagnosed 2008.  On rate control and warfarin strategy.   2.  Morbid obesity.   3.  Hypertension - well controlled.      PHYSICAL EXAMINATION:   GENERAL:  Exam today demonstrates a very pleasant and cooperative 68-year-old man.   VITAL SIGNS:  His blood pressure was 110/70, his heart rate is 45 and irregular.  His height is 1.6 meters, his weight is 122.9 kg for a BMI of 45.   LUNGS:  Clear to percussion and auscultation.   CARDIOVASCULAR:  Shows an irregular S1 and S2.  There is no S3.  There is no murmur, rub or click.   ABDOMEN:  There is morbid obesity.  I cannot palpate any internal organs.   NEUROLOGIC:  Cranial nerves II-XII are intact.  His strength is equal and symmetrical.  He displays normal insight and judgment.      LABORATORY STUDIES:  His cholesterol level was 135 with an HDL of 33, LDL 64.  His potassium is 4.2 with a creatinine of 0.86.  His most recent INR was 2.5.  His ECG shows atrial fibrillation with a ventricular response rate of 94 beats per minute and occasional PVCs.  There is poor R-wave progression and a nonspecific intraventricular conduction delay.      The results of the patient's nuclear stress test are in the above text.  The ejection fraction was 33% with 2 defects seen.      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  Nuclear stress test shows left ventricular dysfunction with 2 perfusion defects.  He is on excellent and appropriate medical therapy including a beta blocker, ACE inhibitor, statin drug and warfarin.  His systolic blood pressure and LDL cholesterols are optimal.      In view of his left ventricular function and worsening anginal symptoms, I would  advise diagnostic left heart catheterization, left ventriculogram, coronary angiography and then a decision regarding his need for mechanical revascularization. Given the challenges associated with assessing LV function with nuclear stress testing in face of morbid obesity, I would advise an LV gram at the time of his diagnostic procedure. The patient will need to hold his warfarin prior to the procedure.      RECOMMENDATIONS:   1.  Continue present excellent medical therapy prescribed by Dr. Teran.   2.  Diagnostic left heart catheterization, left ventricular angiography and possible intervention.  The patient will need to hold his warfarin for 3 days prior to the procedure.  I have explained the risks of death, myocardial infarction, stroke, hematoma, bleeding, embolus, arrhythmia, need for bypass surgery, the use of dual antiplatelet therapy should a stent be placed and the option of medical therapy alone.  The patient voiced understanding and wishes to proceed.   3.  Continue present medical therapy.       We greatly appreciate the opportunity to be involved in the care of your patient, Mr. Marian Mohan.      cc:   Ruben Teran MD   [facility]    [address] [place] [address]         JE PERRY MD             D: 2019   T: 2019   MT: MARELY      Name:     MARIAN MOHAN   MRN:      [id number]        Account:      [id number]   :      1950           Service Date: 2019      Document: [id number]           Outpatient Encounter Medications as of 2019   Medication Sig Dispense Refill     digoxin (LANOXIN) 250 MCG tablet Take 1 tab on even days & 1/2 tab on odd day of the month. Patient has been on this medication for year & atrial fibrillation is controlled. 30 tablet 11     lisinopril (PRINIVIL/ZESTRIL) 10 MG tablet Take 1 tablet (10 mg) by mouth daily 30 tablet 11     metoprolol succinate ER (TOPROL-XL) 200 MG 24 hr tablet  Take 1 tablet (200 mg) by mouth daily 30 tablet 11     omeprazole (PRILOSEC OTC) 20 MG tablet Takes PRN 30 tablet      simvastatin (ZOCOR) 40 MG tablet Take 1 tablet (40 mg) by mouth every other day 15 tablet 11     [DISCONTINUED] warfarin (COUMADIN) 7.5 MG tablet Take 3.75mg by mouth every Monday and 7.5mg all other days or as directed by Anticoagulation Clinic (file until pt calls for refill) 90 tablet 0     No facility-administered encounter medications on file as of 5/20/2019.        Again, thank you for allowing me to participate in the care of your patient.      Sincerely,    Bogdan Ernandez MD     [facility]

## 2019-05-20 NOTE — LETTER
"5/20/2019    Ruben Teran MD  [address] [facility] [address]    RE: Benjamín AVITIA Boogie       Dear Colleague,    I had the pleasure of seeing Benjamín AVITIA Boogie in the [facility].    HISTORY OF PRESENT ILLNESS:  Exertional axillary discomfort. No rest pain. Worse over last year. Nuclear stress test shows two defects and decreased LVEF.     Orders this Visit:  No orders of the defined types were placed in this encounter.    No orders of the defined types were placed in this encounter.    There are no discontinued medications.    No diagnosis found.    CURRENT MEDICATIONS:  Current Outpatient Medications   Medication Sig Dispense Refill     digoxin (LANOXIN) 250 MCG tablet Take 1 tab on even days & 1/2 tab on odd day of the month. Patient has been on this medication for year & atrial fibrillation is controlled. 30 tablet 11     lisinopril (PRINIVIL/ZESTRIL) 10 MG tablet Take 1 tablet (10 mg) by mouth daily 30 tablet 11     metoprolol succinate ER (TOPROL-XL) 200 MG 24 hr tablet Take 1 tablet (200 mg) by mouth daily 30 tablet 11     omeprazole (PRILOSEC OTC) 20 MG tablet Takes PRN 30 tablet      simvastatin (ZOCOR) 40 MG tablet Take 1 tablet (40 mg) by mouth every other day 15 tablet 11     warfarin (COUMADIN) 7.5 MG tablet Take 3.75mg by mouth every Monday and 7.5mg all other days or as directed by Anticoagulation Clinic (file until pt calls for refill) 90 tablet 0       ALLERGIES     Allergies   Allergen Reactions     Latex      Adhesive Tape Rash     Reacted to the EKG stickers       PAST MEDICAL, SURGICAL, FAMILY, SOCIAL HISTORY:  History was reviewed and updated as needed, see medical record.    Review of Systems:  A 12-point review of systems was completed, see medical record for detailed review of systems information.    Physical Exam:  Vitals: /70 (BP Location: Right arm, Patient Position: Sitting, Cuff Size: Adult Regular)   Pulse (!) 45   Ht 1.638 m (5' 4.5\")   Wt " 122.9 kg (271 lb)   SpO2 96%   BMI 45.80 kg/m       Constitutional:           Skin:           Head:           Eyes:           ENT:           Neck:           Chest:           Cardiac:                    Abdomen:           Vascular:                                        Extremities and Back:           Neurological:           ASSESSMENT:  Continue current guideline directed medical therapy         RECOMMENDATIONS:   Heart cath possible intervention  Need to hold warfarin before      Recent Lab Results:  LIPID RESULTS:  Lab Results   Component Value Date    CHOL 135 04/22/2019    HDL 33 (L) 04/22/2019    LDL 64 04/22/2019    TRIG 192 (H) 04/22/2019    CHOLHDLRATIO 4.0 01/11/2012       LIVER ENZYME RESULTS:  Lab Results   Component Value Date    AST 24 02/06/2013    ALT 36 02/06/2013       CBC RESULTS:  Lab Results   Component Value Date    WBC 11.7 (H) 04/22/2019    RBC 5.02 04/22/2019    HGB 15.8 04/22/2019    HCT 47.6 04/22/2019    MCV 95 04/22/2019    MCH 31.5 04/22/2019    MCHC 33.2 04/22/2019    RDW 12.2 04/22/2019     04/22/2019       BMP RESULTS:  Lab Results   Component Value Date     04/22/2019    POTASSIUM 4.2 04/22/2019    CHLORIDE 104 04/22/2019    CO2 28 04/22/2019    ANIONGAP 5 04/22/2019    GLC 99 04/22/2019    BUN 15 04/22/2019    CR 0.86 04/22/2019    GFRESTIMATED 89 04/22/2019    GFRESTBLACK >90 04/22/2019    LIMA 8.9 04/22/2019        A1C RESULTS:  Lab Results   Component Value Date    A1C 5.6 07/06/2010       INR RESULTS:  Lab Results   Component Value Date    INR 2.5 (A) 03/27/2019    INR 2.0 (A) 01/02/2019    INR 2.15 (H) 05/11/2016    INR 1.86 (H) 07/19/2013       We greatly appreciate the opportunity to be involved in the care of your patient, Benjamín Hyman.    Sincerely,  Bogdan Ernadnez MD      CC  Ruben Teran MD  [address] [place] [address]                                                                       Thank you for allowing me to participate in  the care of your patient.      Sincerely,     Bogdan Ernandez MD     [facility]    cc:   Ruben Teran MD  [address] [place] [address]

## 2019-05-23 DIAGNOSIS — I48.19 PERSISTENT ATRIAL FIBRILLATION (H): ICD-10-CM

## 2019-05-23 RX ORDER — WARFARIN SODIUM 7.5 MG/1
TABLET ORAL
Qty: 90 TABLET | Refills: 0 | Status: SHIPPED | OUTPATIENT
Start: 2019-05-23 | End: 2019-07-09

## 2019-05-23 NOTE — TELEPHONE ENCOUNTER
Requested Prescriptions   Pending Prescriptions Disp Refills     warfarin (COUMADIN) 7.5 MG tablet 90 tablet      Sig: Take 3.75mg by mouth every Monday and 7.5mg all other days or as directed by Anticoagulation Clinic (file until pt calls for refill)       There is no refill protocol information for this order        Last Written Prescription Date:  4/22/19  Last Fill Quantity: 90,  # refills: 0   Last office visit: 4/22/2019 with prescribing provider:     Future Office Visit:   Next 5 appointments (look out 90 days)    Jun 12, 2019  1:00 PM CDT  Return Visit with BRENNA Alberto CNP  [facility] ([facility]) [address] [place] [id number] [phone]

## 2019-05-29 ENCOUNTER — TELEPHONE (OUTPATIENT)
Dept: CARDIOLOGY | Facility: CLINIC | Age: 69
End: 2019-05-29

## 2019-05-29 RX ORDER — LIDOCAINE 40 MG/G
CREAM TOPICAL
Status: CANCELLED | OUTPATIENT
Start: 2019-05-29

## 2019-05-29 RX ORDER — ASPIRIN 81 MG/1
325 TABLET ORAL DAILY
Status: CANCELLED | OUTPATIENT
Start: 2019-05-29

## 2019-05-29 RX ORDER — SODIUM CHLORIDE 9 MG/ML
INJECTION, SOLUTION INTRAVENOUS CONTINUOUS
Status: CANCELLED | OUTPATIENT
Start: 2019-05-29

## 2019-05-29 RX ORDER — POTASSIUM CHLORIDE 750 MG/1
20 TABLET, EXTENDED RELEASE ORAL
Status: CANCELLED | OUTPATIENT
Start: 2019-05-29

## 2019-05-29 NOTE — TELEPHONE ENCOUNTER
Patient called stating his son will be able to stay overnight, but will need to leave at 5 am for work. Patient will have either have his daughter or landlord keep an eye on him the rest of the time. Zulma ANTHONY

## 2019-05-29 NOTE — TELEPHONE ENCOUNTER
Left Coronary Angiogram    Scheduled: 05-31-19  Location: [facility]  Check-in time: 10 am   Procedure time: 12 noon    Prep instructions were reviewed with patient over the phone. Patient had no questions.     See instructions below...      NPO after midnight, the night before the procedure.     Hold Warfarin for 3 days before the procedure, per Dr. Ernandez. Last dose was taken on 05-27-19.     Take 325 mg of Aspirin on the day before the procedure, and 325 mg of Aspirin on the morning of the procedure.     Other medications that can be taken on the morning of the procedure (with small sips of water): Digoxin, Lisinopril, Toprol XL .     Other medications should be taken when patient returns home.      Patient will need a ride home, and a person to stay with him for 24 hours after the procedure.       Zulma ANTHONY

## 2019-05-29 NOTE — TELEPHONE ENCOUNTER
Received a call from patient. Patient's stated his son may not be able to stay with him after his procedure. Patient was informed that his procedure will be need be cancelled and rescheduled if he does not have someone to stay with him for 24 hours. Patient is waiting to hear back from his son. Patient was advised to call our scheduling dept if his LHC needs to be rescheduled. Zulma ANTHONY

## 2019-05-31 ENCOUNTER — SURGERY (OUTPATIENT)
Age: 69
End: 2019-05-31
Payer: COMMERCIAL

## 2019-05-31 ENCOUNTER — HOSPITAL ENCOUNTER (OUTPATIENT)
Facility: CLINIC | Age: 69
Discharge: HOME | End: 2019-05-31
Admitting: INTERNAL MEDICINE
Payer: MEDICARE

## 2019-05-31 VITALS
RESPIRATION RATE: 16 BRPM | DIASTOLIC BLOOD PRESSURE: 51 MMHG | HEART RATE: 84 BPM | TEMPERATURE: 97.1 F | SYSTOLIC BLOOD PRESSURE: 114 MMHG | OXYGEN SATURATION: 96 %

## 2019-05-31 DIAGNOSIS — Z98.61 POSTSURGICAL PERCUTANEOUS TRANSLUMINAL CORONARY ANGIOPLASTY STATUS: ICD-10-CM

## 2019-05-31 DIAGNOSIS — I48.20 CHRONIC ATRIAL FIBRILLATION (H): ICD-10-CM

## 2019-05-31 DIAGNOSIS — I25.10 CORONARY ARTERY DISEASE INVOLVING NATIVE CORONARY ARTERY, ANGINA PRESENCE UNSPECIFIED, UNSPECIFIED WHETHER NATIVE OR TRANSPLANTED HEART: ICD-10-CM

## 2019-05-31 DIAGNOSIS — I50.22 CHRONIC SYSTOLIC HEART FAILURE (H): ICD-10-CM

## 2019-05-31 PROBLEM — Z98.890 STATUS POST CORONARY ANGIOGRAM: Status: ACTIVE | Noted: 2019-05-31

## 2019-05-31 LAB
ANION GAP SERPL CALCULATED.3IONS-SCNC: 4 MMOL/L (ref 3–14)
APTT PPP: 31 SEC (ref 22–37)
APTT PPP: 31 SEC (ref 22–37)
BUN SERPL-MCNC: 17 MG/DL (ref 7–30)
CALCIUM SERPL-MCNC: 8.6 MG/DL (ref 8.5–10.1)
CATH EF ESTIMATED: 35 %
CHLORIDE SERPL-SCNC: 106 MMOL/L (ref 94–109)
CO2 SERPL-SCNC: 28 MMOL/L (ref 20–32)
CREAT SERPL-MCNC: 0.86 MG/DL (ref 0.66–1.25)
ERYTHROCYTE [DISTWIDTH] IN BLOOD BY AUTOMATED COUNT: 12.1 % (ref 10–15)
GFR SERPL CREATININE-BSD FRML MDRD: 89 ML/MIN/{1.73_M2}
GLUCOSE SERPL-MCNC: 105 MG/DL (ref 70–99)
HCT VFR BLD AUTO: 50.3 % (ref 40–53)
HGB BLD-MCNC: 16.1 G/DL (ref 13.3–17.7)
INR PPP: 1.18 (ref 0.86–1.14)
INR PPP: 1.25 (ref 0.86–1.14)
KCT BLD-ACNC: 371 SEC (ref 75–150)
MCH RBC QN AUTO: 31.4 PG (ref 26.5–33)
MCHC RBC AUTO-ENTMCNC: 32 G/DL (ref 31.5–36.5)
MCV RBC AUTO: 98 FL (ref 78–100)
PLATELET # BLD AUTO: 274 10E9/L (ref 150–450)
POTASSIUM SERPL-SCNC: 4.4 MMOL/L (ref 3.4–5.3)
RBC # BLD AUTO: 5.12 10E12/L (ref 4.4–5.9)
SODIUM SERPL-SCNC: 138 MMOL/L (ref 133–144)
WBC # BLD AUTO: 11.5 10E9/L (ref 4–11)

## 2019-05-31 PROCEDURE — 25000132 ZZH RX MED GY IP 250 OP 250 PS 637: Mod: GY | Performed by: INTERNAL MEDICINE

## 2019-05-31 PROCEDURE — 25000128 H RX IP 250 OP 636: Performed by: INTERNAL MEDICINE

## 2019-05-31 PROCEDURE — C1769 GUIDE WIRE: HCPCS | Performed by: INTERNAL MEDICINE

## 2019-05-31 PROCEDURE — A9270 NON-COVERED ITEM OR SERVICE: HCPCS | Mod: GY | Performed by: INTERNAL MEDICINE

## 2019-05-31 PROCEDURE — 40000065 ZZH STATISTIC EKG NON-CHARGEABLE

## 2019-05-31 PROCEDURE — 93005 ELECTROCARDIOGRAM TRACING: CPT

## 2019-05-31 PROCEDURE — 27210794 ZZH OR GENERAL SUPPLY STERILE: Performed by: INTERNAL MEDICINE

## 2019-05-31 PROCEDURE — 93010 ELECTROCARDIOGRAM REPORT: CPT | Performed by: INTERNAL MEDICINE

## 2019-05-31 PROCEDURE — 99152 MOD SED SAME PHYS/QHP 5/>YRS: CPT | Performed by: INTERNAL MEDICINE

## 2019-05-31 PROCEDURE — 85610 PROTHROMBIN TIME: CPT

## 2019-05-31 PROCEDURE — C1725 CATH, TRANSLUMIN NON-LASER: HCPCS | Performed by: INTERNAL MEDICINE

## 2019-05-31 PROCEDURE — 36415 COLL VENOUS BLD VENIPUNCTURE: CPT | Performed by: INTERNAL MEDICINE

## 2019-05-31 PROCEDURE — 85347 COAGULATION TIME ACTIVATED: CPT

## 2019-05-31 PROCEDURE — C1894 INTRO/SHEATH, NON-LASER: HCPCS | Performed by: INTERNAL MEDICINE

## 2019-05-31 PROCEDURE — C1887 CATHETER, GUIDING: HCPCS | Performed by: INTERNAL MEDICINE

## 2019-05-31 PROCEDURE — 85027 COMPLETE CBC AUTOMATED: CPT | Performed by: INTERNAL MEDICINE

## 2019-05-31 PROCEDURE — C9600 PERC DRUG-EL COR STENT SING: HCPCS | Mod: LD | Performed by: INTERNAL MEDICINE

## 2019-05-31 PROCEDURE — 36415 COLL VENOUS BLD VENIPUNCTURE: CPT

## 2019-05-31 PROCEDURE — 92928 PRQ TCAT PLMT NTRAC ST 1 LES: CPT | Mod: LD | Performed by: INTERNAL MEDICINE

## 2019-05-31 PROCEDURE — 93458 L HRT ARTERY/VENTRICLE ANGIO: CPT | Mod: 26 | Performed by: INTERNAL MEDICINE

## 2019-05-31 PROCEDURE — 25000125 ZZHC RX 250: Performed by: INTERNAL MEDICINE

## 2019-05-31 PROCEDURE — C1874 STENT, COATED/COV W/DEL SYS: HCPCS | Performed by: INTERNAL MEDICINE

## 2019-05-31 PROCEDURE — 93458 L HRT ARTERY/VENTRICLE ANGIO: CPT | Performed by: INTERNAL MEDICINE

## 2019-05-31 PROCEDURE — 25800030 ZZH RX IP 258 OP 636: Performed by: INTERNAL MEDICINE

## 2019-05-31 PROCEDURE — 80048 BASIC METABOLIC PNL TOTAL CA: CPT

## 2019-05-31 PROCEDURE — 40000275 ZZH STATISTIC RCP TIME EA 10 MIN

## 2019-05-31 PROCEDURE — 85730 THROMBOPLASTIN TIME PARTIAL: CPT

## 2019-05-31 PROCEDURE — 99153 MOD SED SAME PHYS/QHP EA: CPT | Performed by: INTERNAL MEDICINE

## 2019-05-31 DEVICE — STENT SYNERGY DRUG ELUTING 3.00X16MM  H7493926016300: Type: IMPLANTABLE DEVICE | Status: FUNCTIONAL

## 2019-05-31 RX ORDER — HEPARIN SODIUM 1000 [USP'U]/ML
INJECTION, SOLUTION INTRAVENOUS; SUBCUTANEOUS
Status: DISCONTINUED
Start: 2019-05-31 | End: 2019-05-31 | Stop reason: HOSPADM

## 2019-05-31 RX ORDER — NITROGLYCERIN 0.4 MG/1
0.4 TABLET SUBLINGUAL EVERY 5 MIN PRN
Status: DISCONTINUED | OUTPATIENT
Start: 2019-05-31 | End: 2019-05-31 | Stop reason: HOSPADM

## 2019-05-31 RX ORDER — ACETAMINOPHEN 325 MG/1
650 TABLET ORAL EVERY 4 HOURS PRN
Status: DISCONTINUED | OUTPATIENT
Start: 2019-05-31 | End: 2019-05-31 | Stop reason: HOSPADM

## 2019-05-31 RX ORDER — NALOXONE HYDROCHLORIDE 0.4 MG/ML
.1-.4 INJECTION, SOLUTION INTRAMUSCULAR; INTRAVENOUS; SUBCUTANEOUS
Status: DISCONTINUED | OUTPATIENT
Start: 2019-05-31 | End: 2019-05-31 | Stop reason: HOSPADM

## 2019-05-31 RX ORDER — VERAPAMIL HYDROCHLORIDE 2.5 MG/ML
INJECTION, SOLUTION INTRAVENOUS
Status: DISCONTINUED
Start: 2019-05-31 | End: 2019-05-31 | Stop reason: HOSPADM

## 2019-05-31 RX ORDER — LIDOCAINE 40 MG/G
CREAM TOPICAL
Status: DISCONTINUED | OUTPATIENT
Start: 2019-05-31 | End: 2019-05-31 | Stop reason: HOSPADM

## 2019-05-31 RX ORDER — NITROGLYCERIN 5 MG/ML
VIAL (ML) INTRAVENOUS
Status: DISCONTINUED
Start: 2019-05-31 | End: 2019-05-31 | Stop reason: HOSPADM

## 2019-05-31 RX ORDER — SODIUM CHLORIDE 9 MG/ML
INJECTION, SOLUTION INTRAVENOUS CONTINUOUS
Status: DISCONTINUED | OUTPATIENT
Start: 2019-05-31 | End: 2019-05-31 | Stop reason: HOSPADM

## 2019-05-31 RX ORDER — HYDROCODONE BITARTRATE AND ACETAMINOPHEN 5; 325 MG/1; MG/1
1-2 TABLET ORAL EVERY 4 HOURS PRN
Status: DISCONTINUED | OUTPATIENT
Start: 2019-05-31 | End: 2019-05-31 | Stop reason: HOSPADM

## 2019-05-31 RX ORDER — HEPARIN SODIUM 1000 [USP'U]/ML
INJECTION, SOLUTION INTRAVENOUS; SUBCUTANEOUS
Status: DISCONTINUED | OUTPATIENT
Start: 2019-05-31 | End: 2019-05-31 | Stop reason: HOSPADM

## 2019-05-31 RX ORDER — FENTANYL CITRATE 50 UG/ML
INJECTION, SOLUTION INTRAMUSCULAR; INTRAVENOUS
Status: DISCONTINUED | OUTPATIENT
Start: 2019-05-31 | End: 2019-05-31 | Stop reason: HOSPADM

## 2019-05-31 RX ORDER — CLOPIDOGREL BISULFATE 75 MG/1
75 TABLET ORAL DAILY
Qty: 90 TABLET | Refills: 3 | Status: SHIPPED | OUTPATIENT
Start: 2019-06-01 | End: 2020-08-13 | Stop reason: ALTCHOICE

## 2019-05-31 RX ORDER — CLOPIDOGREL BISULFATE 75 MG/1
TABLET ORAL
Status: DISCONTINUED | OUTPATIENT
Start: 2019-05-31 | End: 2019-05-31 | Stop reason: HOSPADM

## 2019-05-31 RX ORDER — LIDOCAINE HYDROCHLORIDE 10 MG/ML
INJECTION, SOLUTION EPIDURAL; INFILTRATION; INTRACAUDAL; PERINEURAL
Status: DISCONTINUED
Start: 2019-05-31 | End: 2019-05-31 | Stop reason: HOSPADM

## 2019-05-31 RX ORDER — CLOPIDOGREL BISULFATE 75 MG/1
75 TABLET ORAL DAILY
Status: DISCONTINUED | OUTPATIENT
Start: 2019-06-01 | End: 2019-05-31 | Stop reason: HOSPADM

## 2019-05-31 RX ORDER — VERAPAMIL HYDROCHLORIDE 2.5 MG/ML
INJECTION, SOLUTION INTRAVENOUS
Status: DISCONTINUED | OUTPATIENT
Start: 2019-05-31 | End: 2019-05-31 | Stop reason: HOSPADM

## 2019-05-31 RX ORDER — ASPIRIN 81 MG/1
81 TABLET ORAL DAILY
Status: DISCONTINUED | OUTPATIENT
Start: 2019-05-31 | End: 2019-05-31 | Stop reason: HOSPADM

## 2019-05-31 RX ORDER — IOPAMIDOL 755 MG/ML
INJECTION, SOLUTION INTRAVASCULAR
Status: DISCONTINUED | OUTPATIENT
Start: 2019-05-31 | End: 2019-05-31 | Stop reason: HOSPADM

## 2019-05-31 RX ORDER — NALOXONE HYDROCHLORIDE 0.4 MG/ML
.2-.4 INJECTION, SOLUTION INTRAMUSCULAR; INTRAVENOUS; SUBCUTANEOUS
Status: DISCONTINUED | OUTPATIENT
Start: 2019-05-31 | End: 2019-05-31 | Stop reason: HOSPADM

## 2019-05-31 RX ORDER — POTASSIUM CHLORIDE 1500 MG/1
20 TABLET, EXTENDED RELEASE ORAL
Status: DISCONTINUED | OUTPATIENT
Start: 2019-05-31 | End: 2019-05-31 | Stop reason: HOSPADM

## 2019-05-31 RX ORDER — FENTANYL CITRATE 50 UG/ML
25-50 INJECTION, SOLUTION INTRAMUSCULAR; INTRAVENOUS
Status: DISCONTINUED | OUTPATIENT
Start: 2019-05-31 | End: 2019-05-31 | Stop reason: HOSPADM

## 2019-05-31 RX ORDER — CLOPIDOGREL 300 MG/1
TABLET, FILM COATED ORAL
Status: DISCONTINUED
Start: 2019-05-31 | End: 2019-05-31 | Stop reason: HOSPADM

## 2019-05-31 RX ORDER — FLUMAZENIL 0.1 MG/ML
0.2 INJECTION, SOLUTION INTRAVENOUS
Status: DISCONTINUED | OUTPATIENT
Start: 2019-05-31 | End: 2019-05-31 | Stop reason: HOSPADM

## 2019-05-31 RX ORDER — ATROPINE SULFATE 0.1 MG/ML
0.5 INJECTION INTRAVENOUS EVERY 5 MIN PRN
Status: DISCONTINUED | OUTPATIENT
Start: 2019-05-31 | End: 2019-05-31 | Stop reason: HOSPADM

## 2019-05-31 RX ORDER — NITROGLYCERIN 5 MG/ML
VIAL (ML) INTRAVENOUS
Status: DISCONTINUED | OUTPATIENT
Start: 2019-05-31 | End: 2019-05-31 | Stop reason: HOSPADM

## 2019-05-31 RX ORDER — FENTANYL CITRATE 50 UG/ML
INJECTION, SOLUTION INTRAMUSCULAR; INTRAVENOUS
Status: DISCONTINUED
Start: 2019-05-31 | End: 2019-05-31 | Stop reason: HOSPADM

## 2019-05-31 RX ADMIN — FENTANYL CITRATE 25 MCG: 50 INJECTION, SOLUTION INTRAMUSCULAR; INTRAVENOUS at 13:19

## 2019-05-31 RX ADMIN — MIDAZOLAM 0.5 MG: 1 INJECTION INTRAMUSCULAR; INTRAVENOUS at 13:41

## 2019-05-31 RX ADMIN — NITROGLYCERIN 400 MCG: 5 INJECTION, SOLUTION INTRAVENOUS at 13:02

## 2019-05-31 RX ADMIN — MIDAZOLAM 0.5 MG: 1 INJECTION INTRAMUSCULAR; INTRAVENOUS at 13:45

## 2019-05-31 RX ADMIN — SODIUM CHLORIDE: 9 INJECTION, SOLUTION INTRAVENOUS at 14:37

## 2019-05-31 RX ADMIN — HEPARIN SODIUM 4000 UNITS: 1000 INJECTION, SOLUTION INTRAVENOUS; SUBCUTANEOUS at 13:12

## 2019-05-31 RX ADMIN — MIDAZOLAM 1 MG: 1 INJECTION INTRAMUSCULAR; INTRAVENOUS at 13:01

## 2019-05-31 RX ADMIN — CLOPIDOGREL BISULFATE 600 MG: 75 TABLET ORAL at 13:12

## 2019-05-31 RX ADMIN — HEPARIN SODIUM 5000 UNITS: 1000 INJECTION, SOLUTION INTRAVENOUS; SUBCUTANEOUS at 13:04

## 2019-05-31 RX ADMIN — MIDAZOLAM 0.5 MG: 1 INJECTION INTRAMUSCULAR; INTRAVENOUS at 13:49

## 2019-05-31 RX ADMIN — MIDAZOLAM 0.5 MG: 1 INJECTION INTRAMUSCULAR; INTRAVENOUS at 13:24

## 2019-05-31 RX ADMIN — MIDAZOLAM 0.5 MG: 1 INJECTION INTRAMUSCULAR; INTRAVENOUS at 13:06

## 2019-05-31 RX ADMIN — FENTANYL CITRATE 50 MCG: 50 INJECTION, SOLUTION INTRAMUSCULAR; INTRAVENOUS at 13:01

## 2019-05-31 RX ADMIN — LIDOCAINE HYDROCHLORIDE 2 ML: 10 INJECTION, SOLUTION INFILTRATION; PERINEURAL at 13:01

## 2019-05-31 RX ADMIN — VERAPAMIL HYDROCHLORIDE 2.5 MG: 2.5 INJECTION, SOLUTION INTRAVENOUS at 13:02

## 2019-05-31 RX ADMIN — IOPAMIDOL 240 ML: 755 INJECTION, SOLUTION INTRAVENOUS at 13:58

## 2019-05-31 RX ADMIN — SODIUM CHLORIDE: 9 INJECTION, SOLUTION INTRAVENOUS at 10:17

## 2019-05-31 RX ADMIN — MIDAZOLAM 0.5 MG: 1 INJECTION INTRAMUSCULAR; INTRAVENOUS at 13:13

## 2019-05-31 RX ADMIN — FENTANYL CITRATE 25 MCG: 50 INJECTION, SOLUTION INTRAMUSCULAR; INTRAVENOUS at 13:14

## 2019-05-31 NOTE — Clinical Note
The first balloon was inserted into the left anterior descending and middle left anterior descending.Max pressure = 20 arcadio. Total duration = 25 seconds.

## 2019-05-31 NOTE — DISCHARGE INSTRUCTIONS
Going Home after an Angioplasty or Stent Placement (Cardiac)  ______________________________________________    Patient Name: Benjamín Hyman  Date of Procedure: May 31, 2019    Doctor: Oma      After you go home:    Have an adult stay with you for 24 hours.    Drink plenty of fluids.    You may eat your normal diet, unless your doctor tells you otherwise.    For 24 hours:    Relax and take it easy.    Do NOT smoke.    Do NOT make any important or legal decisions.    Do NOT drive or operate machines at home or at work.    Do NOT drink alcohol.    Remove the Band-Aid after 24 hours. If there is minor oozing, apply another Band-aid and remove it after 12 hours.    For 2 days, do NOT have sex or do any heavy exercise.    Do NOT take a bath, or use a hot tub or pool for at least 3 days. You may shower.    Care of groin site  It is normal to have a small bruise or lump at the site.    Do not scrub the site.    For the first 2 days: Do not stoop or squat. When you cough, sneeze or move your bowels, hold your hand over the puncture site and press gently.    Do not lift more than 10 pounds for at least 3 to 5 days.    Do not use lotion or powder near the puncture site for 3 days.    If you start bleeding from the site in your groin, lie down flat and press firmly  on the site. Call your doctor as soon as you can.    Care of wrist or arm site  It is normal to have soreness at the puncture site and mild tingling in your hand for up to 3 days.    For 2 days, do not use your hand or arm to support your weight (such as rising from a chair) or bend your wrist (such as lifting a garage door).    For 2 days, do not lift more than 5 pounds or exercise your arm (tennis, golf or bowling).    If you start bleeding from the site in your arm:    Sit down and press firmly on the site with your fingers for 10 minutes. Call your doctor as soon as you can.    If the bleeding stops, sit still and keep your wrist straight for 2  hours.    Medicines    If you have started taking Plavix or Effient, do not stop taking it until you talk to your heart doctor (cardiologist).    If you are on metformin (Glucophage), do not restart it until you have blood tests (within 2 to 3 days after discharge). When your doctor tells you it is safe, you may restart the metformin.    If you have stopped any other medicines, check with your nurse or provider about when to restart them.    Call 911 right away if you have bleeding that is heavy or does not stop.    Call your doctor if:    You have a large or growing hard lump around the site.    The site is red, swollen, hot or tender.    Blood or fluid is draining from the site.    You have chills or a fever greater than 101 F (38 C).    Your leg or arm feels numb or cool.    You have hives, a rash or unusual itching.      [facility] [place]:  [phone] (7 days a week)

## 2019-05-31 NOTE — Clinical Note
Stent deployed in the middle left anterior descending. Max pressure = 11 arcadio. Total duration = 25 seconds.

## 2019-05-31 NOTE — PLAN OF CARE
PRIMARY DIAGNOSIS: TR BAND RECOVERY   OUTPATIENT/OBSERVATION GOALS TO BE MET BEFORE DISCHARGE:  TR band status: in place  Radial pulse and CMS (circulation, motion, sensation) are WDL: Yes, hand dusky and cool to the touch. Using doppler to find pulse.   Bleeding or hematoma present at site: No      Activity restriction education reviewed with patient: Yes. Pink wrist band applied, arm board applied  Stable vital signs:  Yes  ADLs back to baseline:  in bed   Activity and level of assistance: has not been out of bed.    Interpretation of rhythm per telemetry tech: Normal sinus  and Atrial fibrillation - controlled     Discharge Planner Nurse   Safe discharge environment identified: Yes  Barriers to discharge: Yes       Entered by: Susan Mata 05/31/2019 3:15 PM     Please review provider order for any additional goals.   Nurse to notify provider when observation goals have been met and patient is ready for discharge.    /51 (BP Location: Left arm)   Pulse 79   Temp 96.1  F (35.6  C) (Oral)   Resp 16   SpO2 97%

## 2019-05-31 NOTE — PLAN OF CARE
PRIMARY DIAGNOSIS: TR BAND RECOVERY   OUTPATIENT/OBSERVATION GOALS TO BE MET BEFORE DISCHARGE:  TR band status: removing air every 15 minutes  Radial pulse and CMS (circulation, motion, sensation) are WDL: Yes- hand cold and dusky, no N/T, pulse present with doppler  Bleeding or hematoma present at site: No      Activity restriction education reviewed with patient: Yes. Pink wrist band applied  Stable vital signs:  Yes  ADLs back to baseline:  Bed rest until 1615   Activity and level of assistance: Up with standby assistance.  Interpretation of rhythm per telemetry tech: Atrial fibrillation - controlled and with PVCs      Discharge Planner Nurse   Safe discharge environment identified: Yes  Barriers to discharge: Yes       Entered by: Benita Baker 05/31/2019 6:44 PM     Please review provider order for any additional goals.   Nurse to notify provider when observation goals have been met and patient is ready for discharge.

## 2019-05-31 NOTE — Clinical Note
The first balloon was inserted into the left anterior descending and middle left anterior descending.Max pressure = 12 arcadio. Total duration = 40 seconds.

## 2019-05-31 NOTE — PLAN OF CARE
Patient's After Visit Summary was reviewed with patient.  Patient verbalized understanding of After Visit Summary, recommended follow up and was given an opportunity to ask questions.   Discharge medications sent home with patient/family: YES, Paper scripts. Pt educated on how to take medication and when.     Discharged with other:Friend.    OBSERVATION patient END time: [id number]

## 2019-06-04 ENCOUNTER — TELEPHONE (OUTPATIENT)
Dept: FAMILY MEDICINE | Facility: CLINIC | Age: 69
End: 2019-06-04

## 2019-06-04 LAB — INTERPRETATION ECG - MUSE: NORMAL

## 2019-06-04 NOTE — TELEPHONE ENCOUNTER
"Reason for Call:  Other stent placememt    Detailed comments: Patient states he feels somewhat better but has may questions.  he had a stent placed on May 31, \"in my heart\"    Phone Number Patient can be reached at: Home number on file [phone] (home)    Best Time: any    Can we leave a detailed message on this number? YES    Call taken on 6/4/2019 at 9:47 AM by Celine Grande      "

## 2019-06-04 NOTE — TELEPHONE ENCOUNTER
Feels an occasional pinch on left arm. No pain at time of call. Wondering if this is normal after having procedure angiogram procedure. Was trying to find number on discharge paperwork to call back Cardiology Team. He has other questions regarding the procedure as well.     Routing to Amaya Choudhary RN that assisted patient prior to procedure.      JAD MacdonaldN, RN

## 2019-06-05 ENCOUNTER — ANTICOAGULATION THERAPY VISIT (OUTPATIENT)
Dept: ANTICOAGULATION | Facility: CLINIC | Age: 69
End: 2019-06-05
Payer: COMMERCIAL

## 2019-06-05 ENCOUNTER — ALLIED HEALTH/NURSE VISIT (OUTPATIENT)
Dept: FAMILY MEDICINE | Facility: CLINIC | Age: 69
End: 2019-06-05
Payer: COMMERCIAL

## 2019-06-05 VITALS
HEART RATE: 56 BPM | DIASTOLIC BLOOD PRESSURE: 76 MMHG | BODY MASS INDEX: 45.12 KG/M2 | SYSTOLIC BLOOD PRESSURE: 128 MMHG | WEIGHT: 267 LBS

## 2019-06-05 DIAGNOSIS — I48.20 CHRONIC ATRIAL FIBRILLATION (H): ICD-10-CM

## 2019-06-05 DIAGNOSIS — Z79.01 LONG TERM CURRENT USE OF ANTICOAGULANT THERAPY: ICD-10-CM

## 2019-06-05 DIAGNOSIS — I48.19 PERSISTENT ATRIAL FIBRILLATION (H): Primary | ICD-10-CM

## 2019-06-05 LAB — INR POINT OF CARE: 1.8 (ref 0.86–1.14)

## 2019-06-05 PROCEDURE — 99207 ZZC NO CHARGE NURSE ONLY: CPT

## 2019-06-05 PROCEDURE — 36416 COLLJ CAPILLARY BLOOD SPEC: CPT

## 2019-06-05 PROCEDURE — 85610 PROTHROMBIN TIME: CPT | Mod: QW

## 2019-06-05 NOTE — PROGRESS NOTES
ANTICOAGULATION FOLLOW-UP CLINIC VISIT    Patient Name:  Benjamín Hyman  Date:  2019  Contact Type:  Face to Face    SUBJECTIVE:  Patient Findings     Positives:   Missed doses (held warfarin for 4 days prior to his angiogram on ), Bruising (bruise on left antecubital area from IV insertion)    Comments:   Patient had an angiogram on , he had a stent placed to the LAD coronary artery. Per cardiology notes, target INR 2.0-2.5 (likely due to the triple therapy, aspirin and plavix in addition to the warfarin).     Recheck the INR in 1 week at [facility] after his cardiology appt. If his INR is therapeutic, consider extending INR check to 3-4 weeks, depending on the result. Due to triple anticoag therapy, closer monitoring may be necessary at least initially.         Clinical Outcomes     Comments:   Patient had an angiogram on , he had a stent placed to the LAD coronary artery. Per cardiology notes, target INR 2.0-2.5 (likely due to the triple therapy, aspirin and plavix in addition to the warfarin).     Recheck the INR in 1 week at [facility] after his cardiology appt. If his INR is therapeutic, consider extending INR check to 3-4 weeks, depending on the result. Due to triple anticoag therapy, closer monitoring may be necessary at least initially.            OBJECTIVE    INR Protime   Date Value Ref Range Status   2019 1.8 (A) 0.86 - 1.14 Final       ASSESSMENT / PLAN  INR assessment SUB    Recheck INR In: 1 WEEK    INR Location Clinic      Anticoagulation Summary  As of 2019    INR goal:   2.0-3.0   TTR:   74.5 % (4.2 y)   INR used for dosin.8! (2019)   Warfarin maintenance plan:   3.75 mg (7.5 mg x 0.5) every Mon; 7.5 mg (7.5 mg x 1) all other days   Full warfarin instructions:   3.75 mg every Mon; 7.5 mg all other days   Weekly warfarin total:   48.75 mg   No change documented:   Melia Villagran RN   Plan last modified:   Jing Lozano RN (2017)   Next INR  check:   6/12/2019   Priority:   INR   Target end date:   Indefinite    Indications    Atrial fibrillation (H) [I48.91]  Long term current use of anticoagulant therapy [Z79.01]             Anticoagulation Episode Summary     INR check location:       Preferred lab:       Send INR reminders to:   [facility]    Comments:   * 7.5mg tablets. Dr. Teran Ok with 12 week rechecks. 6/5/19 - ASA and Plavix after stents, target INR 2.0-2.5      Anticoagulation Care Providers     Provider Role Specialty Phone number    Ruben Teran MD [facility] [phone]            See the Encounter Report to view Anticoagulation Flowsheet and Dosing Calendar (Go to Encounters tab in chart review, and find the Anticoagulation Therapy Visit)        Melia Villagran RN [facility]

## 2019-06-05 NOTE — NURSING NOTE
Patient presents to clinic for a blood pressure check. Today 128/76 with pulse of 56. Weight is 267 Lbs. Today also.    Debo Villanueva RN

## 2019-06-06 NOTE — TELEPHONE ENCOUNTER
"Spoke w/ pt regarding sxs. States this pinch in left arm comes and goes. Started yesterday 6/6/19 and is better today. Pt feels a little \"off\" since angio. Denies feeling unbalanced or one-sided weakness. Initially felt fatigued right after but as time goes on he feels better. BP yesterday was 128/76 and HR 56. Denies lightheadedness, dizziness, increased bleeding, SOB, CP. Encouraged pt to continue to monitoring sxs. Can further discuss at OV 6/12/19 w/ Roseann Perkins RN    "

## 2019-06-06 NOTE — TELEPHONE ENCOUNTER
Attempted to call pt to review message received regarding occasional left arm pinch post angiogram. Pt did not answer. Left VM for callback.    More information is needed. However, insertion site for angiogram was right radial region. Unlikely this is a result of angio but will need more information to further assess.     Post angio follow up scheduled for 6/12/19 reese/ Roseann Perkins, GABRIELLE

## 2019-06-12 ENCOUNTER — ANTICOAGULATION THERAPY VISIT (OUTPATIENT)
Dept: ANTICOAGULATION | Facility: CLINIC | Age: 69
End: 2019-06-12
Payer: COMMERCIAL

## 2019-06-12 ENCOUNTER — OFFICE VISIT (OUTPATIENT)
Dept: CARDIOLOGY | Facility: CLINIC | Age: 69
End: 2019-06-12
Payer: COMMERCIAL

## 2019-06-12 VITALS
OXYGEN SATURATION: 97 % | HEART RATE: 51 BPM | SYSTOLIC BLOOD PRESSURE: 126 MMHG | DIASTOLIC BLOOD PRESSURE: 84 MMHG | WEIGHT: 270.6 LBS | BODY MASS INDEX: 45.73 KG/M2

## 2019-06-12 DIAGNOSIS — Z98.61 STATUS POST PERCUTANEOUS TRANSLUMINAL CORONARY ANGIOPLASTY: ICD-10-CM

## 2019-06-12 DIAGNOSIS — E78.5 HYPERLIPIDEMIA LDL GOAL <70: ICD-10-CM

## 2019-06-12 DIAGNOSIS — I48.20 CHRONIC ATRIAL FIBRILLATION (H): ICD-10-CM

## 2019-06-12 DIAGNOSIS — I10 BENIGN ESSENTIAL HYPERTENSION: ICD-10-CM

## 2019-06-12 DIAGNOSIS — I25.10 CORONARY ARTERY DISEASE INVOLVING NATIVE CORONARY ARTERY OF NATIVE HEART WITHOUT ANGINA PECTORIS: Primary | ICD-10-CM

## 2019-06-12 DIAGNOSIS — Z79.01 LONG TERM CURRENT USE OF ANTICOAGULANT THERAPY: ICD-10-CM

## 2019-06-12 DIAGNOSIS — I42.9 CARDIOMYOPATHY, UNSPECIFIED TYPE (H): ICD-10-CM

## 2019-06-12 LAB — INR POINT OF CARE: 1.9 (ref 0.86–1.14)

## 2019-06-12 PROCEDURE — 36416 COLLJ CAPILLARY BLOOD SPEC: CPT

## 2019-06-12 PROCEDURE — 99207 ZZC NO CHARGE NURSE ONLY: CPT

## 2019-06-12 PROCEDURE — 85610 PROTHROMBIN TIME: CPT | Mod: QW

## 2019-06-12 PROCEDURE — 99215 OFFICE O/P EST HI 40 MIN: CPT | Performed by: NURSE PRACTITIONER

## 2019-06-12 RX ORDER — LISINOPRIL 20 MG/1
20 TABLET ORAL DAILY
Qty: 30 TABLET | Refills: 11 | Status: SHIPPED | OUTPATIENT
Start: 2019-06-12 | End: 2019-07-31

## 2019-06-12 NOTE — LETTER
6/12/2019    Ruben Teran MD  [address] [facility] [address]    RE: Benjamín Hyman       Dear Colleague,    I had the pleasure of seeing Benjamín Hyman in the [facility].    Cardiology Clinic Progress Note  Benjamín Hyman MRN# [id number]   YOB: 1950 Age: 68 year old     Reason For Visit: Angiogram f/u   Primary Cardiologist:   Dr. Ernandez           History of Presenting Illness:    Benjamín Hyman is a pleasant 68 year old patient with a past cardiac history significant for chronic atrial fibrillation, CAD, cardiomyopathy, hypertension, and hyperlipidemia.     Pt was last seen by Dr. Ernandez on 5/20/2019 in consultation for abnormal stress test.   He complained of left axillary pressure on exertion for the last 2-3 years. This had become more persistent over the prior year. Nuclear stress test was abnormal and coronary angiogram was recommended.    Pt presents today for angiogram follow-up.  Coronary angiogram 5/31/2019 with ROSY ×1 to the mid LAD with residual 20% mid LCx and he was noted to have small diagonals and a small distal LCx.  His LVEF on LV gram was 30% with LV dilation and diffuse LV hypokinesis.  It was noted that the degree of LV dysfunction exceeded the degree of coronary disease. Triple therapy was recommended for one month, then discontinue aspirin and continue Plavix and Coumadin. These results were reviewed with him today.    He denies any further angina.  He has not had any bleeding difficulties on triple therapy.  We discussed stopping aspirin on July 1 2019 and then continue Plavix and Coumadin, uninterrupted, for at least one year.   He has not heard from cardiac rehabilitation so I asked him to call them to set up appointments.   His right radial angiogram site is without bleeding, ecchymosis, hematoma, or bruit.    In regards to his cardiomyopathy, his weight has been stable and is 270 pounds today in clinic.  He does not  currently check weights at home.  His apartment building has a scale that he can use and he will start checking these weekly.  We briefly reviewed low-sodium diet and I've given him some written education about this today.  He has not had any alcohol in two years and does not have plans to start.  He denies any heart failure symptoms.  He is agreeable to increasing lisinopril for cardiomyopathy.  He is on a fixed Social Security income and will not be able to have his echocardiogram until July as he does not have enough gas money to get to [place]. Patient reports no chest pain, shortness of breath, PND, orthopnea, presyncope, syncope, edema, heart racing, or palpitations.    Current Cardiac Medications   Aspirin 81 mg daily  Plavix 75 mg daily  Digoxin 250  g alternating with 125 mcg every other day  Lisinopril 10 mg daily  Metoprolol  mg daily  Simvastatin 40 mg every other day   Coumadin                   Assessment and Plan:     Plan  1.  Increase lisinopril to 20 mg daily for cardiomyopathy  2.  BMP one week  3.  Stop aspirin on 7/1/2019 and continue Plavix and Coumadin  4.  Call cardiac rehabilitation to set up appointments  5.  Follow-up with cardiologist in one month with echocardiogram prior to reassess LVEF- consider adding spironolactone for cardiomyopathy if needed.       1. CAD    Angio 5/2019 ROSY ×1 to the mid LAD with residual 20% mid LCx and he was noted to have small diagonals and a small distal LCx    No angina ( prior angina Left axillary pressure on exertion)    Continue statin, aspirin, ACE inhibitor, beta blocker    Continue triple therapy with ASA/ Plavix/ Coumadin for one month (6/30/2019), and then Plavix/ Coumadin, uninterrupted, for at least one year (5/2020)      2. Cardiomyopathy     LVEF 30% on LV gram during angiogram and 33% on prior nuclear stress testing- has not had echocardiogram to confirm    no signs of heart failure    Dry weight: 270 pounds    continue ACE inhibitor,  beta blocker    Consider adding spironolactone    Check daily weights and call the clinic if your weight has increased more than 2 lbs in one day or 5 lbs in one week.    2000 mg Na diet       3. Chronic atrial fibrillation    Asymptomatic    Elevated [id number] score  For age, hypertension, CAD, heart failure    Continue metoprolol and digoxin for rate control and Coumadin for anticoagulation      4. hypertension    controlled    Continue lisinopril, metoprolol      5. hyperlipidemia    last LDL 64 on 4/2019    Continue simvastatin 40 mg daily      Greater than 50% of visit spent in face-to-face counseling, 40 mins.       Thank you for allowing me to participate in this delightful patient's care.      This note was completed in part using Dragon voice recognition software. Although reviewed after completion, some word and grammatical errors may occur.    BRENNA Craig, CNP           Data:   All laboratory data reviewed        HPI and Plan:   See dictation    Orders Placed This Encounter   Procedures     Basic metabolic panel     Follow-Up with Cardiologist     Echocardiogram Complete       Orders Placed This Encounter   Medications     lisinopril (PRINIVIL/ZESTRIL) 20 MG tablet     Sig: Take 1 tablet (20 mg) by mouth daily     Dispense:  30 tablet     Refill:  11       Medications Discontinued During This Encounter   Medication Reason     lisinopril (PRINIVIL/ZESTRIL) 10 MG tablet          Encounter Diagnoses   Name Primary?     Chronic atrial fibrillation (H)      Coronary artery disease involving native coronary artery of native heart without angina pectoris Yes     Cardiomyopathy, unspecified type (H)      Benign essential hypertension      Hyperlipidemia LDL goal <70      Status post percutaneous transluminal coronary angioplasty        CURRENT MEDICATIONS:  Current Outpatient Medications   Medication Sig Dispense Refill     aspirin (ASA) 81 MG EC tablet Take 1 tablet (81 mg) by mouth  daily Start tomorrow morning. 90 tablet 3     clopidogrel (PLAVIX) 75 MG tablet Take 1 tablet (75 mg) by mouth daily 90 tablet 3     digoxin (LANOXIN) 250 MCG tablet Take 1 tab on even days & 1/2 tab on odd day of the month. Patient has been on this medication for year & atrial fibrillation is controlled. 30 tablet 11     lisinopril (PRINIVIL/ZESTRIL) 20 MG tablet Take 1 tablet (20 mg) by mouth daily 30 tablet 11     metoprolol succinate ER (TOPROL-XL) 200 MG 24 hr tablet Take 1 tablet (200 mg) by mouth daily 30 tablet 11     omeprazole (PRILOSEC OTC) 20 MG tablet Takes PRN 30 tablet      simvastatin (ZOCOR) 40 MG tablet Take 1 tablet (40 mg) by mouth every other day 15 tablet 11     warfarin (COUMADIN) 7.5 MG tablet Take 3.75mg by mouth every Monday and 7.5mg all other days or as directed by Anticoagulation Clinic (file until pt calls for refill) 90 tablet 0       ALLERGIES     Allergies   Allergen Reactions     Latex      Adhesive Tape Rash     Reacted to the EKG stickers       PAST MEDICAL HISTORY:  Past Medical History:   Diagnosis Date     Atrial fibrillation (H)      Cardiomyopathy in other diseases classified elsewhere      Chest pain      HLD (hyperlipidemia)      HTN (hypertension)      Ischemia      Morbid obesity (H)        PAST SURGICAL HISTORY:  Past Surgical History:   Procedure Laterality Date     CV CORONARY ANGIOGRAM Left 5/31/2019    Procedure: Coronary Angiogram;  Surgeon: Bogdan Ernandez MD;  Location:  HEART CARDIAC CATH LAB     CV LEFT HEART CATH N/A 5/31/2019    Procedure: Left Heart Cath;  Surgeon: Bogdan Ernandez MD;  Location:  HEART CARDIAC CATH LAB     CV LEFT VENTRICULOGRAM N/A 5/31/2019    Procedure: Left Ventriculogram;  Surgeon: Bogdan Ernandez MD;  Location:  HEART CARDIAC CATH LAB     CV PCI STENT DRUG ELUTING N/A 5/31/2019    Procedure: PCI Stent Drug Eluting;  Surgeon: Bogdan Ernandez MD;  Location:  HEART CARDIAC CATH LAB       FAMILY  HISTORY:  Family History   Problem Relation Age of Onset     Cancer - colorectal Mother      C.A.D. Father      Heart Disease Father      Unknown/Adopted Maternal Grandmother      Unknown/Adopted Paternal Grandmother      Cerebrovascular Disease Paternal Grandfather      Depression Daughter        SOCIAL HISTORY:  Social History     Socioeconomic History     Marital status: Single     Spouse name: None     Number of children: None     Years of education: None     Highest education level: None   Occupational History     None   Social Needs     Financial resource strain: None     Food insecurity:     Worry: None     Inability: None     Transportation needs:     Medical: None     Non-medical: None   Tobacco Use     Smoking status: Former Smoker     Types: Cigarettes     Last attempt to quit: 1996     Years since quittin.4     Smokeless tobacco: Never Used   Substance and Sexual Activity     Alcohol use: Yes     Comment: Hasn't had anything since 2015 Occsional beer, not often.  Quit drinking 4-1-10/  drank around [place]2015 2-3 beers every other week.      Drug use: No     Sexual activity: Not Currently   Lifestyle     Physical activity:     Days per week: None     Minutes per session: None     Stress: None   Relationships     Social connections:     Talks on phone: None     Gets together: None     Attends Anabaptist service: None     Active member of club or organization: None     Attends meetings of clubs or organizations: None     Relationship status: None     Intimate partner violence:     Fear of current or ex partner: None     Emotionally abused: None     Physically abused: None     Forced sexual activity: None   Other Topics Concern     Parent/sibling w/ CABG, MI or angioplasty before 65F 55M? Yes     Comment: Father fatal MI at 48yo   Social History Narrative     None       Review of Systems:  Skin:  Negative       Eyes:  Positive for glasses    ENT:  Negative       Respiratory:  Negative       Cardiovascular:  Negative      Gastroenterology: Negative      Genitourinary:  Negative      Musculoskeletal:  Negative      Neurologic:  Positive for numbness or tingling of feet;numbness or tingling of hands    Psychiatric:  Negative      Heme/Lymph/Imm:  Negative      Endocrine:  Negative        Physical Exam:  Vitals: /84 (BP Location: Right arm, Patient Position: Sitting, Cuff Size: Adult Regular)   Pulse 51   Wt 122.7 kg (270 lb 9.6 oz)   SpO2 97%   BMI 45.73 kg/m       Constitutional:  cooperative;well developed;in no acute distress morbidly obese      Skin:  warm and dry to the touch          Head:  normocephalic        Eyes:  sclera white        Lymph:      ENT:  no pallor or cyanosis        Neck:  no stiffness        Respiratory:  clear to auscultation;normal symmetry diminished breath sounds bilaterally       Cardiac:   irregularly irregular rhythm distant heart sounds            pulses full and equal                                        GI:  abdomen soft        Extremities and Muscular Skeletal:  no edema              Neurological:  no gross motor deficits;affect appropriate        Psych:  Alert and Oriented x 3          Thank you for allowing me to participate in the care of your patient.    Sincerely,     BRENNA Craig [facility]

## 2019-06-12 NOTE — PROGRESS NOTES
Cardiology Clinic Progress Note  Benjamín Hyman MRN# [id number]   YOB: 1950 Age: 68 year old     Reason For Visit: Angiogram f/u   Primary Cardiologist:   Dr. Ernandez           History of Presenting Illness:    Benjamín Hyman is a pleasant 68 year old patient with a past cardiac history significant for chronic atrial fibrillation, CAD, cardiomyopathy, hypertension, and hyperlipidemia.     Pt was last seen by Dr. Ernandez on 5/20/2019 in consultation for abnormal stress test.   He complained of left axillary pressure on exertion for the last 2-3 years. This had become more persistent over the prior year. Nuclear stress test was abnormal and coronary angiogram was recommended.    Pt presents today for angiogram follow-up.  Coronary angiogram 5/31/2019 with ROSY ×1 to the mid LAD with residual 20% mid LCx and he was noted to have small diagonals and a small distal LCx.  His LVEF on LV gram was 30% with LV dilation and diffuse LV hypokinesis.  It was noted that the degree of LV dysfunction exceeded the degree of coronary disease. Triple therapy was recommended for one month, then discontinue aspirin and continue Plavix and Coumadin. These results were reviewed with him today.    He denies any further angina.  He has not had any bleeding difficulties on triple therapy.  We discussed stopping aspirin on July 1 2019 and then continue Plavix and Coumadin, uninterrupted, for at least one year.   He has not heard from cardiac rehabilitation so I asked him to call them to set up appointments.   His right radial angiogram site is without bleeding, ecchymosis, hematoma, or bruit.    In regards to his cardiomyopathy, his weight has been stable and is 270 pounds today in clinic.  He does not currently check weights at home.  His apartment building has a scale that he can use and he will start checking these weekly.  We briefly reviewed low-sodium diet and I've given him some written education about this today.  He  has not had any alcohol in two years and does not have plans to start.  He denies any heart failure symptoms.  He is agreeable to increasing lisinopril for cardiomyopathy.  He is on a fixed Social Security income and will not be able to have his echocardiogram until July as he does not have enough gas money to get to [place]. Patient reports no chest pain, shortness of breath, PND, orthopnea, presyncope, syncope, edema, heart racing, or palpitations.    Current Cardiac Medications   Aspirin 81 mg daily  Plavix 75 mg daily  Digoxin 250  g alternating with 125 mcg every other day  Lisinopril 10 mg daily  Metoprolol  mg daily  Simvastatin 40 mg every other day   Coumadin                   Assessment and Plan:     Plan  1.  Increase lisinopril to 20 mg daily for cardiomyopathy  2.  BMP one week  3.  Stop aspirin on 7/1/2019 and continue Plavix and Coumadin  4.  Call cardiac rehabilitation to set up appointments  5.  Follow-up with cardiologist in one month with echocardiogram prior to reassess LVEF- consider adding spironolactone for cardiomyopathy if needed.       1. CAD    Angio 5/2019 ROSY ×1 to the mid LAD with residual 20% mid LCx and he was noted to have small diagonals and a small distal LCx    No angina ( prior angina Left axillary pressure on exertion)    Continue statin, aspirin, ACE inhibitor, beta blocker    Continue triple therapy with ASA/ Plavix/ Coumadin for one month (6/30/2019), and then Plavix/ Coumadin, uninterrupted, for at least one year (5/2020)      2. Cardiomyopathy     LVEF 30% on LV gram during angiogram and 33% on prior nuclear stress testing- has not had echocardiogram to confirm    no signs of heart failure    Dry weight: 270 pounds    continue ACE inhibitor, beta blocker    Consider adding spironolactone    Check daily weights and call the clinic if your weight has increased more than 2 lbs in one day or 5 lbs in one week.    2000 mg Na diet       3. Chronic atrial  fibrillation    Asymptomatic    Elevated [id number] score  For age, hypertension, CAD, heart failure    Continue metoprolol and digoxin for rate control and Coumadin for anticoagulation      4. hypertension    controlled    Continue lisinopril, metoprolol      5. hyperlipidemia    last LDL 64 on 4/2019    Continue simvastatin 40 mg daily      Greater than 50% of visit spent in face-to-face counseling, 40 mins.       Thank you for allowing me to participate in this delightful patient's care.      This note was completed in part using Dragon voice recognition software. Although reviewed after completion, some word and grammatical errors may occur.    Roseann Briggs, APRN, CNP           Data:   All laboratory data reviewed        HPI and Plan:   See dictation    Orders Placed This Encounter   Procedures     Basic metabolic panel     Follow-Up with Cardiologist     Echocardiogram Complete       Orders Placed This Encounter   Medications     lisinopril (PRINIVIL/ZESTRIL) 20 MG tablet     Sig: Take 1 tablet (20 mg) by mouth daily     Dispense:  30 tablet     Refill:  11       Medications Discontinued During This Encounter   Medication Reason     lisinopril (PRINIVIL/ZESTRIL) 10 MG tablet          Encounter Diagnoses   Name Primary?     Chronic atrial fibrillation (H)      Coronary artery disease involving native coronary artery of native heart without angina pectoris Yes     Cardiomyopathy, unspecified type (H)      Benign essential hypertension      Hyperlipidemia LDL goal <70      Status post percutaneous transluminal coronary angioplasty        CURRENT MEDICATIONS:  Current Outpatient Medications   Medication Sig Dispense Refill     aspirin (ASA) 81 MG EC tablet Take 1 tablet (81 mg) by mouth daily Start tomorrow morning. 90 tablet 3     clopidogrel (PLAVIX) 75 MG tablet Take 1 tablet (75 mg) by mouth daily 90 tablet 3     digoxin (LANOXIN) 250 MCG tablet Take 1 tab on even days & 1/2 tab on odd day  of the month. Patient has been on this medication for year & atrial fibrillation is controlled. 30 tablet 11     lisinopril (PRINIVIL/ZESTRIL) 20 MG tablet Take 1 tablet (20 mg) by mouth daily 30 tablet 11     metoprolol succinate ER (TOPROL-XL) 200 MG 24 hr tablet Take 1 tablet (200 mg) by mouth daily 30 tablet 11     omeprazole (PRILOSEC OTC) 20 MG tablet Takes PRN 30 tablet      simvastatin (ZOCOR) 40 MG tablet Take 1 tablet (40 mg) by mouth every other day 15 tablet 11     warfarin (COUMADIN) 7.5 MG tablet Take 3.75mg by mouth every Monday and 7.5mg all other days or as directed by Anticoagulation Clinic (file until pt calls for refill) 90 tablet 0       ALLERGIES     Allergies   Allergen Reactions     Latex      Adhesive Tape Rash     Reacted to the EKG stickers       PAST MEDICAL HISTORY:  Past Medical History:   Diagnosis Date     Atrial fibrillation (H)      Cardiomyopathy in other diseases classified elsewhere      Chest pain      HLD (hyperlipidemia)      HTN (hypertension)      Ischemia      Morbid obesity (H)        PAST SURGICAL HISTORY:  Past Surgical History:   Procedure Laterality Date     CV CORONARY ANGIOGRAM Left 5/31/2019    Procedure: Coronary Angiogram;  Surgeon: Bogdan Ernandez MD;  Location:  HEART CARDIAC CATH LAB     CV LEFT HEART CATH N/A 5/31/2019    Procedure: Left Heart Cath;  Surgeon: Bogdan Ernandez MD;  Location:  HEART CARDIAC CATH LAB     CV LEFT VENTRICULOGRAM N/A 5/31/2019    Procedure: Left Ventriculogram;  Surgeon: Bogdan Ernandez MD;  Location:  HEART CARDIAC CATH LAB     CV PCI STENT DRUG ELUTING N/A 5/31/2019    Procedure: PCI Stent Drug Eluting;  Surgeon: Bogdan Ernandez MD;  Location:  HEART CARDIAC CATH LAB       FAMILY HISTORY:  Family History   Problem Relation Age of Onset     Cancer - colorectal Mother      C.A.D. Father      Heart Disease Father      Unknown/Adopted Maternal Grandmother      Unknown/Adopted Paternal Grandmother       Cerebrovascular Disease Paternal Grandfather      Depression Daughter        SOCIAL HISTORY:  Social History     Socioeconomic History     Marital status: Single     Spouse name: None     Number of children: None     Years of education: None     Highest education level: None   Occupational History     None   Social Needs     Financial resource strain: None     Food insecurity:     Worry: None     Inability: None     Transportation needs:     Medical: None     Non-medical: None   Tobacco Use     Smoking status: Former Smoker     Types: Cigarettes     Last attempt to quit: 1996     Years since quittin.4     Smokeless tobacco: Never Used   Substance and Sexual Activity     Alcohol use: Yes     Comment: Hasn't had anything since 2015 Occsional beer, not often.  Quit drinking 4-1-10/  drank around [place] 2015 2-3 beers every other week.      Drug use: No     Sexual activity: Not Currently   Lifestyle     Physical activity:     Days per week: None     Minutes per session: None     Stress: None   Relationships     Social connections:     Talks on phone: None     Gets together: None     Attends Evangelical service: None     Active member of club or organization: None     Attends meetings of clubs or organizations: None     Relationship status: None     Intimate partner violence:     Fear of current or ex partner: None     Emotionally abused: None     Physically abused: None     Forced sexual activity: None   Other Topics Concern     Parent/sibling w/ CABG, MI or angioplasty before 65F 55M? Yes     Comment: Father fatal MI at 48yo   Social History Narrative     None       Review of Systems:  Skin:  Negative       Eyes:  Positive for glasses    ENT:  Negative      Respiratory:  Negative       Cardiovascular:  Negative      Gastroenterology: Negative      Genitourinary:  Negative      Musculoskeletal:  Negative      Neurologic:  Positive for numbness or tingling of feet;numbness or  tingling of hands    Psychiatric:  Negative      Heme/Lymph/Imm:  Negative      Endocrine:  Negative        Physical Exam:  Vitals: /84 (BP Location: Right arm, Patient Position: Sitting, Cuff Size: Adult Regular)   Pulse 51   Wt 122.7 kg (270 lb 9.6 oz)   SpO2 97%   BMI 45.73 kg/m      Constitutional:  cooperative;well developed;in no acute distress morbidly obese      Skin:  warm and dry to the touch          Head:  normocephalic        Eyes:  sclera white        Lymph:      ENT:  no pallor or cyanosis        Neck:  no stiffness        Respiratory:  clear to auscultation;normal symmetry diminished breath sounds bilaterally       Cardiac:   irregularly irregular rhythm distant heart sounds            pulses full and equal                                        GI:  abdomen soft        Extremities and Muscular Skeletal:  no edema              Neurological:  no gross motor deficits;affect appropriate        Psych:  Alert and Oriented x 3        CC  No referring provider defined for this encounter.

## 2019-06-12 NOTE — PATIENT INSTRUCTIONS
"[facility] HEART CARE  [facility]~[address]~[place], [place]~[address]  Thank you for your  Heart Care visit today. If you have questions regarding your visit, please contact your cardiology RN's, Kayleen Kaplan or Ruby Combs, at [phone]. Your provider has recommended the following:  Medication Changes:  STOP aspirin 7/1 and continue plavix and coumadin   INCREASE lisinopril to 20 mg daily    Recommendations:  1.  Check daily weights and call the clinic if your weight has increased more than 2 lbs in one day or 5 lbs in one week.  2. Don't hold Aspirin or plavix without checking with cardiology first   3. Call cardiac rehab to set up appts   Follow-up:  1. nonfasting lab work in 1 week at [facility]  2. Echocardiogram at beginning of July   3. See cardiologist for follow up at [facility]: 1 month     To schedule a future appointment, we kindly ask that you call cardiology scheduling at [phone] three months prior to requested revisit date.      [facility] cardiology clinic is staffed with \"Advance Practice Providers\". These are our cardiology Physician Assistants and Nurse Practitioners.   Please call cardiology scheduling if you feel you need clinical evaluation with them at any time for any cardiac reason.   Reminder:  For your safety, we ask that you bring in your current medication(s) or an updated list of your medications with you to EACH office visit. Include the medication name, dose of pill on bottle and how you are taking it. Include over-the-counter medications or supplements. Your provider will review this at each visit and plan your care based on your current information.   ~~~~~~~~~~~~~~~~~~~~~~~~~~~~~~~~~~~~~~~  \"[facility]\" [place] telephone numbers for reference:  Cardiology Scheduling~[phone]  Diagnostic Imaging Scheduling~[phone]  Lab Scheduling~[phone]  Anticoagulation Clinic~[phone]  Cardiac " "Rehabilitation~[phone]  [facility] RN's~[phone] (at [facility])  Cardiology Clinic RN's~[phone] (Ruby Combs, GABRIELLE & Kayleen Kaplan RN)  ~~~~~~~~~~~~~~~~~~~~~~~~~~~~~~~~~~~~~~~~        You have been diagnosed with \"Heart Failure.\" This does not mean the heart is about to stop working, rather it means the heart's pumping power is weaker than normal. You may hear different terms that mean the same thing. Some terms you may hear are: Cardiomyopathy, Diastolic Dysfunction, Systolic Dysfunction, Congestive Heart Failure.   Heart failure is a condition that is manageable by paying attention to your signs and symptoms and treating them before they get too severe.   Below, you will find information that will help you learn how to identify these signs and symptoms.   If you have questions, you should write these down and bring them to your next visit so they can be addressed.   If you have urgent questions and would like to speak with a cardiology RN, please call [phone].      Left- or Right- Side Congestive Heart Failure (CHF)    The heart is a large muscle that acts as a pump to circulate blood throughout the body. Blood carries oxygen to all of the organs, including the brain, muscles, and skin. After your body takes the oxygen out of the blood, the blood returns to the heart. The right side of the heart collects the blood from the body and pumps it to the lungs. In the lungs, it gets fresh oxygen and gives up carbon dioxide. The oxygen-rich blood from the lungs then returns to the left side of the heart, where it is pumped back out to the rest of your body, starting the process all over.  Congestive heart failure (CHF) occurs when the heart muscle does not function normally, leading to fluid retention or reduces blood flow. This can be caused by heart muscle weakness or stiffness, or a heart valve problem. Heart failure can affect the right side of the heart or the left side. But heart failure may affect " not only the right side of the heart or only the left side. Although it may have started on one side, it can and often eventually does affect both sides.  Right-side heart failure  When the right side of the heart is failing, it can t handle the blood it is getting from the rest of the body. This blood returns to the heart through veins. When too much pressure builds up in the veins, fluid leaks out into the tissues. Gravity then causes that fluid to move to those parts of the body that are the lowest. So one of the first symptoms of right-side CHF can include swelling in the feet and ankles. If the condition gets worse, the swelling can even go up past the knees. Sometimes it gets so severe, the liver and intestines can get congested as well.  Left-side heart failure  When the left side of the heart is failing, it can t handle the blood it gets from the lungs. Pressure then builds up in the veins of the lungs, causing fluid to leak into the lung tissues. This may cause CHF and pulmonary edema. This causes you to feel short of breath, weak, or dizzy. These symptoms are often worse with exertion, such as when climbing stairs or walking up hills. Lying with your head flat is uncomfortable and can make your breathing worse. This may make sleeping difficult. You may need to use extra pillows to elevate your upper body to sleep well. The same is true when just resting during the daytime. You may also feel weak or tired and have less energy during exertion.  There are many causes of heart failure including:    Coronary artery disease    Past heart attack (also known as acute myocardial infarction, or AMI)    High blood pressure    Damaged heart valve    Diabetes    Obesity    Cigarette smoking    Alcohol abuse  Heart failure is usually a chronic condition. The purpose of medical treatment is to improve the pumping action of the heart and to remove excess water from the body. A number of medicines can help reach this  goal, improve symptoms, and prevent the heart from becoming weaker. Sometimes, heart failure can become so severe that a device is placed in the heart to help with pumping. Another major goal is to better treat the causes of heart failure, such as diabetes and high blood pressure, by making changes in your lifestyle and maximizing medical control when needed.  Home care  Follow these guidelines when caring for yourself at home:    Check your weight every day. This is very important because a sudden increase in weight gain could mean worsening heart failure. Keep these things in mind:  ? Use the same scale every day.  ? Weigh yourself at the same time every day.  ? Make sure the scale is on a hard floor surface, not on a rug or carpet.  ? Keep a record of your weight every day so your healthcare provider can see it. If you are not given a log sheet for this, keep a separate journal for this purpose.     Cut back on the amount of salt (sodium) you eat. Follow your healthcare provider's recommendation on how much salt or sodium you should have each day.  ? Limit high-salt foods. These include olives, pickles, smoked meats, salted potato chips, and most prepared foods.  ? Don't add salt to your food at the table. Use only small amounts of salt when cooking.  ? Read the labels carefully on food packages to learn how much salt or sodium is in each serving in the package. Remember, a can or package of food may contain more than 1 serving. So if you eat all the food in the package, you may be getting more salt than you think.    Follow your healthcare provider's recommendations about how much fluid you should have. Be aware that some foods, such as soup, pudding, and juicy fruits like oranges or melons, contain liquid. You'll need to count the liquid in those foods as part of your daily fluid intake. Your provider can help you with this.    Stop smoking.    Cut back on how much alcohol you drink.    Lose weight if you are  overweight. The excess weight adds a lot of stress on the workload of the heart.    Stay active. Talk with your provider about an exercise program that is safe for your heart.    Keep your feet elevated to reduce swelling. Ask your provider about support hose as a preventive treatment for daytime leg swelling.  Besides taking your medicine as instructed, an important part of treatment is lifestyle changes. These include diet, physical activity, stopping smoking, and weight control.  Improve your diet by including more fresh foods, cutting back on how much sugar and saturated fat you eat, and eating fewer processed foods and less salt.  Follow-up care  Follow up with your healthcare provider, or as advised.  Make sure to keep any appointments that were made for you. These can help better control your congestive heart failure. You will need to follow up with your provider on a routine basis to make sure your heart failure is well managed.  If an X-ray, electrocardiogram (ECG), or other tests were done, you will be told of any new findings that may affect your care.  Call 911  Call 911 if you:        * Become severely short of breath        * Feel lightheaded, or feel like you might pass out or faint        * Have chest pain or discomfort that is different than usual, medicines your doctor            told you to use for this don't help or the pain lasts longer than 10 to 15 minutes        * You suddenly develop a rapid heart rate   When to seek medical advice  The following may be signs that your heart failure is getting worse. Call your healthcare provider right away if any of these happen:    Sudden weight gain. This means 3 or more pounds in one day, or 5 or more pounds in 1 week    Trouble breathing not related to being active    New or increased swelling of your legs or ankles    Swelling or pain in your abdomen    Breathing trouble at night. This means waking up short of breath or needing more pillows to  breathe.    Frequent coughing that doesn t go away    Feeling much more tired than usual  Date Last Reviewed: 5/1/2018 2000-2018 [employer]. [address] [place] [address], [place] [address]. All rights reserved. This information is not intended as a substitute for professional medical care. Always follow your healthcare professional's instructions.     Heart Failure: Warning Signs of a Flare-Up    You have a condition called heart failure. Once you have heart failure, flare-ups can happen. Below are signs that can mean your heart failure is getting worse. If you notice any of these warning signs, call your healthcare provider.  Swelling    Your feet, ankles, or lower legs get puffier.    You notice skin changes on your lower legs.    Your shoes feel too tight.    Your clothes are tighter in the waist.    You have trouble getting rings on or off your fingers.  Shortness of breath    You have to breathe harder even when you re doing your normal activities or when you re resting.    You are short of breath walking up stairs or even short distances.    You wake up at night short of breath or coughing.    You need to use more pillows or sit up to sleep.    You wake up tired or restless.  Other warning signs    You feel weaker, dizzy, or more tired.    You have chest pain or changes in your heartbeat.    You have a cough that won t go away.    You can t remember things or don t feel like eating.  Tracking your weight  Gaining weight is often the first warning sign that heart failure is getting worse. Gaining even a few pounds can be a sign that your body is retaining excess water and salt. Weighing yourself each day in the morning after you urinate and before you eat, is the best way to know if you're retaining water. Get a scale that is easy to read and make sure you wear the same clothes and use the same scale every time you weigh. Your healthcare provider will show you how to track your weight. Call your  doctor if you gain more than 2 pounds in 1 day, 5 pounds in 1 week, or whatever weight gain you were told to report by your doctor. This is often a sign of worsening heart failure and needs to be evaluated and treated before it compromises your breathing. Your doctor will tell you what to do next.    Date Last Reviewed: 3/15/2016    [id number] [employer]. [address] [place] [address], [place] [address]. All rights reserved. This information is not intended as a substitute for professional medical care. Always follow your healthcare professional's instructions.      WEIGHT CHART for Patients with Heart Failure  Instructions: Weigh yourself every morning at the same time, on the same scale and in the same clothing. Write your weight on the weight chart. Bring this chart with you to your cardiology clinic visits. Call us if:     You gain more than 2 pounds in one day or 5 pounds in one week.    Have increased shortness of breath.    Wake up short of breath or cannot sleep lying down.    Have increased swelling in your legs, ankles or abdomen (belly).     SUN MON TUES WED THURS FRI SAT   Date          Weight          Date          Weight          Date          Weight          Date          Weight          Date          Weight          Date          Weight          Date          Weight          Date          Weight          Date          Weight          Date          Weight          Date          Weight            Tips for a Low-Sodium Diet  If you have high blood pressure, heart failure, liver problems or kidney problems, you may need to watch your sodium intake. Too much sodium can cause thirst and shortness of breath. It can also make your body retain extra fluids. You should limit the amount of sodium in your diet  to _________ mg per day. Sodium is found in many foods. Most of our sodium comes from  processed  foods like canned soups, lunch meats and TV dinners.  A major source of sodium is salt, or sodium  chloride. Salt is often used to preserve foods (extend their shelf life). We have gotten used to the taste of salt in our foods. When you start to limit your salt intake, you will notice the lack of salt. Give yourself time to adjust to the change.  Tips    To keep track of your sodium intake, write down the amount of sodium you eat for a of couple days. This gives you a good idea of which foods are high in sodium--and where you can cut back.    Do not add salt while cooking or at the table. In recipes, you can often use half the amount of salt without giving up flavor.    Do not add salt to water when making rice, pasta or potatoes.    Do not use lemon pepper--this is made with salt.    Use spices and herbs without the word  salt  in their names. Use herb blends like Mrs. Hill.    When eating vegetables, meats, poultry or fish, choose fresh or frozen instead of canned foods.    Eat more homemade foods that are made from scratch. Avoid boxed rice, noodles or potato dishes--these often contain salty seasonings.    Choose foods with the least amount of packaging. These are often lower in sodium.    Read food labels to learn the sodium content for suggested serving sizes. Choose foods with the least amount of sodium.  - Choose foods labeled  low sodium.  These have less than 140 mg of sodium per serving.  - Always double-check serving sizes. If you eat two servings of a food, you will get twice as much sodium.    When you go out to eat, ask that foods be made without added salt. Ask for condiments on the side, so you can control how much you use.    You will find foods with less sodium if you shop along the outer walls of the grocery store.    Do not use a salt substitute without your doctor s okay. Some products (like [place] Lite Salt) contain potassium. If you are taking certain medicines, these products could raise the level of potassium in your blood.  High-sodium foods    Salt or kosher salt (one teaspoon = 2,300  mg of sodium)    Seasonings (lemon pepper, garlic salt, sea salt, seasoning salt, etc.)    Meat tenderizers and marinades    Packaged sauces or gravies    Snack foods (chips, crackers, pork rinds, salted nuts)    Cheese (natural and processed)    Cottage cheese    Fast-food and restaurant meals    Most canned vegetables and vegetable juices    Pickled and cured foods (pickles, olives, sauerkraut)    Condiments (BBQ sauce, soy sauce, teriyaki sauce, steak sauce, salad dressing, ketchup, etc.)    Canned or boxed side dishes, such as macaroni and cheese, ramen noodles, Hamburger [place] and Rice-A-Alexandre    Frozen meals with more than 500 mg of sodium per serving    Monosodium glutamate (MSG)    Processed meats (bologna, ham, heck) and  canned meats (SPAM, corned beef)    Soups and bouillon (canned, frozen or dried)    Canned tomato products (juice, spaghetti sauce, etc.)    Sports drinks such as Gatorade or Powerade    Foods covered in sauce, gravy or other coatings (broccoli with cheese sauce, chicken fingers, etc.)    Biscuits and refrigerated rolls or breads  For informational purposes only. Not to replace the advice of your health care provider.  Copyright   2005 [facility]. All rights reserved. [employer] 156328 - REV 01/16.

## 2019-06-12 NOTE — PROGRESS NOTES
ANTICOAGULATION FOLLOW-UP CLINIC VISIT    Patient Name:  Benjamín Hyman  Date:  2019  Contact Type:  Face to Face    SUBJECTIVE:  Patient Findings     Positives:   Change in medications (Lisinopril increased)    Comments:   No changes in activity, health, or diet noted. No bleeding or increased bruising noted. Took warfarin as prescribed.  Patient will continue on maintenance dose.  Recheck in 4 weeks. Maintenance dose may need to be increased.  Patient verbalizes understanding and agrees to plan. No further questions or concerns.          Clinical Outcomes     Comments:   No changes in activity, health, or diet noted. No bleeding or increased bruising noted. Took warfarin as prescribed.  Patient will continue on maintenance dose.  Recheck in 4 weeks. Maintenance dose may need to be increased.  Patient verbalizes understanding and agrees to plan. No further questions or concerns.             OBJECTIVE    INR Protime   Date Value Ref Range Status   2019 1.9 (A) 0.86 - 1.14 Final       ASSESSMENT / PLAN  INR assessment SUB    Recheck INR In: 4 WEEKS    INR Location Clinic      Anticoagulation Summary  As of 2019    INR goal:   2.0-3.0   TTR:   74.1 % (4.2 y)   INR used for dosin.9! (2019)   Warfarin maintenance plan:   3.75 mg (7.5 mg x 0.5) every Mon; 7.5 mg (7.5 mg x 1) all other days   Full warfarin instructions:   3.75 mg every Mon; 7.5 mg all other days   Weekly warfarin total:   48.75 mg   No change documented:   Horace Barnett RN   Plan last modified:   Jing Lozano RN (2017)   Next INR check:   2019   Priority:   INR   Target end date:   Indefinite    Indications    Atrial fibrillation (H) [I48.91]  Long term current use of anticoagulant therapy [Z79.01]             Anticoagulation Episode Summary     INR check location:       Preferred lab:       Send INR reminders to:   [facility]    Comments:   * 7.5mg tablets. Dr. Jeffry English with 12 week  rechecks. 6/5/19 - ASA and Plavix after stents, target INR 2.0-2.5. Stop ASA 7/1/19.      Anticoagulation Care Providers     Provider Role Specialty Phone number    Ruben Teran MD [facility] [phone]            See the Encounter Report to view Anticoagulation Flowsheet and Dosing Calendar (Go to Encounters tab in chart review, and find the Anticoagulation Therapy Visit)        Horace Barnett RN

## 2019-06-18 ENCOUNTER — APPOINTMENT (OUTPATIENT)
Dept: CT IMAGING | Facility: CLINIC | Age: 69
End: 2019-06-18
Attending: NURSE PRACTITIONER
Payer: MEDICARE

## 2019-06-18 ENCOUNTER — APPOINTMENT (OUTPATIENT)
Dept: GENERAL RADIOLOGY | Facility: CLINIC | Age: 69
End: 2019-06-18
Attending: NURSE PRACTITIONER
Payer: MEDICARE

## 2019-06-18 ENCOUNTER — HOSPITAL ENCOUNTER (EMERGENCY)
Facility: CLINIC | Age: 69
Discharge: HOME | End: 2019-06-18
Attending: NURSE PRACTITIONER | Admitting: NURSE PRACTITIONER
Payer: MEDICARE

## 2019-06-18 ENCOUNTER — NURSE TRIAGE (OUTPATIENT)
Dept: FAMILY MEDICINE | Facility: CLINIC | Age: 69
End: 2019-06-18

## 2019-06-18 VITALS
BODY MASS INDEX: 45.93 KG/M2 | HEIGHT: 64 IN | DIASTOLIC BLOOD PRESSURE: 75 MMHG | WEIGHT: 269 LBS | SYSTOLIC BLOOD PRESSURE: 127 MMHG | HEART RATE: 84 BPM | RESPIRATION RATE: 18 BRPM | OXYGEN SATURATION: 97 % | TEMPERATURE: 97.6 F

## 2019-06-18 DIAGNOSIS — R07.89 LEFT-SIDED CHEST WALL PAIN: ICD-10-CM

## 2019-06-18 LAB
ALBUMIN SERPL-MCNC: 3.7 G/DL (ref 3.4–5)
ALP SERPL-CCNC: 100 U/L (ref 40–150)
ALT SERPL W P-5'-P-CCNC: 25 U/L (ref 0–70)
ANION GAP SERPL CALCULATED.3IONS-SCNC: 6 MMOL/L (ref 3–14)
AST SERPL W P-5'-P-CCNC: 18 U/L (ref 0–45)
BASOPHILS # BLD AUTO: 0.1 10E9/L (ref 0–0.2)
BASOPHILS NFR BLD AUTO: 0.8 %
BILIRUB SERPL-MCNC: 0.4 MG/DL (ref 0.2–1.3)
BUN SERPL-MCNC: 19 MG/DL (ref 7–30)
CALCIUM SERPL-MCNC: 9 MG/DL (ref 8.5–10.1)
CHLORIDE SERPL-SCNC: 106 MMOL/L (ref 94–109)
CO2 SERPL-SCNC: 29 MMOL/L (ref 20–32)
CREAT SERPL-MCNC: 0.93 MG/DL (ref 0.66–1.25)
DIFFERENTIAL METHOD BLD: ABNORMAL
EOSINOPHIL # BLD AUTO: 0 10E9/L (ref 0–0.7)
EOSINOPHIL NFR BLD AUTO: 0 %
ERYTHROCYTE [DISTWIDTH] IN BLOOD BY AUTOMATED COUNT: 11.9 % (ref 10–15)
GFR SERPL CREATININE-BSD FRML MDRD: 84 ML/MIN/{1.73_M2}
GLUCOSE SERPL-MCNC: 91 MG/DL (ref 70–99)
HCT VFR BLD AUTO: 49.7 % (ref 40–53)
HGB BLD-MCNC: 15.7 G/DL (ref 13.3–17.7)
IMM GRANULOCYTES # BLD: 0 10E9/L (ref 0–0.4)
IMM GRANULOCYTES NFR BLD: 0.3 %
INR PPP: 1.95 (ref 0.86–1.14)
LIPASE SERPL-CCNC: 92 U/L (ref 73–393)
LYMPHOCYTES # BLD AUTO: 2.2 10E9/L (ref 0.8–5.3)
LYMPHOCYTES NFR BLD AUTO: 18.3 %
MCH RBC QN AUTO: 30.7 PG (ref 26.5–33)
MCHC RBC AUTO-ENTMCNC: 31.6 G/DL (ref 31.5–36.5)
MCV RBC AUTO: 97 FL (ref 78–100)
MONOCYTES # BLD AUTO: 1.1 10E9/L (ref 0–1.3)
MONOCYTES NFR BLD AUTO: 9.5 %
NEUTROPHILS # BLD AUTO: 8.4 10E9/L (ref 1.6–8.3)
NEUTROPHILS NFR BLD AUTO: 71.1 %
NRBC # BLD AUTO: 0 10*3/UL
NRBC BLD AUTO-RTO: 0 /100
NT-PROBNP SERPL-MCNC: 1688 PG/ML (ref 0–900)
PLATELET # BLD AUTO: 256 10E9/L (ref 150–450)
POTASSIUM SERPL-SCNC: 4.7 MMOL/L (ref 3.4–5.3)
PROT SERPL-MCNC: 7.9 G/DL (ref 6.8–8.8)
RBC # BLD AUTO: 5.11 10E12/L (ref 4.4–5.9)
SODIUM SERPL-SCNC: 141 MMOL/L (ref 133–144)
TROPONIN I SERPL-MCNC: <0.015 UG/L (ref 0–0.04)
WBC # BLD AUTO: 11.9 10E9/L (ref 4–11)

## 2019-06-18 PROCEDURE — 93005 ELECTROCARDIOGRAM TRACING: CPT

## 2019-06-18 PROCEDURE — 99285 EMERGENCY DEPT VISIT HI MDM: CPT | Mod: 25

## 2019-06-18 PROCEDURE — 25000125 ZZHC RX 250: Performed by: NURSE PRACTITIONER

## 2019-06-18 PROCEDURE — 99285 EMERGENCY DEPT VISIT HI MDM: CPT | Mod: 25 | Performed by: NURSE PRACTITIONER

## 2019-06-18 PROCEDURE — 84484 ASSAY OF TROPONIN QUANT: CPT | Performed by: NURSE PRACTITIONER

## 2019-06-18 PROCEDURE — 93010 ELECTROCARDIOGRAM REPORT: CPT | Mod: Z6 | Performed by: NURSE PRACTITIONER

## 2019-06-18 PROCEDURE — 80053 COMPREHEN METABOLIC PANEL: CPT | Performed by: NURSE PRACTITIONER

## 2019-06-18 PROCEDURE — 85025 COMPLETE CBC W/AUTO DIFF WBC: CPT | Performed by: NURSE PRACTITIONER

## 2019-06-18 PROCEDURE — 85610 PROTHROMBIN TIME: CPT | Performed by: NURSE PRACTITIONER

## 2019-06-18 PROCEDURE — 83690 ASSAY OF LIPASE: CPT | Performed by: NURSE PRACTITIONER

## 2019-06-18 PROCEDURE — 83880 ASSAY OF NATRIURETIC PEPTIDE: CPT | Performed by: NURSE PRACTITIONER

## 2019-06-18 PROCEDURE — 71046 X-RAY EXAM CHEST 2 VIEWS: CPT

## 2019-06-18 PROCEDURE — 74177 CT ABD & PELVIS W/CONTRAST: CPT

## 2019-06-18 PROCEDURE — 25000128 H RX IP 250 OP 636: Performed by: NURSE PRACTITIONER

## 2019-06-18 RX ORDER — IOPAMIDOL 755 MG/ML
100 INJECTION, SOLUTION INTRAVASCULAR ONCE
Status: COMPLETED | OUTPATIENT
Start: 2019-06-18 | End: 2019-06-18

## 2019-06-18 RX ADMIN — SODIUM CHLORIDE 73 ML: 9 INJECTION, SOLUTION INTRAVENOUS at 11:57

## 2019-06-18 RX ADMIN — IOPAMIDOL 100 ML: 755 INJECTION, SOLUTION INTRAVENOUS at 11:57

## 2019-06-18 ASSESSMENT — ENCOUNTER SYMPTOMS
HEADACHES: 0
DIFFICULTY URINATING: 0
ABDOMINAL PAIN: 0
BACK PAIN: 0
UNEXPECTED WEIGHT CHANGE: 0
DYSURIA: 0
CONSTIPATION: 0
VOMITING: 0
SHORTNESS OF BREATH: 0
DIARRHEA: 0
NAUSEA: 0
LIGHT-HEADEDNESS: 0
COUGH: 0
BLOOD IN STOOL: 0
CHILLS: 0
FEVER: 0
DIZZINESS: 0

## 2019-06-18 ASSESSMENT — MIFFLIN-ST. JEOR: SCORE: 1901.18

## 2019-06-18 NOTE — DISCHARGE INSTRUCTIONS
Warm packs, ice packs.  Tylenol for pain.  INR recheck as scheduled.  Follow-up with Cardiology and echocardiogram as scheduled.  Follow-up with Dr. Teran for recheck on Friday at 9:40am.    Return for the emergency department for fevers, increased or persistent chest pain, vomiting, short of breath, or worse in any way.

## 2019-06-18 NOTE — ED PROVIDER NOTES
"  History     Chief Complaint   Patient presents with     Chest Pain     c/o chest pressure       HPI  Benjamín Hyman is a 68 year old male with history of atrial fibrillation (on Coumadin), hyperlipidemia, coronary artery disease (recent coronary stent placement on 5/31, on Plavix), CHF (EF 35-40%, see echo below from 2008, has another echo scheduled next month), cardiomyopathy, and Htn who presents to the emergency department for persistent left low chest \"pressure\".  Patient reports this is been ongoing since he had his stents placed on 5/31.  Denies exertional chest pain.  Denies shortness of breath or difficulty breathing.  Denies diaphoresis.  Denies fever or chills.  Denies nausea or vomiting.  Patient states he has been able to sleep during the night and is not woken up by pressure or pain in his chest.  Patient states he is pain-free at this time and is not feeling the chest pressure.        3/17/08  9:12 AM 18151    Echo        Interpretation Summary  Left ventricular systolic function is mild to moderately reduced and the   visual ejection fraction is estimated at 35-40%. There is mild-moderate   global hypokinesia of the left ventricle but there may be slightly worse   thickening of the basal septum and inferior walls. The left ventricle is   mildly dilated and there is mild concentric left ventricular hypertrophy.   Moderately decreased right ventricular systolic function and mildly dilation   were noted as well. The rhythm was atrial fibrillation and the rate was 80 to   95 bpm at rest.  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       Allergies:  Allergies   Allergen Reactions     Latex      Adhesive Tape Rash     Reacted to the EKG stickers       Problem List:    Patient Active Problem List    Diagnosis Date Noted     Cardiomyopathy, unspecified type (H) 06/12/2019     Priority: Medium     Benign essential hypertension 06/12/2019     Priority: Medium     Status post coronary " angiogram 05/31/2019     Priority: Medium     Coronary artery disease involving native coronary artery of native heart without angina pectoris 05/20/2019     Priority: Medium     Added automatically from request for surgery [id number]       Chronic systolic heart failure (H) 05/20/2019     Priority: Medium     Added automatically from request for surgery [id number]       Chronic atrial fibrillation (H) 05/20/2019     Priority: Medium     Added automatically from request for surgery [id number]       Morbid obesity due to excess calories (H) 05/11/2016     Priority: Medium     Stomach ulcer 10/17/2014     Priority: Medium     Advanced directives, counseling/discussion 03/03/2014     Priority: Medium     [place] [facility] [place]       Hyperlipidemia LDL goal <70 03/03/2014     Priority: Medium     Diagnosis updated by automated process. Provider to review and confirm.       Health Care Home 07/24/2013     Priority: Medium     EMERGENCY CARE PLAN  August 19, 2013: No current Care Coordination follow up planned. Please refer if Care Coordination services are needed.      Presenting Problem Signs and Symptoms Treatment Plan   Questions or concerns   during clinic hours   I will call my clinic directly:  [facility]  [address] [place] [id number]  [phone].   Questions or concerns outside clinic hours   I will call the 24 hour nurse line at   [phone] or [address] [place] [address].   Need to schedule an appointment   I will call the 24 hour scheduling team at [phone] or my clinic directly at [phone].   Same day treatment     I will call my clinic first, nurse line if after hours, urgent care and express care if needed.   Clinic care coordination services (regular clinic hours)   I will call a clinic care coordinator directly:     Mari Allan RN  [phone]    MICHAEL Simon  [phone]        Or call my clinic at [phone] and ask to speak with care  coordination.   Crisis Services: Behavioral or Mental Health  [facility] 24 Hour Phone Line  [phone]    [facility] 24 Hour Crisis Services  [phone]    [facility] [phone]    [facility]   [phone]     Emergency treatment -- Immediately    [facility] 911              Umbilical hernia 07/19/2013     Priority: Medium     Immunization (Tdap) not carried out because of patient refusal 01/19/2012     Priority: Medium     FAMILY HISTORY OF GI NEOPLASM 01/15/2008     Priority: Medium     Atrial fibrillation (H) 04/19/2006     Priority: Medium     Long term current use of anticoagulant therapy 04/19/2006     Priority: Medium     Problem list name updated by automated process. Provider to review          Past Medical History:    Past Medical History:   Diagnosis Date     Atrial fibrillation (H)      Cardiomyopathy in other diseases classified elsewhere      Chest pain      HLD (hyperlipidemia)      HTN (hypertension)      Ischemia      Morbid obesity (H)        Past Surgical History:    Past Surgical History:   Procedure Laterality Date     CV CORONARY ANGIOGRAM Left 5/31/2019    Procedure: Coronary Angiogram;  Surgeon: Bogdan Ernandez MD;  Location:  HEART CARDIAC CATH LAB     CV LEFT HEART CATH N/A 5/31/2019    Procedure: Left Heart Cath;  Surgeon: Bogdan Ernandez MD;  Location: [facility] HEART CARDIAC CATH LAB     CV LEFT VENTRICULOGRAM N/A 5/31/2019    Procedure: Left Ventriculogram;  Surgeon: Bogdan Ernandez MD;  Location:  HEART CARDIAC CATH LAB     CV PCI STENT DRUG ELUTING N/A 5/31/2019    Procedure: PCI Stent Drug Eluting;  Surgeon: Bogdan Ernandez MD;  Location:  HEART CARDIAC CATH LAB       Family History:    Family History   Problem Relation Age of Onset     Cancer - colorectal Mother      C.A.D. Father      Heart Disease Father      Unknown/Adopted Maternal Grandmother      Unknown/Adopted Paternal Grandmother       "Cerebrovascular Disease Paternal Grandfather      Depression Daughter        Social History:  Marital Status:  Single [1]  Social History     Tobacco Use     Smoking status: Former Smoker     Types: Cigarettes     Last attempt to quit: 1996     Years since quittin.4     Smokeless tobacco: Never Used   Substance Use Topics     Alcohol use: Yes     Comment: Hasn't had anything since 2015 Occsional beer, not often.  Quit drinking 4-1-10/  drank around [place] 2015 2-3 beers every other week.      Drug use: No        Medications:      aspirin (ASA) 81 MG EC tablet   clopidogrel (PLAVIX) 75 MG tablet   digoxin (LANOXIN) 250 MCG tablet   lisinopril (PRINIVIL/ZESTRIL) 20 MG tablet   metoprolol succinate ER (TOPROL-XL) 200 MG 24 hr tablet   omeprazole (PRILOSEC OTC) 20 MG tablet   simvastatin (ZOCOR) 40 MG tablet   warfarin (COUMADIN) 7.5 MG tablet         Review of Systems   Constitutional: Negative for chills, fever and unexpected weight change.   HENT: Negative for congestion.    Respiratory: Negative for cough and shortness of breath.    Cardiovascular: Positive for chest pain (\"pressure\").   Gastrointestinal: Negative for abdominal pain, blood in stool, constipation, diarrhea, nausea and vomiting.   Genitourinary: Negative for difficulty urinating and dysuria.   Musculoskeletal: Negative for back pain.   Skin: Negative for rash.   Neurological: Negative for dizziness, light-headedness and headaches.       Physical Exam   BP: 114/84  Pulse: 72  Heart Rate: 92  Temp: 97.6  F (36.4  C)  Resp: 18  Height: 162.6 cm (5' 4\")  Weight: 122 kg (269 lb)  SpO2: 97 %      Physical Exam   Constitutional: He is oriented to person, place, and time. He appears well-developed and well-nourished. No distress.   HENT:   Head: Normocephalic and atraumatic.   Eyes: Conjunctivae are normal.   Cardiovascular: Normal rate. An irregularly irregular rhythm present.   Atrial Fibrillation-rate controlled " "at this time.   Pulmonary/Chest: Effort normal and breath sounds normal. No respiratory distress.   Abdominal: Soft. Normal appearance and bowel sounds are normal. There is tenderness in the left upper quadrant.       Patient is profoundly tender with palpation of the left upper quadrant. Jumps with light palpation to the LUQ, which is the region he complains of feeling \"pressure\".   Musculoskeletal: Normal range of motion. He exhibits no edema.   Neurological: He is alert and oriented to person, place, and time.   Skin: Skin is warm and dry.       ED Course        Procedures       EKG Interpretation:        Interpreted by: Dr. Jayden Koroma at 1134am  Symptoms at time of EKG: None   Rhythm: Atrial fibrillation - controlled  Rate: Normal  Axis: Left Axis Deviation  Ectopy: Premature ventricular contractions (frequent)  Conduction: Left anterior fasciclar block  ST Segments/ T Waves: No ST-T wave changes and No acute ischemic changes  Q Waves: None  Comparison to prior: Unchanged from 5/31/2019    Clinical Impression: atrial fibrillation (chronic), unchanged from previous               Results for orders placed or performed during the hospital encounter of 06/18/19 (from the past 24 hour(s))   CBC with platelets differential   Result Value Ref Range    WBC 11.9 (H) 4.0 - 11.0 10e9/L    RBC Count 5.11 4.4 - 5.9 10e12/L    Hemoglobin 15.7 13.3 - 17.7 g/dL    Hematocrit 49.7 40.0 - 53.0 %    MCV 97 78 - 100 fl    MCH 30.7 26.5 - 33.0 pg    MCHC 31.6 31.5 - 36.5 g/dL    RDW 11.9 10.0 - 15.0 %    Platelet Count 256 150 - 450 10e9/L    Diff Method Automated Method     % Neutrophils 71.1 %    % Lymphocytes 18.3 %    % Monocytes 9.5 %    % Eosinophils 0.0 %    % Basophils 0.8 %    % Immature Granulocytes 0.3 %    Nucleated RBCs 0 0 /100    Absolute Neutrophil 8.4 (H) 1.6 - 8.3 10e9/L    Absolute Lymphocytes 2.2 0.8 - 5.3 10e9/L    Absolute Monocytes 1.1 0.0 - 1.3 10e9/L    Absolute Eosinophils 0.0 0.0 - 0.7 10e9/L    " Absolute Basophils 0.1 0.0 - 0.2 10e9/L    Abs Immature Granulocytes 0.0 0 - 0.4 10e9/L    Absolute Nucleated RBC 0.0    Comprehensive metabolic panel   Result Value Ref Range    Sodium 141 133 - 144 mmol/L    Potassium 4.7 3.4 - 5.3 mmol/L    Chloride 106 94 - 109 mmol/L    Carbon Dioxide 29 20 - 32 mmol/L    Anion Gap 6 3 - 14 mmol/L    Glucose 91 70 - 99 mg/dL    Urea Nitrogen 19 7 - 30 mg/dL    Creatinine 0.93 0.66 - 1.25 mg/dL    GFR Estimate 84 >60 mL/min/[1.73_m2]    GFR Estimate If Black >90 >60 mL/min/[1.73_m2]    Calcium 9.0 8.5 - 10.1 mg/dL    Bilirubin Total 0.4 0.2 - 1.3 mg/dL    Albumin 3.7 3.4 - 5.0 g/dL    Protein Total 7.9 6.8 - 8.8 g/dL    Alkaline Phosphatase 100 40 - 150 U/L    ALT 25 0 - 70 U/L    AST 18 0 - 45 U/L   Troponin I   Result Value Ref Range    Troponin I ES <0.015 0.000 - 0.045 ug/L   Nt probnp inpatient (BNP)   Result Value Ref Range    N-Terminal Pro BNP Inpatient 1,688 (H) 0 - 900 pg/mL   INR   Result Value Ref Range    INR 1.95 (H) 0.86 - 1.14   Lipase   Result Value Ref Range    Lipase 92 73 - 393 U/L   CT Abdomen Pelvis w Contrast    Narrative    CT ABDOMEN AND PELVIS WITH CONTRAST   6/18/2019 12:06 PM     HISTORY: Left upper quadrant abdominal pain. Rule out splenic  infarction, pancreatitis.    TECHNIQUE: CT abdomen and pelvis with 100 mL Isovue-370 IV. Radiation  dose for this scan was reduced using automated exposure control,  adjustment of the mA and/or kV according to patient size, or iterative  reconstruction technique.    COMPARISON: 2/6/2013.    FINDINGS:  Abdomen: There is mild scarring at the left lung base. Small calcified  granuloma in the right lower lobe. The heart size is normal. There are  multiple splenic granulomas. The spleen otherwise appears normal.  Single granuloma in the right lobe of the liver. The gallbladder is  partially contracted. No calcified gallstones. The pancreas, adrenal  glands and kidneys are normal in appearance. There is no abdominal  "or  pelvic lymph node enlargement.    Pelvis: There is a midline ventral hernia containing multiple loops of  small bowel. This does not cause a bowel obstruction. The appendix is  dilated but there is no surrounding inflammation. There are colonic  diverticula without acute diverticulitis. No free intraperitoneal gas  or fluid. Degenerative disease in the spine and hips.      Impression    IMPRESSION:  1. Large midline ventral hernia containing multiple small bowel loops.  No bowel obstruction.  2. Colonic diverticula without acute diverticulitis.  3. The appendix is mildly dilated but there is no surrounding  inflammation and no convincing evidence of appendicitis.    BRIANNE BUSTAMANTE MD   XR Chest 2 Views    Narrative    CHEST TWO VIEWS June 18, 2019 12:48 PM     HISTORY: Left lower rib pain, left upper quadrant abdominal pain.    COMPARISON: 4/22/2019.    FINDINGS: The heart is at the upper limits of normal in size. There is  no pulmonary edema. There are again few small dense lung nodules  bilaterally consistent with granulomas. Lungs are otherwise clear. No  pneumothorax or pleural effusion.       Impression    IMPRESSION: No acute abnormality.    BRIANNE BUSTAMANTE MD       Medications   iopamidol (ISOVUE-370) solution 100 mL (100 mLs Intravenous Given 6/18/19 1157)   sodium chloride 0.9 % bag 500mL for CT scan flush use (73 mLs Intravenous Given 6/18/19 1157)       Assessments & Plan (with Medical Decision Making)   68 year old male with history of atrial fibrillation (on Coumadin), hyperlipidemia, coronary artery disease (recent coronary stent placement on 5/31, on Plavix), CHF (EF 35-40%, scheduled for another echo next month), cardiomyopathy, and Htn who presents to the emergency department for persistent left low chest \"pressure\".  Patient reports this has been ongoing since he had his stents placed on 5/31.  No other accompanying symptoms.    On exam patient does not appear distressed. Alert and oriented. " Afebrile. Normotensive. No tachycardia. Lung sounds CTA. No hypoxia. Patient states he is not having any pain or pressure at time of exam, however, when palpating his abdomen he displays significant tenderness with light palpation to the left lateral upper quadrant. No overlying skin changes, rash,  or ecchymosis to this region. No crepitus.     EKG reveals A-fib, rate controlled, frequent PVCs and is unchanged compared to 5/31/19. No evidence of acute ischemia. WBC mildly elevated at 11.9 (nonspecific).  Electrolytes, kidney function tests, and LFTs are normal.  INR is subtherapeutic at 1.95.  BNP is 1688.  Troponin is normal.  Chest xray is normal. Abd/pelvis CT as interpreted by radiologist reveals mildly dilated appendix, but no surrounding inflammation convincing evidence of appendicitis. Patient is not tender to this region. Large midline ventral hernia with multiple small bowel loops-no bowel obstruction.  Colonic diverticula but no diverticulitis. No evidence of splenic infarct is reported.  Discussed the lab and imaging results with patient.  Unclear etiology for his left lateral low chest wall pain.  I have low suspicion for cardiac cause for his pain.  I recommend close follow-up and discussed worrisome reasons to return.      Plan as follows:     Warm packs, ice packs.  Tylenol for pain.  INR recheck as scheduled.  Follow-up with Cardiology and echocardiogram as scheduled.  Follow-up with Dr. Teran for recheck on Friday at 9:40am.    Return for the emergency department for fevers, increased or persistent chest pain, vomiting, short of breath, or worse in any way.      I have reviewed the findings, diagnosis, plan and need for follow up with the patient.         Medication List      There are no discharge medications for this visit.         Final diagnoses:   Left-sided chest wall pain       6/18/2019   [facility] EMERGENCY DEPARTMENT     Meli, Lia Townsend, BRENNA CNP  06/18/19 1713

## 2019-06-18 NOTE — ED AVS SNAPSHOT
[facility]  [address] [address]  Phone:  [phone]  Fax:  [phone]                                    Benjamín Hyman   MRN: [id number]    Department:  [facility]   Date of Visit:  6/18/2019           After Visit Summary Signature Page    I have received my discharge instructions, and my questions have been answered. I have discussed any challenges I see with this plan with the nurse or doctor.    ..........................................................................................................................................  Patient/Patient Representative Signature      ..........................................................................................................................................  Patient Representative Print Name and Relationship to Patient    ..................................................               ................................................  Date                                   Time    ..........................................................................................................................................  Reviewed by Signature/Title    ...................................................              ..............................................  Date                                               Time          22EPIC Rev 08/18

## 2019-06-18 NOTE — TELEPHONE ENCOUNTER
Reason for call:  Patient reporting a symptom    Symptom or request: Patient is calling stating that the pain on his side it worse. It is all over. Patient said the pain is gone when he goes to bed. Patient now gets it all the time. Patient said he has this pain since his angiogram on 5/31/19.      Duration (how long have symptoms been present): 5/31/19    Have you been treated for this before? No    Phone Number patient can be reached at:  Home number on file [phone] (home)    Best Time:  any    Can we leave a detailed message on this number:  YES    Call taken on 6/18/2019 at 9:43 AM by Agueda Grant

## 2019-06-18 NOTE — TELEPHONE ENCOUNTER
"  Reason for Disposition    Major surgery in the past month    Additional Information    Intermittent chest pain and pain has been increasing in severity or frequency    Chest pain lasting longer than 5 minutes    Negative: Dizziness or lightheadedness    Negative: Coughing up blood    Negative: Patient sounds very sick or weak to the triager    Negative: SEVERE chest pain    Negative: Pain also present in shoulder(s) or arm(s) or jaw    Negative: Difficulty breathing    Negative: Cocaine use within last 3 days    Negative: History of prior 'blood clot' in leg or lungs (i.e., deep vein thrombosis, pulmonary embolism)    Negative: Recent illness requiring prolonged bed rest (i.e., immobilization)    Negative: Hip or leg fracture in past 2 months (e.g, or had cast on leg or ankle)    Negative: Recent long-distance travel with prolonged time in car, bus, plane, or train (i.e., within past 2 weeks; 6 or more hours duration)    Negative: Heart beating irregularly or very rapidly    Negative: Severe difficulty breathing (e.g., struggling for each breath, speaks in single words)    Negative: Passed out (i.e., fainted, collapsed and was not responding)    Negative: Chest pain lasting longer than 5 minutes and ANY of the following:* Over 50 years old* Over 30 years old and at least one cardiac risk factor (i.e., high blood pressure, diabetes, high cholesterol, obesity, smoker or strong family history of heart disease)* Pain is crushing, pressure-like, or heavy * Took nitroglycerin and chest pain was not relieved* History of heart disease (i.e., angina, heart attack, bypass surgery, angioplasty, CHF)    Negative: Visible sweat on face or sweat dripping down face    Negative: Sounds like a life-threatening emergency to the triager    Negative: Followed an injury to chest    Answer Assessment - Initial Assessment Questions  1. LOCATION: \"Where does it hurt?\"        Left rib pain.   2. RADIATION: \"Does the pain go anywhere " "else?\" (e.g., into neck, jaw, arms, back)      no  3. ONSET: \"When did the chest pain begin?\" (Minutes, hours or days)       Following angiogram, 5/31/19.   4. PATTERN \"Does the pain come and go, or has it been constant since it started?\"  \"Does it get worse with exertion?\"       Intermittent, goes away during sleep and at rest, occurs with movement.   5. DURATION: \"How long does it last\" (e.g., seconds, minutes, hours)      Hours.   6. SEVERITY: \"How bad is the pain?\"  (e.g., Scale 1-10; mild, moderate, or severe)     - MILD (1-3): doesn't interfere with normal activities      - MODERATE (4-7): interferes with normal activities or awakens from sleep     - SEVERE (8-10): excruciating pain, unable to do any normal activities        Mild, a constant pressure.   7. CARDIAC RISK FACTORS: \"Do you have any history of heart problems or risk factors for heart disease?\" (e.g., prior heart attack, angina; high blood pressure, diabetes, being overweight, high cholesterol, smoking, or strong family history of heart disease)      Yes.   8. PULMONARY RISK FACTORS: \"Do you have any history of lung disease?\"  (e.g., blood clots in lung, asthma, emphysema, birth control pills)      no  9. CAUSE: \"What do you think is causing the chest pain?\"      Happened following angiogram procedure.   10. OTHER SYMPTOMS: \"Do you have any other symptoms?\" (e.g., dizziness, nausea, vomiting, sweating, fever, difficulty breathing, cough)        no  11. PREGNANCY: \"Is there any chance you are pregnant?\" \"When was your last menstrual period?\"        N/A    Protocols used: CHEST PAIN-A-OH    "

## 2019-06-21 ENCOUNTER — OFFICE VISIT (OUTPATIENT)
Dept: FAMILY MEDICINE | Facility: CLINIC | Age: 69
End: 2019-06-21
Payer: COMMERCIAL

## 2019-06-21 VITALS
DIASTOLIC BLOOD PRESSURE: 74 MMHG | SYSTOLIC BLOOD PRESSURE: 118 MMHG | HEIGHT: 65 IN | HEART RATE: 72 BPM | BODY MASS INDEX: 44.98 KG/M2 | WEIGHT: 270 LBS | TEMPERATURE: 97.2 F

## 2019-06-21 DIAGNOSIS — R07.9 LEFT-SIDED CHEST PAIN: Primary | ICD-10-CM

## 2019-06-21 DIAGNOSIS — R79.1 SUPRATHERAPEUTIC INR: ICD-10-CM

## 2019-06-21 LAB — INR PPP: 1.83 (ref 0.86–1.14)

## 2019-06-21 PROCEDURE — 36415 COLL VENOUS BLD VENIPUNCTURE: CPT | Performed by: FAMILY MEDICINE

## 2019-06-21 PROCEDURE — 85610 PROTHROMBIN TIME: CPT | Performed by: FAMILY MEDICINE

## 2019-06-21 PROCEDURE — 99214 OFFICE O/P EST MOD 30 MIN: CPT | Performed by: FAMILY MEDICINE

## 2019-06-21 ASSESSMENT — MIFFLIN-ST. JEOR: SCORE: 1913.65

## 2019-06-21 NOTE — NURSING NOTE
"Initial /74   Pulse 72   Temp 97.2  F (36.2  C) (Tympanic)   Ht 1.638 m (5' 4.5\")   Wt 122.5 kg (270 lb)   BMI 45.63 kg/m   Estimated body mass index is 45.63 kg/m  as calculated from the following:    Height as of this encounter: 1.638 m (5' 4.5\").    Weight as of this encounter: 122.5 kg (270 lb). .      "

## 2019-06-21 NOTE — PATIENT INSTRUCTIONS
Please go to lab.    Please go to cardiac rehab.    Please see INR clinic as planned.      Thank you for choosing [facility].  You may be receiving an email and/or telephone survey request from [facility] Experience regarding your visit today.  Please take a few minutes to respond to the survey to let us know how we are doing.      If you have questions or concerns, please contact us via [employer] or you can contact your care team at [phone].    Our Clinic hours are:  Monday 6:40 am  to 7:00 pm  Tuesday -Friday 6:40 am to 5:00 pm    The [place] outpatient lab hours are:  Monday - Friday 6:10 am to 4:45 pm  Saturdays 7:00 am to 11:00 am  Appointments are required, call [phone]    If you have clinical questions after hours or would like to schedule an appointment,  call the clinic at [phone].

## 2019-06-21 NOTE — PROGRESS NOTES
"Subjective     Benjamín Hyman is a 68 year old male who presents to clinic today for the following health issues:    HPI   ED/UC Followup:    Facility:  [facility]  Date of visit: 6/18/19   Reason for visit: chest wall pain   Current Status: still has intermittent pressure     His left side chest pain is better.  Last seconds to less than 1 minute.  No other symptoms.  Getting less frequent.  He has many questions on meds and what occurred in hospital.  Needs to still go to cardiac rehab.              Reviewed and updated as needed this visit by Provider  Tobacco  Allergies  Meds  Problems  Med Hx  Surg Hx  Fam Hx         Review of Systems   ROS COMP: CONSTITUTIONAL:NEGATIVE for fever, chills, change in weight  INTEGUMENTARY/SKIN: NEGATIVE for worrisome rashes, moles or lesions  RESP:NEGATIVE for significant cough or SOB  CV: NEGATIVE for chest pain, palpitations or peripheral edema  GI: NEGATIVE for nausea, abdominal pain, heartburn, or change in bowel habits  MUSCULOSKELETAL: NEGATIVE for significant arthralgias or myalgia  NEURO: NEGATIVE for weakness, dizziness or paresthesias  HEME/ALLERGY/IMMUNE: NEGATIVE for bleeding problems  PSYCHIATRIC: NEGATIVE for changes in mood or affect      Objective    /74   Pulse 72   Temp 97.2  F (36.2  C) (Tympanic)   Ht 1.638 m (5' 4.5\")   Wt 122.5 kg (270 lb)   BMI 45.63 kg/m    Body mass index is 45.63 kg/m .  Physical Exam   GENERAL APPEARANCE: alert, no distress, cooperative and Nourishment morbidly obese   RESP: lungs clear to auscultation - no rales, rhonchi or wheezes  CV: irregular rates and rhythm slightly no murmur, click or rub  ABDOMEN: soft, nontender, without hepatosplenomegaly or masses and bowel sounds normal  MS: extremities normal- no gross deformities noted  SKIN: no suspicious lesions or rashes  NEURO: Normal strength and tone, mentation intact and speech normal  PSYCH: mentation appears normal and affect normal/bright        " "    Assessment & Plan     (R07.9) Left-sided chest pain  (primary encounter diagnosis)  Comment: discussed his CAD, stent placement, what his medications are for and not to interrupt plavix, needs to take med for one year etc.  Needs to start cardiac rehab  Plan:     (R79.1) Supratherapeutic INR  Comment: needs to be rechecked  Plan: INR               BMI:   Estimated body mass index is 45.63 kg/m  as calculated from the following:    Height as of this encounter: 1.638 m (5' 4.5\").    Weight as of this encounter: 122.5 kg (270 lb).         Counseling/Coordination of care is over 15 min in 25 min appt.    See Patient Instructions    Return in about 3 months (around 9/21/2019).    Ruben Teran MD  [facility]        "

## 2019-06-24 ENCOUNTER — ANTICOAGULATION THERAPY VISIT (OUTPATIENT)
Dept: ANTICOAGULATION | Facility: CLINIC | Age: 69
End: 2019-06-24
Payer: COMMERCIAL

## 2019-06-24 DIAGNOSIS — Z79.01 LONG TERM CURRENT USE OF ANTICOAGULANT THERAPY: ICD-10-CM

## 2019-06-24 DIAGNOSIS — I48.20 CHRONIC ATRIAL FIBRILLATION (H): ICD-10-CM

## 2019-06-24 PROCEDURE — 99207 ZZC NO CHARGE NURSE ONLY: CPT

## 2019-06-24 NOTE — PROGRESS NOTES
ANTICOAGULATION FOLLOW-UP CLINIC VISIT    Patient Name:  Benjamín Hyman  Date:  2019  Contact Type:  Telephone    SUBJECTIVE:  Patient Findings     Comments:   INR is still low, it is unclear why. Patient denies any identifiable changes that caused the subtherapeutic INR. Patient is nervous about taking so many blood thinners (also taking aspirin and plavix). Writer explained that the aspirin and plavix are not going to stop a clot from forming due to the Afib, but they are protecting his stents from platelets sticking. If his INR remains low at his next visit he will need to increase his warfarin dose. Because he has 7.5mg tablets, smaller adjustments are more difficult. Depending on the INR reading, he may benefit from a different tablet strength.           Clinical Outcomes     Negatives:   Major bleeding event, Thromboembolic event, Anticoagulation-related hospital admission, Anticoagulation-related ED visit, Anticoagulation-related fatality    Comments:   INR is still low, it is unclear why. Patient denies any identifiable changes that caused the subtherapeutic INR. Patient is nervous about taking so many blood thinners (also taking aspirin and plavix). Writer explained that the aspirin and plavix are not going to stop a clot from forming due to the Afib, but they are protecting his stents from platelets sticking. If his INR remains low at his next visit he will need to increase his warfarin dose. Because he has 7.5mg tablets, smaller adjustments are more difficult. Depending on the INR reading, he may benefit from a different tablet strength.              OBJECTIVE    INR   Date Value Ref Range Status   2019 1.83 (H) 0.86 - 1.14 Final       ASSESSMENT / PLAN  INR assessment SUB      Anticoagulation Summary  As of 2019    INR goal:   2.0-3.0   TTR:   73.7 % (4.2 y)   INR used for dosin.83! (2019)   Warfarin maintenance plan:   3.75 mg (7.5 mg x 0.5) every Mon; 7.5 mg (7.5 mg x 1) all  other days   Full warfarin instructions:   6/24: 7.5 mg; Otherwise 3.75 mg every Mon; 7.5 mg all other days   Weekly warfarin total:   48.75 mg   Plan last modified:   Jing Lozano RN (4/12/2017)   Next INR check:   7/9/2019   Priority:   INR   Target end date:   Indefinite    Indications    Atrial fibrillation (H) [I48.91]  Long term current use of anticoagulant therapy [Z79.01]             Anticoagulation Episode Summary     INR check location:       Preferred lab:       Send INR reminders to:   MARY OSORIO    Comments:   * 7.5mg tablets. Dr. Teran Ok with 12 week rechecks. 6/5/19 - ASA and Plavix after stents, target INR 2.0-2.5. Stop ASA 7/1/19.      Anticoagulation Care Providers     Provider Role Specialty Phone number    Ruben Teran MD [facility] [phone]            See the Encounter Report to view Anticoagulation Flowsheet and Dosing Calendar (Go to Encounters tab in chart review, and find the Anticoagulation Therapy Visit)        Melia Villagran RN CACP

## 2019-07-05 ENCOUNTER — HOSPITAL ENCOUNTER (OUTPATIENT)
Dept: CARDIOLOGY | Facility: CLINIC | Age: 69
Discharge: HOME | End: 2019-07-05
Attending: NURSE PRACTITIONER | Admitting: NURSE PRACTITIONER
Payer: MEDICARE

## 2019-07-05 DIAGNOSIS — I42.9 CARDIOMYOPATHY, UNSPECIFIED TYPE (H): ICD-10-CM

## 2019-07-05 PROCEDURE — 25500064 ZZH RX 255 OP 636: Performed by: NURSE PRACTITIONER

## 2019-07-05 PROCEDURE — 93306 TTE W/DOPPLER COMPLETE: CPT | Mod: 26 | Performed by: INTERNAL MEDICINE

## 2019-07-05 PROCEDURE — 40000264 ECHOCARDIOGRAM COMPLETE

## 2019-07-05 RX ADMIN — HUMAN ALBUMIN MICROSPHERES AND PERFLUTREN 2 ML: 10; .22 INJECTION, SOLUTION INTRAVENOUS at 11:33

## 2019-07-09 ENCOUNTER — ANTICOAGULATION THERAPY VISIT (OUTPATIENT)
Dept: ANTICOAGULATION | Facility: CLINIC | Age: 69
End: 2019-07-09
Payer: COMMERCIAL

## 2019-07-09 ENCOUNTER — OFFICE VISIT (OUTPATIENT)
Dept: CARDIOLOGY | Facility: CLINIC | Age: 69
End: 2019-07-09
Attending: NURSE PRACTITIONER
Payer: COMMERCIAL

## 2019-07-09 VITALS
HEART RATE: 52 BPM | OXYGEN SATURATION: 97 % | WEIGHT: 270 LBS | SYSTOLIC BLOOD PRESSURE: 143 MMHG | BODY MASS INDEX: 45.63 KG/M2 | DIASTOLIC BLOOD PRESSURE: 69 MMHG

## 2019-07-09 DIAGNOSIS — Z79.01 LONG TERM CURRENT USE OF ANTICOAGULANT THERAPY: ICD-10-CM

## 2019-07-09 DIAGNOSIS — I48.20 CHRONIC ATRIAL FIBRILLATION (H): ICD-10-CM

## 2019-07-09 DIAGNOSIS — I42.9 CARDIOMYOPATHY, UNSPECIFIED TYPE (H): ICD-10-CM

## 2019-07-09 DIAGNOSIS — I25.10 CORONARY ARTERY DISEASE INVOLVING NATIVE CORONARY ARTERY OF NATIVE HEART WITHOUT ANGINA PECTORIS: ICD-10-CM

## 2019-07-09 DIAGNOSIS — I48.19 PERSISTENT ATRIAL FIBRILLATION (H): ICD-10-CM

## 2019-07-09 LAB — INR POINT OF CARE: 2.3 (ref 0.86–1.14)

## 2019-07-09 PROCEDURE — 85610 PROTHROMBIN TIME: CPT | Mod: QW

## 2019-07-09 PROCEDURE — 36416 COLLJ CAPILLARY BLOOD SPEC: CPT

## 2019-07-09 PROCEDURE — 99207 ZZC NO CHARGE NURSE ONLY: CPT

## 2019-07-09 PROCEDURE — 99214 OFFICE O/P EST MOD 30 MIN: CPT | Performed by: INTERNAL MEDICINE

## 2019-07-09 RX ORDER — WARFARIN SODIUM 7.5 MG/1
TABLET ORAL
Qty: 90 TABLET | Refills: 0 | COMMUNITY
Start: 2019-07-09 | End: 2019-10-23

## 2019-07-09 NOTE — PROGRESS NOTES
ANTICOAGULATION FOLLOW-UP CLINIC VISIT    Patient Name:  Benjamín Hyman  Date:  2019  Contact Type:  Face to Face    SUBJECTIVE:  Patient Findings     Positives:   Extra doses (took 7.5mg last Monday), Change in medications (stopped aspirin about 5 days ago)    Comments:   Patient is seeing the cardiologist today. Will plan to continue on 7.5mg daily and recheck in 2 weeks. No issues with bleeding or unusual bruising noted.         Clinical Outcomes     Comments:   Patient is seeing the cardiologist today. Will plan to continue on 7.5mg daily and recheck in 2 weeks. No issues with bleeding or unusual bruising noted.            OBJECTIVE    INR Protime   Date Value Ref Range Status   2019 2.3 (A) 0.86 - 1.14 Final       ASSESSMENT / PLAN  INR assessment THER    Recheck INR In: 2 WEEKS    INR Location Clinic      Anticoagulation Summary  As of 2019    INR goal:   2.0-3.0   TTR:   73.6 % (4.2 y)   INR used for dosin.3 (2019)   Warfarin maintenance plan:   7.5 mg (7.5 mg x 1) every day   Full warfarin instructions:   7.5 mg every day   Weekly warfarin total:   52.5 mg   Plan last modified:   Melia Villagran RN (2019)   Next INR check:   2019   Priority:   INR   Target end date:   Indefinite    Indications    Atrial fibrillation (H) [I48.91]  Long term current use of anticoagulant therapy [Z79.01]             Anticoagulation Episode Summary     INR check location:       Preferred lab:       Send INR reminders to:   MARY OSORIO    Comments:   * 7.5mg tablets. Dr. Teran Ok with 12 week rechecks. 19 - Plavix after stents, target INR 2.0-2.5      Anticoagulation Care Providers     Provider Role Specialty Phone number    Ruben Teran MD [facility] [phone]            See the Encounter Report to view Anticoagulation Flowsheet and Dosing Calendar (Go to Encounters tab in chart review, and find the Anticoagulation Therapy Visit)        Melia AMOR  Sparkle RN CACP

## 2019-07-09 NOTE — PATIENT INSTRUCTIONS
"[facility]  [facility]~[address]~[place], [place]~[address]  Thank you for your [facility] visit today. If you have questions regarding your visit, please contact your cardiology RN's, Kayleen Kaplan or Ruby Combs, at [phone].      To schedule a future appointment, we kindly ask that you call cardiology scheduling at [phone] three months prior to requested revisit date.  [facility] cardiology clinic is staffed with \"Advance Practice Providers\". These are our cardiology Physician Assistants and Nurse Practitioners.   Please call cardiology scheduling if you feel you need clinical evaluation with them at any time for any cardiac reason.   Reminder:  For your safety, we ask that you bring in your current medication(s) or an updated list of your medications with you to EACH office visit. Include the medication name, dose of pill on bottle and how you are taking it. Include over-the-counter medications or supplements. Your provider will review this at each visit and plan your care based on your current information.   ~~~~~~~~~~~~~~~~~~~~~~~~~~~~~~~~~~~~~~~  \"[facility]\" [place] telephone numbers for reference:  Cardiology Scheduling~[phone]  Diagnostic Imaging Scheduling~[phone]  Lab Scheduling~[phone]  Anticoagulation Clinic~[phone]  Cardiac Rehabilitation~[phone]  [facility] Clinic RN's~[phone] (at [facility])  [place] Team [phone] (at [facility])  Cardiology Clinic RN's~[phone] (Ruby Combs, RN & Kayleen Kaplan, RN)  ~~~~~~~~~~~~~~~~~~~~~~~~~~~~~~~~~~~~~~~~        "

## 2019-07-09 NOTE — LETTER
7/9/2019    Ruben Teran MD  [address] [facility] [address]    RE: Benjamín Hyman       Dear Colleague,    I had the pleasure of seeing Benjamín Hyman in the [facility].    CARDIOLOGY VISIT    REASON FOR VISIT: afib, CAD, NICM    SUBJECTIVE:  69 y/o male seen for chronic A. fib, coronary disease, cardiomyopathy.  He also has hypertension and dyslipidemia.     He has a history of atrial fibrillation dating back to 2004.  He has reduced ejection fraction in the past when in rapid A. fib.  Echo in 2008 showed EF 35 to 40%.     In May 2019 he had an abnormal stress test.  He underwent angiogram with single drug-eluting stent to the mid LAD, 20% mid circumflex disease, EF 30% on LV gram.       Echo July 2019 showed EF 32%, moderate RV dysfunction, 1+ MR.     He has been feeling decent, but not great.  He has a little pressure on his left side that comes on randomly.  He has some mild dyspnea, but can do walking and other activities without limitation.  Blood pressure has not really been checked at home.  He is planning to start cardiac rehab next week.    MEDICATIONS:  Current Outpatient Medications   Medication     clopidogrel (PLAVIX) 75 MG tablet     digoxin (LANOXIN) 250 MCG tablet     lisinopril (PRINIVIL/ZESTRIL) 20 MG tablet     metoprolol succinate ER (TOPROL-XL) 200 MG 24 hr tablet     simvastatin (ZOCOR) 40 MG tablet     warfarin (COUMADIN) 7.5 MG tablet     omeprazole (PRILOSEC OTC) 20 MG tablet     No current facility-administered medications for this visit.        ALLERGIES:  Allergies   Allergen Reactions     Latex      Adhesive Tape Rash     Reacted to the EKG stickers       REVIEW OF SYSTEMS:  Constitutional:  No weight loss, fever, chills, weakness or fatigue.  HEENT:  Eyes:  No visual loss, blurred vision, double vision or yellow sclerae. No hearing loss, sneezing, congestion, runny nose or sore throat.  Skin:  No rash or itching.  Cardiovascular: per  HPI  Respiratory: per HPI  GI:  No anorexia, nausea, vomiting or diarrhea. No abdominal pain or blood.  :  No dysurea, hematuria  Neurologic:  No headache, dizziness, syncope, paralysis, ataxia, numbness or tingling in the extremities. No change in bowel or bladder control.  Musculoskeletal:  No muscle, back pain, joint pain or stiffness.  Hematologic:  No anemia, bleeding or bruising.  Lymphatics:  No enlarged nodes. No history of splenectomy.  Psychiatric:  No history of depression or anxiety.  Endocrine:  No reports of sweating, cold or heat intolerance. No polyuria or polydipsia.  Allergies:  No history of asthma, hives, eczema or rhinitis.    PHYSICAL EXAM:      BP: 143/69 Pulse: 52     SpO2: 97 %      Vital Signs with Ranges  Pulse:  [52] 52  BP: (143)/(69) 143/69  SpO2:  [97 %] 97 %  270 lbs 0 oz    Constitutional: awake, alert, no distress  Eyes: PERRL, sclera nonicteric  ENT: trachea midline  Respiratory: Lungs clear  Cardiovascular: Mild bradycardia, totally irregular rhythm  GI: nondistended, nontender, bowel sounds present  Lymph/Hematologic: no lymphadenopathy  Skin: dry, no rash  Musculoskeletal: good muscle tone, strength 5/5 in upper and lower extremities  Neurologic: no focal deficits  Neuropsychiatric: appropriate affact    DATA:  Lab: April 2019: Cholesterol 135, HDL 33, LDL 64, triglycerides 192   June 2019: Potassium 4.7, creatinine 0.9     ASSESSMENT:  68-year-old male seen for follow-up of A. fib, cardiomyopathy, and coronary disease.  Cardiac issues seem stable.  His cardiomyopathy is nonischemic, ejection fraction recently is 32%, this is down just slightly from remote echoes.  He is on decent medical therapy.  He will be starting cardiac rehab soon area he might be a little bradycardic with his high dose of metoprolol and digoxin.    If ejection fraction is not improving with good medical therapy, cardiac MRI could be done at some point to further clarify the etiology of his  cardiomyopathy.      RECOMMENDATIONS:  1. Chronic atrial fibrillation  - continue Toprol and digoxin, if heart rates are low at cardiac rehab, consider stopping digoxin or backing off on metoprolol dose    2.  Coronary artery disease, recent LAD stent   -Continue clopidogrel through May 2020, not on aspirin since he is already on warfarin    3.  Nonischemic cardiomyopathy   - continue current medications, spironolactone could be added in the future  -Consider cardiac MRI in the future    Follow-up in 3 months with PREM.    Phi Fletcher MD  Cardiology - [facility]  Pager:  [phone]  Text Page  July 9, 2019      Thank you for allowing me to participate in the care of your patient.      Sincerely,     Phi Fletcher MD     [facility]    cc:   Roseann Porter, BRENNA CNP  [address]  [place] [address]

## 2019-07-09 NOTE — LETTER
7/9/2019    Ruben Teran MD  [address] [facility] [address]    RE: Benjamín Hyman       Dear Colleague,    I had the pleasure of seeing Benjamín Hyman in the [facility].    CARDIOLOGY VISIT    REASON FOR VISIT: afib, CAD, NICM    SUBJECTIVE:  67 y/o male seen for chronic A. fib, coronary disease, cardiomyopathy.  He also has hypertension and dyslipidemia.     He has a history of atrial fibrillation dating back to 2004.  He has reduced ejection fraction in the past when in rapid A. fib.  Echo in 2008 showed EF 35 to 40%.     In May 2019 he had an abnormal stress test.  He underwent angiogram with single drug-eluting stent to the mid LAD, 20% mid circumflex disease, EF 30% on LV gram.       Echo July 2019 showed EF 32%, moderate RV dysfunction, 1+ MR.     He has been feeling decent, but not great.  He has a little pressure on his left side that comes on randomly.  He has some mild dyspnea, but can do walking and other activities without limitation.  Blood pressure has not really been checked at home.  He is planning to start cardiac rehab next week.    MEDICATIONS:  Current Outpatient Medications   Medication     clopidogrel (PLAVIX) 75 MG tablet     digoxin (LANOXIN) 250 MCG tablet     lisinopril (PRINIVIL/ZESTRIL) 20 MG tablet     metoprolol succinate ER (TOPROL-XL) 200 MG 24 hr tablet     simvastatin (ZOCOR) 40 MG tablet     warfarin (COUMADIN) 7.5 MG tablet     omeprazole (PRILOSEC OTC) 20 MG tablet     No current facility-administered medications for this visit.        ALLERGIES:  Allergies   Allergen Reactions     Latex      Adhesive Tape Rash     Reacted to the EKG stickers       REVIEW OF SYSTEMS:  Constitutional:  No weight loss, fever, chills, weakness or fatigue.  HEENT:  Eyes:  No visual loss, blurred vision, double vision or yellow sclerae. No hearing loss, sneezing, congestion, runny nose or sore throat.  Skin:  No rash or itching.  Cardiovascular: per  HPI  Respiratory: per HPI  GI:  No anorexia, nausea, vomiting or diarrhea. No abdominal pain or blood.  :  No dysurea, hematuria  Neurologic:  No headache, dizziness, syncope, paralysis, ataxia, numbness or tingling in the extremities. No change in bowel or bladder control.  Musculoskeletal:  No muscle, back pain, joint pain or stiffness.  Hematologic:  No anemia, bleeding or bruising.  Lymphatics:  No enlarged nodes. No history of splenectomy.  Psychiatric:  No history of depression or anxiety.  Endocrine:  No reports of sweating, cold or heat intolerance. No polyuria or polydipsia.  Allergies:  No history of asthma, hives, eczema or rhinitis.    PHYSICAL EXAM:      BP: 143/69 Pulse: 52     SpO2: 97 %      Vital Signs with Ranges  Pulse:  [52] 52  BP: (143)/(69) 143/69  SpO2:  [97 %] 97 %  270 lbs 0 oz    Constitutional: awake, alert, no distress  Eyes: PERRL, sclera nonicteric  ENT: trachea midline  Respiratory: Lungs clear  Cardiovascular: Mild bradycardia, totally irregular rhythm  GI: nondistended, nontender, bowel sounds present  Lymph/Hematologic: no lymphadenopathy  Skin: dry, no rash  Musculoskeletal: good muscle tone, strength 5/5 in upper and lower extremities  Neurologic: no focal deficits  Neuropsychiatric: appropriate affact    DATA:  Lab: April 2019: Cholesterol 135, HDL 33, LDL 64, triglycerides 192   June 2019: Potassium 4.7, creatinine 0.9     ASSESSMENT:  68-year-old male seen for follow-up of A. fib, cardiomyopathy, and coronary disease.  Cardiac issues seem stable.  His cardiomyopathy is nonischemic, ejection fraction recently is 32%, this is down just slightly from remote echoes.  He is on decent medical therapy.  He will be starting cardiac rehab soon area he might be a little bradycardic with his high dose of metoprolol and digoxin.    If ejection fraction is not improving with good medical therapy, cardiac MRI could be done at some point to further clarify the etiology of his  cardiomyopathy.      RECOMMENDATIONS:  1. Chronic atrial fibrillation  - continue Toprol and digoxin, if heart rates are low at cardiac rehab, consider stopping digoxin or backing off on metoprolol dose    2.  Coronary artery disease, recent LAD stent   -Continue clopidogrel through May 2020, not on aspirin since he is already on warfarin    3.  Nonischemic cardiomyopathy   - continue current medications, spironolactone could be added in the future  -Consider cardiac MRI in the future    Follow-up in 3 months with PREM.    Phi Fletcher MD  Cardiology - [facility]  Pager:  [phone]  Text Page  July 9, 2019      Thank you for allowing me to participate in the care of your patient.    Sincerely,     Phi Fletcher MD     [facility]

## 2019-07-09 NOTE — PROGRESS NOTES
CARDIOLOGY VISIT    REASON FOR VISIT: afib, CAD, NICM    SUBJECTIVE:  69 y/o male seen for chronic A. fib, coronary disease, cardiomyopathy.  He also has hypertension and dyslipidemia.     He has a history of atrial fibrillation dating back to 2004.  He has reduced ejection fraction in the past when in rapid A. fib.  Echo in 2008 showed EF 35 to 40%.     In May 2019 he had an abnormal stress test.  He underwent angiogram with single drug-eluting stent to the mid LAD, 20% mid circumflex disease, EF 30% on LV gram.       Echo July 2019 showed EF 32%, moderate RV dysfunction, 1+ MR.     He has been feeling decent, but not great.  He has a little pressure on his left side that comes on randomly.  He has some mild dyspnea, but can do walking and other activities without limitation.  Blood pressure has not really been checked at home.  He is planning to start cardiac rehab next week.    MEDICATIONS:  Current Outpatient Medications   Medication     clopidogrel (PLAVIX) 75 MG tablet     digoxin (LANOXIN) 250 MCG tablet     lisinopril (PRINIVIL/ZESTRIL) 20 MG tablet     metoprolol succinate ER (TOPROL-XL) 200 MG 24 hr tablet     simvastatin (ZOCOR) 40 MG tablet     warfarin (COUMADIN) 7.5 MG tablet     omeprazole (PRILOSEC OTC) 20 MG tablet     No current facility-administered medications for this visit.        ALLERGIES:  Allergies   Allergen Reactions     Latex      Adhesive Tape Rash     Reacted to the EKG stickers       REVIEW OF SYSTEMS:  Constitutional:  No weight loss, fever, chills, weakness or fatigue.  HEENT:  Eyes:  No visual loss, blurred vision, double vision or yellow sclerae. No hearing loss, sneezing, congestion, runny nose or sore throat.  Skin:  No rash or itching.  Cardiovascular: per HPI  Respiratory: per HPI  GI:  No anorexia, nausea, vomiting or diarrhea. No abdominal pain or blood.  :  No dysurea, hematuria  Neurologic:  No headache, dizziness, syncope, paralysis, ataxia, numbness or tingling in the  extremities. No change in bowel or bladder control.  Musculoskeletal:  No muscle, back pain, joint pain or stiffness.  Hematologic:  No anemia, bleeding or bruising.  Lymphatics:  No enlarged nodes. No history of splenectomy.  Psychiatric:  No history of depression or anxiety.  Endocrine:  No reports of sweating, cold or heat intolerance. No polyuria or polydipsia.  Allergies:  No history of asthma, hives, eczema or rhinitis.    PHYSICAL EXAM:      BP: 143/69 Pulse: 52     SpO2: 97 %      Vital Signs with Ranges  Pulse:  [52] 52  BP: (143)/(69) 143/69  SpO2:  [97 %] 97 %  270 lbs 0 oz    Constitutional: awake, alert, no distress  Eyes: PERRL, sclera nonicteric  ENT: trachea midline  Respiratory: Lungs clear  Cardiovascular: Mild bradycardia, totally irregular rhythm  GI: nondistended, nontender, bowel sounds present  Lymph/Hematologic: no lymphadenopathy  Skin: dry, no rash  Musculoskeletal: good muscle tone, strength 5/5 in upper and lower extremities  Neurologic: no focal deficits  Neuropsychiatric: appropriate affact    DATA:  Lab: April 2019: Cholesterol 135, HDL 33, LDL 64, triglycerides 192   June 2019: Potassium 4.7, creatinine 0.9     ASSESSMENT:  68-year-old male seen for follow-up of A. fib, cardiomyopathy, and coronary disease.  Cardiac issues seem stable.  His cardiomyopathy is nonischemic, ejection fraction recently is 32%, this is down just slightly from remote echoes.  He is on decent medical therapy.  He will be starting cardiac rehab soon area he might be a little bradycardic with his high dose of metoprolol and digoxin.    If ejection fraction is not improving with good medical therapy, cardiac MRI could be done at some point to further clarify the etiology of his cardiomyopathy.      RECOMMENDATIONS:  1. Chronic atrial fibrillation  - continue Toprol and digoxin, if heart rates are low at cardiac rehab, consider stopping digoxin or backing off on metoprolol dose    2.  Coronary artery disease,  recent LAD stent   -Continue clopidogrel through May 2020, not on aspirin since he is already on warfarin    3.  Nonischemic cardiomyopathy   - continue current medications, spironolactone could be added in the future  -Consider cardiac MRI in the future    Follow-up in 3 months with PREM.    Phi Fletcher MD  Cardiology - [facility]  Pager:  [phone]  Text Page  July 9, 2019

## 2019-07-16 ENCOUNTER — HOSPITAL ENCOUNTER (OUTPATIENT)
Dept: CARDIAC REHAB | Facility: CLINIC | Age: 69
End: 2019-07-16
Attending: INTERNAL MEDICINE
Payer: MEDICARE

## 2019-07-16 VITALS — HEIGHT: 64 IN | BODY MASS INDEX: 46.26 KG/M2 | WEIGHT: 271 LBS

## 2019-07-16 DIAGNOSIS — I25.10 CORONARY ARTERY DISEASE INVOLVING NATIVE CORONARY ARTERY, ANGINA PRESENCE UNSPECIFIED, UNSPECIFIED WHETHER NATIVE OR TRANSPLANTED HEART: ICD-10-CM

## 2019-07-16 PROCEDURE — 40000116 ZZH STATISTIC OP CR VISIT

## 2019-07-16 PROCEDURE — 93798 PHYS/QHP OP CAR RHAB W/ECG: CPT

## 2019-07-16 PROCEDURE — 93797 PHYS/QHP OP CAR RHAB WO ECG: CPT

## 2019-07-16 PROCEDURE — 40000575 ZZH STATISTIC OP CARDIAC VISIT #2

## 2019-07-16 ASSESSMENT — 6 MINUTE WALK TEST (6MWT)
FEMALE CALC: 1106.97
MALE CALC: 1222.79
GENDER SELECTION: MALE
PREDICTED: 1230.25
TOTAL DISTANCE WALKED (FT): 740

## 2019-07-16 ASSESSMENT — MIFFLIN-ST. JEOR: SCORE: 1918

## 2019-07-16 NOTE — PROGRESS NOTES
07/16/19 0800   Session   Session Initial Evaluation and Exercise Prescription   Certified through this date 08/14/19   Cardiac Rehab Assessment   Cardiac Rehab Assessment Pt is s/p ROSY at [facility] on 5/31.  He has been asymptomatic since discharge from the hospital.  Pt lives in an apartment alone.  Pt states it may be difficult for him to walk on treadmill due to a hammer toe that he has.  Pt completed 6 MWT with no rests.  Telemetry showed Afib with PVC's.    The patient's history and clinical status including hemodynamics and ECG were evaluated. The patient was assessed to be stable and appropriate to begin exercise.   The patient's functional capacity and exercise prescription were determined by the completion of the 6 minute walk test. See results below. The patient was oriented to the program.  Risk factor profile was completed. Goals and objectives were discussed. CV response was WNL. No symptoms, complaints or pain were reported. Good prognosis for reaching goals below. Skilled therapy is necessary in order to monitor CV response to workloads > 2.1 MET's with an overall program goal of 4-6 MET's, to support and encourage patient with both initiation and safe progression of home exercise program, to provide education on risk factors and behavior change counseling needed to achieve patient's goals of weight loss and initiation of home exercise program.  Plan to progress to 30-40 minutes of exercise prior to discharge from cardiac rehab.  Initial THR of 20-30 beats above RHR; Effort rating of 4-6. Initiate muscle conditioning as appropriate. Provide risk factor education and behavior change counseling.    General Information   Treatment Diagnosis Stent   Date of Treatment Diagnosis 05/31/19   Secondary Treatment Diagnosis Systolic Heart Failure with Lowered EF   Significant Past CV History Chronic AF   Comorbidities None   Other Medical History HTN, HLD, Morbid obesity,    Lead up symptoms left arm pain over  "the last 3 years.   Hospital Location [facility]   Hospital Discharge Date 05/31/19   Signs and Symptoms Post Hospital Discharge SOB;Anxiety   Outpatient Cardiac Rehab Start Date 07/16/19   Primary Physician Ruben Teran   Primary Physician Follow Up Completed   Cardiologist Dr. Ernandez   Cardiologist Follow Up Completed   Ejection Fraction 33%   Risk Stratification High   Summary of Cath Report   Summary of Cath Report Available   Date Performed 05/31/19   LAD m20% residual/ROSY   Living and Work Status    Living Arrangements and Social Status apartment   Support System Live alone   Return to Employment No;Retired   Occupation retired    Preventative Medications   CMS recommended medications Antiplatelets;Beta Blocker;Lipid Lowering;Ace inhibitors   Fall Risk Screen   Fall screen completed by Cardiac Rehab   Have you fallen 2 or more times in the past year? No   Have you fallen and had an injury in the past year? No   Is patient a fall risk? No   Abuse Screen (yes response referral indicated)   Feels Unsafe at Home or Work/School no   Feels Threatened by Someone no   Does Anyone Try to Keep You From Having Contact with Others or Doing Things Outside Your Home? no   Physical Signs of Abuse Present no   Pain   Patient Currently in Pain No   Physical Assessments   Incisions WNL   Edema None   Right Lung Sounds not assessed   Left Lung Sounds not assessed   Limitations Other (see comments)  (Pt states walking may be difficult for him due to a hammertoe)   Individualized Treatment Plan   Monitored Sessions Scheduled 24   Monitored Sessions Attended 1   Oxygen   Supplemental Oxygen needed No   Nutrition Management - Weight Management   Assessment Initial Assessment   Age 68   Weight 122.9 kg (271 lb)   Height 1.638 m (5' 4.49\")   BMI (Calculated) 45.82   Goal Weight 99.8 kg (220 lb)   Nutrition Management - Lipids   Lipids Labs Available   Date 04/22/19   Total Cholesterol 135   Triglycerides 192   HDL 33   LDL 64 "   Prescribed Lipid Medication Yes   Statin Intensity High Intensity   Nutrition Management - Diabetes   Diabetes No   Nutrition Management Summary   Dietary Recommendations Low Sodium;Low Cholesterol;Low Fat   Stages of Change for Diet Compliance Preparation   Interventions Planned Attend Nutrition Education Class(es);Educate on Weight Management Principles;Educate on Benefits of Exercise   Patient Goals Goal #1   Goal #1 Description 7/16: Through monitoring portion sizes lose .5-1 lb per week.   Goal #1 Progress Towards Goal 7/16: Pt is currently monitoring portion sizes.   Nutrition Target Outcome Weight loss .5-1 lb/week (if BMI > 25)   Psychosocial Management   Psychosocial Assessment Initial   Is there history of clinical depression or increased risk of depression? No previous history   Current Level of Stress per Patient Report Mild   Current Coping Skills Patient Unable to Identify Personal Coping Skills   Initial Patient Health Questionnaire -9 Score (PHQ-9) for depression. 5-9 Minimal symptoms, 10-14 Minor depression, 15-19 Major depression, moderately severe, > 20 Major depression, severe  7   Initial [facility] Survey score.  Quality of Life:   If total score > 25 review individual areas where patient rated a 4 or 5.  Consider patients current medical condition and what role that plays on the score.   Adjust treatment protocol to improve areas of concern.  Consider the following:  PHQ9 score, DASI, and re-assessment within the next 30 days to assist with developing treatments.  22   Stages of Change Action   Interventions Planned Patient to verbalize understanding of behavioral assessment results;Patient will recognize signs and symptoms of depression   Patient Goal No   Psychosocial Target Outcome Identify absence or presence of depression using valid screening tool;Maximize coping skills   Other Core Components - Hypertension   History of or Diagnosis of Hypertension Yes   Currently taking  Anti-Hypertensive's Yes;Beta blocker;Ace Inhibitor   Other Core Components - Tobacco   History of Tobacco Use Yes   Quit Date or Planned Quit Date 01/01/98   Tobacco Use Status Former (Quit > 6 mo ago)   Tobacco Habit Cigarettes   Tobacco Use per Day (average) 1ppd   Years of Tobacco Use 30   Stages of Change Maintenance   Other Core Components Summary   Interventions Planned List benefits of weight management;Educate on importance of maintaining low sodium diet;Educate on importance of monitoring daily weight;Educate on signs/symptoms and when to call the doctor (i.e. increased edema, dyspnea, fatigue, dizziness/lightheadedness, change in sleep patterns, chest discomfort);Instruct and educate to self manage Heart Failure symptoms;Attend education class(es) on Nutrition   Patient Goals No   Other Core Components Target Outcome Compliance with Diet, Medications and Symptom Management to allow for stable Heart Failure   Activity/Exercise History   Activity/Exercise Assessment Initial   Activity/Exercise Status prior to event? Sedentary   Number of Days Currently participating in Moderate Physical Activity? 0   Number of Days Currently performing  Aerobic Exercise (including rehab)? 0   Number of Minutes per Session Currently of Aerobic Exercise (average)? 0   Current Stage of Change (Physical Activity) Contemplation   Current Stage of Change (Aerobic Exercise) Contemplation   Patient Goals Goal #2;Goal #1   Goal #1 Description 7/16: Initiate home exercise program of walking 15-30 continuous minutes consistently prior to discharge.   Goal #1 Target Date 09/16/19   Goal #1 Progress Towards Goal 7/16: Initial Evaluation   Goal #2 Description 7/16: Learn how to safely exercise independently with HF prior to discharge.   Goal #2 Target Date 08/16/19   Activity/Exercise Target Outcome An Accumulation of 150  Minutes of Aerobic Activity per Week   Exercise Assessment   6 Minute Walk Predicted - Gender Selection Male   6  Minute Walk Predicted (Male) 1222.79   Initial 6 Minute Walk Distance (Feet) 740 ft   Resting HR 73 bpm   Exercise  bpm   Post Exercise  bpm   Resting /78   Exercise /66   Post Exercise /76   Effort Rating 7   Current MET Level 2.1   MET Level Goal 4-6   ECG Rhythm Atrial fibrillation   Ectopy PVC's   Current Symptoms Denies symptoms   Limitations/Restrictions None   Exercise Prescription   Mode NuStep;Recumbent bike;Arm Ergometer;Weights   Duration/Time 45-60 min   Frequency 2 days/week   OMNI Effort Rating (0-10 Scale) 4-6/10   Progression Intermittent bouts;Total exercise time of 20-30 minutes;Aerobic exercise to OMNI rating of 5-7, and heart rate at or below target;Progress peak intensity by 1/4 MET per week   Recommended Home Exercise   Type of Exercise Walking   Frequency (days per week) 3-5   Duration (minutes per session) 15-30 min   Effort Rating Recommended 4-6/10   30 Day Exercise Plan 7/16: initiate home exercise program of walking on non rehab days.   Current Home Exercise   Type of Exercise None   Follow-up/On-going Support   Provider follow-up needed on the following Weight Management   Learning Assessment   Learner Patient   Primary Language English   Preferred Learning Style Listening;Demonstration;Pictures/Video   Barriers to Learning No barriers noted   Patient Education   Education recommended Anatomy and Physiology of the Heart;Blood Pressure;Exercise Principles;Heart Failure;Medication Overview;Muscle Conditioning;Nutrition;Weight Loss   I have established, reviewed and made necessary changes to the individualized treatment plan and exercise prescription for this patient.        Physician Signature: ___________________________________________Date:_________

## 2019-07-18 ENCOUNTER — HOSPITAL ENCOUNTER (OUTPATIENT)
Dept: CARDIAC REHAB | Facility: CLINIC | Age: 69
End: 2019-07-18
Attending: INTERNAL MEDICINE
Payer: MEDICARE

## 2019-07-18 PROCEDURE — 93798 PHYS/QHP OP CAR RHAB W/ECG: CPT | Performed by: REHABILITATION PRACTITIONER

## 2019-07-18 PROCEDURE — 40000116 ZZH STATISTIC OP CR VISIT: Performed by: REHABILITATION PRACTITIONER

## 2019-07-23 ENCOUNTER — HOSPITAL ENCOUNTER (OUTPATIENT)
Dept: CARDIAC REHAB | Facility: CLINIC | Age: 69
End: 2019-07-23
Attending: INTERNAL MEDICINE
Payer: MEDICARE

## 2019-07-23 PROCEDURE — 40000116 ZZH STATISTIC OP CR VISIT

## 2019-07-23 PROCEDURE — 93798 PHYS/QHP OP CAR RHAB W/ECG: CPT

## 2019-07-24 ENCOUNTER — ANTICOAGULATION THERAPY VISIT (OUTPATIENT)
Dept: ANTICOAGULATION | Facility: CLINIC | Age: 69
End: 2019-07-24
Payer: COMMERCIAL

## 2019-07-24 DIAGNOSIS — Z79.01 LONG TERM CURRENT USE OF ANTICOAGULANT THERAPY: ICD-10-CM

## 2019-07-24 DIAGNOSIS — I48.20 CHRONIC ATRIAL FIBRILLATION (H): ICD-10-CM

## 2019-07-24 LAB — INR POINT OF CARE: 2.9 (ref 0.86–1.14)

## 2019-07-24 PROCEDURE — 99207 ZZC NO CHARGE NURSE ONLY: CPT

## 2019-07-24 PROCEDURE — 36416 COLLJ CAPILLARY BLOOD SPEC: CPT

## 2019-07-24 PROCEDURE — 85610 PROTHROMBIN TIME: CPT | Mod: QW

## 2019-07-24 NOTE — PROGRESS NOTES
ANTICOAGULATION FOLLOW-UP CLINIC VISIT    Patient Name:  Benjamín Hyman  Date:  2019  Contact Type:  Face to Face    SUBJECTIVE:  Patient Findings     Positives:   Change in activity (Patient has been going to cardiac rehab)    Comments:   No changes in medications, diet, or activity noted. Took warfarin as instructed, denies any missed doses. No issues with bleeding or unusual bruising noted.    Will recheck again in 2 weeks, if the INR remains therapeutic, will consider extending INR checks out.        Clinical Outcomes     Comments:   No changes in medications, diet, or activity noted. Took warfarin as instructed, denies any missed doses. No issues with bleeding or unusual bruising noted.    Will recheck again in 2 weeks, if the INR remains therapeutic, will consider extending INR checks out.           OBJECTIVE    INR Protime   Date Value Ref Range Status   2019 2.9 (A) 0.86 - 1.14 Final       ASSESSMENT / PLAN  No question data found.  Anticoagulation Summary  As of 2019    INR goal:   2.0-3.0   TTR:   73.8 % (4.3 y)   INR used for dosin.9 (2019)   Warfarin maintenance plan:   7.5 mg (7.5 mg x 1) every day   Full warfarin instructions:   7.5 mg every day   Weekly warfarin total:   52.5 mg   No change documented:   Melia Villagran RN   Plan last modified:   Melia Villagran RN (2019)   Next INR check:   2019   Priority:   INR   Target end date:   Indefinite    Indications    Atrial fibrillation (H) [I48.91]  Long term current use of anticoagulant therapy [Z79.01]             Anticoagulation Episode Summary     INR check location:       Preferred lab:       Send INR reminders to:   [facility]    Comments:   * 7.5mg tablets. Dr. Teran Ok with 12 week rechecks. 19 - Plavix after stents, target INR 2.0-2.5      Anticoagulation Care Providers     Provider Role Specialty Phone number    Ruben Teran MD [facility] [phone]             See the Encounter Report to view Anticoagulation Flowsheet and Dosing Calendar (Go to Encounters tab in chart review, and find the Anticoagulation Therapy Visit)        [facility]

## 2019-07-29 DIAGNOSIS — I48.19 PERSISTENT ATRIAL FIBRILLATION (H): ICD-10-CM

## 2019-07-29 NOTE — TELEPHONE ENCOUNTER
"Requested Prescriptions   Pending Prescriptions Disp Refills     simvastatin (ZOCOR) 40 MG tablet 15 tablet 11     Sig: Take 1 tablet (40 mg) by mouth every other day       Statins Protocol Passed - 7/29/2019  7:36 AM        Passed - LDL on file in past 12 months     Recent Labs   Lab Test 04/22/19  1013   LDL 64             Passed - No abnormal creatine kinase in past 12 months     No lab results found.             Passed - Recent (12 mo) or future (30 days) visit within the authorizing provider's specialty     Patient had office visit in the last 12 months or has a visit in the next 30 days with authorizing provider or within the authorizing provider's specialty.  See \"Patient Info\" tab in inbasket, or \"Choose Columns\" in Meds & Orders section of the refill encounter.              Passed - Medication is active on med list        Passed - Patient is age 18 or older        Last Written Prescription Date:  4/11/19  Last Fill Quantity: 15,  # refills: 11   Last office visit: 6/21/2019 with prescribing provider:     Future Office Visit:   Next 5 appointments (look out 90 days)    Sep 27, 2019 11:00 AM CDT  Return Visit with BRENNA Alberto CNP  [facility] ([facility]) [address] [place] [id number] [phone]           "

## 2019-07-30 ENCOUNTER — HOSPITAL ENCOUNTER (OUTPATIENT)
Dept: CARDIAC REHAB | Facility: CLINIC | Age: 69
End: 2019-07-30
Attending: INTERNAL MEDICINE
Payer: MEDICARE

## 2019-07-30 PROCEDURE — 40000116 ZZH STATISTIC OP CR VISIT

## 2019-07-30 PROCEDURE — 93798 PHYS/QHP OP CAR RHAB W/ECG: CPT

## 2019-07-30 RX ORDER — SIMVASTATIN 40 MG
40 TABLET ORAL EVERY OTHER DAY
Qty: 15 TABLET | Refills: 11 | OUTPATIENT
Start: 2019-07-30

## 2019-07-30 NOTE — TELEPHONE ENCOUNTER
Duplicate  Outpatient Medication Detail      Disp Refills Start End ALF   simvastatin (ZOCOR) 40 MG tablet 15 tablet 11 4/22/2019  No   Sig - Route: Take 1 tablet (40 mg) by mouth every other day - Oral   Sent to pharmacy as: simvastatin (ZOCOR) 40 MG tablet   Class: E-Prescribe   Order: [id number]   E-Prescribing Status: Receipt confirmed by pharmacy (4/22/2019 10:04 AM CDT)   Printout Tracking     External Result Report   Pharmacy     [facility] - [address]   Associated Diagnoses     Persistent atrial fibrillation (H) [I48.1]         Jing VILLA RN

## 2019-07-31 ENCOUNTER — TELEPHONE (OUTPATIENT)
Dept: FAMILY MEDICINE | Facility: CLINIC | Age: 69
End: 2019-07-31

## 2019-07-31 DIAGNOSIS — I48.20 CHRONIC ATRIAL FIBRILLATION (H): ICD-10-CM

## 2019-07-31 RX ORDER — LISINOPRIL 20 MG/1
20 TABLET ORAL DAILY
Qty: 90 TABLET | Refills: 2 | Status: SHIPPED | OUTPATIENT
Start: 2019-07-31 | End: 2019-10-28

## 2019-08-01 ENCOUNTER — HOSPITAL ENCOUNTER (OUTPATIENT)
Dept: CARDIAC REHAB | Facility: CLINIC | Age: 69
End: 2019-08-01
Attending: INTERNAL MEDICINE
Payer: MEDICARE

## 2019-08-01 PROCEDURE — 93797 PHYS/QHP OP CAR RHAB WO ECG: CPT | Mod: XU | Performed by: REHABILITATION PRACTITIONER

## 2019-08-01 PROCEDURE — 40000116 ZZH STATISTIC OP CR VISIT: Performed by: REHABILITATION PRACTITIONER

## 2019-08-01 PROCEDURE — 93798 PHYS/QHP OP CAR RHAB W/ECG: CPT | Performed by: REHABILITATION PRACTITIONER

## 2019-08-01 PROCEDURE — 40000575 ZZH STATISTIC OP CARDIAC VISIT #2: Performed by: REHABILITATION PRACTITIONER

## 2019-08-07 ENCOUNTER — ANTICOAGULATION THERAPY VISIT (OUTPATIENT)
Dept: ANTICOAGULATION | Facility: CLINIC | Age: 69
End: 2019-08-07
Payer: COMMERCIAL

## 2019-08-07 DIAGNOSIS — Z79.01 LONG TERM CURRENT USE OF ANTICOAGULANT THERAPY: ICD-10-CM

## 2019-08-07 DIAGNOSIS — I48.20 CHRONIC ATRIAL FIBRILLATION (H): ICD-10-CM

## 2019-08-07 LAB — INR POINT OF CARE: 3.2 (ref 0.86–1.14)

## 2019-08-07 PROCEDURE — 99207 ZZC NO CHARGE NURSE ONLY: CPT

## 2019-08-07 PROCEDURE — 36416 COLLJ CAPILLARY BLOOD SPEC: CPT

## 2019-08-07 PROCEDURE — 85610 PROTHROMBIN TIME: CPT | Mod: QW

## 2019-08-07 NOTE — PROGRESS NOTES
ANTICOAGULATION FOLLOW-UP CLINIC VISIT    Patient Name:  Benjamín Hyman  Date:  8/7/2019  Contact Type:  Face to Face    SUBJECTIVE:  Patient Findings     Positives:   Change in health (Was feeling well, needed to cancel cardiac rehab. No infection or pain, just overall not feeling well)    Comments:   Will plan to continue the same warfarin dose, recheck INR again in 2 weeks. No issues with bleeding or unusual bruising noted.         Clinical Outcomes     Comments:   Will plan to continue the same warfarin dose, recheck INR again in 2 weeks. No issues with bleeding or unusual bruising noted.            OBJECTIVE    INR Protime   Date Value Ref Range Status   08/07/2019 3.2 (A) 0.86 - 1.14 Final       ASSESSMENT / PLAN  INR assessment SUPRA    Recheck INR In: 2 WEEKS    INR Location Clinic      Anticoagulation Summary  As of 8/7/2019    INR goal:   2.0-3.0   TTR:   73.4 % (4.3 y)   INR used for dosing:   3.2! (8/7/2019)   Warfarin maintenance plan:   7.5 mg (7.5 mg x 1) every day   Full warfarin instructions:   7.5 mg every day   Weekly warfarin total:   52.5 mg   No change documented:   Melia Villagran RN   Plan last modified:   Melia Villagran RN (7/9/2019)   Next INR check:   8/21/2019   Priority:   INR   Target end date:   Indefinite    Indications    Atrial fibrillation (H) [I48.91]  Long term current use of anticoagulant therapy [Z79.01]             Anticoagulation Episode Summary     INR check location:       Preferred lab:       Send INR reminders to:   [facility]    Comments:   * 7.5mg tablets. Dr. Teran Ok with 12 week rechecks. 6/5/19 - Plavix after stents, target INR 2.0-2.5      Anticoagulation Care Providers     Provider Role Specialty Phone number    Ruben Teran MD [facility] [phone]            See the Encounter Report to view Anticoagulation Flowsheet and Dosing Calendar (Go to Encounters tab in chart review, and find the Anticoagulation Therapy  Visit)        [facility]ran [facility]

## 2019-08-08 ENCOUNTER — HOSPITAL ENCOUNTER (OUTPATIENT)
Dept: CARDIAC REHAB | Facility: CLINIC | Age: 69
End: 2019-08-08
Attending: INTERNAL MEDICINE
Payer: MEDICARE

## 2019-08-08 PROCEDURE — 93798 PHYS/QHP OP CAR RHAB W/ECG: CPT

## 2019-08-08 PROCEDURE — 40000116 ZZH STATISTIC OP CR VISIT

## 2019-08-13 ENCOUNTER — HOSPITAL ENCOUNTER (OUTPATIENT)
Dept: CARDIAC REHAB | Facility: CLINIC | Age: 69
End: 2019-08-13
Attending: INTERNAL MEDICINE
Payer: MEDICARE

## 2019-08-13 PROCEDURE — 40000116 ZZH STATISTIC OP CR VISIT

## 2019-08-13 PROCEDURE — 93798 PHYS/QHP OP CAR RHAB W/ECG: CPT

## 2019-08-14 ASSESSMENT — MIFFLIN-ST. JEOR: SCORE: 1913.46

## 2019-08-14 ASSESSMENT — 6 MINUTE WALK TEST (6MWT)
GENDER SELECTION: MALE
FEMALE CALC: 1110.38
PREDICTED: 1232.89
MALE CALC: 1225.42
TOTAL DISTANCE WALKED (FT): 740

## 2019-08-15 VITALS — BODY MASS INDEX: 46.1 KG/M2 | WEIGHT: 270 LBS | HEIGHT: 64 IN

## 2019-08-15 NOTE — PROGRESS NOTES
08/14/19 1448   Session   Session 30 Day Individualized Treatment Plan   Certified through this date 09/13/19   Cardiac Rehab Assessment   Cardiac Rehab Assessment Patient attended 7 sessions thus far. Overall he is doing fairly well. Pt fatigues easilyl, requires rest breaks though this is improving. Pt started with some light stationary biking this past week, encouraged to progress duration slowly. Will encourage patient to attend education to assist with knowledge to self manage his HF symptoms.    General Information   Treatment Diagnosis Stent   Date of Treatment Diagnosis 05/31/19   Secondary Treatment Diagnosis Systolic Heart Failure with Lowered EF   Significant Past CV History Chronic AF   Comorbidities None   Other Medical History HTN, HLD, Morbid obesity,    Lead up symptoms left arm pain over the last 3 years.   Hospital Location [facility]   Hospital Discharge Date 05/31/19   Signs and Symptoms Post Hospital Discharge SOB;Anxiety   Outpatient Cardiac Rehab Start Date 07/16/19   Primary Physician Ruben Teran   Primary Physician Follow Up Completed   Cardiologist Dr. Ernandez   Cardiologist Follow Up Completed   Ejection Fraction 33%   Risk Stratification High   Summary of Cath Report   Summary of Cath Report Available   Date Performed 05/31/19   LAD m20% residual/ROSY   Living and Work Status    Living Arrangements and Social Status apartment   Support System Live alone   Return to Employment No;Retired   Occupation retired    Preventative Medications   CMS recommended medications Antiplatelets;Beta Blocker;Lipid Lowering;Ace inhibitors   Fall Risk Screen   Fall screen completed by Cardiac Rehab   Have you fallen 2 or more times in the past year? No   Have you fallen and had an injury in the past year? No   Is patient a fall risk? No   Pain   Patient Currently in Pain No   Physical Assessments   Incisions WNL   Edema None   Right Lung Sounds not assessed   Left Lung Sounds not assessed  "  Limitations Other (see comments)  (Pt states walking may be difficult for him due to a hammerto)   Individualized Treatment Plan   Monitored Sessions Scheduled 24   Monitored Sessions Attended 7   Oxygen   Supplemental Oxygen needed No   Nutrition Management - Weight Management   Assessment Initial Assessment   Age 68   Weight 122.5 kg (270 lb)   Height 1.638 m (5' 4.49\")   BMI (Calculated) 45.65   Goal Weight 99.8 kg (220 lb)   Nutrition Management - Lipids   Lipids Labs Available   Date 04/22/19   Total Cholesterol 135   Triglycerides 192   HDL 33   LDL 64   Prescribed Lipid Medication Yes   Statin Intensity High Intensity   Nutrition Management - Diabetes   Diabetes No   Nutrition Management Summary   Dietary Recommendations Low Sodium;Low Cholesterol;Low Fat   Stages of Change for Diet Compliance Preparation   Interventions Planned Attend Nutrition Education Class(es);Educate on Weight Management Principles;Educate on Benefits of Exercise   Patient Goals Goal #1   Goal #1 Description 7/16: Through monitoring portion sizes lose .5-1 lb per week.   Goal #1 Target Date 10/01/19   Goal #1 Progress Towards Goal 8/13: Pt continues to monitor portion sizes. Pt is down 1 lb thus far. 7/16: Pt is currently monitoing portion sizes.   Nutrition Target Outcome Weight loss .5-1 lb/week (if BMI > 25)   Psychosocial Management   Psychosocial Assessment Re-assessment   Is there history of clinical depression or increased risk of depression? No previous history   Current Level of Stress per Patient Report Mild   Current Coping Skills Patient Unable to Identify Personal Coping Skills   Initial Patient Health Questionnaire -9 Score (PHQ-9) for depression. 5-9 Minimal symptoms, 10-14 Minor depression, 15-19 Major depression, moderately severe, > 20 Major depression, severe  7   Initial [facility] Survey score.  Quality of Life:   If total score > 25 review individual areas where patient rated a 4 or 5.  Consider patients " current medical condition and what role that plays on the score.   Adjust treatment protocol to improve areas of concern.  Consider the following:  PHQ9 score, DASI, and re-assessment within the next 30 days to assist with developing treatments.  22   Stages of Change Action   Interventions Planned Patient to verbalize understanding of behavioral assessment results;Patient will recognize signs and symptoms of depression   Interventions In Progress or Completed Patient verbalizes understanding of behavioral assessment scores   Patient Goal No   Psychosocial Target Outcome Identify absence or presence of depression using valid screening tool;Maximize coping skills   Other Core Components - Hypertension   History of or Diagnosis of Hypertension Yes   Currently taking Anti-Hypertensives Yes;Beta blocker;Ace Inhibitor   Other Core Components - Tobacco   History of Tobacco Use Yes   Quit Date or Planned Quit Date 01/01/98   Tobacco Use Status Former (Quit > 6 mo ago)   Tobacco Habit Cigarettes   Tobacco Use per Day (average) 1ppd   Years of Tobacco Use 30   Stages of Change Maintenance   Other Core Components Summary   Interventions Planned List benefits of weight management;Educate on importance of maintaining low sodium diet;Educate on importance of monitoring daily weight;Educate on signs/symptoms and when to call the doctor (i.e. increased edema, dyspnea, fatigue, dizziness/lightheadedness, change in sleep patterns, chest discomfort);Instruct and educate to self manage Heart Failure symptoms;Attend education class(es) on Nutrition   Patient Goals No   Other Core Components Target Outcome Compliance with Diet, Medications and Symptom Management to allow for stable Heart Failure   Activity/Exercise History   Activity/Exercise Assessment Re-assessment   Activity/Exercise Status prior to event? Sedentary   Number of Days Currently participating in Moderate Physical Activity? 0   Number of Days Currently performing   Aerobic Exercise (including rehab)? 2   Number of Minutes per Session Currently of Aerobic Exercise (average)? 30   Current Stage of Change (Physical Activity) Contemplation   Current Stage of Change (Aerobic Exercise) Contemplation   Patient Goals Goal #2;Goal #1   Goal #1 Description 7/16: Initiate home exercise program of walking 15-30 continuous minutes consistantly prior to discharge.   Goal #1 Target Date 09/16/19   Goal #1 Progress Towards Goal 8/13: Pt is riding stationary bike 5 minute bouts 7/16: Initial Evaluation   Goal #2 Description 7/16: Learn how to safely exercise independently with HF prior to discharge.   Goal #2 Target Date 08/16/19   Goal #2 Progress Towards Goal 8/13: Pt is focusing on progressing durations here in rehab versus intensity. Pt is recommended to start with low intensities and low durations until he improves stamina.   Activity/Exercise Target Outcome An Accumulation of 150  Minutes of Aerobic Activity per Week   Exercise Assessment   6 Minute Walk Predicted - Gender Selection Male   6 Minute Walk Predicted (Male) 1225.42   6 Minute Walk Predicted (Female) 1110.38   Initial 6 Minute Walk Distance (Feet) 740 ft   Resting HR 62 bpm   Exercise  bpm   Post Exercise HR 80 bpm   Resting /78   Exercise /68   Post Exercise BP 98/60   Effort Rating 5   Current MET Level 2.4   MET Level Goal 3-4   ECG Rhythm Atrial fibrillation   Ectopy PVCs   Current Symptoms Denies symptoms   Limitations/Restrictions None   Exercise Prescription   Mode Nustep;Recumbant bike;Arm Ergometer;Weights   Duration/Time 45-60 min   Frequency 2 days/week   THR (85% of age predicted max HR) 129.2   OMNI Effort Rating (0-10 Scale) 4-6/10   Progression Total exercise time of 20-30 minutes;Progress peak intensity by 1/4 MET per week;Continuous bouts;Aerobic exercise to OMNI rating of 6 or below and at or below THR   Recommended Home Exercise   Type of Exercise Bike;Walking   Frequency (days per week)  2-3   Duration (minutes per session) 15-30 min   Effort Rating Recommended 4-6/10   30 Day Exercise Plan 8/13: Pt is biking 5 minute bouts on days not in rehab. 7/16: initate home exercise program of walking on non rehab days.   Current Home Exercise   Type of Exercise Bike   Frequency (days per week) 2-3d   Duration (minutes per session) 5   Follow-up/On-going Support   Provider follow-up needed on the following Weight Management   Learning Assessment   Learner Patient   Primary Language English   Preferred Learning Style Listening;Demonstration;Pictures/Video   Barriers to Learning No barriers noted   Patient Education   Education recommended Anatomy and Physiology of the Heart;Blood Pressure;Exercise Principles;Heart Failure;Medication Overview;Muscle Conditioning;Nutrition;Weight Loss   Education classes attended Medication Overview     I have established, reviewed and made necessary changes to the individualized treatment plan and exercise prescription for this patient.    Physician Signature: ________________________________________ Date:  ___________

## 2019-08-21 ENCOUNTER — ANTICOAGULATION THERAPY VISIT (OUTPATIENT)
Dept: ANTICOAGULATION | Facility: CLINIC | Age: 69
End: 2019-08-21
Payer: COMMERCIAL

## 2019-08-21 DIAGNOSIS — Z79.01 LONG TERM CURRENT USE OF ANTICOAGULANT THERAPY: ICD-10-CM

## 2019-08-21 DIAGNOSIS — I48.20 CHRONIC ATRIAL FIBRILLATION (H): ICD-10-CM

## 2019-08-21 LAB — INR POINT OF CARE: 2.5 (ref 0.86–1.14)

## 2019-08-21 PROCEDURE — 85610 PROTHROMBIN TIME: CPT | Mod: QW

## 2019-08-21 PROCEDURE — 36416 COLLJ CAPILLARY BLOOD SPEC: CPT

## 2019-08-21 PROCEDURE — 99207 ZZC NO CHARGE NURSE ONLY: CPT

## 2019-08-21 NOTE — PROGRESS NOTES
ANTICOAGULATION FOLLOW-UP CLINIC VISIT    Patient Name:  Benjamín Hyman  Date:  2019  Contact Type:  Face to Face    SUBJECTIVE:  Patient Findings     Comments:   No acute changes in derrek, medications, diet (vitamin K intake), or activity noted. Took warfarin as instructed, denies any missed doses. No issues with bleeding or unusual bruising noted.    Writer suggested a recheck in 3 weeks, he requested 4 weeks instead. He has been coming to the clinic so often for cardiac rehab. Writer discussed the importance of returning sooner if he has any changes in health or medications. He verbalized understanding.         Clinical Outcomes     Negatives:   Major bleeding event, Thromboembolic event, Anticoagulation-related hospital admission, Anticoagulation-related ED visit, Anticoagulation-related fatality    Comments:   No acute changes in derrek, medications, diet (vitamin K intake), or activity noted. Took warfarin as instructed, denies any missed doses. No issues with bleeding or unusual bruising noted.    Writer suggested a recheck in 3 weeks, he requested 4 weeks instead. He has been coming to the clinic so often for cardiac rehab. Writer discussed the importance of returning sooner if he has any changes in health or medications. He verbalized understanding.            OBJECTIVE    INR Protime   Date Value Ref Range Status   2019 2.5 (A) 0.86 - 1.14 Final       ASSESSMENT / PLAN  No question data found.  Anticoagulation Summary  As of 2019    INR goal:   2.0-3.0   TTR:   73.4 % (4.4 y)   INR used for dosin.5 (2019)   Warfarin maintenance plan:   7.5 mg (7.5 mg x 1) every day   Full warfarin instructions:   7.5 mg every day   Weekly warfarin total:   52.5 mg   No change documented:   Melia Villagran RN   Plan last modified:   Melia Villagran RN (2019)   Next INR check:   2019   Priority:   INR   Target end date:   Indefinite    Indications    Atrial fibrillation (H)  [I48.91]  Long term current use of anticoagulant therapy [Z79.01]             Anticoagulation Episode Summary     INR check location:       Preferred lab:       Send INR reminders to:   [facility]    Comments:   * 7.5mg tablets. Dr. Teran Ok with 12 week rechecks. 6/5/19 - Plavix after stents, target INR 2.0-2.5      Anticoagulation Care Providers     Provider Role Specialty Phone number    Ruben Teran MD [facility] [phone]            See the Encounter Report to view Anticoagulation Flowsheet and Dosing Calendar (Go to Encounters tab in chart review, and find the Anticoagulation Therapy Visit)        Melia Villagran RN CACP

## 2019-08-22 ENCOUNTER — HOSPITAL ENCOUNTER (OUTPATIENT)
Dept: CARDIAC REHAB | Facility: CLINIC | Age: 69
End: 2019-08-22
Attending: INTERNAL MEDICINE
Payer: MEDICARE

## 2019-08-22 PROCEDURE — 40000116 ZZH STATISTIC OP CR VISIT: Performed by: REHABILITATION PRACTITIONER

## 2019-08-22 PROCEDURE — 93798 PHYS/QHP OP CAR RHAB W/ECG: CPT | Performed by: REHABILITATION PRACTITIONER

## 2019-08-27 ENCOUNTER — HOSPITAL ENCOUNTER (OUTPATIENT)
Dept: CARDIAC REHAB | Facility: CLINIC | Age: 69
End: 2019-08-27
Attending: INTERNAL MEDICINE
Payer: MEDICARE

## 2019-08-27 PROCEDURE — 40000116 ZZH STATISTIC OP CR VISIT

## 2019-08-27 PROCEDURE — 93798 PHYS/QHP OP CAR RHAB W/ECG: CPT

## 2019-08-29 ENCOUNTER — HOSPITAL ENCOUNTER (OUTPATIENT)
Dept: CARDIAC REHAB | Facility: CLINIC | Age: 69
End: 2019-08-29
Attending: INTERNAL MEDICINE
Payer: MEDICARE

## 2019-08-29 PROCEDURE — 93798 PHYS/QHP OP CAR RHAB W/ECG: CPT | Performed by: REHABILITATION PRACTITIONER

## 2019-08-29 PROCEDURE — 40000116 ZZH STATISTIC OP CR VISIT: Performed by: REHABILITATION PRACTITIONER

## 2019-09-05 ENCOUNTER — HOSPITAL ENCOUNTER (OUTPATIENT)
Dept: CARDIAC REHAB | Facility: CLINIC | Age: 69
End: 2019-09-05
Attending: INTERNAL MEDICINE
Payer: MEDICARE

## 2019-09-05 VITALS — BODY MASS INDEX: 46.1 KG/M2 | HEIGHT: 64 IN | WEIGHT: 270 LBS

## 2019-09-05 PROCEDURE — 93798 PHYS/QHP OP CAR RHAB W/ECG: CPT | Performed by: REHABILITATION PRACTITIONER

## 2019-09-05 PROCEDURE — 40000116 ZZH STATISTIC OP CR VISIT: Performed by: REHABILITATION PRACTITIONER

## 2019-09-05 ASSESSMENT — 6 MINUTE WALK TEST (6MWT)
FEMALE CALC: 1110.38
MALE CALC: 1225.42
PREDICTED: 1232.89
TOTAL DISTANCE WALKED (FT): 740
GENDER SELECTION: MALE

## 2019-09-05 ASSESSMENT — MIFFLIN-ST. JEOR: SCORE: 1913.46

## 2019-09-05 NOTE — PROGRESS NOTES
09/05/19 1300   Session   Session 60 Day Individualized Treatment Plan   Certified through this date 10/05/19   Cardiac Rehab Assessment   Cardiac Rehab Assessment Patient attended 11 sessions thus far. Overall, he is doing pretty well. MET levels haven't changed in the past three weeks. He is however, able to move from one modality to another without rest breaks. The amount of home exercise is unchanged as pt continues to bike 5' bouts. Weight is down 1 lb. Pt feels he has made some changes to diet but still does a lot of snacking at night. We discussed potentially filling out a food diary, incorp longer duration of biking as ways to assist with weight loss. Pt did not express interest in either. Pt does report an increase in strength and feels he can walk further without getting so out of breath.  Skilled therapy is necessary to assess  CV response with workloads > 3 METs to allow for improved stamina, provide behavior change counseling particularly with incorporating lifestyle changes specific to dietary choices and compliance with home exer to assist with overall weight loss goal.   General Information   Treatment Diagnosis Stent   Date of Treatment Diagnosis 05/31/19   Secondary Treatment Diagnosis Systolic Heart Failure with Lowered EF   Significant Past CV History Chronic AF   Comorbidities None   Other Medical History HTN, HLD, Morbid obesity,    Lead up symptoms left arm pain over the last 3 years.   Hospital Location [facility]   Hospital Discharge Date 05/31/19   Signs and Symptoms Post Hospital Discharge SOB;Anxiety   Outpatient Cardiac Rehab Start Date 07/16/19   Primary Physician Ruben Teran   Primary Physician Follow Up Completed   Cardiologist Dr. Ernandez   Cardiologist Follow Up Completed   Ejection Fraction 33%   Risk Stratification High   Summary of Cath Report   Summary of Cath Report Available   Date Performed 05/31/19   LAD m20% residual/ROSY   Living and Work Status    Living Arrangements  "and Social Status apartment   Support System Live alone   Return to Employment No;Retired   Occupation retired    Preventative Medications   CMS recommended medications Antiplatelets;Beta Blocker;Lipid Lowering;Ace inhibitors   Fall Risk Screen   Fall screen completed by Cardiac Rehab   Have you fallen 2 or more times in the past year? No   Have you fallen and had an injury in the past year? No   Is patient a fall risk? No   Pain   Patient Currently in Pain No   Physical Assessments   Incisions WNL   Edema None   Right Lung Sounds not assessed   Left Lung Sounds not assessed   Limitations Other (see comments)  (Pt states walking may be difficult for him due to a hammerto)   Individualized Treatment Plan   Monitored Sessions Scheduled 24   Monitored Sessions Attended 11   Oxygen   Supplemental Oxygen needed No   Nutrition Management - Weight Management   Assessment Re-assessment   Age 68   Weight 122.5 kg (270 lb)   Height 1.638 m (5' 4.49\")   BMI (Calculated) 45.65   Goal Weight 99.8 kg (220 lb)   Initial Rate Your Plate Score. Dietary tool to assess eating patterns. Scores range from 24 to 72. The higher the score the healthier the eating pattern. 52   Nutrition Management - Lipids   Lipids Labs Available   Date 04/22/19   Total Cholesterol 135   Triglycerides 192   HDL 33   LDL 64   Prescribed Lipid Medication Yes   Statin Intensity High Intensity   Nutrition Management - Diabetes   Diabetes No   Nutrition Management Summary   Dietary Recommendations Low Sodium;Low Cholesterol;Low Fat   Stages of Change for Diet Compliance Preparation   Interventions Planned Attend Nutrition Education Class(es);Educate on Weight Management Principles;Educate on Benefits of Exercise;Complete Food Diary   Interventions In Progress or Completed Educated on Benefits of Exercise   Patient Goals Goal #1   Goal #1 Description 7/16: Through monitoring portion sizes lose .5-1 lb per week.   Goal #1 Target Date 10/01/19   Goal #1 " Progress Towards Goal 9/5: Pt states he is trying to monitor portions. He has cut back when eating out for breakfast. Pt states he snacks quite a bit in the evenings. He eats lunch/dinner around 2pm but doesn't not eat a regular meal in the evening. Pt feels he frequently eats out of boredom. He remains down 1 lb from start of rehab. 8/13: Pt continues to monitor portion sizes. Pt is down 1 lb thus far. 7/16: Pt is currently monitoing portion sizes.   Nutrition Target Outcome Weight loss .5-1 lb/week (if BMI > 25)   Psychosocial Management   Psychosocial Assessment Re-assessment   Is there history of clinical depression or increased risk of depression? No previous history   Current Level of Stress per Patient Report Mild   Current Coping Skills Patient Unable to Identify Personal Coping Skills   Initial Patient Health Questionnaire -9 Score (PHQ-9) for depression. 5-9 Minimal symptoms, 10-14 Minor depression, 15-19 Major depression, moderately severe, > 20 Major depression, severe  7   Initial [facility] Survey score.  Quality of Life:   If total score > 25 review individual areas where patient rated a 4 or 5.  Consider patients current medical condition and what role that plays on the score.   Adjust treatment protocol to improve areas of concern.  Consider the following:  PHQ9 score, DASI, and re-assessment within the next 30 days to assist with developing treatments.  22   Stages of Change Action   Interventions Planned Patient to verbalize understanding of behavioral assessment results;Patient will recognize signs and symptoms of depression;Assist patient to identify positive support system;Patient denies need for intervention at this time.   Interventions In Progress or Completed Patient verbalizes understanding of behavioral assessment scores   Patient Goal No   Psychosocial Target Outcome Identify absence or presence of depression using valid screening tool;Maximize coping skills   Other Core Components -  Hypertension   History of or Diagnosis of Hypertension Yes   Currently taking Anti-Hypertensives Yes;Beta blocker;Ace Inhibitor   Other Core Components - Tobacco   History of Tobacco Use Yes   Quit Date or Planned Quit Date 01/01/98   Tobacco Use Status Former (Quit > 6 mo ago)   Tobacco Habit Cigarettes   Tobacco Use per Day (average) 1ppd   Years of Tobacco Use 30   Stages of Change Maintenance   Other Core Components Summary   Interventions Planned List benefits of weight management;Educate on importance of maintaining low sodium diet;Educate on importance of monitoring daily weight;Educate on signs/symptoms and when to call the doctor (i.e. increased edema, dyspnea, fatigue, dizziness/lightheadedness, change in sleep patterns, chest discomfort);Instruct and educate to self manage Heart Failure symptoms;Attend education class(es) on Nutrition   Interventions In Progress or Completed Educated on importance of maintaining low sodium diet;Educated on importance of monitoring daily weight   Patient Goals No   Other Core Components Target Outcome Compliance with Diet, Medications and Symptom Management to allow for stable Heart Failure   Activity/Exercise History   Activity/Exercise Assessment Re-assessment   Activity/Exercise Status prior to event? Sedentary   Number of Days Currently participating in Moderate Physical Activity? 2   Number of Days Currently performing  Aerobic Exercise (including rehab)? 2   Number of Minutes per Session Currently of Aerobic Exercise (average)? 30   Current Stage of Change (Physical Activity) Contemplation   Current Stage of Change (Aerobic Exercise) Preparation   Patient Goals Goal #2;Goal #1   Goal #1 Description 7/16: Initiate home exercise program of walking 15-30 continuous minutes consistantly prior to discharge.   Goal #1 Target Date 09/16/19   Goal #1 Progress Towards Goal 9/5: Despite repeated attempts to encourage an increase in duratio of biking bouts, pt continues at 5  minute bouts. 8/13: Pt is riding stationary bike 5 minute bouts 7/16: Initial Evaluation   Goal #2 Description 7/16: Learn how to safely exercise independently with HF prior to discharge.   Goal #2 Target Date 08/16/19   Goal #2 Progress Towards Goal 9/5: Pt feels he understands what he should be doing. Declines questions or concerns on this at this time. 8/13: Pt is focusing on progressing durations here in rehab versus intensity. Pt is recommended to start with low intensities and low durations until he improves stamina.   Activity/Exercise Comments 9/5: Asked on a scale of 1 to 10 importance and confidence in continuing indep exercise; pt was unable to answer. Pt states he knows what he should do he is just not motivated to do it.   Activity/Exercise Target Outcome An Accumulation of 150  Minutes of Aerobic Activity per Week   Exercise Assessment   6 Minute Walk Predicted - Gender Selection Male   6 Minute Walk Predicted (Male) 1225.42   6 Minute Walk Predicted (Female) 1110.38   Initial 6 Minute Walk Distance (Feet) 740 ft   Resting HR 72 bpm   Exercise  bpm   Post Exercise HR 80 bpm   Resting /62   Exercise /70   Post Exercise BP 98/42   Effort Rating 5   Current MET Level 2.4   MET Level Goal 3-4   ECG Rhythm Atrial fibrillation   Ectopy PVCs   Current Symptoms Denies symptoms   Limitations/Restrictions None   Exercise Prescription   Mode Nustep;Recumbant bike;Arm Ergometer;Weights   Duration/Time 45-60 min   Frequency 2 days/week   THR (85% of age predicted max HR) 129.2   OMNI Effort Rating (0-10 Scale) 4-6/10   Progression Total exercise time of 20-30 minutes;Progress peak intensity by 1/4 MET per week;Continuous bouts;Aerobic exercise to OMNI rating of 6 or below and at or below THR   Recommended Home Exercise   Type of Exercise Bike;Walking   Frequency (days per week) 2-3   Duration (minutes per session) 15-30 min   Effort Rating Recommended 4-6/10   30 Day Exercise Plan 9/5: Pt  continues with 5' bouts. Encouraged to increase to 10 minutes. 8/13: Pt is biking 5 minute bouts on days not in rehab. 7/16: initate home exercise program of walking on non rehab days.   Current Home Exercise   Type of Exercise Bike   Frequency (days per week) 2-3d   Duration (minutes per session) 5   Follow-up/On-going Support   Provider follow-up needed on the following Weight Management   Learning Assessment   Learner Patient   Primary Language English   Preferred Learning Style Listening;Demonstration;Pictures/Video   Barriers to Learning No barriers noted   Patient Education   Education recommended Anatomy and Physiology of the Heart;Blood Pressure;Exercise Principles;Heart Failure;Medication Overview;Nutrition;Weight Loss   Education classes attended Medication Overview     I have established, reviewed and made necessary changes to the individualized treatment plan and exercise prescription for this patient.    Physician Signature: ________________________________________   Date: _________

## 2019-09-10 ENCOUNTER — HOSPITAL ENCOUNTER (OUTPATIENT)
Dept: CARDIAC REHAB | Facility: CLINIC | Age: 69
End: 2019-09-10
Attending: INTERNAL MEDICINE
Payer: MEDICARE

## 2019-09-10 PROCEDURE — 40000116 ZZH STATISTIC OP CR VISIT: Performed by: REHABILITATION PRACTITIONER

## 2019-09-10 PROCEDURE — 93798 PHYS/QHP OP CAR RHAB W/ECG: CPT | Performed by: REHABILITATION PRACTITIONER

## 2019-09-12 ENCOUNTER — HOSPITAL ENCOUNTER (OUTPATIENT)
Dept: CARDIAC REHAB | Facility: CLINIC | Age: 69
End: 2019-09-12
Attending: INTERNAL MEDICINE
Payer: MEDICARE

## 2019-09-12 PROCEDURE — 40000116 ZZH STATISTIC OP CR VISIT: Performed by: REHABILITATION PRACTITIONER

## 2019-09-12 PROCEDURE — 93798 PHYS/QHP OP CAR RHAB W/ECG: CPT | Performed by: REHABILITATION PRACTITIONER

## 2019-09-17 ENCOUNTER — HOSPITAL ENCOUNTER (OUTPATIENT)
Dept: CARDIAC REHAB | Facility: CLINIC | Age: 69
End: 2019-09-17
Attending: INTERNAL MEDICINE
Payer: MEDICARE

## 2019-09-17 PROCEDURE — 93798 PHYS/QHP OP CAR RHAB W/ECG: CPT

## 2019-09-17 PROCEDURE — 40000116 ZZH STATISTIC OP CR VISIT

## 2019-09-18 ENCOUNTER — ANTICOAGULATION THERAPY VISIT (OUTPATIENT)
Dept: ANTICOAGULATION | Facility: CLINIC | Age: 69
End: 2019-09-18
Payer: COMMERCIAL

## 2019-09-18 DIAGNOSIS — Z79.01 LONG TERM CURRENT USE OF ANTICOAGULANT THERAPY: ICD-10-CM

## 2019-09-18 DIAGNOSIS — I48.20 CHRONIC ATRIAL FIBRILLATION (H): ICD-10-CM

## 2019-09-18 LAB — INR POINT OF CARE: 2.8 (ref 0.86–1.14)

## 2019-09-18 PROCEDURE — 99207 ZZC NO CHARGE NURSE ONLY: CPT

## 2019-09-18 PROCEDURE — 85610 PROTHROMBIN TIME: CPT | Mod: QW

## 2019-09-18 PROCEDURE — 36416 COLLJ CAPILLARY BLOOD SPEC: CPT

## 2019-09-18 NOTE — PROGRESS NOTES
ANTICOAGULATION FOLLOW-UP CLINIC VISIT    Patient Name:  Benjamín Hyman  Date:  2019  Contact Type:  Face to Face    SUBJECTIVE:  Patient Findings     Comments:   No acute changes in derrek, medications, diet (vitamin K intake), or activity noted. Took warfarin as instructed, denies any missed doses. No issues with bleeding or unusual bruising noted.         Clinical Outcomes     Negatives:   Major bleeding event, Thromboembolic event, Anticoagulation-related hospital admission, Anticoagulation-related ED visit, Anticoagulation-related fatality    Comments:   No acute changes in derrek, medications, diet (vitamin K intake), or activity noted. Took warfarin as instructed, denies any missed doses. No issues with bleeding or unusual bruising noted.            OBJECTIVE    INR Protime   Date Value Ref Range Status   2019 2.8 (A) 0.86 - 1.14 Final       ASSESSMENT / PLAN  No question data found.  Anticoagulation Summary  As of 2019    INR goal:   2.0-3.0   TTR:   73.9 % (4.4 y)   INR used for dosin.8 (2019)   Warfarin maintenance plan:   7.5 mg (7.5 mg x 1) every day   Full warfarin instructions:   7.5 mg every day   Weekly warfarin total:   52.5 mg   No change documented:   Melia Villagran RN   Plan last modified:   Melia Villagran RN (2019)   Next INR check:   10/23/2019   Priority:   INR   Target end date:   Indefinite    Indications    Atrial fibrillation (H) [I48.91]  Long term current use of anticoagulant therapy [Z79.01]             Anticoagulation Episode Summary     INR check location:       Preferred lab:       Send INR reminders to:   [facility]    Comments:   * 7.5mg tablets. Dr. Teran Ok with 12 week rechecks. 19 - Plavix after stents, target INR 2.0-2.5      Anticoagulation Care Providers     Provider Role Specialty Phone number    Ruben Teran MD [facility] [phone]            See the Encounter Report to view  Anticoagulation Flowsheet and Dosing Calendar (Go to Encounters tab in chart review, and find the Anticoagulation Therapy Visit)        Mel[facility]agran RN [facility]

## 2019-09-19 ENCOUNTER — HOSPITAL ENCOUNTER (OUTPATIENT)
Dept: CARDIAC REHAB | Facility: CLINIC | Age: 69
End: 2019-09-19
Attending: INTERNAL MEDICINE
Payer: MEDICARE

## 2019-09-19 PROCEDURE — 93798 PHYS/QHP OP CAR RHAB W/ECG: CPT | Performed by: REHABILITATION PRACTITIONER

## 2019-09-19 PROCEDURE — 40000116 ZZH STATISTIC OP CR VISIT: Performed by: REHABILITATION PRACTITIONER

## 2019-09-24 ENCOUNTER — HOSPITAL ENCOUNTER (OUTPATIENT)
Dept: CARDIAC REHAB | Facility: CLINIC | Age: 69
End: 2019-09-24
Attending: INTERNAL MEDICINE
Payer: MEDICARE

## 2019-09-24 PROCEDURE — 93798 PHYS/QHP OP CAR RHAB W/ECG: CPT

## 2019-09-24 PROCEDURE — 40000116 ZZH STATISTIC OP CR VISIT

## 2019-09-26 ENCOUNTER — HOSPITAL ENCOUNTER (OUTPATIENT)
Dept: CARDIAC REHAB | Facility: CLINIC | Age: 69
End: 2019-09-26
Attending: INTERNAL MEDICINE
Payer: MEDICARE

## 2019-09-26 PROCEDURE — 93798 PHYS/QHP OP CAR RHAB W/ECG: CPT | Performed by: REHABILITATION PRACTITIONER

## 2019-09-26 PROCEDURE — 40000116 ZZH STATISTIC OP CR VISIT: Performed by: REHABILITATION PRACTITIONER

## 2019-09-27 ENCOUNTER — OFFICE VISIT (OUTPATIENT)
Dept: CARDIOLOGY | Facility: CLINIC | Age: 69
End: 2019-09-27
Attending: INTERNAL MEDICINE
Payer: COMMERCIAL

## 2019-09-27 VITALS
BODY MASS INDEX: 46.32 KG/M2 | SYSTOLIC BLOOD PRESSURE: 104 MMHG | OXYGEN SATURATION: 97 % | WEIGHT: 274 LBS | HEART RATE: 57 BPM | DIASTOLIC BLOOD PRESSURE: 78 MMHG

## 2019-09-27 DIAGNOSIS — I48.20 CHRONIC ATRIAL FIBRILLATION (H): ICD-10-CM

## 2019-09-27 DIAGNOSIS — I10 BENIGN ESSENTIAL HYPERTENSION: ICD-10-CM

## 2019-09-27 DIAGNOSIS — I42.9 CARDIOMYOPATHY, UNSPECIFIED TYPE (H): Primary | ICD-10-CM

## 2019-09-27 DIAGNOSIS — E78.5 HYPERLIPIDEMIA LDL GOAL <70: ICD-10-CM

## 2019-09-27 DIAGNOSIS — I25.10 CORONARY ARTERY DISEASE INVOLVING NATIVE CORONARY ARTERY OF NATIVE HEART WITHOUT ANGINA PECTORIS: ICD-10-CM

## 2019-09-27 PROCEDURE — 99214 OFFICE O/P EST MOD 30 MIN: CPT | Performed by: NURSE PRACTITIONER

## 2019-09-27 NOTE — PROGRESS NOTES
Cardiology Clinic Progress Note  Benjamín Hyman MRN# [id number]   YOB: 1950 Age: 68 year old     Reason For Visit: 3 month f/u   Primary Cardiologist:   Dr. Fletcher           History of Presenting Illness:    Benjamín Hyman is a pleasant 68 year old patient with a past cardiac history significant for chronic atrial fibrillation, CAD, cardiomyopathy, hypertension, and hyperlipidemia.     He has a history of chronic atrial fibrillation starting in 2004.  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 Fib RVR in 2008 with LVEF 35-40%. Pt was seen by Dr. Ernandez in May 2019 in consultation for abnormal stress test.   He complained of left axillary pressure on exertion for the last 2-3 years. This had become more persistent over the prior year. Nuclear stress test was abnormal. Coronary angiogram 5/31/2019 with ROSY ×1 to the mid LAD with residual 20% mid LCx and he was noted to have small diagonals and a small distal LCx. He was noted to have reduced LVEF but degree of LV dysfunction was more than his degree of coronary disease.    Patient was last seen by Dr. Fletcher in July 2019.  Echocardiogram at that time showed LVEF 32%, moderate RV dysfunction, and 1+ MR.  He complained of mild left-sided chest pressure and mild dyspnea but was not limited in his activities. He was going to be starting cardiac rehabilitation.  If EF did not improve, would consider cardiac MRI to assess etiology of his cardiomyopathy.     Pt presents today for 3 month follow-up.  Blood pressure today is soft at 104/78 but he denies any lightheadedness.  He has started cardiac rehabilitation and states that overall he is feeling stronger.  His shortness of breath has resolved with better conditioning.  His weight today in the clinic is up 4 pounds in the last couple months.  He has not noticed any significant weight increases at home.  He denies any symptoms of heart failure  His prior left-sided chest  discomfort has resolved also.  He remains asymptomatic with his atrial fibrillation and has been doing well from a cardiac standpoint. Given his soft blood pressures I will not add spironolactone today but have discussed adding this at his next visit if blood pressure is higher.  The patient is very discouraged and states he does not want to add any more medication.  He will consider this for next visit. Patient reports no chest pain, shortness of breath, PND, orthopnea, presyncope, syncope, edema, heart racing, or palpitations.    Current Cardiac Medications   Plavix 75 mg daily  Digoxin 250  g alternating with 125 mcg every other day  Lisinopril 20 mg daily  Metoprolol  mg daily  Simvastatin 40 mg every other day   Coumadin                   Assessment and Plan:     Plan  1.   Follow-up with [facility] in 1 month to reassess bps and see if cardiomyopathy medications can be up titrated      1. CAD    Angio 5/2019 ROSY ×1 to the mid LAD with residual 20% mid LCx and he was noted to have small diagonals and a small distal LCx    No angina ( prior angina Left axillary pressure on exertion)     Continue statin, aspirin, ACE inhibitor, beta blocker    Continue  Plavix/ Coumadin, uninterrupted, for at least one year (5/2020)    Could consider imdur for small vessel disease, if needed       2. Cardiomyopathy     H/o tachycardia mediated LVEF  35-40% 2008    LVEF 32% echo 7/2019    no signs of heart failure     Dry weight: 270 pounds    continue ACE inhibitor, beta blocker    Unable to up titrate cardiomyopathy medications due to soft BP    Consider adding spironolactone    reassess EF with CMR    Check daily weights and call the clinic if your weight has increased more than 2 lbs in one day or 5 lbs in one week.    2000 mg Na diet       3. Chronic atrial fibrillation    Asymptomatic    Elevated [id number] score  For age, hypertension, CAD, heart failure    Continue metoprolol and digoxin for rate control and Coumadin for  anticoagulation      4. hypertension    controlled    Continue lisinopril, metoprolol      5. hyperlipidemia    last LDL 64 on 4/2019    Continue simvastatin 40 mg daily           Thank you for allowing me to participate in this delightful patient's care.      This note was completed in part using Dragon voice recognition software. Although reviewed after completion, some word and grammatical errors may occur.    Roseann Briggs, APRN, CNP           Data:   All laboratory data reviewed        HPI and Plan:   See dictation    Orders Placed This Encounter   Procedures     Follow-Up with Cardiac Advanced Practice Provider       No orders of the defined types were placed in this encounter.      There are no discontinued medications.      Encounter Diagnoses   Name Primary?     Coronary artery disease involving native coronary artery of native heart without angina pectoris      Cardiomyopathy, unspecified type (H) Yes     Chronic atrial fibrillation      Benign essential hypertension      Hyperlipidemia LDL goal <70        CURRENT MEDICATIONS:  Current Outpatient Medications   Medication Sig Dispense Refill     clopidogrel (PLAVIX) 75 MG tablet Take 1 tablet (75 mg) by mouth daily 90 tablet 3     digoxin (LANOXIN) 250 MCG tablet Take 1 tab on even days & 1/2 tab on odd day of the month. Patient has been on this medication for year & atrial fibrillation is controlled. 30 tablet 11     lisinopril (PRINIVIL/ZESTRIL) 20 MG tablet Take 1 tablet (20 mg) by mouth daily 90 tablet 2     metoprolol succinate ER (TOPROL-XL) 200 MG 24 hr tablet Take 1 tablet (200 mg) by mouth daily 30 tablet 11     omeprazole (PRILOSEC OTC) 20 MG tablet Takes PRN 30 tablet      simvastatin (ZOCOR) 40 MG tablet Take 1 tablet (40 mg) by mouth every other day 15 tablet 11     warfarin (COUMADIN) 7.5 MG tablet Take 7.5mg daily or as directed by the Anticoagulation Clinic 90 tablet 0       ALLERGIES     Allergies   Allergen Reactions      Latex      Adhesive Tape Rash     Reacted to the EKG stickers       PAST MEDICAL HISTORY:  Past Medical History:   Diagnosis Date     Atrial fibrillation (H)      Cardiomyopathy in other diseases classified elsewhere      Chest pain      HLD (hyperlipidemia)      HTN (hypertension)      Ischemia      Morbid obesity (H)        PAST SURGICAL HISTORY:  Past Surgical History:   Procedure Laterality Date     CV CORONARY ANGIOGRAM Left 2019    Procedure: Coronary Angiogram;  Surgeon: Bogdan Ernandez MD;  Location: [facility]     CV LEFT HEART CATH N/A 2019    Procedure: Left Heart Cath;  Surgeon: Bogdan Ernandez MD;  Location: [facility]     CV LEFT VENTRICULOGRAM N/A 2019    Procedure: Left Ventriculogram;  Surgeon: Bogdan Ernandez MD;  Location: [facility]     CV PCI STENT DRUG ELUTING N/A 2019    Procedure: PCI Stent Drug Eluting;  Surgeon: Bogdan Ernandez MD;  Location: [facility]       FAMILY HISTORY:  Family History   Problem Relation Age of Onset     Cancer - colorectal Mother      C.A.D. Father      Heart Disease Father      Unknown/Adopted Maternal Grandmother      Unknown/Adopted Paternal Grandmother      Cerebrovascular Disease Paternal Grandfather      Depression Daughter        SOCIAL HISTORY:  Social History     Socioeconomic History     Marital status: Single     Spouse name: None     Number of children: None     Years of education: None     Highest education level: None   Occupational History     None   Social Needs     Financial resource strain: None     Food insecurity:     Worry: None     Inability: None     Transportation needs:     Medical: None     Non-medical: None   Tobacco Use     Smoking status: Former Smoker     Types: Cigarettes     Last attempt to quit: 1996     Years since quittin.7     Smokeless tobacco: Never Used   Substance and Sexual Activity     Alcohol use: Yes     Comment:  Hasn't had anything since June 2017 9/28/2015 Occsional beer, not often.  Quit drinking 4-1-10/  drank around [place] 2010 4- 2-3 beers every other week.      Drug use: No     Sexual activity: Not Currently   Lifestyle     Physical activity:     Days per week: None     Minutes per session: None     Stress: None   Relationships     Social connections:     Talks on phone: None     Gets together: None     Attends Catholic service: None     Active member of club or organization: None     Attends meetings of clubs or organizations: None     Relationship status: None     Intimate partner violence:     Fear of current or ex partner: None     Emotionally abused: None     Physically abused: None     Forced sexual activity: None   Other Topics Concern     Parent/sibling w/ CABG, MI or angioplasty before 65F 55M? Yes     Comment: Father fatal MI at 46yo   Social History Narrative     None       Review of Systems:  Skin:  Negative       Eyes:  Positive for glasses    ENT:  Negative      Respiratory:  Negative for dyspnea on exertion;shortness of breath;cough     Cardiovascular:  Negative for;palpitations;chest pain;edema;syncope or near-syncope;fatigue;lightheadedness;dizziness      Gastroenterology: Negative for nausea;vomiting;heartburn    Genitourinary:  Negative      Musculoskeletal:  Negative joint pain right foot hammer toe   Neurologic:  Positive for numbness or tingling of feet;numbness or tingling of hands sometimes   Psychiatric:  Negative      Heme/Lymph/Imm:  Negative      Endocrine:  Negative        Physical Exam:  Vitals: /78   Pulse 57   Wt 124.3 kg (274 lb)   SpO2 97%   BMI 46.32 kg/m      Constitutional:  cooperative;well developed;in no acute distress morbidly obese      Skin:  warm and dry to the touch          Head:  normocephalic        Eyes:  sclera white        Lymph:      ENT:  no pallor or cyanosis        Neck:  no stiffness        Respiratory:  clear to auscultation;normal  symmetry diminished breath sounds bilaterally       Cardiac:   irregularly irregular rhythm distant heart sounds            pulses full and equal                                        GI:  abdomen soft        Extremities and Muscular Skeletal:  no edema              Neurological:  no gross motor deficits;affect appropriate        Psych:  Alert and Oriented x 3        CC  No referring provider defined for this encounter.

## 2019-09-27 NOTE — LETTER
9/27/2019    Ruben Teran MD  [address] [facility] [address]    RE: Benjamín Hyman       Dear Colleague,    I had the pleasure of seeing Benjamín Hyman in the [facility].    Cardiology Clinic Progress Note  Benjamín Hyman MRN# [id number]   YOB: 1950 Age: 68 year old     Reason For Visit: 3 month f/u   Primary Cardiologist:   Dr. Fletcher           History of Presenting Illness:    Benjamín Hyman is a pleasant 68 year old patient with a past cardiac history significant for chronic atrial fibrillation, CAD, cardiomyopathy, hypertension, and hyperlipidemia.     He has a history of chronic atrial fibrillation starting in 2004.  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 Fib RVR in 2008 with LVEF 35-40%. Pt was seen by Dr. Ernandez in May 2019 in consultation for abnormal stress test.   He complained of left axillary pressure on exertion for the last 2-3 years. This had become more persistent over the prior year. Nuclear stress test was abnormal. Coronary angiogram 5/31/2019 with ROSY ×1 to the mid LAD with residual 20% mid LCx and he was noted to have small diagonals and a small distal LCx. He was noted to have reduced LVEF but degree of LV dysfunction was more than his degree of coronary disease.    Patient was last seen by Dr. Fletcher in July 2019.  Echocardiogram at that time showed LVEF 32%, moderate RV dysfunction, and 1+ MR.  He complained of mild left-sided chest pressure and mild dyspnea but was not limited in his activities. He was going to be starting cardiac rehabilitation.  If EF did not improve, would consider cardiac MRI to assess etiology of his cardiomyopathy.     Pt presents today for 3 month follow-up.  Blood pressure today is soft at 104/78 but he denies any lightheadedness.  He has started cardiac rehabilitation and states that overall he is feeling stronger.  His shortness of breath has resolved with better  conditioning.  His weight today in the clinic is up 4 pounds in the last couple months.  He has not noticed any significant weight increases at home.  He denies any symptoms of heart failure  His prior left-sided chest discomfort has resolved also.  He remains asymptomatic with his atrial fibrillation and has been doing well from a cardiac standpoint. Given his soft blood pressures I will not add spironolactone today but have discussed adding this at his next visit if blood pressure is higher.  The patient is very discouraged and states he does not want to add any more medication.  He will consider this for next visit. Patient reports no chest pain, shortness of breath, PND, orthopnea, presyncope, syncope, edema, heart racing, or palpitations.    Current Cardiac Medications   Plavix 75 mg daily  Digoxin 250  g alternating with 125 mcg every other day  Lisinopril 20 mg daily  Metoprolol  mg daily  Simvastatin 40 mg every other day   Coumadin                   Assessment and Plan:     Plan  1.   Follow-up with [facility] in 1 month to reassess bps and see if cardiomyopathy medications can be up titrated      1. CAD    Angio 5/2019 ROSY ×1 to the mid LAD with residual 20% mid LCx and he was noted to have small diagonals and a small distal LCx    No angina ( prior angina Left axillary pressure on exertion)     Continue statin, aspirin, ACE inhibitor, beta blocker    Continue  Plavix/ Coumadin, uninterrupted, for at least one year (5/2020)    Could consider imdur for small vessel disease, if needed       2. Cardiomyopathy     H/o tachycardia mediated LVEF  35-40% 2008    LVEF 32% echo 7/2019    no signs of heart failure     Dry weight: 270 pounds    continue ACE inhibitor, beta blocker    Unable to up titrate cardiomyopathy medications due to soft BP    Consider adding spironolactone    reassess EF with CMR    Check daily weights and call the clinic if your weight has increased more than 2 lbs in one day or 5 lbs in one  week.    2000 mg Na diet       3. Chronic atrial fibrillation    Asymptomatic    Elevated [id number] score  For age, hypertension, CAD, heart failure    Continue metoprolol and digoxin for rate control and Coumadin for anticoagulation      4. hypertension    controlled    Continue lisinopril, metoprolol      5. hyperlipidemia    last LDL 64 on 4/2019    Continue simvastatin 40 mg daily           Thank you for allowing me to participate in this delightful patient's care.      This note was completed in part using Dragon voice recognition software. Although reviewed after completion, some word and grammatical errors may occur.    Roseann Briggs, APRN, CNP           Data:   All laboratory data reviewed        HPI and Plan:   See dictation    Orders Placed This Encounter   Procedures     Follow-Up with Cardiac Advanced Practice Provider       No orders of the defined types were placed in this encounter.      There are no discontinued medications.      Encounter Diagnoses   Name Primary?     Coronary artery disease involving native coronary artery of native heart without angina pectoris      Cardiomyopathy, unspecified type (H) Yes     Chronic atrial fibrillation      Benign essential hypertension      Hyperlipidemia LDL goal <70        CURRENT MEDICATIONS:  Current Outpatient Medications   Medication Sig Dispense Refill     clopidogrel (PLAVIX) 75 MG tablet Take 1 tablet (75 mg) by mouth daily 90 tablet 3     digoxin (LANOXIN) 250 MCG tablet Take 1 tab on even days & 1/2 tab on odd day of the month. Patient has been on this medication for year & atrial fibrillation is controlled. 30 tablet 11     lisinopril (PRINIVIL/ZESTRIL) 20 MG tablet Take 1 tablet (20 mg) by mouth daily 90 tablet 2     metoprolol succinate ER (TOPROL-XL) 200 MG 24 hr tablet Take 1 tablet (200 mg) by mouth daily 30 tablet 11     omeprazole (PRILOSEC OTC) 20 MG tablet Takes PRN 30 tablet      simvastatin (ZOCOR) 40 MG tablet Take 1  tablet (40 mg) by mouth every other day 15 tablet 11     warfarin (COUMADIN) 7.5 MG tablet Take 7.5mg daily or as directed by the Anticoagulation Clinic 90 tablet 0       ALLERGIES     Allergies   Allergen Reactions     Latex      Adhesive Tape Rash     Reacted to the EKG stickers       PAST MEDICAL HISTORY:  Past Medical History:   Diagnosis Date     Atrial fibrillation (H)      Cardiomyopathy in other diseases classified elsewhere      Chest pain      HLD (hyperlipidemia)      HTN (hypertension)      Ischemia      Morbid obesity (H)        PAST SURGICAL HISTORY:  Past Surgical History:   Procedure Laterality Date     CV CORONARY ANGIOGRAM Left 5/31/2019    Procedure: Coronary Angiogram;  Surgeon: Bogdan Ernandez MD;  Location:  [facility]     CV LEFT HEART CATH N/A 5/31/2019    Procedure: Left Heart Cath;  Surgeon: Bogdan Ernandez MD;  Location:  [facility]     CV LEFT VENTRICULOGRAM N/A 5/31/2019    Procedure: Left Ventriculogram;  Surgeon: Bogdan Ernandez MD;  Location:  [facility]     CV PCI STENT DRUG ELUTING N/A 5/31/2019    Procedure: PCI Stent Drug Eluting;  Surgeon: Bogdan Ernandez MD;  Location:  [facility]       FAMILY HISTORY:  Family History   Problem Relation Age of Onset     Cancer - colorectal Mother      C.A.D. Father      Heart Disease Father      Unknown/Adopted Maternal Grandmother      Unknown/Adopted Paternal Grandmother      Cerebrovascular Disease Paternal Grandfather      Depression Daughter        SOCIAL HISTORY:  Social History     Socioeconomic History     Marital status: Single     Spouse name: None     Number of children: None     Years of education: None     Highest education level: None   Occupational History     None   Social Needs     Financial resource strain: None     Food insecurity:     Worry: None     Inability: None     Transportation needs:     Medical: None     Non-medical: None   Tobacco Use      Smoking status: Former Smoker     Types: Cigarettes     Last attempt to quit: 1996     Years since quittin.7     Smokeless tobacco: Never Used   Substance and Sexual Activity     Alcohol use: Yes     Comment: Hasn't had anything since 2015 Occsional beer, not often.  Quit drinking 4-1-10/  drank around [place] 2015 2-3 beers every other week.      Drug use: No     Sexual activity: Not Currently   Lifestyle     Physical activity:     Days per week: None     Minutes per session: None     Stress: None   Relationships     Social connections:     Talks on phone: None     Gets together: None     Attends Anabaptist service: None     Active member of club or organization: None     Attends meetings of clubs or organizations: None     Relationship status: None     Intimate partner violence:     Fear of current or ex partner: None     Emotionally abused: None     Physically abused: None     Forced sexual activity: None   Other Topics Concern     Parent/sibling w/ CABG, MI or angioplasty before 65F 55M? Yes     Comment: Father fatal MI at 48yo   Social History Narrative     None       Review of Systems:  Skin:  Negative       Eyes:  Positive for glasses    ENT:  Negative      Respiratory:  Negative for dyspnea on exertion;shortness of breath;cough     Cardiovascular:  Negative for;palpitations;chest pain;edema;syncope or near-syncope;fatigue;lightheadedness;dizziness      Gastroenterology: Negative for nausea;vomiting;heartburn    Genitourinary:  Negative      Musculoskeletal:  Negative joint pain right foot hammer toe   Neurologic:  Positive for numbness or tingling of feet;numbness or tingling of hands sometimes   Psychiatric:  Negative      Heme/Lymph/Imm:  Negative      Endocrine:  Negative        Physical Exam:  Vitals: /78   Pulse 57   Wt 124.3 kg (274 lb)   SpO2 97%   BMI 46.32 kg/m       Constitutional:  cooperative;well developed;in no acute distress morbidly obese       Skin:  warm and dry to the touch          Head:  normocephalic        Eyes:  sclera white        Lymph:      ENT:  no pallor or cyanosis        Neck:  no stiffness        Respiratory:  clear to auscultation;normal symmetry diminished breath sounds bilaterally       Cardiac:   irregularly irregular rhythm distant heart sounds            pulses full and equal                                        GI:  abdomen soft        Extremities and Muscular Skeletal:  no edema              Neurological:  no gross motor deficits;affect appropriate        Psych:  Alert and Oriented x 3          Thank you for allowing me to participate in the care of your patient.    Sincerely,     BRENNA Craig [facility]

## 2019-09-27 NOTE — PATIENT INSTRUCTIONS
"[facility]  [facility]~[address]~[place], [place]~[address]  Thank you for your  Heart Care visit today. If you have questions regarding your visit, please contact your cardiology RN's, Ruby Combs, at [phone]. Your provider has recommended the following:  Medication Changes:  No changes   Recommendations:  1. Check daily weights and call the clinic if your weight has increased more than 2 lbs in one day or 5 lbs in one week.    Follow-up:  See Roseann TAPIA for cardiology follow up at [facility]: 1 month    To schedule a future appointment, we kindly ask that you call cardiology scheduling at [phone] three months prior to requested revisit date.      [facility] cardiology clinic is staffed with \"Advance Practice Providers\". These are our cardiology Physician Assistants and Nurse Practitioners.   Please call cardiology scheduling if you feel you need clinical evaluation with them at any time for any cardiac reason.   Reminder:  For your safety, we ask that you bring in your current medication(s) or an updated list of your medications with you to EACH office visit. Include the medication name, dose of pill on bottle and how you are taking it. Include over-the-counter medications or supplements. Your provider will review this at each visit and plan your care based on your current information.   ~~~~~~~~~~~~~~~~~~~~~~~~~~~~~~~~~~~~~~~  \"[facility]\" [place] telephone numbers for reference:  Cardiology Scheduling~[phone]  Diagnostic Imaging Scheduling~[phone]  Lab Scheduling~[phone]  Anticoagulation Clinic~[phone]  Cardiac Rehabilitation~[phone]  [facility] RN's~[phone] (at [facility])  Cardiology Clinic RN's~[phone] (Ruby Combs, RN)  ~~~~~~~~~~~~~~~~~~~~~~~~~~~~~~~~~~~~~~~~    "

## 2019-10-01 ENCOUNTER — HOSPITAL ENCOUNTER (OUTPATIENT)
Dept: CARDIAC REHAB | Facility: CLINIC | Age: 69
End: 2019-10-01
Attending: INTERNAL MEDICINE
Payer: MEDICARE

## 2019-10-01 PROCEDURE — 40000116 ZZH STATISTIC OP CR VISIT

## 2019-10-01 PROCEDURE — 93798 PHYS/QHP OP CAR RHAB W/ECG: CPT

## 2019-10-03 ASSESSMENT — MIFFLIN-ST. JEOR: SCORE: 1918

## 2019-10-03 ASSESSMENT — 6 MINUTE WALK TEST (6MWT)
PREDICTED: 1230.25
TOTAL DISTANCE WALKED (FT): 740
FEMALE CALC: 1106.97
MALE CALC: 1222.79
GENDER SELECTION: MALE

## 2019-10-08 VITALS — HEIGHT: 64 IN | BODY MASS INDEX: 46.26 KG/M2 | WEIGHT: 271 LBS

## 2019-10-08 NOTE — PROGRESS NOTES
10/03/19 0816   Session   Session 90 Day Individualized Treatment Plan   Certified through this date 11/02/19   Cardiac Rehab Assessment   Cardiac Rehab Assessment Patient has attended 18 sessions thus far. Overall, patient is improving. Pt admits to feeling he can do more before becoming short of breath, can walk longer distances. Pt admits to some frustration he is unable to see weight loss. Pt isn't consistent nor has not been increasing duration of his biking bouts at home as well as hasn't made signif changes to his diet in terms of snacking at night and thus likely contributing his lack of success of weight loss. Skilled services are necessary to build endurance > 3 METs and monitor CV response at higher intensities, assist with strategies of increasing general physical activity and help with motivation of implementing home exercise so he continues to see progress with stamina/endurance as well as weight loss.   General Information   Treatment Diagnosis Stent   Date of Treatment Diagnosis 05/31/19   Secondary Treatment Diagnosis Systolic Heart Failure with Lowered EF   Significant Past CV History Chronic AF   Comorbidities None   Other Medical History HTN, HLD, Morbid obesity,    Lead up symptoms left arm pain over the last 3 years.   Hospital Location [facility]   Hospital Discharge Date 05/31/19   Signs and Symptoms Post Hospital Discharge SOB;Anxiety   Outpatient Cardiac Rehab Start Date 07/16/19   Primary Physician Ruben Teran   Primary Physician Follow Up Completed   Cardiologist Dr. Ernandez   Cardiologist Follow Up Completed   Ejection Fraction 33%   Risk Stratification High   Summary of Cath Report   Summary of Cath Report Available   Date Performed 05/31/19   LAD m20% residual/ROSY   Living and Work Status    Living Arrangements and Social Status apartment   Support System Live alone   Return to Employment No;Retired   Occupation retired    Preventative Medications   CMS recommended  "medications Antiplatelets;Beta Blocker;Lipid Lowering;Ace inhibitors   Fall Risk Screen   Fall screen completed by Cardiac Rehab   Have you fallen 2 or more times in the past year? No   Have you fallen and had an injury in the past year? No   Is patient a fall risk? No   Pain   Patient Currently in Pain No   Physical Assessments   Incisions WNL   Edema None   Right Lung Sounds not assessed   Left Lung Sounds not assessed   Limitations Other (see comments)  (Pt states walking may be difficult for him due to a hammerto)   Individualized Treatment Plan   Monitored Sessions Scheduled 24   Monitored Sessions Attended 18   Oxygen   Supplemental Oxygen needed No   Nutrition Management - Weight Management   Assessment Re-assessment   Age 68   Weight 122.9 kg (271 lb)   Height 1.638 m (5' 4.49\")   BMI (Calculated) 45.82   Goal Weight 99.8 kg (220 lb)   Initial Rate Your Plate Score. Dietary tool to assess eating patterns. Scores range from 24 to 72. The higher the score the healthier the eating pattern. 52   Nutrition Management - Lipids   Lipids Labs Available   Date 04/22/19   Total Cholesterol 135   Triglycerides 192   HDL 33   LDL 64   Prescribed Lipid Medication Yes   Statin Intensity High Intensity   Nutrition Management - Diabetes   Diabetes No   Nutrition Management Summary   Dietary Recommendations Low Sodium;Low Cholesterol;Low Fat   Stages of Change for Diet Compliance Preparation   Interventions Planned Attend Nutrition Education Class(es);Educate on Weight Management Principles;Educate on Benefits of Exercise;Complete Food Diary   Interventions In Progress or Completed Educated on Benefits of Exercise   Patient Goals Goal #1   Goal #1 Description 7/16: Through monitoring portion sizes lose .5-1 lb per week.   Goal #1 Target Date 10/01/19   Goal #1 Progress Towards Goal 10/4: Pt states he is trying to monitor portions. He eats primarily one meal per day. Tends to snack later in day. Discussed trying to eat a " small dinner earlier in evening. Pt seems in contemplation to monitor calories via food diary at this point. 9/5: Pt states he is trying to monitor portions. He has cut back when eating out for breakfast. Pt states he snacks quite a bit in the evenings. He eats lunch/dinner around 2pm but doesn't not eat a regular meal in the evening. Pt feels he frequently eats out of boredom. He remains down 1 lb from start of rehab.    Nutrition Target Outcome Weight loss .5-1 lb/week (if BMI > 25)   Psychosocial Management   Psychosocial Assessment Re-assessment   Is there history of clinical depression or increased risk of depression? No previous history   Current Level of Stress per Patient Report Mild   Current Coping Skills Patient Unable to Identify Personal Coping Skills   Initial Patient Health Questionnaire -9 Score (PHQ-9) for depression. 5-9 Minimal symptoms, 10-14 Minor depression, 15-19 Major depression, moderately severe, > 20 Major depression, severe  7   Initial [facility] Survey score.  Quality of Life:   If total score > 25 review individual areas where patient rated a 4 or 5.  Consider patients current medical condition and what role that plays on the score.   Adjust treatment protocol to improve areas of concern.  Consider the following:  PHQ9 score, DASI, and re-assessment within the next 30 days to assist with developing treatments.  22   Stages of Change Action   Interventions Planned Patient to verbalize understanding of behavioral assessment results;Patient will recognize signs and symptoms of depression;Assist patient to identify positive support system;Patient denies need for intervention at this time.   Interventions In Progress or Completed Patient verbalizes understanding of behavioral assessment scores   Patient Goal No   Psychosocial Target Outcome Identify absence or presence of depression using valid screening tool;Maximize coping skills   Other Core Components - Hypertension   History of or  Diagnosis of Hypertension Yes   Currently taking Anti-Hypertensives Yes;Beta blocker;Ace Inhibitor   Other Core Components - Tobacco   History of Tobacco Use Yes   Quit Date or Planned Quit Date 01/01/98   Tobacco Use Status Former (Quit > 6 mo ago)   Tobacco Habit Cigarettes   Tobacco Use per Day (average) 1ppd   Years of Tobacco Use 30   Stages of Change Maintenance   Other Core Components Summary   Interventions Planned List benefits of weight management;Educate on importance of maintaining low sodium diet;Educate on importance of monitoring daily weight;Educate on signs/symptoms and when to call the doctor (i.e. increased edema, dyspnea, fatigue, dizziness/lightheadedness, change in sleep patterns, chest discomfort);Instruct and educate to self manage Heart Failure symptoms;Attend education class(es) on Nutrition   Interventions In Progress or Completed Educated on importance of maintaining low sodium diet;Educated on importance of monitoring daily weight   Patient Goals No   Other Core Components Target Outcome Compliance with Diet, Medications and Symptom Management to allow for stable Heart Failure   Activity/Exercise History   Activity/Exercise Assessment Re-assessment   Activity/Exercise Status prior to event? Sedentary   Number of Days Currently participating in Moderate Physical Activity? 2   Number of Days Currently performing  Aerobic Exercise (including rehab)? 2   Number of Minutes per Session Currently of Aerobic Exercise (average)? 30   Current Stage of Change (Physical Activity) Contemplation   Current Stage of Change (Aerobic Exercise) Preparation   Patient Goals Goal #2;Goal #1   Goal #1 Description 7/16: Initiate home exercise program of walking 15-30 continuous minutes consistantly prior to discharge.   Goal #1 Target Date 09/16/19   Goal #1 Progress Towards Goal 10/4: Pt had increased his biking bouts to 7-10 minutes but isn't consistent with using exercise room. Pt states a lot of this is  lack of motivation. 9/5: Despite repeated attempts to encourage an increase in duration of biking bouts, pt continues at 5 minute bouts. 8/13: Pt is riding stationary bike 5 minute bouts 7/16: Initial Evaluation   Goal #2 Description 7/16: Learn how to safely exercise independently with HF prior to discharge.   Goal #2 Target Date 08/16/19   Goal #2 Progress Towards Goal 10/4: This is unchanged. Pt feels he understands how hard to be working, verbalizes understanding that increasing durations of his bouts will help to increase stamina, though admits to lack of motivation. 9/5: Pt feels he understands what he should be doing. Declines questions or concerns on this at this time.    Activity/Exercise Comments 9/5: Asked on a scale of 1 to 10 importance and confidence in continuing indep exercise; pt was unable to answer. Pt states he knows what he should do he is just not motivated to do it.   Activity/Exercise Target Outcome An Accumulation of 150  Minutes of Aerobic Activity per Week   Exercise Assessment   6 Minute Walk Predicted - Gender Selection Male   6 Minute Walk Predicted (Male) 1222.79   6 Minute Walk Predicted (Female) 1106.97   Initial 6 Minute Walk Distance (Feet) 740 ft   Resting HR 80 bpm   Exercise  bpm   Post Exercise HR 65 bpm   Resting /66   Exercise /66   Post Exercise /64   Effort Rating 5   Current MET Level 2.4   MET Level Goal 3-4   ECG Rhythm Atrial fibrillation   Ectopy PVCs   Current Symptoms Denies symptoms   Limitations/Restrictions None   Exercise Prescription   Mode Nustep;Recumbant bike;Arm Ergometer;Weights   Duration/Time 45-60 min   Frequency 2 days/week   THR (85% of age predicted max HR) 129.2   OMNI Effort Rating (0-10 Scale) 4-6/10   Progression Total exercise time of 20-30 minutes;Progress peak intensity by 1/4 MET per week;Continuous bouts;Aerobic exercise to OMNI rating of 6 or below and at or below THR   Recommended Home Exercise   Type of Exercise  Bike   Frequency (days per week) 2-3   Duration (minutes per session) Other (see comments)  (5-10 minute bouts)   Effort Rating Recommended 4-6/10   30 Day Exercise Plan 10/4: Pt has increased to 10' bouts but not consistent with home exercise. 9/5: Pt continues with 5' bouts. Encouraged to increase to 10 minutes. 8/13: Pt is biking 5 minute bouts on days not in rehab. 7/16: initate home exercise program of walking on non rehab days.   Current Home Exercise   Type of Exercise Bike   Frequency (days per week) 1-2d/week   Duration (minutes per session) 7-10 minutes   Follow-up/On-going Support   Provider follow-up needed on the following Weight Management   Learning Assessment   Learner Patient   Primary Language English   Preferred Learning Style Listening;Demonstration;Pictures/Video   Barriers to Learning No barriers noted   Patient Education   Education recommended Anatomy and Physiology of the Heart;Blood Pressure;Exercise Principles;Heart Failure;Medication Overview;Nutrition;Weight Loss   Education classes attended Medication Overview     I have established, reviewed and made necessary changes to the individualized treatment plan and exercise prescription for this patient.    Physician Signature: _______________________________________    Date:  ________

## 2019-10-10 ENCOUNTER — HOSPITAL ENCOUNTER (OUTPATIENT)
Dept: CARDIAC REHAB | Facility: CLINIC | Age: 69
End: 2019-10-10
Attending: INTERNAL MEDICINE
Payer: MEDICARE

## 2019-10-10 PROCEDURE — 40000116 ZZH STATISTIC OP CR VISIT: Performed by: REHABILITATION PRACTITIONER

## 2019-10-10 PROCEDURE — 93798 PHYS/QHP OP CAR RHAB W/ECG: CPT | Performed by: REHABILITATION PRACTITIONER

## 2019-10-17 ENCOUNTER — HOSPITAL ENCOUNTER (OUTPATIENT)
Dept: CARDIAC REHAB | Facility: CLINIC | Age: 69
End: 2019-10-17
Attending: INTERNAL MEDICINE
Payer: MEDICARE

## 2019-10-17 PROCEDURE — 40000116 ZZH STATISTIC OP CR VISIT

## 2019-10-17 PROCEDURE — 93798 PHYS/QHP OP CAR RHAB W/ECG: CPT

## 2019-10-22 ENCOUNTER — HOSPITAL ENCOUNTER (OUTPATIENT)
Dept: CARDIAC REHAB | Facility: CLINIC | Age: 69
End: 2019-10-22
Attending: INTERNAL MEDICINE
Payer: MEDICARE

## 2019-10-22 PROCEDURE — 40000116 ZZH STATISTIC OP CR VISIT

## 2019-10-22 PROCEDURE — 93798 PHYS/QHP OP CAR RHAB W/ECG: CPT

## 2019-10-23 ENCOUNTER — TELEPHONE (OUTPATIENT)
Dept: FAMILY MEDICINE | Facility: CLINIC | Age: 69
End: 2019-10-23

## 2019-10-23 ENCOUNTER — ALLIED HEALTH/NURSE VISIT (OUTPATIENT)
Dept: FAMILY MEDICINE | Facility: CLINIC | Age: 69
End: 2019-10-23
Payer: COMMERCIAL

## 2019-10-23 ENCOUNTER — ANTICOAGULATION THERAPY VISIT (OUTPATIENT)
Dept: ANTICOAGULATION | Facility: CLINIC | Age: 69
End: 2019-10-23
Payer: COMMERCIAL

## 2019-10-23 VITALS — SYSTOLIC BLOOD PRESSURE: 132 MMHG | DIASTOLIC BLOOD PRESSURE: 64 MMHG | HEART RATE: 60 BPM

## 2019-10-23 DIAGNOSIS — I48.19 PERSISTENT ATRIAL FIBRILLATION (H): ICD-10-CM

## 2019-10-23 DIAGNOSIS — I48.91 ATRIAL FIBRILLATION (H): ICD-10-CM

## 2019-10-23 DIAGNOSIS — I10 BENIGN ESSENTIAL HYPERTENSION: Primary | ICD-10-CM

## 2019-10-23 DIAGNOSIS — Z79.01 LONG TERM CURRENT USE OF ANTICOAGULANT THERAPY: ICD-10-CM

## 2019-10-23 LAB — INR POINT OF CARE: 2.6 (ref 0.86–1.14)

## 2019-10-23 PROCEDURE — 99207 ZZC NO CHARGE NURSE ONLY: CPT

## 2019-10-23 PROCEDURE — 36416 COLLJ CAPILLARY BLOOD SPEC: CPT

## 2019-10-23 PROCEDURE — 85610 PROTHROMBIN TIME: CPT | Mod: QW

## 2019-10-23 RX ORDER — WARFARIN SODIUM 7.5 MG/1
TABLET ORAL
Qty: 90 TABLET | Refills: 0 | Status: SHIPPED | OUTPATIENT
Start: 2019-10-23 | End: 2020-02-07

## 2019-10-23 NOTE — TELEPHONE ENCOUNTER
Patient states he had his Blood Pressure checked in [place] this morning by the RN. States she took this manually and told him it was 138/64 and he is concerned as he normally runs 116/60's-70's. States she never took his pulse. The reading in his chart states 132/64 with pulse of 60. He denies any chest pain or shortness of breath. States he is feeling ok, just a little anxious over this reading. Advised I will send this to Dr. Teran for his review.    Debo Villanueva RN

## 2019-10-23 NOTE — TELEPHONE ENCOUNTER
Reason for call:  Patient is concerned about his BP reading of 138/64 today, denies concerns or problems.     Phone number to reach patient:      Best Time:  any    Can we leave a detailed message on this number?  YES

## 2019-10-23 NOTE — PROGRESS NOTES
ANTICOAGULATION FOLLOW-UP CLINIC VISIT    Patient Name:  Benjamín Hyman  Date:  10/23/2019  Contact Type:  Face to Face    SUBJECTIVE:  Patient Findings     Positives:   Change in activity (started cardiac rehab again)    Comments:   No acute changes in derrek, medications, diet (vitamin K intake) noted. Took warfarin as instructed, denies any missed doses. No issues with bleeding or unusual bruising noted. No issues with bleeding or unusual bruising noted.         Clinical Outcomes     Comments:   No acute changes in derrek, medications, diet (vitamin K intake) noted. Took warfarin as instructed, denies any missed doses. No issues with bleeding or unusual bruising noted. No issues with bleeding or unusual bruising noted.            OBJECTIVE    INR Protime   Date Value Ref Range Status   10/23/2019 2.6 (A) 0.86 - 1.14 Final       ASSESSMENT / PLAN  No question data found.  Anticoagulation Summary  As of 10/23/2019    INR goal:   2.0-3.0   TTR:   74.4 % (4.5 y)   INR used for dosin.6 (10/23/2019)   Warfarin maintenance plan:   7.5 mg (7.5 mg x 1) every day   Full warfarin instructions:   7.5 mg every day   Weekly warfarin total:   52.5 mg   No change documented:   Melia Villagran RN   Plan last modified:   Melia Villagran RN (2019)   Next INR check:   2019   Priority:   INR   Target end date:   Indefinite    Indications    Atrial fibrillation (H) [I48.91]  Long term current use of anticoagulant therapy [Z79.01]             Anticoagulation Episode Summary     INR check location:       Preferred lab:       Send INR reminders to:   [facility]    Comments:   * 7.5mg tablets. Dr. Teran Ok with 12 week rechecks. 19 - Plavix after stents, target INR 2.0-2.5      Anticoagulation Care Providers     Provider Role Specialty Phone number    Ruben Teran MD [facility] [phone]            See the Encounter Report to view Anticoagulation Flowsheet and Dosing  Calendar (Go to Encounters tab in chart review, and find the Anticoagulation Therapy Visit)        Melia Villagran RN [facility]

## 2019-10-23 NOTE — TELEPHONE ENCOUNTER
Please call patient,  Pulse is fine.  His blood pressure is at goal.  If there is a concern about elevation I have the patient sit 10-15 minutes and then recheck the blood pressure.  I do not know if this was checked after resting.  Ruben Teran MD  Family Medicine

## 2019-10-23 NOTE — TELEPHONE ENCOUNTER
I have attempted to contact this patient by phone with the following results: left message to return my call on answering machine.    Debo Villanueva RN

## 2019-10-24 ENCOUNTER — HOSPITAL ENCOUNTER (OUTPATIENT)
Dept: CARDIAC REHAB | Facility: CLINIC | Age: 69
End: 2019-10-24
Attending: INTERNAL MEDICINE
Payer: MEDICARE

## 2019-10-24 PROCEDURE — 93798 PHYS/QHP OP CAR RHAB W/ECG: CPT | Performed by: REHABILITATION PRACTITIONER

## 2019-10-24 PROCEDURE — 40000116 ZZH STATISTIC OP CR VISIT: Performed by: REHABILITATION PRACTITIONER

## 2019-10-28 ENCOUNTER — OFFICE VISIT (OUTPATIENT)
Dept: CARDIOLOGY | Facility: CLINIC | Age: 69
End: 2019-10-28
Attending: NURSE PRACTITIONER
Payer: COMMERCIAL

## 2019-10-28 VITALS
HEIGHT: 65 IN | SYSTOLIC BLOOD PRESSURE: 120 MMHG | HEART RATE: 62 BPM | BODY MASS INDEX: 45.32 KG/M2 | DIASTOLIC BLOOD PRESSURE: 72 MMHG | WEIGHT: 272 LBS

## 2019-10-28 DIAGNOSIS — I48.20 CHRONIC ATRIAL FIBRILLATION (H): ICD-10-CM

## 2019-10-28 DIAGNOSIS — I10 BENIGN ESSENTIAL HYPERTENSION: ICD-10-CM

## 2019-10-28 DIAGNOSIS — I25.10 CORONARY ARTERY DISEASE INVOLVING NATIVE CORONARY ARTERY OF NATIVE HEART WITHOUT ANGINA PECTORIS: ICD-10-CM

## 2019-10-28 DIAGNOSIS — I42.9 CARDIOMYOPATHY, UNSPECIFIED TYPE (H): Primary | ICD-10-CM

## 2019-10-28 PROCEDURE — 99214 OFFICE O/P EST MOD 30 MIN: CPT | Performed by: NURSE PRACTITIONER

## 2019-10-28 RX ORDER — LISINOPRIL 40 MG/1
40 TABLET ORAL DAILY
Qty: 90 TABLET | Refills: 3 | Status: SHIPPED | OUTPATIENT
Start: 2019-10-28 | End: 2020-04-24

## 2019-10-28 ASSESSMENT — MIFFLIN-ST. JEOR: SCORE: 1922.72

## 2019-10-28 NOTE — PROGRESS NOTES
Cardiology Clinic Progress Note  Benjamín Hyman MRN# [id number]   YOB: 1950 Age: 68 year old     Reason For Visit: 1 month f/u   Primary Cardiologist:   Dr. Fletcher           History of Presenting Illness:    Benjamín Hyman is a pleasant 68 year old patient with a past cardiac history significant for chronic atrial fibrillation, CAD, cardiomyopathy, hypertension, and hyperlipidemia.     He has a history of chronic atrial fibrillation starting in 2004.  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 Fib RVR in 2008 with LVEF 35-40%. Pt was seen by Dr. Ernandez in May 2019 in consultation for abnormal stress test.   He complained of left axillary pressure on exertion for the last 2-3 years. This had become more persistent over the prior year. Nuclear stress test was abnormal. Coronary angiogram 5/31/2019 with ROSY ×1 to the mid LAD with residual 20% mid LCx and he was noted to have small diagonals and a small distal LCx. He was noted to have reduced LVEF but degree of LV dysfunction was more than his degree of coronary disease.    Patient saw Dr. Fletcher in July 2019.  Echocardiogram at that time showed LVEF 32%, moderate RV dysfunction, and 1+ MR.  He complained of mild left-sided chest pressure and mild dyspnea but was not limited in his activities. He was going to be starting cardiac rehabilitation.  If EF did not improve, would consider cardiac MRI to assess etiology of his cardiomyopathy.     He was last seen by me in September 2019.  He had started cardiac rehabilitation and was feeling stronger.  His shortness of breath resolved with better conditioning.  Weight was up 4 pounds but no significant weight increase at home.  He denied any heart failure symptoms and chest discomfort had also resolved.  Blood pressures were soft so cardiolomyopathy medications could not be up titrated.    Pt presents today for 1 month follow-up.    Blood pressure today has come up to 120s  and was 132 systolic a week ago.  He is hesitant to add any further medications but is agreeable to increasing lisinopril. Weight today in the clinic is 272 pounds which is down 2 pounds from his prior visit.  Weights at home have not had any significant weight increases and are stable.  He denies any heart failure or anginal symptoms. Patient reports no chest pain, shortness of breath, PND, orthopnea, presyncope, syncope, edema, heart racing, or palpitations.      Current Cardiac Medications   Plavix 75 mg daily  Digoxin 250  g alternating with 125 mcg every other day  Lisinopril 20 mg daily  Metoprolol  mg daily  Simvastatin 40 mg every other day   Coumadin                   Assessment and Plan:     Plan  1.   Increase lisinopril to 40 mg daily for cardiomyopathy  2.  BMP in 1-2 weeks  3.  Follow-up with PRE[facility] in 1 month to reassess bps and see if spironolactone can be added for cardiomyopathy, then schedule CMR to reassess EF       1. CAD    Angio 5/2019 ROSY ×1 to the mid LAD with residual 20% mid LCx and he was noted to have small diagonals and a small distal LCx    No angina (prior angina Left axillary pressure on exertion)     Continue statin, aspirin, ACE inhibitor, beta blocker    Continue  Plavix/ Coumadin, uninterrupted, for at least one year (5/2020)    Could consider imdur for small vessel disease, if needed       2. Cardiomyopathy     H/o tachycardia mediated LVEF  35-40% 2008    LVEF 32% echo 7/2019    no signs of heart failure     Dry weight: 270 pounds    continue ACE inhibitor, beta blocker    Consider adding spironolactone     Uptitrate cardiomyopathy medications as tolerated    reassess EF with CMR    Check daily weights and call the clinic if your weight has increased more than 2 lbs in one day or 5 lbs in one week.    2000 mg Na diet     Consider ICD if EF remains less than 35%      3. Chronic atrial fibrillation    Asymptomatic    Elevated [id number] score  For age, hypertension, CAD,  heart failure    Continue metoprolol and digoxin for rate control and Coumadin for anticoagulation      4. hypertension    controlled    Continue lisinopril, metoprolol      5. hyperlipidemia    last LDL 64 on 4/2019    Continue simvastatin 40 mg daily           Thank you for allowing me to participate in this delightful patient's care.      This note was completed in part using Dragon voice recognition software. Although reviewed after completion, some word and grammatical errors may occur.    Roseann Briggs, APRN, CNP           Data:   All laboratory data reviewed        HPI and Plan:   See dictation    Orders Placed This Encounter   Procedures     Basic metabolic panel     Follow-Up with Cardiac Advanced Practice Provider       Orders Placed This Encounter   Medications     lisinopril (PRINIVIL/ZESTRIL) 40 MG tablet     Sig: Take 1 tablet (40 mg) by mouth daily     Dispense:  90 tablet     Refill:  3       Medications Discontinued During This Encounter   Medication Reason     lisinopril (PRINIVIL/ZESTRIL) 20 MG tablet          Encounter Diagnoses   Name Primary?     Coronary artery disease involving native coronary artery of native heart without angina pectoris      Cardiomyopathy, unspecified type (H) Yes     Benign essential hypertension      Chronic atrial fibrillation        CURRENT MEDICATIONS:  Current Outpatient Medications   Medication Sig Dispense Refill     clopidogrel (PLAVIX) 75 MG tablet Take 1 tablet (75 mg) by mouth daily 90 tablet 3     digoxin (LANOXIN) 250 MCG tablet Take 1 tab on even days & 1/2 tab on odd day of the month. Patient has been on this medication for year & atrial fibrillation is controlled. 30 tablet 11     lisinopril (PRINIVIL/ZESTRIL) 40 MG tablet Take 1 tablet (40 mg) by mouth daily 90 tablet 3     metoprolol succinate ER (TOPROL-XL) 200 MG 24 hr tablet Take 1 tablet (200 mg) by mouth daily 30 tablet 11     omeprazole (PRILOSEC OTC) 20 MG tablet Takes PRN 30  tablet      simvastatin (ZOCOR) 40 MG tablet Take 1 tablet (40 mg) by mouth every other day 15 tablet 11     warfarin ANTICOAGULANT (COUMADIN ANTICOAGULANT) 7.5 MG tablet Take 7.5mg daily or as directed by the Anticoagulation Clinic 90 tablet 0       ALLERGIES     Allergies   Allergen Reactions     Latex      Adhesive Tape Rash     Reacted to the EKG stickers       PAST MEDICAL HISTORY:  Past Medical History:   Diagnosis Date     Atrial fibrillation (H)      Cardiomyopathy in other diseases classified elsewhere      Chest pain      HLD (hyperlipidemia)      HTN (hypertension)      Ischemia      Morbid obesity (H)        PAST SURGICAL HISTORY:  Past Surgical History:   Procedure Laterality Date     CV CORONARY ANGIOGRAM Left 5/31/2019    Procedure: Coronary Angiogram;  Surgeon: Bogdan Ernandez MD;  Location:  HEART CARDIAC CATH LAB     CV LEFT HEART CATH N/A 5/31/2019    Procedure: Left Heart Cath;  Surgeon: Bogdan Ernandez MD;  Location:  HEART CARDIAC CATH LAB     CV LEFT VENTRICULOGRAM N/A 5/31/2019    Procedure: Left Ventriculogram;  Surgeon: Bogdan Ernandez MD;  Location:  HEART CARDIAC CATH LAB     CV PCI STENT DRUG ELUTING N/A 5/31/2019    Procedure: PCI Stent Drug Eluting;  Surgeon: Bogdan Ernandez MD;  Location: [facility]       FAMILY HISTORY:  Family History   Problem Relation Age of Onset     Cancer - colorectal Mother      C.A.D. Father      Heart Disease Father      Unknown/Adopted Maternal Grandmother      Unknown/Adopted Paternal Grandmother      Cerebrovascular Disease Paternal Grandfather      Depression Daughter        SOCIAL HISTORY:  Social History     Socioeconomic History     Marital status: Single     Spouse name: None     Number of children: None     Years of education: None     Highest education level: None   Occupational History     None   Social Needs     Financial resource strain: None     Food insecurity:     Worry: None     Inability: None  "    Transportation needs:     Medical: None     Non-medical: None   Tobacco Use     Smoking status: Former Smoker     Types: Cigarettes     Last attempt to quit: 1996     Years since quittin.8     Smokeless tobacco: Never Used   Substance and Sexual Activity     Alcohol use: Yes     Comment: Hasn't had anything since 2015 Occsional beer, not often.  Quit drinking 4-1-10/  drank around [place] 2015 2-3 beers every other week.      Drug use: No     Sexual activity: Not Currently   Lifestyle     Physical activity:     Days per week: None     Minutes per session: None     Stress: None   Relationships     Social connections:     Talks on phone: None     Gets together: None     Attends Christian service: None     Active member of club or organization: None     Attends meetings of clubs or organizations: None     Relationship status: None     Intimate partner violence:     Fear of current or ex partner: None     Emotionally abused: None     Physically abused: None     Forced sexual activity: None   Other Topics Concern     Parent/sibling w/ CABG, MI or angioplasty before 65F 55M? Yes     Comment: Father fatal MI at 48yo   Social History Narrative     None       Review of Systems:  Skin:  Negative       Eyes:  Positive for glasses    ENT:  Negative      Respiratory:  Negative       Cardiovascular:  Negative      Gastroenterology: Negative      Genitourinary:  not assessed      Musculoskeletal:  Positive for joint pain    Neurologic:  Positive for numbness or tingling of feet;numbness or tingling of hands sometimes   Psychiatric:  Negative      Heme/Lymph/Imm:  Negative      Endocrine:  Negative        Physical Exam:  Vitals: /72   Pulse 62   Ht 1.638 m (5' 4.5\")   Wt 123.4 kg (272 lb)   BMI 45.97 kg/m      Constitutional:  cooperative;well developed;in no acute distress morbidly obese      Skin:  warm and dry to the touch          Head:  normocephalic        Eyes:  sclera " white        Lymph:      ENT:  no pallor or cyanosis        Neck:  no stiffness        Respiratory:  clear to auscultation;normal symmetry diminished breath sounds bilaterally       Cardiac:   irregularly irregular rhythm distant heart sounds            pulses full and equal                                        GI:  abdomen soft        Extremities and Muscular Skeletal:  no edema              Neurological:  no gross motor deficits;affect appropriate        Psych:  Alert and Oriented x 3        CC  No referring provider defined for this encounter.

## 2019-10-28 NOTE — PATIENT INSTRUCTIONS
"[facility] HEART CARE  [facility]~[address]~[place], [place]~[address]  Thank you for your  Heart Care visit today. If you have questions regarding your visit, please contact your cardiology RN's, Ruby Combs, at [phone]. Your provider has recommended the following:  Medication Changes:  1. Ok to take plavix in the mornings   2. INCREASE lisinopril to 40 mg daily (can take 2 tablets of 20 mg at once until you run out)  Recommendations:  1. Check blood pressure at least 1 hour after medications. Call the clinic if your blood pressure is consistently greater than 130/80.     Follow-up:  1. nonfasting lab work in 1-2 weeks   2. See Rosaenn TAPIA for cardiology follow up at [facility]: 1 month.       To schedule a future appointment, we kindly ask that you call cardiology scheduling at [phone] three months prior to requested revisit date.      [facility] cardiology clinic is staffed with \"Advance Practice Providers\". These are our cardiology Physician Assistants and Nurse Practitioners.   Please call cardiology scheduling if you feel you need clinical evaluation with them at any time for any cardiac reason.   Reminder:  For your safety, we ask that you bring in your current medication(s) or an updated list of your medications with you to EACH office visit. Include the medication name, dose of pill on bottle and how you are taking it. Include over-the-counter medications or supplements. Your provider will review this at each visit and plan your care based on your current information.   ~~~~~~~~~~~~~~~~~~~~~~~~~~~~~~~~~~~~~~~  \"[facility]\" campus telephone numbers for reference:  Cardiology Scheduling~[phone]  Diagnostic Imaging Scheduling~[phone]  Lab Scheduling~[phone]  Anticoagulation Clinic~[phone]  Cardiac Rehabilitation~[phone]  [facility] Clinic RN's~[phone] (at [facility])  Cardiology Clinic RN's~[phone] (Ruby Combs, " RN)  ~~~~~~~~~~~~~~~~~~~~~~~~~~~~~~~~~~~~~~~~

## 2019-10-28 NOTE — LETTER
10/28/2019    Ruben Teran MD  [address] [address]    RE: Benjamín Hyman       Dear Colleague,    I had the pleasure of seeing Benjamín Hyman in the [facility] Clinic.    Cardiology Clinic Progress Note  Benjamín Hyman MRN# [id number]   YOB: 1950 Age: 68 year old     Reason For Visit: 1 month f/u   Primary Cardiologist:   Dr. Fletcher           History of Presenting Illness:    Benjamín Hyman is a pleasant 68 year old patient with a past cardiac history significant for chronic atrial fibrillation, CAD, cardiomyopathy, hypertension, and hyperlipidemia.     He has a history of chronic atrial fibrillation starting in 2004.  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 Fib RVR in 2008 with LVEF 35-40%. Pt was seen by Dr. Ernandez in May 2019 in consultation for abnormal stress test.   He complained of left axillary pressure on exertion for the last 2-3 years. This had become more persistent over the prior year. Nuclear stress test was abnormal. Coronary angiogram 5/31/2019 with ROSY ×1 to the mid LAD with residual 20% mid LCx and he was noted to have small diagonals and a small distal LCx. He was noted to have reduced LVEF but degree of LV dysfunction was more than his degree of coronary disease.    Patient saw Dr. Fletcher in July 2019.  Echocardiogram at that time showed LVEF 32%, moderate RV dysfunction, and 1+ MR.  He complained of mild left-sided chest pressure and mild dyspnea but was not limited in his activities. He was going to be starting cardiac rehabilitation.  If EF did not improve, would consider cardiac MRI to assess etiology of his cardiomyopathy.     He was last seen by me in September 2019.  He had started cardiac rehabilitation and was feeling stronger.  His shortness of breath resolved with better conditioning.  Weight was up 4 pounds but no significant weight increase at home.  He denied any heart failure  symptoms and chest discomfort had also resolved.  Blood pressures were soft so cardiolomyopathy medications could not be up titrated.    Pt presents today for 1 month follow-up.    Blood pressure today has come up to 120s and was 132 systolic a week ago.  He is hesitant to add any further medications but is agreeable to increasing lisinopril. Weight today in the clinic is 272 pounds which is down 2 pounds from his prior visit.  Weights at home have not had any significant weight increases and are stable.  He denies any heart failure or anginal symptoms. Patient reports no chest pain, shortness of breath, PND, orthopnea, presyncope, syncope, edema, heart racing, or palpitations.      Current Cardiac Medications   Plavix 75 mg daily  Digoxin 250  g alternating with 125 mcg every other day  Lisinopril 20 mg daily  Metoprolol  mg daily  Simvastatin 40 mg every other day   Coumadin                   Assessment and Plan:     Plan  1.   Increase lisinopril to 40 mg daily for cardiomyopathy  2.  BMP in 1-2 weeks  3.  Follow-up with PREM in 1 month to reassess bps and see if spironolactone can be added for cardiomyopathy, then schedule CMR to reassess EF       1. CAD    Angio 5/2019 ROSY ×1 to the mid LAD with residual 20% mid LCx and he was noted to have small diagonals and a small distal LCx    No angina (prior angina Left axillary pressure on exertion)     Continue statin, aspirin, ACE inhibitor, beta blocker    Continue  Plavix/ Coumadin, uninterrupted, for at least one year (5/2020)    Could consider imdur for small vessel disease, if needed       2. Cardiomyopathy     H/o tachycardia mediated LVEF  35-40% 2008    LVEF 32% echo 7/2019    no signs of heart failure     Dry weight: 270 pounds    continue ACE inhibitor, beta blocker    Consider adding spironolactone     Uptitrate cardiomyopathy medications as tolerated    reassess EF with CMR    Check daily weights and call the clinic if your weight has increased more  than 2 lbs in one day or 5 lbs in one week.    2000 mg Na diet     Consider ICD if EF remains less than 35%      3. Chronic atrial fibrillation    Asymptomatic    Elevated [id number] score  For age, hypertension, CAD, heart failure    Continue metoprolol and digoxin for rate control and Coumadin for anticoagulation      4. hypertension    controlled    Continue lisinopril, metoprolol      5. hyperlipidemia    last LDL 64 on 4/2019    Continue simvastatin 40 mg daily           Thank you for allowing me to participate in this delightful patient's care.      This note was completed in part using Dragon voice recognition software. Although reviewed after completion, some word and grammatical errors may occur.    Roseann Briggs, APRN, CNP           Data:   All laboratory data reviewed        HPI and Plan:   See dictation    Orders Placed This Encounter   Procedures     Basic metabolic panel     Follow-Up with Cardiac Advanced Practice Provider       Orders Placed This Encounter   Medications     lisinopril (PRINIVIL/ZESTRIL) 40 MG tablet     Sig: Take 1 tablet (40 mg) by mouth daily     Dispense:  90 tablet     Refill:  3       Medications Discontinued During This Encounter   Medication Reason     lisinopril (PRINIVIL/ZESTRIL) 20 MG tablet          Encounter Diagnoses   Name Primary?     Coronary artery disease involving native coronary artery of native heart without angina pectoris      Cardiomyopathy, unspecified type (H) Yes     Benign essential hypertension      Chronic atrial fibrillation        CURRENT MEDICATIONS:  Current Outpatient Medications   Medication Sig Dispense Refill     clopidogrel (PLAVIX) 75 MG tablet Take 1 tablet (75 mg) by mouth daily 90 tablet 3     digoxin (LANOXIN) 250 MCG tablet Take 1 tab on even days & 1/2 tab on odd day of the month. Patient has been on this medication for year & atrial fibrillation is controlled. 30 tablet 11     lisinopril (PRINIVIL/ZESTRIL) 40 MG tablet  Take 1 tablet (40 mg) by mouth daily 90 tablet 3     metoprolol succinate ER (TOPROL-XL) 200 MG 24 hr tablet Take 1 tablet (200 mg) by mouth daily 30 tablet 11     omeprazole (PRILOSEC OTC) 20 MG tablet Takes PRN 30 tablet      simvastatin (ZOCOR) 40 MG tablet Take 1 tablet (40 mg) by mouth every other day 15 tablet 11     warfarin ANTICOAGULANT (COUMADIN ANTICOAGULANT) 7.5 MG tablet Take 7.5mg daily or as directed by the Anticoagulation Clinic 90 tablet 0       ALLERGIES     Allergies   Allergen Reactions     Latex      Adhesive Tape Rash     Reacted to the EKG stickers       PAST MEDICAL HISTORY:  Past Medical History:   Diagnosis Date     Atrial fibrillation (H)      Cardiomyopathy in other diseases classified elsewhere      Chest pain      HLD (hyperlipidemia)      HTN (hypertension)      Ischemia      Morbid obesity (H)        PAST SURGICAL HISTORY:  Past Surgical History:   Procedure Laterality Date     CV CORONARY ANGIOGRAM Left 5/31/2019    Procedure: Coronary Angiogram;  Surgeon: Bogdan Eranndez MD;  Location:  HEART CARDIAC CATH LAB     CV LEFT HEART CATH N/A 5/31/2019    Procedure: Left Heart Cath;  Surgeon: Bogdan Ernandez MD;  Location:  HEART CARDIAC CATH LAB     CV LEFT VENTRICULOGRAM N/A 5/31/2019    Procedure: Left Ventriculogram;  Surgeon: Bogdan Ernandez MD;  Location: [facility] CARDI[facility] C[facility] LAB     CV PCI STENT DRUG ELUTING N/A 5/31/2019    Procedure: PCI Stent Drug Eluting;  Surgeon: Bogdan Ernandez MD;  Location: [facility] HEART CARDIAC CATH LAB       FAMILY HISTORY:  Family History   Problem Relation Age of Onset     Cancer - colorectal Mother      C.A.D. Father      Heart Disease Father      Unknown/Adopted Maternal Grandmother      Unknown/Adopted Paternal Grandmother      Cerebrovascular Disease Paternal Grandfather      Depression Daughter        SOCIAL HISTORY:  Social History     Socioeconomic History     Marital status: Single     Spouse name: None     Number of  children: None     Years of education: None     Highest education level: None   Occupational History     None   Social Needs     Financial resource strain: None     Food insecurity:     Worry: None     Inability: None     Transportation needs:     Medical: None     Non-medical: None   Tobacco Use     Smoking status: Former Smoker     Types: Cigarettes     Last attempt to quit: 1996     Years since quittin.8     Smokeless tobacco: Never Used   Substance and Sexual Activity     Alcohol use: Yes     Comment: Hasn't had anything since 2015 Occsional beer, not often.  Quit drinking 4-1-10/  drank around [place] 2015 2-3 beers every other week.      Drug use: No     Sexual activity: Not Currently   Lifestyle     Physical activity:     Days per week: None     Minutes per session: None     Stress: None   Relationships     Social connections:     Talks on phone: None     Gets together: None     Attends Pent[employer]al service: None     Active member of club or organization: None     Attends meetings of clubs or organizations: None     Relationship status: None     Intimate partner violence:     Fear of current or ex partner: None     Emotionally abused: None     Physically abused: None     Forced sexual activity: None   Other Topics Concern     Parent/sibling w/ CABG, MI or angioplasty before 65F 55M? Yes     Comment: Father fatal MI at 48yo   Social History Narrative     None       Review of Systems:  Skin:  Negative       Eyes:  Positive for glasses    ENT:  Negative      Respiratory:  Negative       Cardiovascular:  Negative      Gastroenterology: Negative      Genitourinary:  not assessed      Musculoskeletal:  Positive for joint pain    Neurologic:  Positive for numbness or tingling of feet;numbness or tingling of hands sometimes   Psychiatric:  Negative      Heme/Lymph/Imm:  Negative      Endocrine:  Negative        Physical Exam:  Vitals: /72   Pulse 62   Ht 1.638 m (5'  "4.5\")   Wt 123.4 kg (272 lb)   BMI 45.97 kg/m       Constitutional:  cooperative;well developed;in no acute distress morbidly obese      Skin:  warm and dry to the touch          Head:  normocephalic        Eyes:  sclera white        Lymph:      ENT:  no pallor or cyanosis        Neck:  no stiffness        Respiratory:  clear to auscultation;normal symmetry diminished breath sounds bilaterally       Cardiac:   irregularly irregular rhythm distant heart sounds            pulses full and equal                                        GI:  abdomen soft        Extremities and Muscular Skeletal:  no edema              Neurological:  no gross motor deficits;affect appropriate        Psych:  Alert and Oriented x 3          Thank you for allowing me to participate in the care of your patient.    Sincerely,     BRENNA Craig [facility]    "

## 2019-10-29 ENCOUNTER — HOSPITAL ENCOUNTER (OUTPATIENT)
Dept: CARDIAC REHAB | Facility: CLINIC | Age: 69
End: 2019-10-29
Attending: INTERNAL MEDICINE
Payer: MEDICARE

## 2019-10-29 PROCEDURE — 40000116 ZZH STATISTIC OP CR VISIT: Performed by: REHABILITATION PRACTITIONER

## 2019-10-29 PROCEDURE — 93798 PHYS/QHP OP CAR RHAB W/ECG: CPT | Performed by: REHABILITATION PRACTITIONER

## 2019-10-31 ENCOUNTER — HOSPITAL ENCOUNTER (OUTPATIENT)
Dept: CARDIAC REHAB | Facility: CLINIC | Age: 69
End: 2019-10-31
Attending: INTERNAL MEDICINE
Payer: MEDICARE

## 2019-10-31 VITALS — HEIGHT: 64 IN | BODY MASS INDEX: 46.1 KG/M2 | WEIGHT: 270 LBS

## 2019-10-31 PROCEDURE — 93798 PHYS/QHP OP CAR RHAB W/ECG: CPT | Performed by: REHABILITATION PRACTITIONER

## 2019-10-31 PROCEDURE — 40000116 ZZH STATISTIC OP CR VISIT: Performed by: REHABILITATION PRACTITIONER

## 2019-10-31 ASSESSMENT — MIFFLIN-ST. JEOR: SCORE: 1913.46

## 2019-10-31 ASSESSMENT — 6 MINUTE WALK TEST (6MWT)
MALE CALC: 1225.42
PREDICTED: 1232.89
GENDER SELECTION: MALE
FEMALE CALC: 1110.38
TOTAL DISTANCE WALKED (FT): 740

## 2019-10-31 NOTE — PROGRESS NOTES
10/31/19 1500   Session   Session 120 Day Individualized Treatment Plan   Certified through this date 11/29/19   Cardiac Rehab Assessment   Cardiac Rehab Assessment Pt attended 24 sessions thus far. Pt had recent FU appt with cardiology. Pt BP medication was increased. Pt will FU with cardiology at end of Nov to discuss uptitrating cardiomyopathy medications, consider adding spirolactone and reassing EF per cardiac MRI. IT was stated in medical record to consider ICD if EF remains < 35%.  Pt is feeling well, denies symptoms. He feels stronger. Pt has plateaued his workloads here in rehab. Our focus the past 4-6 weeks was for pt to incorporate indep exercise at home. Pt has used [employer], been doing some walking at home but not consistently. Pt is monitoring dietary foods a little more than months ago, is interested to lose weight. Pt verbalizes understanding of impact both healthy diet and follow through of exercise is on end result of weight loss. Skilled services needed to reinforce importance of follow through of indep exercise, provide handouts to allow for more successful transition. Offer and provide handouts necessary of dietary recommendations to assist with long term compliance of healthy and low sodium diet.   General Information   Treatment Diagnosis Stent   Date of Treatment Diagnosis 05/31/19   Secondary Treatment Diagnosis Systolic Heart Failure with Lowered EF   Significant Past CV History Chronic AF   Comorbidities None   Other Medical History HTN, HLD, Morbid obesity,    Lead up symptoms left arm pain over the last 3 years.   Hospital Location [facility]   Hospital Discharge Date 05/31/19   Signs and Symptoms Post Hospital Discharge SOB;Anxiety   Outpatient Cardiac Rehab Start Date 07/16/19   Primary Physician Ruben Teran   Primary Physician Follow Up Completed   Cardiologist Dr. Ernandez   Cardiologist Follow Up Completed   Ejection Fraction 33%   Risk Stratification High   Summary of Cath  "Report   Summary of Cath Report Available   Date Performed 05/31/19   LAD m20% residual/ROSY   Living and Work Status    Living Arrangements and Social Status apartment   Support System Live alone   Return to Employment No;Retired   Occupation retired    Preventative Medications   CMS recommended medications Antiplatelets;Beta Blocker;Lipid Lowering;Ace inhibitors   Fall Risk Screen   Fall screen completed by Cardiac Rehab   Have you fallen 2 or more times in the past year? No   Have you fallen and had an injury in the past year? No   Is patient a fall risk? No   Pain   Patient Currently in Pain No   Physical Assessments   Incisions WNL   Edema None   Right Lung Sounds not assessed   Left Lung Sounds not assessed   Limitations Other (see comments)  (Pt states walking may be difficult for him due to a hammerto)   Individualized Treatment Plan   Monitored Sessions Scheduled 26   Monitored Sessions Attended 24   Oxygen   Supplemental Oxygen needed No   Nutrition Management - Weight Management   Assessment Re-assessment   Age 68   Weight 122.5 kg (270 lb)   Height 1.638 m (5' 4.49\")   BMI (Calculated) 45.65   Goal Weight 99.8 kg (220 lb)   Initial Rate Your Plate Score. Dietary tool to assess eating patterns. Scores range from 24 to 72. The higher the score the healthier the eating pattern. 52   Nutrition Management - Lipids   Lipids Labs Available   Date 04/22/19   Total Cholesterol 135   Triglycerides 192   HDL 33   LDL 64   Prescribed Lipid Medication Yes   Statin Intensity High Intensity   Nutrition Management - Diabetes   Diabetes No   Nutrition Management Summary   Dietary Recommendations Low Sodium;Low Cholesterol;Low Fat   Stages of Change for Diet Compliance Action   Interventions Planned Attend Nutrition Education Class(es);Educate on Weight Management Principles;Educate on Benefits of Exercise;Complete Food Diary   Interventions In Progress or Completed Educated on Benefits of Exercise   Patient Goals " Goal #1   Goal #1 Description 7/16: Through monitoring portion sizes lose .5-1 lb per week.   Goal #1 Target Date 10/01/19   Goal #1 Progress Towards Goal 10/31: Pt states he is monitoring portions more closely. Pt weight has been stable but just wasn't been able to lose weight. 10/4: Pt states he is trying to monitor portions. He eats primarily one meal per day. Tends to snack later in day. Discussed trying to eat a small dinner earlier in evening. Pt seems in contemplation to monitor calories via food diary at this point. 9/5: Pt states he is trying to monitor portions. He has cut back when eating out for breakfast. Pt states he snacks quite a bit in the evenings. He eats lunch/dinner around 2pm but doesn't not eat a regular meal in the evening. Pt feels he frequently eats out of boredom. He remains down 1 lb from start of rehab. 8/13: Pt continues to monitor portion sizes. Pt is down 1 lb thus far. 7/16: Pt is currently monitoing portion sizes.   Nutrition Target Outcome Weight loss .5-1 lb/week (if BMI > 25)   Psychosocial Management   Psychosocial Assessment Re-assessment   Is there history of clinical depression or increased risk of depression? No previous history   Current Level of Stress per Patient Report Mild   Current Coping Skills Patient Unable to Identify Personal Coping Skills   Initial Patient Health Questionnaire -9 Score (PHQ-9) for depression. 5-9 Minimal symptoms, 10-14 Minor depression, 15-19 Major depression, moderately severe, > 20 Major depression, severe  7   Initial [facility] Survey score.  Quality of Life:   If total score > 25 review individual areas where patient rated a 4 or 5.  Consider patients current medical condition and what role that plays on the score.   Adjust treatment protocol to improve areas of concern.  Consider the following:  PHQ9 score, DASI, and re-assessment within the next 30 days to assist with developing treatments.  22   Stages of Change Action    Interventions Planned Patient to verbalize understanding of behavioral assessment results;Patient will recognize signs and symptoms of depression;Assist patient to identify positive support system;Patient denies need for intervention at this time.   Interventions In Progress or Completed Patient verbalizes understanding of behavioral assessment scores   Patient Goal No   Psychosocial Target Outcome Identify absence or presence of depression using valid screening tool;Maximize coping skills   Other Core Components - Hypertension   History of or Diagnosis of Hypertension Yes   Currently taking Anti-Hypertensives Yes;Beta blocker;Ace Inhibitor   Other Core Components - Tobacco   History of Tobacco Use Yes   Quit Date or Planned Quit Date 01/01/98   Tobacco Use Status Former (Quit > 6 mo ago)   Tobacco Habit Cigarettes   Tobacco Use per Day (average) 1ppd   Years of Tobacco Use 30   Stages of Change Maintenance   Other Core Components Summary   Interventions Planned List benefits of weight management;Educate on importance of maintaining low sodium diet;Educate on importance of monitoring daily weight;Educate on signs/symptoms and when to call the doctor (i.e. increased edema, dyspnea, fatigue, dizziness/lightheadedness, change in sleep patterns, chest discomfort);Instruct and educate to self manage Heart Failure symptoms;Attend education class(es) on Nutrition   Interventions In Progress or Completed Educated on importance of maintaining low sodium diet;Educated on importance of monitoring daily weight;Listed benefits of weight management   Patient Goals No   Other Core Components Target Outcome Compliance with Diet, Medications and Symptom Management to allow for stable Heart Failure   Activity/Exercise History   Activity/Exercise Assessment Re-assessment   Activity/Exercise Status prior to event? Sedentary   Number of Days Currently participating in Moderate Physical Activity? 2   Number of Days Currently performing   Aerobic Exercise (including rehab)? 2   Number of Minutes per Session Currently of Aerobic Exercise (average)? 30   Current Stage of Change (Physical Activity) Contemplation   Current Stage of Change (Aerobic Exercise) Preparation   Patient Goals Goal #2;Goal #1   Goal #1 Description 7/16: Initiate home exercise program of walking 15-30 continuous minutes consistantly prior to discharge.   Goal #1 Target Date 09/16/19   Goal #1 Progress Towards Goal 10/31: Pt continues to be inconsistent. He will occ use room and occ walks. 10/4: Pt had increased his biking bouts to 7-10 minutes but isn't consistent with using exercise room. Pt states a lot of this is lack of motivation. 9/5: Despite repeated attempts to encourage an increase in duration of biking bouts, pt continues at 5 minute bouts. 8/13: Pt is riding stationary bike 5 minute bouts 7/16: Initial Evaluation   Goal #2 Description 7/16: Learn how to safely exercise independently with HF prior to discharge.   Goal #2 Target Date 08/16/19   Goal #2 Progress Towards Goal 10/31: No progress with this. Pt verbalizes understanding and is stable with exercise on recumb bike in rehab just is not following through consistently at home. 10/4: This is unchanged. Pt feels he understands how hard to be working, verbalizes understanding that increasing durations of his bouts will help to increase stamina, though admits to lack of motivation. 9/5: Pt feels he understands what he should be doing. Declines questions or concerns on this at this time.    Activity/Exercise Comments 9/5: Asked on a scale of 1 to 10 importance and confidence in continuing indep exercise; pt was unable to answer. Pt states he knows what he should do he is just not motivated to do it.   Activity/Exercise Target Outcome An Accumulation of 150  Minutes of Aerobic Activity per Week   Exercise Assessment   6 Minute Walk Predicted - Gender Selection Male   6 Minute Walk Predicted (Male) 1225.42   6 Minute Walk  Predicted (Female) 1110.38   Initial 6 Minute Walk Distance (Feet) 740 ft   Resting HR 72 bpm   Exercise  bpm   Post Exercise HR 81 bpm   Resting /74   Exercise /70   Post Exercise /70   Effort Rating 5   Current MET Level 2.4   MET Level Goal 3-4   ECG Rhythm Atrial fibrillation   Ectopy PVCs   Current Symptoms Denies symptoms   Limitations/Restrictions None   Exercise Prescription   Mode Nustep;Recumbant bike;Arm Ergometer;Weights   Duration/Time 45-60 min   Frequency 2 days/week   THR (85% of age predicted max HR) 129.2   OMNI Effort Rating (0-10 Scale) 4-6/10   Progression Total exercise time of 20-30 minutes;Progress peak intensity by 1/4 MET per week;Continuous bouts;Aerobic exercise to OMNI rating of 6 or below and at or below THR   Recommended Home Exercise   Type of Exercise Bike   Frequency (days per week) 2-3   Duration (minutes per session) Other (see comments)  (5-10 minute bouts)   Effort Rating Recommended 4-6/10   30 Day Exercise Plan 10/31: No changein this. 10/4: Pt has increased to 10' bouts but not consistent with home exercise. 9/5: Pt continues with 5' bouts. Encouraged to increase to 10 minutes. 8/13: Pt is biking 5 minute bouts on days not in rehab. 7/16: initate home exercise program of walking on non rehab days.   Current Home Exercise   Type of Exercise Bike   Frequency (days per week) 1-2d/week   Duration (minutes per session) 7-10 minutes   Follow-up/On-going Support   Provider follow-up needed on the following Weight Management   Learning Assessment   Learner Patient   Primary Language English   Preferred Learning Style Listening;Demonstration;Pictures/Video   Barriers to Learning No barriers noted   Patient Education   Education recommended Anatomy and Physiology of the Heart;Blood Pressure;Exercise Principles;Heart Failure;Medication Overview;Nutrition;Weight Loss   Education classes attended Medication Overview     I have established, reviewed and made  necessary changes to the individualized treatment plan and exercise prescription for this patient.    Physician Signature: ____________________________________    Date:  _________

## 2019-11-05 ASSESSMENT — 6 MINUTE WALK TEST (6MWT)
TOTAL DISTANCE WALKED (FT): 740
GENDER SELECTION: MALE

## 2019-11-06 ENCOUNTER — ANTICOAGULATION THERAPY VISIT (OUTPATIENT)
Dept: ANTICOAGULATION | Facility: CLINIC | Age: 69
End: 2019-11-06
Payer: COMMERCIAL

## 2019-11-06 DIAGNOSIS — Z79.01 LONG TERM CURRENT USE OF ANTICOAGULANT THERAPY: ICD-10-CM

## 2019-11-06 DIAGNOSIS — I48.91 ATRIAL FIBRILLATION (H): ICD-10-CM

## 2019-11-06 LAB — INR POINT OF CARE: 2.8 (ref 0.86–1.14)

## 2019-11-06 PROCEDURE — 99207 ZZC NO CHARGE NURSE ONLY: CPT

## 2019-11-06 PROCEDURE — 85610 PROTHROMBIN TIME: CPT | Mod: QW

## 2019-11-06 PROCEDURE — 36416 COLLJ CAPILLARY BLOOD SPEC: CPT

## 2019-11-14 ENCOUNTER — HOSPITAL ENCOUNTER (OUTPATIENT)
Dept: CARDIAC REHAB | Facility: CLINIC | Age: 69
End: 2019-11-14
Attending: INTERNAL MEDICINE
Payer: MEDICARE

## 2019-11-14 DIAGNOSIS — I42.9 CARDIOMYOPATHY, UNSPECIFIED TYPE (H): ICD-10-CM

## 2019-11-14 DIAGNOSIS — I25.10 CORONARY ARTERY DISEASE INVOLVING NATIVE CORONARY ARTERY OF NATIVE HEART WITHOUT ANGINA PECTORIS: ICD-10-CM

## 2019-11-14 LAB
ANION GAP SERPL CALCULATED.3IONS-SCNC: 3 MMOL/L (ref 3–14)
BUN SERPL-MCNC: 26 MG/DL (ref 7–30)
CALCIUM SERPL-MCNC: 9.5 MG/DL (ref 8.5–10.1)
CHLORIDE SERPL-SCNC: 105 MMOL/L (ref 94–109)
CO2 SERPL-SCNC: 27 MMOL/L (ref 20–32)
CREAT SERPL-MCNC: 1.05 MG/DL (ref 0.66–1.25)
GFR SERPL CREATININE-BSD FRML MDRD: 72 ML/MIN/{1.73_M2}
GLUCOSE SERPL-MCNC: 103 MG/DL (ref 70–99)
POTASSIUM SERPL-SCNC: 5.1 MMOL/L (ref 3.4–5.3)
SODIUM SERPL-SCNC: 135 MMOL/L (ref 133–144)

## 2019-11-14 PROCEDURE — 36415 COLL VENOUS BLD VENIPUNCTURE: CPT | Performed by: NURSE PRACTITIONER

## 2019-11-14 PROCEDURE — 80048 BASIC METABOLIC PNL TOTAL CA: CPT | Performed by: NURSE PRACTITIONER

## 2019-11-14 PROCEDURE — 40000116 ZZH STATISTIC OP CR VISIT: Performed by: REHABILITATION PRACTITIONER

## 2019-11-14 PROCEDURE — 40000575 ZZH STATISTIC OP CARDIAC VISIT #2: Performed by: REHABILITATION PRACTITIONER

## 2019-11-14 PROCEDURE — 93797 PHYS/QHP OP CAR RHAB WO ECG: CPT | Mod: 59 | Performed by: REHABILITATION PRACTITIONER

## 2019-11-14 PROCEDURE — 93798 PHYS/QHP OP CAR RHAB W/ECG: CPT | Performed by: REHABILITATION PRACTITIONER

## 2019-11-14 ASSESSMENT — MIFFLIN-ST. JEOR: SCORE: 1913.46

## 2019-11-14 ASSESSMENT — 6 MINUTE WALK TEST (6MWT)
MALE CALC: 1225.42
PREDICTED: 1232.89
FEMALE CALC: 1110.38
GENDER SELECTION: MALE
TOTAL DISTANCE WALKED (FT): 816
TOTAL DISTANCE WALKED (FT): 740

## 2019-11-15 NOTE — RESULT ENCOUNTER NOTE
Results noted: electrolytes and kidney function WNL. Done after increasing lisinopril to 40 mg every day on 10/28/19. Pt notified of results

## 2019-11-20 VITALS — WEIGHT: 270 LBS | HEIGHT: 64 IN | BODY MASS INDEX: 46.1 KG/M2

## 2019-11-20 NOTE — PROGRESS NOTES
11/14/19 1400   Session   Session Discharge Note   Cardiac Rehab Assessment   Cardiac Rehab Assessment Patient completed 25 sessions of monitored exercise. Pt MET level peaked at 2.4 MET. He increased 6 MWT by 76 feet, an improvement of 10%. Pt had recent FU visit with Roseann Tejeda in [place]. Lisinipril was increased slightly at that appointment. Pt denied symptoms of dizziness or lightheadedness with increase in this med after two weeks of taking increased dose. Pt was instructed to schedule another appt one month out from that appt which would be end of November. Pt had not made appt but planned to call today to schedule. Pt did have non fasting labs completed after visit today here in [place]. Pt was also encouraged to monitor fluid retention/weight using scale in fitness room. Pt seems contemplative with doing this consistently.  The past two weeks pt implemented some walking in hallways of apartment 10' bouts X 2 each day. Pt feels he will be more successful with this than using stationary bike in fitness room. PHQ-9 survey worsened slightly at discharge. Pt states it was not a good day at time he filled out discharge survey. Pt feels his depression is okay, does not feel he needs to FU on this.   General Information   Treatment Diagnosis Stent   Date of Treatment Diagnosis 05/31/19   Secondary Treatment Diagnosis Systolic Heart Failure with Lowered EF   Significant Past CV History Chronic AF   Comorbidities None   Other Medical History HTN, HLD, Morbid obesity,    Lead up symptoms left arm pain over the last 3 years.   Hospital Location [facility]   Hospital Discharge Date 05/31/19   Signs and Symptoms Post Hospital Discharge SOB;Anxiety   Outpatient Cardiac Rehab Start Date 07/16/19   Primary Physician Ruben Teran   Primary Physician Follow Up Completed   Cardiologist Dr. Ernandez   Cardiologist Follow Up Completed   Ejection Fraction 33%   Risk Stratification High   Summary of Cath Report   Summary of  "Cath Report Available   Date Performed 05/31/19   LAD m20% residual/ROSY   Living and Work Status    Living Arrangements and Social Status apartment   Support System Live alone   Return to Employment No;Retired   Occupation retired    Preventative Medications   CMS recommended medications Antiplatelets;Beta Blocker;Lipid Lowering;Ace inhibitors   Fall Risk Screen   Fall screen completed by Cardiac Rehab   Have you fallen 2 or more times in the past year? No   Have you fallen and had an injury in the past year? No   Is patient a fall risk? No   Pain   Patient Currently in Pain No   Physical Assessments   Incisions WNL   Edema None   Right Lung Sounds not assessed   Left Lung Sounds not assessed   Limitations Other (see comments)  (Pt states walking may be difficult for him due to a hammerto)   Individualized Treatment Plan   Monitored Sessions Scheduled 26   Monitored Sessions Attended 25   Oxygen   Supplemental Oxygen needed No   Nutrition Management - Weight Management   Assessment Discharge   Age 68   Weight 122.5 kg (270 lb)   Height 1.638 m (5' 4.49\")   BMI (Calculated) 45.65   Goal Weight 99.8 kg (220 lb)   Initial Rate Your Plate Score. Dietary tool to assess eating patterns. Scores range from 24 to 72. The higher the score the healthier the eating pattern. 52   Discharge Rate Your Plate Score 53   Nutrition Management - Lipids   Lipids Labs Available   Date 04/22/19   Total Cholesterol 135   Triglycerides 192   HDL 33   LDL 64   Prescribed Lipid Medication Yes   Statin Intensity High Intensity   Nutrition Management - Diabetes   Diabetes No   Nutrition Management Summary   Dietary Recommendations Low Sodium;Low Cholesterol;Low Fat   Stages of Change for Diet Compliance Action   Interventions Planned Attend Nutrition Education Class(es);Educate on Weight Management Principles;Educate on Benefits of Exercise;Complete Food Diary   Interventions In Progress or Completed Educated on Benefits of Exercise "   Patient Goals Goal #1   Goal #1 Description 7/16: Through monitoring portion sizes lose .5-1 lb per week.   Goal #1 Target Date 10/01/19   Goal #1 Progress Towards Goal 11/14: Pt lost 1 lb since start of rehab. Pt frustrated he has not lost more weight. Pt has, however, been able to maintain weight he was at 2 weeks ago when he attended his last rehab session. Pt had started to walk in his apartment building 2x/day several days per week. d10/31: Pt states he is monitoring portions more closely. Pt weight has been stable but just wasn't been able to lose weight. 10/4: Pt states he is trying to monitor portions. He eats primarily one meal per day. Tends to snack later in day. Discussed trying to eat a small dinner earlier in evening. Pt seems in contemplation to monitor calories via food diary at this point.   Nutrition Target Outcome Weight loss .5-1 lb/week (if BMI > 25)   Psychosocial Management   Psychosocial Assessment Discharge   Is there history of clinical depression or increased risk of depression? No previous history   Current Level of Stress per Patient Report Mild   Current Coping Skills Patient Unable to Identify Personal Coping Skills   Initial Patient Health Questionnaire -9 Score (PHQ-9) for depression. 5-9 Minimal symptoms, 10-14 Minor depression, 15-19 Major depression, moderately severe, > 20 Major depression, severe  7   Discharge PHQ-9 Score for Depression 10   Initial [employer] Survey score.  Quality of Life:   If total score > 25 review individual areas where patient rated a 4 or 5.  Consider patients current medical condition and what role that plays on the score.   Adjust treatment protocol to improve areas of concern.  Consider the following:  PHQ9 score, DASI, and re-assessment within the next 30 days to assist with developing treatments.  22   Discharge [employer] Survey Score 21   Stages of Change Action   Interventions Planned Patient to verbalize understanding of behavioral  "assessment results;Patient will recognize signs and symptoms of depression;Assist patient to identify positive support system;Patient denies need for intervention at this time.   Interventions In Progress or Completed Patient verbalizes understanding of behavioral assessment scores;Pt recognizes signs and symptoms of depression   Patient Goal No   Psychosocial Comments 11/14: PHQ-9 score worsened at discharge. Pt reports \"it was a bad day\" when he filled the surveys out. He denies depression, just sometimes has up/down days.   Psychosocial Target Outcome Identify absence or presence of depression using valid screening tool;Maximize coping skills   Other Core Components - Hypertension   History of or Diagnosis of Hypertension Yes   Currently taking Anti-Hypertensives Yes;Beta blocker;Ace Inhibitor   Other Core Components - Tobacco   History of Tobacco Use Yes   Quit Date or Planned Quit Date 01/01/98   Tobacco Use Status Former (Quit > 6 mo ago)   Tobacco Habit Cigarettes   Tobacco Use per Day (average) 1ppd   Years of Tobacco Use 30   Stages of Change Maintenance   Other Core Components Summary   Interventions Planned List benefits of weight management;Educate on importance of maintaining low sodium diet;Educate on importance of monitoring daily weight;Educate on signs/symptoms and when to call the doctor (i.e. increased edema, dyspnea, fatigue, dizziness/lightheadedness, change in sleep patterns, chest discomfort);Instruct and educate to self manage Heart Failure symptoms;Attend education class(es) on Nutrition   Interventions In Progress or Completed Educated on importance of maintaining low sodium diet;Educated on importance of monitoring daily weight;Listed benefits of weight management   Patient Goals No   Other Core Components Target Outcome Compliance with Diet, Medications and Symptom Management to allow for stable Heart Failure   Activity/Exercise History   Activity/Exercise Assessment Discharge "   Activity/Exercise Status prior to event? Sedentary   Number of Days Currently participating in Moderate Physical Activity? 4   Number of Days Currently performing  Aerobic Exercise (including rehab)? 3   Number of Minutes per Session Currently of Aerobic Exercise (average)? 20-40 minutes   Current Stage of Change (Physical Activity) Preparation   Current Stage of Change (Aerobic Exercise) Action   Patient Goals Goal #2;Goal #1   Goal #1 Description 7/16: Initiate home exercise program of walking 15-30 continuous minutes consistantly prior to discharge.   Goal #1 Target Date 09/16/19   Goal #1 Progress Towards Goal 11/14: Pt walked 10 minutes X 2 daily over the past few weeks. This totaled 140 minutes each week. 10/31: Pt continues to be inconsistent. He will occ use room and occ walks. 10/4: Pt had increased his biking bouts to 7-10 minutes but isn't consistent with using exercise room. Pt states a lot of this is lack of motivation. 9/5: Despite repeated attempts to encourage an increase in duration of biking bouts, pt continues at 5 minute bouts. 8/13: Pt is riding stationary bike 5 minute bouts 7/16: Initial Evaluation   Goal #2 Description 7/16: Learn how to safely exercise independently with HF prior to discharge.   Goal #2 Target Date 08/16/19   Goal #2 Date Met 11/14/19   Activity/Exercise Comments 11/14: Pt has started to walk hallways of apartment building. Finds this easier to do then going to fitness room to ride stationary bike. 9/5: Asked on a scale of 1 to 10 importance and confidence in continuing indep exercise; pt was unable to answer. Pt states he knows what he should do he is just not motivated to do it.   Activity/Exercise Target Outcome An Accumulation of 150  Minutes of Aerobic Activity per Week   Exercise Assessment   6 Minute Walk Predicted - Gender Selection Male   6 Minute Walk Predicted (Male) 1225.42   6 Minute Walk Predicted (Female) 1110.38   Initial 6 Minute Walk Distance (Feet) 740  ft   Discharge 6 Minute Walk Distance (Feet) 816 ft   Resting HR 64 bpm   Exercise  bpm   Post Exercise  bpm   Resting /62   Exercise /60   Post Exercise /64   Effort Rating 5   Current MET Level 2.4   MET Level Goal 3-4   ECG Rhythm Atrial fibrillation   Ectopy PVCs   Current Symptoms Denies symptoms   Limitations/Restrictions None   Exercise Prescription   Mode Nustep;Recumbant bike;Arm Ergometer;Weights   Duration/Time 45-60 min   Frequency 2 days/week   THR (85% of age predicted max HR) 129.2   OMNI Effort Rating (0-10 Scale) 4-6/10   Progression Total exercise time of 20-30 minutes;Progress peak intensity by 1/4 MET per week;Continuous bouts;Aerobic exercise to OMNI rating of 6 or below and at or below THR   Recommended Home Exercise   Type of Exercise Walking   Frequency (days per week) 4-5   Duration (minutes per session) 15-30 min   Effort Rating Recommended 4-6/10   30 Day Exercise Plan 11/14: Pt walked 10 minutes twice in hallways each day the past two weeks.   Current Home Exercise   Type of Exercise Walking   Frequency (days per week) 6-7   Duration (minutes per session) 10 minutes X 2   Follow-up/On-going Support   Provider follow-up needed on the following No follow-up needed   Learning Assessment   Learner Patient   Primary Language English   Preferred Learning Style Listening;Demonstration;Pictures/Video   Barriers to Learning No barriers noted   Patient Education   Education recommended Anatomy and Physiology of the Heart;Blood Pressure;Exercise Principles;Heart Failure;Medication Overview;Nutrition;Weight Loss   Education classes attended Medication Overview;Exercise Principles     Physician cosignature/electronic signature indicates agreements with the ITP document and approval of discharge.    Physician Signature: ______________________________________      Date: _________

## 2019-12-06 ENCOUNTER — OFFICE VISIT (OUTPATIENT)
Dept: CARDIOLOGY | Facility: CLINIC | Age: 69
End: 2019-12-06
Payer: COMMERCIAL

## 2019-12-06 VITALS
SYSTOLIC BLOOD PRESSURE: 106 MMHG | HEART RATE: 75 BPM | OXYGEN SATURATION: 97 % | WEIGHT: 270.8 LBS | DIASTOLIC BLOOD PRESSURE: 78 MMHG | BODY MASS INDEX: 45.78 KG/M2

## 2019-12-06 DIAGNOSIS — E78.5 HYPERLIPIDEMIA LDL GOAL <70: ICD-10-CM

## 2019-12-06 DIAGNOSIS — I42.9 CARDIOMYOPATHY, UNSPECIFIED TYPE (H): Primary | ICD-10-CM

## 2019-12-06 DIAGNOSIS — I25.10 CORONARY ARTERY DISEASE INVOLVING NATIVE CORONARY ARTERY OF NATIVE HEART WITHOUT ANGINA PECTORIS: ICD-10-CM

## 2019-12-06 DIAGNOSIS — I10 BENIGN ESSENTIAL HYPERTENSION: ICD-10-CM

## 2019-12-06 DIAGNOSIS — I48.20 CHRONIC ATRIAL FIBRILLATION (H): ICD-10-CM

## 2019-12-06 PROCEDURE — 99214 OFFICE O/P EST MOD 30 MIN: CPT | Performed by: NURSE PRACTITIONER

## 2019-12-06 NOTE — PATIENT INSTRUCTIONS
Medication Changes:  None     Recommendations:  1. Check daily weights and call the clinic if your weight has increased more than 2 lbs in one day or 5 lbs in one week.    2. Cardiac MRI to be done at [facility] on Monday January 6, 2020 at 2:00pm at the 3rd floor Diagnostic Imaging Desk. Call [phone] option #2 for scheduling concerns.     Follow-up:    1. See Dr. Fletcher for cardiology follow up at [place]: 1-2 months.     Cardiology Scheduling~[phone]  Cardiology Clinic RNs~[phone] (Ange Gan RN and Ruby Combs RN)    For information only. Not to replace the advice of your health care provider. Copyright   2004, 2012 [facility]. All rights reserved. On[employer] [id number] - 02/13  Getting Ready for Your MRI or MRA  1. Are you pregnant? 2. Claustrophobic? 3. Any contrast dye allergy? 4. Any implanted wires or cochler implants?  Where should I go?    [facility] Imaging Department located on the 3rd floor, [address].   What are these tests?  MRI (magnetic resonance imaging) uses a strong magnet and radio waves to look inside the body. An MRA (magnetic resonance angiogram) does the same thing, but it lets us look at your blood vessels.   A computer turns the radio waves into pictures showing cross sections of the body, much like slices of bread. This helps us see any problems more clearly.   You may receive fluid (called  contrast ) before or during your scan. The fluid helps us see the pictures better. We give the fluid through an IV (small needle in your arm).   How do I get ready for my exam?  Take your medicines as usual, unless your doctor tells you not to. Bring a list of your current medicines to your exam (including vitamins, minerals and over-the-counter drugs). Also bring the results of similar scans you may have had.  Please remove any body piercings and hair extensions before you arrive.   Follow the orders below.    ? You will have contrast for this exam.    ? IV contrast: The day before your exam, drink extra fluids--at least six 8-ounce glasses (unless your doctor tells you to restrict your fluids).   ? Have a blood test (creatinine test) within 30 days of your exam. Go to your clinic or Diagnostic Imaging Department for this test.   ? For heart exams:    24 hours before the exam:  - Stop all caffeine (coffee, tea, soda pop, chocolate, aspirin, etc.).  - Stop any medicines that contain theophylline or dipyridamole.    Do not eat, drink or smoke for two hours before the test. Your exam may last up to two hours.  What else do I need to know?  The MRI machine uses a strong magnet. Please wear clothes without metal (snaps, zippers). A sweatsuit works well, or we may give you a hospital gown.  You will remove watches, jewelry, hairpins, wallets, dentures, partial dental plates and hearing aids. You may wear contact lenses, and you may be able to wear your rings. We have a safe place to keep your personal items, but it is safer to leave them at home.  What happens during the exam?  Most tests take 30 to 60 minutes. The tests are painless. You can talk to us through a two-way speaker during your exam.    We may inject fluid ( contrast ) into one of your veins. This is not a dye and does not contain iodine. It will help us see the pictures better.    You will lie on an exam table. The table slides into a lighted tunnel that s open at both ends. The tunnel is 4 feet long.     You may hear loud thumping or hammering. If so, it may last up to 15 minutes. We will give you earplugs or headphones with music. You may bring your own CDs.     We will take several sets of pictures. You will need to lie very still. You may relax and breathe normally. In some cases, we may ask you to hold your breath for up to 30 seconds.  Are these tests safe?  There are no side effects from these tests, but they aren t safe for everyone. You cannot have an MRI or MRA if you have certain implants, a  defibrillator or pieces of metal in your eye. If you have a pacemaker, call the Diagnostic Imaging Department to see if it is safe for you to have this exam.  We will make sure it is safe for you to have these tests. You will fill out a safety checklist before the exam. If you have any concerns, you may discuss them with your doctor or radiologist (X-ray doctor).  When will I know the results?  Your family doctor (or the doctor who ordered the test) will give you the results. They should be ready within five days. You or your insurer will then receive two bills: one from the hospital and one from the radiologist.  Who should I call with questions?  Please call your Diagnostic Imaging Scheduling Department-[facility] at [phone].

## 2019-12-06 NOTE — PROGRESS NOTES
Cardiology Clinic Progress Note  Benjamín Hyman MRN# [id number]   YOB: 1950 Age: 68 year old     Reason For Visit: 1 month f/u   Primary Cardiologist:   Dr. Fletcher           History of Presenting Illness:    Benjamín Hyman is a pleasant 68 year old patient with a past cardiac history significant for chronic atrial fibrillation, CAD, cardiomyopathy, hypertension, and hyperlipidemia.     He has a history of chronic atrial fibrillation starting in 2004.  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 Fib RVR in 2008 with LVEF 35-40%. Pt was seen by Dr. Ernandez in May 2019 in consultation for abnormal stress test.   He complained of left axillary pressure on exertion for the last 2-3 years. This had become more persistent over the prior year. Nuclear stress test was abnormal. Coronary angiogram 5/31/2019 with ROSY ×1 to the mid LAD with residual 20% mid LCx and he was noted to have small diagonals and a small distal LCx. He was noted to have reduced LVEF but degree of LV dysfunction was more than his degree of coronary disease.    Patient saw Dr. Fletcher in July 2019.  Echocardiogram at that time showed LVEF 32%, moderate RV dysfunction, and 1+ MR.  He complained of mild left-sided chest pressure and mild dyspnea but was not limited in his activities. He was going to be starting cardiac rehabilitation.  If EF did not improve, would consider cardiac MRI to assess etiology of his cardiomyopathy.     In September 2019, he started cardiac rehabilitation and was feeling stronger.  His shortness of breath resolved with better conditioning.  Weight was up 4 pounds but no significant weight increase at home.  He denied any heart failure symptoms and chest discomfort had resolved.  Blood pressures were soft so cardiolomyopathy medications could not be up titrated.    Patient was last seen by me on 10/28/2019.  Lisinopril was uptitrated for cardiomyopathy.  Weight was down 2 pounds and  he denied any heart failure symptoms.    Pt presents today for 1 month follow-up.  BMP 11/14/2018, after increasing lisinopril, shows normal renal function and potassium.  These results were reviewed with him today.  His potassium is on the higher side of normal and given his soft blood pressures, I am hesitant to add spironolactone. Weights are down another 2 pounds and he denies any heart failure symptoms.  He is agreeable to reassessing his EF with cardiac MRI.  However, the last time he had an MRI he was slightly claustrophobic with it, but is willing to try again.  He does not wish to have any sedation due to driving himself.  When shoveling snow this past week, he denied any anginal symptoms.  Patient reports no chest pain, shortness of breath, PND, orthopnea, presyncope, syncope, edema, heart racing, or palpitations.      Current Cardiac Medications   Plavix 75 mg daily  Digoxin 250  g alternating with 125 mcg every other day  Lisinopril 40 mg daily  Metoprolol  mg daily  Simvastatin 40 mg every other day   Coumadin                   Assessment and Plan:     Plan  1. Check daily weights and call the clinic if your weight has increased more than 2 lbs in one day or 5 lbs in one week.    Follow-up:  1. Cardiac MRI to reassess EF   2. See Dr. Fletcher for cardiology follow up at [place]: 1-2 months to review CMR      1. CAD    Angio 5/2019 ROSY ×1 to the mid LAD with residual 20% mid LCx and he was noted to have small diagonals and a small distal LCx    No angina (prior angina Left axillary pressure on exertion)     Continue statin, aspirin, ACE inhibitor, beta blocker    Continue  Plavix/ Coumadin, uninterrupted, for at least one year (5/2020)    Could consider imdur for small vessel disease, if needed       2. Cardiomyopathy     H/o tachycardia mediated LVEF  35-40% 2008    LVEF 32% echo 7/2019    no signs of heart failure     Dry weight: 270 pounds    continue ACE inhibitor, beta blocker    Unable  to add spironolactone d/t soft BP and higher normal potassium    Uptitrate cardiomyopathy medications as tolerated    reassess EF with CMR    Check daily weights and call the clinic if your weight has increased more than 2 lbs in one day or 5 lbs in one week.    2000 mg Na diet     Consider ICD if EF remains less than 35%      3. Chronic atrial fibrillation    Asymptomatic    Elevated [id number] score  For age, hypertension, CAD, heart failure    Continue metoprolol and digoxin for rate control and Coumadin for anticoagulation      4. hypertension    controlled    Continue lisinopril, metoprolol      5. hyperlipidemia    last LDL 64 on 4/2019    Continue simvastatin 40 mg daily           Thank you for allowing me to participate in this delightful patient's care.      This note was completed in part using Dragon voice recognition software. Although reviewed after completion, some word and grammatical errors may occur.    Roseann Briggs, APRN, CNP           Data:   All laboratory data reviewed        HPI and Plan:   See dictation    Orders Placed This Encounter   Procedures     MRI Cardiac w/contrast     Follow-Up with Cardiologist       No orders of the defined types were placed in this encounter.      There are no discontinued medications.      Encounter Diagnoses   Name Primary?     Benign essential hypertension      Coronary artery disease involving native coronary artery of native heart without angina pectoris      Hyperlipidemia LDL goal <70      Cardiomyopathy, unspecified type (H) Yes     Chronic atrial fibrillation        CURRENT MEDICATIONS:  Current Outpatient Medications   Medication Sig Dispense Refill     clopidogrel (PLAVIX) 75 MG tablet Take 1 tablet (75 mg) by mouth daily 90 tablet 3     digoxin (LANOXIN) 250 MCG tablet Take 1 tab on even days & 1/2 tab on odd day of the month. Patient has been on this medication for year & atrial fibrillation is controlled. 30 tablet 11      lisinopril (PRINIVIL/ZESTRIL) 40 MG tablet Take 1 tablet (40 mg) by mouth daily 90 tablet 3     metoprolol succinate ER (TOPROL-XL) 200 MG 24 hr tablet Take 1 tablet (200 mg) by mouth daily 30 tablet 11     omeprazole (PRILOSEC OTC) 20 MG tablet Takes PRN 30 tablet      simvastatin (ZOCOR) 40 MG tablet Take 1 tablet (40 mg) by mouth every other day 15 tablet 11     warfarin ANTICOAGULANT (COUMADIN ANTICOAGULANT) 7.5 MG tablet Take 7.5mg daily or as directed by the Anticoagulation Clinic 90 tablet 0       ALLERGIES     Allergies   Allergen Reactions     Latex      Adhesive Tape Rash     Reacted to the EKG stickers       PAST MEDICAL HISTORY:  Past Medical History:   Diagnosis Date     Atrial fibrillation (H)      Cardiomyopathy in other diseases classified elsewhere      Chest pain      HLD (hyperlipidemia)      HTN (hypertension)      Ischemia      Morbid obesity (H)        PAST SURGICAL HISTORY:  Past Surgical History:   Procedure Laterality Date     CV CORONARY ANGIOGRAM Left 5/31/2019    Procedure: Coronary Angiogram;  Surgeon: Bogdan Ernandez MD;  Location:  HEART CARDIAC CATH LAB     CV LEFT HEART CATH N/A 5/31/2019    Procedure: Left Heart Cath;  Surgeon: Bogdan Ernandez MD;  Location:  HEART CARDIAC CATH LAB     CV LEFT VENTRICULOGRAM N/A 5/31/2019    Procedure: Left Ventriculogram;  Surgeon: Bogdan Ernandez MD;  Location: [facility] CARDIAC CATH LAB     CV PCI STENT DRUG ELUTING N/A 5/31/2019    Procedure: PCI Stent Drug Eluting;  Surgeon: Bogdan Ernandez MD;  Location: [facility] HE[facility] CARDIAC CATH LAB       FAMILY HISTORY:  Family History   Problem Relation Age of Onset     Cancer - colorectal Mother      C.A.D. Father      Heart Disease Father      Unknown/Adopted Maternal Grandmother      Unknown/Adopted Paternal Grandmother      Cerebrovascular Disease Paternal Grandfather      Depression Daughter        SOCIAL HISTORY:  Social History     Socioeconomic History     Marital status:  Single     Spouse name: None     Number of children: None     Years of education: None     Highest education level: None   Occupational History     None   Social Needs     Financial resource strain: None     Food insecurity:     Worry: None     Inability: None     Transportation needs:     Medical: None     Non-medical: None   Tobacco Use     Smoking status: Former Smoker     Types: Cigarettes     Last attempt to quit: 1996     Years since quittin.9     Smokeless tobacco: Never Used   Substance and Sexual Activity     Alcohol use: Yes     Comment: Hasn't had anything since 2015 Occsional beer, not often.  Quit drinking 4-1-10/  drank around [place] 2015 2-3 beers every other week.      Drug use: No     Sexual activity: Not Currently   Lifestyle     Physical activity:     Days per week: None     Minutes per session: None     Stress: None   Relationships     Social connections:     Talks on phone: None     Gets together: None     Attends Gnosticist service: None     Active member of club or organization: None     Attends meetings of clubs or organizations: None     Relationship status: None     Intimate partner violence:     Fear of current or ex partner: None     Emotionally abused: None     Physically abused: None     Forced sexual activity: None   Other Topics Concern     Parent/sibling w/ CABG, MI or angioplasty before 65F 55M? Yes     Comment: Father fatal MI at 46yo   Social History Narrative     None       Review of Systems:  Skin:  Negative       Eyes:  Positive for glasses    ENT:  Negative      Respiratory:  Negative       Cardiovascular:  Negative      Gastroenterology: Negative      Genitourinary:  Negative      Musculoskeletal:  Negative      Neurologic:  Positive for numbness or tingling of hands    Psychiatric:  Negative      Heme/Lymph/Imm:  Negative      Endocrine:  Negative        Physical Exam:  Vitals: /78 (BP Location: Right arm, Patient Position:  Sitting, Cuff Size: Adult Regular)   Pulse 75   Wt 122.8 kg (270 lb 12.8 oz)   SpO2 97%   BMI 45.78 kg/m      Constitutional:  cooperative;well developed;in no acute distress morbidly obese      Skin:  warm and dry to the touch          Head:  normocephalic        Eyes:  sclera white        Lymph:      ENT:  no pallor or cyanosis        Neck:  no stiffness        Respiratory:  clear to auscultation;normal symmetry diminished breath sounds bilaterally       Cardiac:   irregularly irregular rhythm distant heart sounds            pulses full and equal                                        GI:  abdomen soft        Extremities and Muscular Skeletal:  no edema              Neurological:  no gross motor deficits;affect appropriate        Psych:  Alert and Oriented x 3        CC  No referring provider defined for this encounter.

## 2019-12-06 NOTE — LETTER
12/6/2019    Ruben Teran MD  [address] [facility] [address]    RE: Benjamín Hyman       Dear Colleague,    I had the pleasure of seeing Benjamín Hyman in the [facility].    Cardiology Clinic Progress Note  Benjamín Hyman MRN# [id number]   YOB: 1950 Age: 68 year old     Reason For Visit: 1 month f/u   Primary Cardiologist:   Dr. Fletcher           History of Presenting Illness:    Benjamín Hyman is a pleasant 68 year old patient with a past cardiac history significant for chronic atrial fibrillation, CAD, cardiomyopathy, hypertension, and hyperlipidemia.     He has a history of chronic atrial fibrillation starting in 2004.  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 Fib RVR in 2008 with LVEF 35-40%. Pt was seen by Dr. Ernandez in May 2019 in consultation for abnormal stress test.   He complained of left axillary pressure on exertion for the last 2-3 years. This had become more persistent over the prior year. Nuclear stress test was abnormal. Coronary angiogram 5/31/2019 with ROSY ×1 to the mid LAD with residual 20% mid LCx and he was noted to have small diagonals and a small distal LCx. He was noted to have reduced LVEF but degree of LV dysfunction was more than his degree of coronary disease.    Patient saw Dr. Fletcher in July 2019.  Echocardiogram at that time showed LVEF 32%, moderate RV dysfunction, and 1+ MR.  He complained of mild left-sided chest pressure and mild dyspnea but was not limited in his activities. He was going to be starting cardiac rehabilitation.  If EF did not improve, would consider cardiac MRI to assess etiology of his cardiomyopathy.     In September 2019, he started cardiac rehabilitation and was feeling stronger.  His shortness of breath resolved with better conditioning.  Weight was up 4 pounds but no significant weight increase at home.  He denied any heart failure symptoms and chest discomfort  had resolved.  Blood pressures were soft so cardiolomyopathy medications could not be up titrated.    Patient was last seen by me on 10/28/2019.  Lisinopril was uptitrated for cardiomyopathy.  Weight was down 2 pounds and he denied any heart failure symptoms.    Pt presents today for 1 month follow-up.  BMP 11/14/2018, after increasing lisinopril, shows normal renal function and potassium.  These results were reviewed with him today.  His potassium is on the higher side of normal and given his soft blood pressures, I am hesitant to add spironolactone. Weights are down another 2 pounds and he denies any heart failure symptoms.  He is agreeable to reassessing his EF with cardiac MRI.  However, the last time he had an MRI he was slightly claustrophobic with it, but is willing to try again.  He does not wish to have any sedation due to driving himself.  When shoveling snow this past week, he denied any anginal symptoms.  Patient reports no chest pain, shortness of breath, PND, orthopnea, presyncope, syncope, edema, heart racing, or palpitations.      Current Cardiac Medications   Plavix 75 mg daily  Digoxin 250  g alternating with 125 mcg every other day  Lisinopril 40 mg daily  Metoprolol  mg daily  Simvastatin 40 mg every other day   Coumadin                   Assessment and Plan:     Plan  1. Check daily weights and call the clinic if your weight has increased more than 2 lbs in one day or 5 lbs in one week.    Follow-up:  1. Cardiac MRI to reassess EF   2. See Dr. Fletcher for cardiology follow up at [facility]: 1-2 months to review CMR      1. CAD    Angio 5/2019 ROSY ×1 to the mid LAD with residual 20% mid LCx and he was noted to have small diagonals and a small distal LCx    No angina (prior angina Left axillary pressure on exertion)     Continue statin, aspirin, ACE inhibitor, beta blocker    Continue  Plavix/ Coumadin, uninterrupted, for at least one year (5/2020)    Could consider imdur for small  vessel disease, if needed       2. Cardiomyopathy     H/o tachycardia mediated LVEF  35-40% 2008    LVEF 32% echo 7/2019    no signs of heart failure     Dry weight: 270 pounds    continue ACE inhibitor, beta blocker    Unable to add spironolactone d/t soft BP and higher normal potassium    Uptitrate cardiomyopathy medications as tolerated    reassess EF with CMR    Check daily weights and call the clinic if your weight has increased more than 2 lbs in one day or 5 lbs in one week.    2000 mg Na diet     Consider ICD if EF remains less than 35%      3. Chronic atrial fibrillation    Asymptomatic    Elevated [id number] score  For age, hypertension, CAD, heart failure    Continue metoprolol and digoxin for rate control and Coumadin for anticoagulation      4. hypertension    controlled    Continue lisinopril, metoprolol      5. hyperlipidemia    last LDL 64 on 4/2019    Continue simvastatin 40 mg daily           Thank you for allowing me to participate in this delightful patient's care.      This note was completed in part using Dragon voice recognition software. Although reviewed after completion, some word and grammatical errors may occur.    Roseann Briggs, BRENNA, CNP           Data:   All laboratory data reviewed        HPI and Plan:   See dictation    Orders Placed This Encounter   Procedures     MRI Cardiac w/contrast     Follow-Up with Cardiologist       No orders of the defined types were placed in this encounter.      There are no discontinued medications.      Encounter Diagnoses   Name Primary?     Benign essential hypertension      Coronary artery disease involving native coronary artery of native heart without angina pectoris      Hyperlipidemia LDL goal <70      Cardiomyopathy, unspecified type (H) Yes     Chronic atrial fibrillation        CURRENT MEDICATIONS:  Current Outpatient Medications   Medication Sig Dispense Refill     clopidogrel (PLAVIX) 75 MG tablet Take 1 tablet (75 mg) by  mouth daily 90 tablet 3     digoxin (LANOXIN) 250 MCG tablet Take 1 tab on even days & 1/2 tab on odd day of the month. Patient has been on this medication for year & atrial fibrillation is controlled. 30 tablet 11     lisinopril (PRINIVIL/ZESTRIL) 40 MG tablet Take 1 tablet (40 mg) by mouth daily 90 tablet 3     metoprolol succinate ER (TOPROL-XL) 200 MG 24 hr tablet Take 1 tablet (200 mg) by mouth daily 30 tablet 11     omeprazole (PRILOSEC OTC) 20 MG tablet Takes PRN 30 tablet      simvastatin (ZOCOR) 40 MG tablet Take 1 tablet (40 mg) by mouth every other day 15 tablet 11     warfarin ANTICOAGULANT (COUMADIN ANTICOAGULANT) 7.5 MG tablet Take 7.5mg daily or as directed by the Anticoagulation Clinic 90 tablet 0       ALLERGIES     Allergies   Allergen Reactions     Latex      Adhesive Tape Rash     Reacted to the EKG stickers       PAST MEDICAL HISTORY:  Past Medical History:   Diagnosis Date     Atrial fibrillation (H)      Cardiomyopathy in other diseases classified elsewhere      Chest pain      HLD (hyperlipidemia)      HTN (hypertension)      Ischemia      Morbid obesity (H)        PAST SURGICAL HISTORY:  Past Surgical History:   Procedure Laterality Date     CV CORONARY ANGIOGRAM Left 5/31/2019    Procedure: Coronary Angiogram;  Surgeon: Bogdan Ernandez MD;  Location:  HEART CARDIAC CATH LAB     CV LEFT HEART CATH N/A 5/31/2019    Procedure: Left Heart Cath;  Surgeon: Bogdan Ernandez MD;  Location:  HEART CARDIAC CATH LAB     CV LEFT VENTRICULOGRAM N/A 5/31/2019    Procedure: Left Ventriculogram;  Surgeon: Bogdan Ernandez MD;  Location:  HEART CARDIAC CATH LAB     CV PCI STENT DRUG ELUTING N/A 5/31/2019    Procedure: PCI Stent Drug Eluting;  Surgeon: Bogdan Ernandez MD;  Location:  HEART CARDIAC CATH LAB       FAMILY HISTORY:  Family History   Problem Relation Age of Onset     Cancer - colorectal Mother      C.A.D. Father      Heart Disease Father      Unknown/Adopted Maternal  Grandmother      Unknown/Adopted Paternal Grandmother      Cerebrovascular Disease Paternal Grandfather      Depression Daughter        SOCIAL HISTORY:  Social History     Socioeconomic History     Marital status: Single     Spouse name: None     Number of children: None     Years of education: None     Highest education level: None   Occupational History     None   Social Needs     Financial resource strain: None     Food insecurity:     Worry: None     Inability: None     Transportation needs:     Medical: None     Non-medical: None   Tobacco Use     Smoking status: Former Smoker     Types: Cigarettes     Last attempt to quit: 1996     Years since quittin.9     Smokeless tobacco: Never Used   Substance and Sexual Activity     Alcohol use: Yes     Comment: Hasn't had anything since 2015 Occsional beer, not often.  Quit drinking 4-1-10/  drank around Shanon2015 2-3 beers every other week.      Drug use: No     Sexual activity: Not Currently   Lifestyle     Physical activity:     Days per week: None     Minutes per session: None     Stress: None   Relationships     Social connections:     Talks on phone: None     Gets together: None     Attends Yazidism service: None     Active member of club or organization: None     Attends meetings of clubs or organizations: None     Relationship status: None     Intimate partner violence:     Fear of current or ex partner: None     Emotionally abused: None     Physically abused: None     Forced sexual activity: None   Other Topics Concern     Parent/sibling w/ CABG, MI or angioplasty before 65F 55M? Yes     Comment: Father fatal MI at 46yo   Social History Narrative     None       Review of Systems:  Skin:  Negative       Eyes:  Positive for glasses    ENT:  Negative      Respiratory:  Negative       Cardiovascular:  Negative      Gastroenterology: Negative      Genitourinary:  Negative      Musculoskeletal:  Negative      Neurologic:   Positive for numbness or tingling of hands    Psychiatric:  Negative      Heme/Lymph/Imm:  Negative      Endocrine:  Negative        Physical Exam:  Vitals: /78 (BP Location: Right arm, Patient Position: Sitting, Cuff Size: Adult Regular)   Pulse 75   Wt 122.8 kg (270 lb 12.8 oz)   SpO2 97%   BMI 45.78 kg/m       Constitutional:  cooperative;well developed;in no acute distress morbidly obese      Skin:  warm and dry to the touch          Head:  normocephalic        Eyes:  sclera white        Lymph:      ENT:  no pallor or cyanosis        Neck:  no stiffness        Respiratory:  clear to auscultation;normal symmetry diminished breath sounds bilaterally       Cardiac:   irregularly irregular rhythm distant heart sounds            pulses full and equal                                        GI:  abdomen soft        Extremities and Muscular Skeletal:  no edema              Neurological:  no gross motor deficits;affect appropriate        Psych:  Alert and Oriented x 3            Thank you for allowing me to participate in the care of your patient.    Sincerely,     BRENNA [facility]

## 2020-01-06 ENCOUNTER — HOSPITAL ENCOUNTER (OUTPATIENT)
Dept: CARDIOLOGY | Facility: CLINIC | Age: 70
Discharge: HOME | End: 2020-01-06
Attending: NURSE PRACTITIONER | Admitting: NURSE PRACTITIONER
Payer: MEDICARE

## 2020-01-06 DIAGNOSIS — I42.9 CARDIOMYOPATHY, UNSPECIFIED TYPE (H): ICD-10-CM

## 2020-01-06 PROCEDURE — A9585 GADOBUTROL INJECTION: HCPCS | Performed by: NURSE PRACTITIONER

## 2020-01-06 PROCEDURE — 75561 CARDIAC MRI FOR MORPH W/DYE: CPT | Mod: 26 | Performed by: INTERNAL MEDICINE

## 2020-01-06 PROCEDURE — 75561 CARDIAC MRI FOR MORPH W/DYE: CPT

## 2020-01-06 PROCEDURE — 25500064 ZZH RX 255 OP 636: Performed by: NURSE PRACTITIONER

## 2020-01-06 RX ORDER — GADOBUTROL 604.72 MG/ML
5-65 INJECTION INTRAVENOUS ONCE
Status: COMPLETED | OUTPATIENT
Start: 2020-01-06 | End: 2020-01-06

## 2020-01-06 RX ADMIN — GADOBUTROL 16 ML: 604.72 INJECTION INTRAVENOUS at 16:52

## 2020-01-06 NOTE — PROGRESS NOTES
Wen Kolb MD  P  [employer]             CORE with T2*    Contrast administered per weight based protocol.

## 2020-01-15 ENCOUNTER — ANTICOAGULATION THERAPY VISIT (OUTPATIENT)
Dept: ANTICOAGULATION | Facility: CLINIC | Age: 70
End: 2020-01-15
Payer: COMMERCIAL

## 2020-01-15 DIAGNOSIS — I48.0 PAROXYSMAL ATRIAL FIBRILLATION (H): ICD-10-CM

## 2020-01-15 DIAGNOSIS — Z79.01 LONG TERM CURRENT USE OF ANTICOAGULANT THERAPY: ICD-10-CM

## 2020-01-15 LAB — INR POINT OF CARE: 2.3 (ref 0.86–1.14)

## 2020-01-15 PROCEDURE — 99207 ZZC NO CHARGE NURSE ONLY: CPT

## 2020-01-15 PROCEDURE — 36416 COLLJ CAPILLARY BLOOD SPEC: CPT

## 2020-01-15 PROCEDURE — 85610 PROTHROMBIN TIME: CPT | Mod: QW

## 2020-01-15 NOTE — PROGRESS NOTES
ANTICOAGULATION FOLLOW-UP CLINIC VISIT    Patient Name:  Benjamín Hyman  Date:  1/15/2020  Contact Type:  Face to Face    SUBJECTIVE:  Patient Findings     Comments:   No acute changes in derrek, medications, diet (vitamin K intake), or activity noted. Took warfarin as instructed, denies any missed doses. No issues with bleeding or unusual bruising noted.         Clinical Outcomes     Negatives:   Major bleeding event, Thromboembolic event, Anticoagulation-related hospital admission, Anticoagulation-related ED visit, Anticoagulation-related fatality    Comments:   No acute changes in derrek, medications, diet (vitamin K intake), or activity noted. Took warfarin as instructed, denies any missed doses. No issues with bleeding or unusual bruising noted.            OBJECTIVE    INR Protime   Date Value Ref Range Status   01/15/2020 2.3 (A) 0.86 - 1.14 Final       ASSESSMENT / PLAN  No question data found.  Anticoagulation Summary  As of 1/15/2020    INR goal:   2.0-3.0   TTR:   77.7 % (1 y)   INR used for dosin.3 (1/15/2020)   Warfarin maintenance plan:   7.5 mg (7.5 mg x 1) every day   Full warfarin instructions:   7.5 mg every day   Weekly warfarin total:   52.5 mg   No change documented:   Melia Villagran RN   Plan last modified:   Melia Villagran RN (2019)   Next INR check:   3/4/2020   Priority:   Maintenance   Target end date:   Indefinite    Indications    Atrial fibrillation (H) [I48.91]  Long term current use of anticoagulant therapy [Z79.01]             Anticoagulation Episode Summary     INR check location:       Preferred lab:       Send INR reminders to:   [facility]    Comments:   * 7.5mg tablets. Dr. Teran Ok with 12 week rechecks. 19 - Plavix after stents, target INR 2.0-2.5      Anticoagulation Care Providers     Provider Role Specialty Phone number    Ruben Teran MD [facility] [phone]            See the Encounter Report to view  Anticoagulation Flowsheet and Dosing Calendar (Go to Encounters tab in chart review, and find the Anticoagulation Therapy Visit)        [facility]agran RN [facility]

## 2020-01-16 ENCOUNTER — TELEPHONE (OUTPATIENT)
Dept: FAMILY MEDICINE | Facility: CLINIC | Age: 70
End: 2020-01-16

## 2020-01-16 DIAGNOSIS — I48.19 PERSISTENT ATRIAL FIBRILLATION (H): ICD-10-CM

## 2020-01-16 RX ORDER — SIMVASTATIN 40 MG
40 TABLET ORAL EVERY OTHER DAY
Qty: 45 TABLET | Refills: 0 | Status: SHIPPED | OUTPATIENT
Start: 2020-01-16 | End: 2020-04-24

## 2020-01-16 NOTE — TELEPHONE ENCOUNTER
"\"Please consider a 90 day supply for SIMVASTATIN 40MG TAB to improve adherence. Thank you, Pharmacist.\"  "

## 2020-02-06 DIAGNOSIS — I48.19 PERSISTENT ATRIAL FIBRILLATION (H): ICD-10-CM

## 2020-02-07 RX ORDER — WARFARIN SODIUM 7.5 MG/1
TABLET ORAL
Qty: 90 TABLET | Refills: 0 | Status: SHIPPED | OUTPATIENT
Start: 2020-02-07 | End: 2020-04-24

## 2020-02-07 NOTE — TELEPHONE ENCOUNTER
Anticoagulation Monitoring Return Date Recheck Instructions   Latest Ref Rng & Units      1/15/2020 3/4/2020  7.5 mg every day   11/6/2019  6 WEEKS    Prescription approved per [facility] Refill Protocol.  Yaritza Lockhart, GABRIELLE  Anticoagulation Nurse - [facility]

## 2020-03-04 ENCOUNTER — ANTICOAGULATION THERAPY VISIT (OUTPATIENT)
Dept: ANTICOAGULATION | Facility: CLINIC | Age: 70
End: 2020-03-04
Payer: COMMERCIAL

## 2020-03-04 DIAGNOSIS — I48.0 PAROXYSMAL ATRIAL FIBRILLATION (H): ICD-10-CM

## 2020-03-04 DIAGNOSIS — Z79.01 LONG TERM CURRENT USE OF ANTICOAGULANT THERAPY: ICD-10-CM

## 2020-03-04 LAB — INR POINT OF CARE: 2.8 (ref 0.86–1.14)

## 2020-03-04 PROCEDURE — 85610 PROTHROMBIN TIME: CPT | Mod: QW

## 2020-03-04 PROCEDURE — 36416 COLLJ CAPILLARY BLOOD SPEC: CPT

## 2020-03-04 PROCEDURE — 99207 ZZC NO CHARGE NURSE ONLY: CPT

## 2020-03-04 NOTE — PROGRESS NOTES
ANTICOAGULATION FOLLOW-UP CLINIC VISIT    Patient Name:  Benjamín Hyman  Date:  3/4/2020  Contact Type:  Face to Face    SUBJECTIVE:  Patient Findings     Comments:   No changes in medications, activity, or diet noted. No concerns with clotting, bleeding, or increased bruising noted. Took warfarin as prescribed.  Patient is to continue maintenance warfarin plan, and check INR in 8 weeks - ok per Dr. Teran to have up to 12 week rechecks.  Patient verbalizes understanding and agrees to plan. No further questions or concerns.        Clinical Outcomes     Negatives:   Major bleeding event, Thromboembolic event, Anticoagulation-related hospital admission, Anticoagulation-related ED visit, Anticoagulation-related fatality    Comments:   No changes in medications, activity, or diet noted. No concerns with clotting, bleeding, or increased bruising noted. Took warfarin as prescribed.  Patient is to continue maintenance warfarin plan, and check INR in 8 weeks - ok per Dr. Teran to have up to 12 week rechecks.  Patient verbalizes understanding and agrees to plan. No further questions or concerns.           OBJECTIVE    INR Protime   Date Value Ref Range Status   2020 2.8 (A) 0.86 - 1.14 Final       ASSESSMENT / PLAN  INR assessment THER    Recheck INR In: 8 WEEKS    INR Location Clinic      Anticoagulation Summary  As of 3/4/2020    INR goal:   2.0-3.0   TTR:   77.7 % (1 y)   INR used for dosin.8 (3/4/2020)   Warfarin maintenance plan:   7.5 mg (7.5 mg x 1) every day   Full warfarin instructions:   7.5 mg every day   Weekly warfarin total:   52.5 mg   No change documented:   Kirti Merrill RN   Plan last modified:   Melia Villagran RN (2019)   Next INR check:   2020   Priority:   Maintenance   Target end date:   Indefinite    Indications    Atrial fibrillation (H) [I48.91]  Long term current use of anticoagulant therapy [Z79.01]             Anticoagulation Episode Summary     INR check  location:       Preferred lab:       Send INR reminders to:   [facility]    Comments:   * 7.5mg tablets. Dr. Teran Ok with 12 week rechecks. 6/5/19 - Plavix after stents, target INR 2.0-2.5      Anticoagulation Care Providers     Provider Role Specialty Phone number    Ruben Teran MD [facility] [phone]            See the Encounter Report to view Anticoagulation Flowsheet and Dosing Calendar (Go to Encounters tab in chart review, and find the Anticoagulation Therapy Visit)        Kirti Merrill RN

## 2020-03-19 DIAGNOSIS — I48.20 CHRONIC ATRIAL FIBRILLATION (H): ICD-10-CM

## 2020-03-19 DIAGNOSIS — I48.0 PAROXYSMAL ATRIAL FIBRILLATION (H): Primary | ICD-10-CM

## 2020-03-19 DIAGNOSIS — Z79.01 LONG TERM CURRENT USE OF ANTICOAGULANT THERAPY: ICD-10-CM

## 2020-04-13 ENCOUNTER — VIRTUAL VISIT (OUTPATIENT)
Dept: CARDIOLOGY | Facility: CLINIC | Age: 70
End: 2020-04-13
Attending: NURSE PRACTITIONER
Payer: COMMERCIAL

## 2020-04-13 VITALS — WEIGHT: 272 LBS | BODY MASS INDEX: 45.98 KG/M2

## 2020-04-13 DIAGNOSIS — E78.5 HYPERLIPIDEMIA LDL GOAL <70: ICD-10-CM

## 2020-04-13 DIAGNOSIS — I10 BENIGN ESSENTIAL HYPERTENSION: ICD-10-CM

## 2020-04-13 DIAGNOSIS — I48.20 CHRONIC ATRIAL FIBRILLATION (H): ICD-10-CM

## 2020-04-13 DIAGNOSIS — I25.10 CORONARY ARTERY DISEASE INVOLVING NATIVE CORONARY ARTERY OF NATIVE HEART WITHOUT ANGINA PECTORIS: ICD-10-CM

## 2020-04-13 DIAGNOSIS — I42.9 CARDIOMYOPATHY, UNSPECIFIED TYPE (H): ICD-10-CM

## 2020-04-13 DIAGNOSIS — R06.09 DYSPNEA ON EXERTION: ICD-10-CM

## 2020-04-13 PROCEDURE — 99213 OFFICE O/P EST LOW 20 MIN: CPT | Mod: 95 | Performed by: INTERNAL MEDICINE

## 2020-04-13 NOTE — PROGRESS NOTES
"Wellness Screening Tool    Symptom Screening:    Do you have one of the following new symptoms:      Fever or reported chills?  No    A new cough (started within the past 14 days)? Yes-Cough due to Allergies?    Shortness of breath (started within the past 14 days)? No    Nausea, vomiting or diarrhea? No    Within the past 3 weeks, have you been exposed to someone with a known positive illness below?      COVID - 19 (known or suspected) No    Chicken pox?  No    Measles? No    Pertussis? No    Patient notified of visitor restriction: Yes    Patient's appointment status: Telephone Visit     Cary Yoon MA  4/13/2020 9:57 AM    Benjamín Hyman is a 69 year old male who is being evaluated via a billable telephone visit.      The patient has been notified of following:     \"This telephone visit will be conducted via a call between you and your physician/provider. We have found that certain health care needs can be provided without the need for a physical exam.  This service lets us provide the care you need with a short phone conversation.  If a prescription is necessary we can send it directly to your pharmacy.  If lab work is needed we can place an order for that and you can then stop by our lab to have the test done at a later time.    Telephone visits are billed at different rates depending on your insurance coverage. During this emergency period, for some insurers they may be billed the same as an in-person visit.  Please reach out to your insurance provider with any questions.    If during the course of the call the physician/provider feels a telephone visit is not appropriate, you will not be charged for this service.\"    Patient has given verbal consent for Telephone visit?  Yes    How would you like to obtain your AVS? Mail a copy    Additional provider notes:   70 y/o male seen for chronic A. fib, coronary disease, cardiomyopathy.  He also has hypertension and dyslipidemia.        He has a history of " atrial fibrillation dating back to 2004.  He has reduced ejection fraction in the past when in rapid A. fib.  Echo in 2008 showed EF 35 to 40%.        In May 2019 he had an abnormal stress test.  He underwent angiogram with single drug-eluting stent to the mid LAD, 20% mid circumflex disease, EF 30% on LV gram.          Echo July 2019 showed EF 32%, moderate RV dysfunction, 1+ MR.   Cardiac MRI January 2020 showed borderline LV dilation, EF 31% with global hypokinesis, mild RV hypokinesis, moderate to severe biatrial enlargement, 1-2+ MR, no delayed enhancement.     He has been feeling the same in the past 4 months.  Weight is steady at 272 pounds.  He denies chest pain, lightheadedness, or lower extremity edema.  Blood pressure is not checked at home.  He is still taking clopidogrel.  He does complain of pain at night in his fourth and fifth finger of the right hand.    Assessment:  69-year-old male seen for follow-up of chronic atrial fibrillation, coronary artery disease, and nonischemic cardiomyopathy.  His cardiac issues seem stable.  He has no symptoms of heart failure, weight is stable.  He will discontinue Plavix and start low-dose aspirin.    Ejection fraction historically has been in the 30 to 35% range.  He only had single-vessel coronary disease, this is a nonischemic cardiomyopathy.  There was no scar on MRI.  The constellation of his A. fib, atrial enlargement, low ejection fraction, and now symptoms suggestive of carpal tunnel syndrome, raise suspicion for amyloidosis.  Because of the coronavirus pandemic, work-up will be deferred at this time.  However on follow-up this can be looked into.    Recommendations:  1.  Nonischemic cardiomyopathy, NYHA class I-II symptoms  -Continue current medications, cannot further uptitrate medication due to blood pressure on the lower side  -When he follows up in clinic, initiate work-up for amyloidosis, especially TTR type, order serum and urine immunofixation and  serum free light chain analysis.  Will consider nuclear PYP scan also.  - may consider Bi-V ICD in the future, EKG shows LBBB-type pattern    2.  Chronic atrial fibrillation  -Continue warfarin, rate controlled    3.  Coronary artery disease, status post LAD stent  -Finish current supply of clopidogrel, then discontinue, then start aspirin 81 mg daily    Follow-up in 4 months in clinic with PREM, check BMP and BNP.      Phone call duration: 12 minutes    Phi Fletcher MD  Cardiology - [facility]  Pager: [phone]  Text Page  April 13, 2020

## 2020-04-13 NOTE — PATIENT INSTRUCTIONS
Everything with your heart seems stable.  It has been 1 year since your stent was placed.  You can finish your current supply of Plavix, then stop it.  When you stop the Plavix, start aspirin 81 mg daily.    Keep your other medications the same.  Please call the cardiology clinic if you need refills on any medications.    We would like to see you back in clinic in about 4 months time.  We will send a reminder when it is closer to them.

## 2020-04-24 ENCOUNTER — VIRTUAL VISIT (OUTPATIENT)
Dept: FAMILY MEDICINE | Facility: CLINIC | Age: 70
End: 2020-04-24
Payer: COMMERCIAL

## 2020-04-24 DIAGNOSIS — I48.19 PERSISTENT ATRIAL FIBRILLATION (H): ICD-10-CM

## 2020-04-24 DIAGNOSIS — I48.0 PAROXYSMAL ATRIAL FIBRILLATION (H): ICD-10-CM

## 2020-04-24 DIAGNOSIS — Z12.11 COLON CANCER SCREENING: ICD-10-CM

## 2020-04-24 DIAGNOSIS — I48.20 CHRONIC ATRIAL FIBRILLATION (H): ICD-10-CM

## 2020-04-24 DIAGNOSIS — E78.5 HYPERLIPIDEMIA LDL GOAL <70: ICD-10-CM

## 2020-04-24 DIAGNOSIS — Z12.5 SCREENING FOR PROSTATE CANCER: ICD-10-CM

## 2020-04-24 DIAGNOSIS — I10 BENIGN ESSENTIAL HYPERTENSION: Primary | ICD-10-CM

## 2020-04-24 DIAGNOSIS — I25.10 CORONARY ARTERY DISEASE INVOLVING NATIVE CORONARY ARTERY, ANGINA PRESENCE UNSPECIFIED, UNSPECIFIED WHETHER NATIVE OR TRANSPLANTED HEART: ICD-10-CM

## 2020-04-24 DIAGNOSIS — I42.9 CARDIOMYOPATHY, UNSPECIFIED TYPE (H): ICD-10-CM

## 2020-04-24 PROCEDURE — 99214 OFFICE O/P EST MOD 30 MIN: CPT | Mod: 95 | Performed by: FAMILY MEDICINE

## 2020-04-24 RX ORDER — SIMVASTATIN 40 MG
40 TABLET ORAL EVERY OTHER DAY
Qty: 45 TABLET | Refills: 3 | Status: SHIPPED | OUTPATIENT
Start: 2020-04-24 | End: 2021-04-20

## 2020-04-24 RX ORDER — LISINOPRIL 40 MG/1
40 TABLET ORAL DAILY
Qty: 90 TABLET | Refills: 3 | Status: ON HOLD | OUTPATIENT
Start: 2020-04-24 | End: 2020-11-24

## 2020-04-24 RX ORDER — DIGOXIN 250 MCG
TABLET ORAL
Qty: 70 TABLET | Refills: 3 | Status: SHIPPED | OUTPATIENT
Start: 2020-04-24 | End: 2021-04-20

## 2020-04-24 RX ORDER — METOPROLOL SUCCINATE 200 MG/1
200 TABLET, EXTENDED RELEASE ORAL DAILY
Qty: 90 TABLET | Refills: 3 | Status: SHIPPED | OUTPATIENT
Start: 2020-04-24 | End: 2021-04-20

## 2020-04-24 RX ORDER — WARFARIN SODIUM 7.5 MG/1
TABLET ORAL
Qty: 90 TABLET | Refills: 0 | Status: SHIPPED | OUTPATIENT
Start: 2020-04-24 | End: 2020-08-06

## 2020-04-24 NOTE — PROGRESS NOTES
"Benjamín Hyman is a 69 year old male who is being evaluated via a billable telephone visit.      The patient has been notified of following:     \"This telephone visit will be conducted via a call between you and your physician/provider. We have found that certain health care needs can be provided without the need for a physical exam.  This service lets us provide the care you need with a short phone conversation.  If a prescription is necessary we can send it directly to your pharmacy.  If lab work is needed we can place an order for that and you can then stop by our lab to have the test done at a later time.    Telephone visits are billed at different rates depending on your insurance coverage. During this emergency period, for some insurers they may be billed the same as an in-person visit.  Please reach out to your insurance provider with any questions.    If during the course of the call the physician/provider feels a telephone visit is not appropriate, you will not be charged for this service.\"    Patient has given verbal consent for Telephone visit?  Yes    How would you like to obtain your AVS? Mail a copy    Subjective     Benjamín Hyman is a 69 year old male who presents to clinic today for the following health issues:  Chief Complaint   Patient presents with     Hypertension     Hyperlipidemia     Refill Request     Patient reports weight is 271 lbs  PATIENT reports cardiologist advised him to start 81 mg after the Plavix is complete.     HPI  Hyperlipidemia Follow-Up      Are you regularly taking any medication or supplement to lower your cholesterol?   Yes- simvastatin    Are you having muscle aches or other side effects that you think could be caused by your cholesterol lowering medication?  No    Hypertension Follow-up      Do you check your blood pressure regularly outside of the clinic? No     Are you following a low salt diet? Does the best he can.      Are your blood pressures ever more than 140 " on the top number (systolic) OR more   than 90 on the bottom number (diastolic), for example 140/90? Unknown, not checking       How many servings of fruits and vegetables do you eat daily?  0-1    On average, how many sweetened beverages do you drink each day (Examples: soda, juice, sweet tea, etc.  Do NOT count diet or artificially sweetened beverages)?   0    How many days per week do you exercise enough to make your heart beat faster? 4    How many minutes a day do you exercise enough to make your heart beat faster? 10 - 19    How many days per week do you miss taking your medication? 0         Patient needs to have his medications refilled.  He recently had a cardiology visit and will be going off plavix and starting asa 81 mg a day.    No side effects of his medications.  He did not know he could come into clinic to have labs drawn.  Discussed he needs to have labs done due to his chronic medications and discussed ways to reduce exposure.          Reviewed and updated as needed this visit by Provider  Tobacco  Allergies  Meds  Problems  Med Hx  Surg Hx  Fam Hx         Review of Systems   ROS COMP: CONSTITUTIONAL:NEGATIVE for fever, chills, change in weight  INTEGUMENTARY/SKIN: NEGATIVE for worrisome rashes, moles or lesions  RESP:NEGATIVE for significant cough or SOB  CV: NEGATIVE for chest pain, palpitations or peripheral edema  GI: NEGATIVE for nausea, abdominal pain, heartburn, or change in bowel habits  MUSCULOSKELETAL: NEGATIVE for significant arthralgias or myalgia  NEURO: NEGATIVE for weakness, dizziness or paresthesias  HEME/ALLERGY/IMMUNE: NEGATIVE for bleeding problems  PSYCHIATRIC: NEGATIVE for changes in mood or affect       Objective   Reported vitals:  There were no vitals taken for this visit.   healthy, alert and no distress  PSYCH: Alert and oriented times 3; coherent speech, normal   rate and volume, able to articulate logical thoughts, able   to abstract reason, no tangential  thoughts, no hallucinations   or delusions  His affect is normal  RESP: No cough, no audible wheezing, able to talk in full sentences  Remainder of exam unable to be completed due to telephone visits            Assessment/Plan:  1. Benign essential hypertension  Has been controlled by patient no side effect of med    2. Chronic atrial fibrillation  Taking his warfarin. No side effects and reviewed consist diet, need to check INR  - lisinopril (ZESTRIL) 40 MG tablet; Take 1 tablet (40 mg) by mouth daily  Dispense: 90 tablet; Refill: 3    3. Paroxysmal atrial fibrillation (H)    - Lipid panel reflex to direct LDL Fasting; Future  - Digoxin level; Future    4. Colon cancer screening  Will send FIT  - Fecal colorectal cancer screen (FIT); Future    5. Screening for prostate cancer  Screen prostate cancer  - Prostate spec antigen screen; Future    6. Cardiomyopathy, unspecified type (H)  Check labs  - Lipid panel reflex to direct LDL Fasting; Future    7. Hyperlipidemia LDL goal <70    - Lipid panel reflex to direct LDL Fasting; Future    8. Coronary artery disease involving native coronary artery, angina presence unspecified, unspecified whether native or transplanted heart      9. Persistent atrial fibrillation    - digoxin (LANOXIN) 250 MCG tablet; Take 1 tab on even days & 1/2 tab on odd day of the month. Patient has been on this medication for year & atrial fibrillation is controlled.  Dispense: 70 tablet; Refill: 3  - metoprolol succinate ER (TOPROL-XL) 200 MG 24 hr tablet; Take 1 tablet (200 mg) by mouth daily  Dispense: 90 tablet; Refill: 3  - simvastatin (ZOCOR) 40 MG tablet; Take 1 tablet (40 mg) by mouth every other day  Dispense: 45 tablet; Refill: 3  - warfarin ANTICOAGULANT (COUMADIN) 7.5 MG tablet; TAKE 1 TABLET BY MOUTH ONCE DAILY OR  AS  DIRECTED  BY  THE  ANTICOAGULATION  CLINIC.  Hold on file until needed.  Dispense: 90 tablet; Refill: 0    Return in about 6 months (around 10/24/2020) for Do an Annual  Medicare Wellness Exam.      Phone call duration:  15 minutes    Ruben Teran MD

## 2020-04-24 NOTE — PATIENT INSTRUCTIONS
Please make a fasting lab visit.  You could do this at [place] or the [facility].    I did refill your medications for the year.    We will send out a FIT test.  Please return this in the future.    When you stop your plavix medication please go to aspirin 81 mg enteric coated and take with food to help minimize any gastro intestinal side effects.      Thank you for choosing [facility].  You may be receiving an email and/or telephone survey request from [facility] Customer Experience regarding your visit today.  Please take a few minutes to respond to the survey to let us know how we are doing.      If you have questions or concerns, please contact us via [employer] or you can contact your care team at [phone].    Our Clinic hours are:  Monday 6:40 am  to 7:00 pm  Tuesday -Friday 6:40 am to 5:00 pm    The [place] outpatient lab hours are:  Monday - Friday 6:10 am to 4:45 pm  Saturdays 7:00 am to 11:00 am  Appointments are required, call [phone]    If you have clinical questions after hours or would like to schedule an appointment,  call the clinic at [phone].

## 2020-05-20 ENCOUNTER — ANTICOAGULATION THERAPY VISIT (OUTPATIENT)
Dept: ANTICOAGULATION | Facility: CLINIC | Age: 70
End: 2020-05-20

## 2020-05-20 DIAGNOSIS — I48.0 PAROXYSMAL ATRIAL FIBRILLATION (H): ICD-10-CM

## 2020-05-20 DIAGNOSIS — I48.20 CHRONIC ATRIAL FIBRILLATION (H): ICD-10-CM

## 2020-05-20 DIAGNOSIS — Z79.01 LONG TERM CURRENT USE OF ANTICOAGULANT THERAPY: ICD-10-CM

## 2020-05-20 LAB
CAPILLARY BLOOD COLLECTION: NORMAL
INR PPP: 3.2 (ref 0.86–1.14)

## 2020-05-20 PROCEDURE — 85610 PROTHROMBIN TIME: CPT | Performed by: FAMILY MEDICINE

## 2020-05-20 PROCEDURE — 36416 COLLJ CAPILLARY BLOOD SPEC: CPT | Performed by: FAMILY MEDICINE

## 2020-05-20 PROCEDURE — 99207 ZZC NO CHARGE NURSE ONLY: CPT

## 2020-05-20 NOTE — PROGRESS NOTES
ANTICOAGULATION FOLLOW-UP CLINIC VISIT    Patient Name:  Benjamín Hyman  Date:  5/20/2020  Contact Type:  Telephone    SUBJECTIVE:  Patient Findings     Positives:   Other complaints (Increase in stress)    Comments:   No changes in medications, activity, or diet noted. No concerns with clotting, bleeding, or increased bruising noted. Took warfarin as prescribed.  Patient is to continue maintenance warfarin plan, and check INR in 2 weeks. Discussed that he could add an extra serving of greens (vitamin K) rich foods if he prefers.  Patient educated on the increased risk for bleeding, precautions to take, and when to seek medical attention.  Patient verbalizes understanding and agrees to plan. No further questions or concerns.        Clinical Outcomes     Negatives:   Major bleeding event, Thromboembolic event, Anticoagulation-related hospital admission, Anticoagulation-related ED visit, Anticoagulation-related fatality    Comments:   No changes in medications, activity, or diet noted. No concerns with clotting, bleeding, or increased bruising noted. Took warfarin as prescribed.  Patient is to continue maintenance warfarin plan, and check INR in 2 weeks. Discussed that he could add an extra serving of greens (vitamin K) rich foods if he prefers.  Patient educated on the increased risk for bleeding, precautions to take, and when to seek medical attention.  Patient verbalizes understanding and agrees to plan. No further questions or concerns.           OBJECTIVE    Recent labs: (last 7 days)     05/20/20  0921   INR 3.20*       ASSESSMENT / PLAN  INR assessment SUPRA    Recheck INR In: 2 WEEKS    INR Location Clinic      Anticoagulation Summary  As of 5/20/2020    INR goal:   2.0-3.0   TTR:   75.4 % (1 y)   INR used for dosing:   3.20! (5/20/2020)   Warfarin maintenance plan:   7.5 mg (7.5 mg x 1) every day   Full warfarin instructions:   7.5 mg every day   Weekly warfarin total:   52.5 mg   No change documented:    Kirti Merrill, RN   Plan last modified:   Melia Villagran RN (7/9/2019)   Next INR check:   6/3/2020   Priority:   High   Target end date:   Indefinite    Indications    Atrial fibrillation (H) [I48.91]  Long term current use of anticoagulant therapy [Z79.01]             Anticoagulation Episode Summary     INR check location:       Preferred lab:       Send INR reminders to:   [facility]    Comments:   * 7.5mg tablets. Dr. Teran Ok with 12 week rechecks. 6/5/19 - Plavix after stents, target INR 2.0-2.5      Anticoagulation Care Providers     Provider Role Specialty Phone number    Ruben Teran MD [facility] [phone]            See the Encounter Report to view Anticoagulation Flowsheet and Dosing Calendar (Go to Encounters tab in chart review, and find the Anticoagulation Therapy Visit)        Kirti Merrill RN

## 2020-06-03 ENCOUNTER — ANTICOAGULATION THERAPY VISIT (OUTPATIENT)
Dept: ANTICOAGULATION | Facility: CLINIC | Age: 70
End: 2020-06-03

## 2020-06-03 DIAGNOSIS — Z79.01 LONG TERM CURRENT USE OF ANTICOAGULANT THERAPY: ICD-10-CM

## 2020-06-03 DIAGNOSIS — I48.0 PAROXYSMAL ATRIAL FIBRILLATION (H): ICD-10-CM

## 2020-06-03 DIAGNOSIS — I48.20 CHRONIC ATRIAL FIBRILLATION (H): ICD-10-CM

## 2020-06-03 LAB
CAPILLARY BLOOD COLLECTION: NORMAL
INR PPP: 2.5 (ref 0.86–1.14)

## 2020-06-03 PROCEDURE — 99207 ZZC NO CHARGE NURSE ONLY: CPT

## 2020-06-03 PROCEDURE — 36416 COLLJ CAPILLARY BLOOD SPEC: CPT | Performed by: FAMILY MEDICINE

## 2020-06-03 PROCEDURE — 85610 PROTHROMBIN TIME: CPT | Performed by: FAMILY MEDICINE

## 2020-06-03 NOTE — PROGRESS NOTES
ANTICOAGULATION FOLLOW-UP CLINIC VISIT    Patient Name:  Benjamín Hyman  Date:  6/3/2020  Contact Type:  Telephone    SUBJECTIVE:  Patient Findings     Positives:   Change in diet/appetite (Pt did start eating salads almost every other day)    Comments:   No changes in activity noted. No concerns with clotting, bleeding, or increased bruising noted. Took warfarin as prescribed.  Patient is to continue maintenance warfarin plan, and check INR in 4 weeks. Consistency discussed of vitamin K (greens).  Patient verbalizes understanding and agrees to plan. No further questions or concerns.        Clinical Outcomes     Negatives:   Major bleeding event, Thromboembolic event, Anticoagulation-related hospital admission, Anticoagulation-related ED visit, Anticoagulation-related fatality    Comments:   No changes in activity noted. No concerns with clotting, bleeding, or increased bruising noted. Took warfarin as prescribed.  Patient is to continue maintenance warfarin plan, and check INR in 4 weeks. Consistency discussed of vitamin K (greens).  Patient verbalizes understanding and agrees to plan. No further questions or concerns.           OBJECTIVE    Recent labs: (last 7 days)     20  0755   INR 2.50*       ASSESSMENT / PLAN  INR assessment THER    Recheck INR In: 4 WEEKS    INR Location Clinic      Anticoagulation Summary  As of 6/3/2020    INR goal:   2.0-3.0   TTR:   78.2 % (1 y)   INR used for dosin.50 (6/3/2020)   Warfarin maintenance plan:   7.5 mg (7.5 mg x 1) every day   Full warfarin instructions:   7.5 mg every day   Weekly warfarin total:   52.5 mg   No change documented:   Kirti Merrill RN   Plan last modified:   Melia Villagran RN (2019)   Next INR check:   2020   Priority:   Maintenance   Target end date:   Indefinite    Indications    Atrial fibrillation (H) [I48.91]  Long term current use of anticoagulant therapy [Z79.01]             Anticoagulation Episode Summary     INR check  location:       Preferred lab:       Send INR reminders to:   [facility]    Comments:   * 7.5mg tablets. Dr. Teran Ok with 12 week rechecks. 6/3/2020 discontinued plavix and is taking 81 mg asprin daily      Anticoagulation Care Providers     Provider Role Specialty Phone number    Ruben Teran MD [facility] [phone]            See the Encounter Report to view Anticoagulation Flowsheet and Dosing Calendar (Go to Encounters tab in chart review, and find the Anticoagulation Therapy Visit)        Kirti Merrill RN

## 2020-07-01 ENCOUNTER — DOCUMENTATION ONLY (OUTPATIENT)
Dept: ANTICOAGULATION | Facility: CLINIC | Age: 70
End: 2020-07-01

## 2020-07-01 ENCOUNTER — ANTICOAGULATION THERAPY VISIT (OUTPATIENT)
Dept: ANTICOAGULATION | Facility: CLINIC | Age: 70
End: 2020-07-01

## 2020-07-01 DIAGNOSIS — Z79.01 LONG TERM CURRENT USE OF ANTICOAGULANT THERAPY: ICD-10-CM

## 2020-07-01 DIAGNOSIS — I48.0 PAROXYSMAL ATRIAL FIBRILLATION (H): ICD-10-CM

## 2020-07-01 DIAGNOSIS — I48.20 CHRONIC ATRIAL FIBRILLATION (H): ICD-10-CM

## 2020-07-01 DIAGNOSIS — I48.0 PAROXYSMAL ATRIAL FIBRILLATION (H): Primary | ICD-10-CM

## 2020-07-01 LAB
CAPILLARY BLOOD COLLECTION: NORMAL
INR PPP: 2.9 (ref 0.86–1.14)

## 2020-07-01 PROCEDURE — 85610 PROTHROMBIN TIME: CPT | Performed by: FAMILY MEDICINE

## 2020-07-01 PROCEDURE — 99207 ZZC NO CHARGE NURSE ONLY: CPT

## 2020-07-01 PROCEDURE — 36416 COLLJ CAPILLARY BLOOD SPEC: CPT | Performed by: FAMILY MEDICINE

## 2020-07-01 NOTE — PROGRESS NOTES
ANTICOAGULATION FOLLOW-UP CLINIC VISIT    Patient Name:  Benjamín Hyman  Date:  2020  Contact Type:  Telephone    SUBJECTIVE:  Patient Findings     Comments:   No changes in medications, activity, or diet noted. No concerns with clotting, bleeding, or increased bruising noted. Took warfarin as prescribed.  Patient is to continue maintenance warfarin plan, and check INR in 6 weeks.  Patient verbalizes understanding and agrees to plan. No further questions or concerns.        Clinical Outcomes     Negatives:   Major bleeding event, Thromboembolic event, Anticoagulation-related hospital admission, Anticoagulation-related ED visit, Anticoagulation-related fatality    Comments:   No changes in medications, activity, or diet noted. No concerns with clotting, bleeding, or increased bruising noted. Took warfarin as prescribed.  Patient is to continue maintenance warfarin plan, and check INR in 6 weeks.  Patient verbalizes understanding and agrees to plan. No further questions or concerns.           OBJECTIVE    Recent labs: (last 7 days)     20  0826   INR 2.90*       ASSESSMENT / PLAN  INR assessment THER    Recheck INR In: 6 WEEKS    INR Location Clinic      Anticoagulation Summary  As of 2020    INR goal:   2.0-3.0   TTR:   84.7 % (1 y)   INR used for dosin.90 (2020)   Warfarin maintenance plan:   7.5 mg (7.5 mg x 1) every day   Full warfarin instructions:   7.5 mg every day   Weekly warfarin total:   52.5 mg   No change documented:   Kirti Merrill RN   Plan last modified:   Melia Villagran RN (2019)   Next INR check:   2020   Priority:   Maintenance   Target end date:   Indefinite    Indications    Atrial fibrillation (H) [I48.91]  Long term current use of anticoagulant therapy [Z79.01]             Anticoagulation Episode Summary     INR check location:       Preferred lab:       Send INR reminders to:   [facility]    Comments:   * 7.5mg tablets. Dr. Teran Ok with 12  week rechecks. 6/3/2020 discontinued plavix and is taking 81 mg asprin daily      Anticoagulation Care Providers     Provider Role Specialty Phone number    Ruben Teran MD [facility] [phone]            See the Encounter Report to view Anticoagulation Flowsheet and Dosing Calendar (Go to Encounters tab in chart review, and find the Anticoagulation Therapy Visit)        Kirti Merrill RN

## 2020-07-01 NOTE — PROGRESS NOTES
Please review and sign anticoagulation clinic referral if appropriate.  Thanks.  Kirti Merrill RN on 7/1/2020 at 9:43 AM

## 2020-07-23 ENCOUNTER — TELEPHONE (OUTPATIENT)
Dept: FAMILY MEDICINE | Facility: CLINIC | Age: 70
End: 2020-07-23

## 2020-07-23 NOTE — TELEPHONE ENCOUNTER
Reason for call:    Symptom or request:     Patient called asking for fit test to be mailed to home address.       Best Time:  any    Can we leave a detailed message on this number?  YES     Karis AMOR  Station

## 2020-08-03 DIAGNOSIS — Z12.11 COLON CANCER SCREENING: ICD-10-CM

## 2020-08-03 PROCEDURE — 82274 ASSAY TEST FOR BLOOD FECAL: CPT | Performed by: FAMILY MEDICINE

## 2020-08-06 DIAGNOSIS — I48.19 PERSISTENT ATRIAL FIBRILLATION (H): ICD-10-CM

## 2020-08-06 RX ORDER — WARFARIN SODIUM 7.5 MG/1
TABLET ORAL
Qty: 90 TABLET | Refills: 0
Start: 2020-08-06

## 2020-08-06 RX ORDER — WARFARIN SODIUM 7.5 MG/1
TABLET ORAL
Qty: 100 TABLET | Refills: 0 | Status: SHIPPED | OUTPATIENT
Start: 2020-08-06 | End: 2020-11-13

## 2020-08-08 LAB — HEMOCCULT STL QL IA: NEGATIVE

## 2020-08-12 ENCOUNTER — ANTICOAGULATION THERAPY VISIT (OUTPATIENT)
Dept: ANTICOAGULATION | Facility: CLINIC | Age: 70
End: 2020-08-12

## 2020-08-12 DIAGNOSIS — Z79.01 LONG TERM CURRENT USE OF ANTICOAGULANT THERAPY: ICD-10-CM

## 2020-08-12 DIAGNOSIS — I42.9 CARDIOMYOPATHY, UNSPECIFIED TYPE (H): ICD-10-CM

## 2020-08-12 DIAGNOSIS — I25.10 CORONARY ARTERY DISEASE INVOLVING NATIVE CORONARY ARTERY OF NATIVE HEART WITHOUT ANGINA PECTORIS: ICD-10-CM

## 2020-08-12 DIAGNOSIS — I48.20 CHRONIC ATRIAL FIBRILLATION (H): ICD-10-CM

## 2020-08-12 DIAGNOSIS — I48.0 PAROXYSMAL ATRIAL FIBRILLATION (H): ICD-10-CM

## 2020-08-12 DIAGNOSIS — R06.09 DYSPNEA ON EXERTION: ICD-10-CM

## 2020-08-12 LAB
ANION GAP SERPL CALCULATED.3IONS-SCNC: 3 MMOL/L (ref 3–14)
BUN SERPL-MCNC: 20 MG/DL (ref 7–30)
CALCIUM SERPL-MCNC: 8.4 MG/DL (ref 8.5–10.1)
CHLORIDE SERPL-SCNC: 105 MMOL/L (ref 94–109)
CO2 SERPL-SCNC: 27 MMOL/L (ref 20–32)
CREAT SERPL-MCNC: 0.96 MG/DL (ref 0.66–1.25)
GFR SERPL CREATININE-BSD FRML MDRD: 80 ML/MIN/{1.73_M2}
GLUCOSE SERPL-MCNC: 96 MG/DL (ref 70–99)
INR PPP: 3 (ref 0.86–1.14)
NT-PROBNP SERPL-MCNC: 983 PG/ML (ref 0–125)
POTASSIUM SERPL-SCNC: 5.1 MMOL/L (ref 3.4–5.3)
SODIUM SERPL-SCNC: 135 MMOL/L (ref 133–144)

## 2020-08-12 PROCEDURE — 80061 LIPID PANEL: CPT | Performed by: NURSE PRACTITIONER

## 2020-08-12 PROCEDURE — 83880 ASSAY OF NATRIURETIC PEPTIDE: CPT | Performed by: INTERNAL MEDICINE

## 2020-08-12 PROCEDURE — 80048 BASIC METABOLIC PNL TOTAL CA: CPT | Performed by: INTERNAL MEDICINE

## 2020-08-12 PROCEDURE — 99207 ZZC NO CHARGE NURSE ONLY: CPT

## 2020-08-12 PROCEDURE — 36415 COLL VENOUS BLD VENIPUNCTURE: CPT | Performed by: INTERNAL MEDICINE

## 2020-08-12 PROCEDURE — 84460 ALANINE AMINO (ALT) (SGPT): CPT | Performed by: NURSE PRACTITIONER

## 2020-08-12 PROCEDURE — 85610 PROTHROMBIN TIME: CPT | Performed by: FAMILY MEDICINE

## 2020-08-12 NOTE — RESULT ENCOUNTER NOTE
Results noted: electrolytes and kidney function WNL; BNP elevated. To be discussed at visit with Roseann Porter NP on 8/13/20

## 2020-08-12 NOTE — PROGRESS NOTES
VM left for pt to return call to [facility] [phone]. Dosing instructions not given to pt.  Tentative plan: recheck INR in 6 weeks.  Kirti Merrill RN on 8/12/2020 at 10:10 AM    ANTICOAGULATION FOLLOW-UP CLINIC VISIT    Patient Name:  Benjamín Hyman  Date:  8/12/2020  Contact Type:  Telephone    SUBJECTIVE:  Patient Findings     Positives:   Change in diet/appetite (Increase in veggies - trying to improve diet)    Comments:   No changes in medications or activity noted. No concerns with clotting, bleeding, or increased bruising noted. Took warfarin as prescribed.  Patient is to continue maintenance warfarin plan, and check INR in 6 weeks.  Patient verbalizes understanding and agrees to plan. No further questions or concerns.        Clinical Outcomes     Negatives:   Major bleeding event, Thromboembolic event, Anticoagulation-related hospital admission, Anticoagulation-related ED visit, Anticoagulation-related fatality    Comments:   No changes in medications or activity noted. No concerns with clotting, bleeding, or increased bruising noted. Took warfarin as prescribed.  Patient is to continue maintenance warfarin plan, and check INR in 6 weeks.  Patient verbalizes understanding and agrees to plan. No further questions or concerns.           OBJECTIVE    Recent labs: (last 7 days)     08/12/20  0809   INR 3.00*       ASSESSMENT / PLAN  INR assessment THER    Recheck INR In: 6 WEEKS    INR Location Clinic      Anticoagulation Summary  As of 8/12/2020    INR goal:   2.0-3.0   TTR:   88.3 % (1 y)   INR used for dosing:   3.00 (8/12/2020)   Warfarin maintenance plan:   7.5 mg (7.5 mg x 1) every day   Full warfarin instructions:   7.5 mg every day   Weekly warfarin total:   52.5 mg   No change documented:   Kirti Merrill RN   Plan last modified:   Melia Villagran RN (7/9/2019)   Next INR check:   9/23/2020   Priority:   Maintenance   Target end date:   Indefinite    Indications    Atrial fibrillation (H)  [I48.91]  Long term current use of anticoagulant therapy [Z79.01]  Paroxysmal atrial fibrillation (H) [I48.0]             Anticoagulation Episode Summary     INR check location:       Preferred lab:       Send INR reminders to:   [facility]    Comments:   * 7.5mg tablets. Dr. Teran Ok with 12 week rechecks. 6/3/2020 discontinued plavix and is taking 81 mg asprin daily      Anticoagulation Care Providers     Provider Role Specialty Phone number    Ruben Teran MD Referring [facility] [phone]            See the Encounter Report to view Anticoagulation Flowsheet and Dosing Calendar (Go to Encounters tab in chart review, and find the Anticoagulation Therapy Visit)        Kirti Merrill RN                  - - -

## 2020-08-13 ENCOUNTER — VIRTUAL VISIT (OUTPATIENT)
Dept: CARDIOLOGY | Facility: CLINIC | Age: 70
End: 2020-08-13
Attending: INTERNAL MEDICINE
Payer: COMMERCIAL

## 2020-08-13 VITALS — BODY MASS INDEX: 45.65 KG/M2 | WEIGHT: 270 LBS

## 2020-08-13 DIAGNOSIS — I48.20 CHRONIC ATRIAL FIBRILLATION (H): ICD-10-CM

## 2020-08-13 DIAGNOSIS — I25.10 CORONARY ARTERY DISEASE INVOLVING NATIVE CORONARY ARTERY OF NATIVE HEART WITHOUT ANGINA PECTORIS: ICD-10-CM

## 2020-08-13 DIAGNOSIS — I42.8 NICM (NONISCHEMIC CARDIOMYOPATHY) (H): Primary | ICD-10-CM

## 2020-08-13 DIAGNOSIS — I10 BENIGN ESSENTIAL HYPERTENSION: ICD-10-CM

## 2020-08-13 DIAGNOSIS — E78.5 HYPERLIPIDEMIA LDL GOAL <70: ICD-10-CM

## 2020-08-13 LAB
ALT SERPL W P-5'-P-CCNC: 25 U/L (ref 0–70)
CHOLEST SERPL-MCNC: 127 MG/DL
HDLC SERPL-MCNC: 32 MG/DL
LDLC SERPL CALC-MCNC: 50 MG/DL
NONHDLC SERPL-MCNC: 95 MG/DL
TRIGL SERPL-MCNC: 227 MG/DL

## 2020-08-13 PROCEDURE — 99214 OFFICE O/P EST MOD 30 MIN: CPT | Mod: 95 | Performed by: NURSE PRACTITIONER

## 2020-08-13 NOTE — PROGRESS NOTES
"Benjamín Hyman is a 69 year old male who is being evaluated via a billable telephone visit.      The patient has been notified of following:     \"This telephone visit will be conducted via a call between you and your physician/provider. We have found that certain health care needs can be provided without the need for a physical exam.  This service lets us provide the care you need with a short phone conversation.  If a prescription is necessary we can send it directly to your pharmacy.  If lab work is needed we can place an order for that and you can then stop by our lab to have the test done at a later time.    Telephone visits are billed at different rates depending on your insurance coverage. During this emergency period, for some insurers they may be billed the same as an in-person visit.  Please reach out to your insurance provider with any questions.    If during the course of the call the physician/provider feels a telephone visit is not appropriate, you will not be charged for this service.\"    Patient has given verbal consent for Telephone visit?  Yes    What phone number would you like to be contacted at? [phone]    How would you like to obtain your AVS? Mail a copy    Cary Yoon MA 8/13/2020 8:44 AM    Phone call duration: 22 minutes    BRENNA Craig [facility]      Cardiology Clinic Progress Note  Benjamín Hyman MRN# [id number]   YOB: 1950 Age: 68 year old     Primary Cardiologist:   Dr. Fletcher           History of Presenting Illness:    Benjamín Hyman is a pleasant 68 year old patient with a past cardiac history significant for   1. chronic atrial fibrillation,   2. CAD,   3. Nonischemic cardiomyopathy,   4. hypertension,   5. hyperlipidemia    He has a history of chronic atrial fibrillation starting in 2004.  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 Fib RVR in 2008 with LVEF 35-40%. Pt was seen by Dr. Ernandez in May 2019 in " consultation for abnormal stress test.   He complained of left axillary pressure on exertion for the last 2-3 years. Coronary angiogram 5/31/2019 with ROSY ×1 to the mid LAD with residual 20% mid LCx and he was noted to have small diagonals and a small distal LCx. He was noted to have reduced LVEF but degree of LV dysfunction was more than his degree of coronary disease.    Echocardiogram 7/2019 showed LVEF 32%, moderate RV dysfunction, and 1+ MR.  He complained of mild left-sided chest pressure and mild dyspnea but was not limited in his activities. He was going to be starting cardiac rehabilitation.  If EF did not improve, would consider cardiac MRI to assess etiology of his cardiomyopathy.  Cardiac MRI January 2020 showed borderline LV dilation, EF 31% with global hypokinesis, mild RV hypokinesis, moderate to severe biatrial enlargement, 1-2+ MR, no delayed enhancement.    Patient was last seen by Dr. Fletcher in April 2020 for routine follow-up.  Weights were steady at 272 pounds.  He remained on Plavix.  He had complaints of finger pain overnight.  His Plavix was discontinued and placed on low-dose aspirin.  Given his atrial fibrillation, atrial enlargement, low EF, and possible carpal tunnel syndrome, there was suspicion for amyloidosis.  However given COVID-19 pandemic, work-up was deferred and can be looked into at follow-up. Work-up for amyloidosis, especially TTR type, order serum and urine immunofixation and serum free light chain analysis.  Will consider nuclear PYP scan also.    Pt presents today for 4-month follow-up.  BMP 8/12/2020 showing normal renal function and electrolytes. .  He has not had any lipids checked for over a year. I have added these labs to his draw from yesterday.  Since he was last seen, he is doing well from a cardiac standpoint.  He denies any anginal symptoms and continues with chronic stable dyspnea on exertion.  His weight is up 5 pounds and he believes this is due to  caloric intake as he has not been as active due to COVID-19 pandemic.  He unfortunately has not been checking daily weights and I explained the importance of this.  He denies any heart failure symptoms.  His prior arm pain has resolved.  He is agreeable to having amyloidosis labs for work-up but is hesitant about undergoing the PYP scan at [facility].  I have explained that this is like a CT machine and is not claustrophobic like an MRI.  He will let us know if he is agreeable to this. Patient reports no chest pain, PND, orthopnea, presyncope, syncope, edema, heart racing, or palpitations.      Current Cardiac Medications   Digoxin 250  g alternating with 125 mcg every other day  Lisinopril 40 mg daily  Metoprolol  mg daily  Simvastatin 40 mg every other day   Coumadin                   Assessment and Plan:     Plan  1. Check daily weights and call the clinic if your weight has increased more than 2 lbs in one day or 5 lbs in one week.   2. Can find information regarding amyloidosis on [facility] or American Heart Association website.     Follow-up:  1. Call to schedule lab for amyloidosis work-up and let us know if you are ok with having the scan at [facility]   2. Call to schedule consult with EP cardiologist to discuss defibrillator  3. See Dr. Fletcher for cardiology follow up at [place]: Nov 2020.       1. CAD    Angio 5/2019 ROSY ×1 to the mid LAD with residual 20% mid LCx and he was noted to have small diagonals and a small distal LCx    No angina (prior angina Left axillary pressure on exertion)     Continue statin, aspirin, ACE inhibitor, beta blocker    Could consider imdur for small vessel disease, if needed       2. Nonischemic cardiomyopathy     H/o tachycardia mediated LVEF 35-40% 2008    LVEF 31% CMR 1/2020     Possible amyloidosis but work-up on hold due to COVID-19 pandemic    no signs of heart failure     Dry weight: 270 pounds    continue ACE inhibitor, beta blocker    Unable to add  spironolactone d/t soft BP and higher normal potassium    2000 mg Na diet     Consider BIV ICD for primary prevention, has LBBB      3. Chronic atrial fibrillation    Asymptomatic    Elevated [id number] score  For age, hypertension, CAD, heart failure    Continue metoprolol and digoxin for rate control and Coumadin for anticoagulation      4. hypertension    controlled    Continue lisinopril, metoprolol      5. hyperlipidemia    last LDL 64 on 4/2019    Continue simvastatin 40 mg daily           Thank you for allowing me to participate in this delightful patient's care.      This note was completed in part using Dragon voice recognition software. Although reviewed after completion, some word and grammatical errors may occur.    Roseann Briggs, APRN, CNP           Data:   All laboratory data reviewed        HPI and Plan:   See dictation    Orders Placed This Encounter   Procedures     NM Tc PYP Imaging for Transthyretin Cardiac Amyloidosis     Lipid Profile     ALT     Protein Immunofixation Serum     Protein immunofixation urine     Kappa and lambda light chain     Follow-Up with Electrophysiologist     Follow-Up with Cardiologist       Orders Placed This Encounter   Medications     order for DME     Sig: Equipment being ordered: Digital home blood pressure monitor kit     Dispense:  1 each     Refill:  0       Medications Discontinued During This Encounter   Medication Reason     clopidogrel (PLAVIX) 75 MG tablet Alternate therapy         Encounter Diagnoses   Name Primary?     Coronary artery disease involving native coronary artery of native heart without angina pectoris      Chronic atrial fibrillation (H)      Hyperlipidemia LDL goal <70      NICM (nonischemic cardiomyopathy) (H) Yes     Benign essential hypertension        CURRENT MEDICATIONS:  Current Outpatient Medications   Medication Sig Dispense Refill     digoxin (LANOXIN) 250 MCG tablet Take 1 tab on even days & 1/2 tab on odd day of  the month. Patient has been on this medication for year & atrial fibrillation is controlled. 70 tablet 3     lisinopril (ZESTRIL) 40 MG tablet Take 1 tablet (40 mg) by mouth daily 90 tablet 3     metoprolol succinate ER (TOPROL-XL) 200 MG 24 hr tablet Take 1 tablet (200 mg) by mouth daily 90 tablet 3     omeprazole (PRILOSEC OTC) 20 MG tablet Takes PRN 30 tablet      order for DME Equipment being ordered: Digital home blood pressure monitor kit 1 each 0     simvastatin (ZOCOR) 40 MG tablet Take 1 tablet (40 mg) by mouth every other day 45 tablet 3     warfarin ANTICOAGULANT (COUMADIN) 7.5 MG tablet TAKE 1 TABLET BY MOUTH ONCE DAILY OR  AS  DIRECTED  BY  THE  ANTICOAGULATION  CLINIC 100 tablet 0       ALLERGIES     Allergies   Allergen Reactions     Latex      Adhesive Tape Rash     Reacted to the EKG stickers       PAST MEDICAL HISTORY:  Past Medical History:   Diagnosis Date     Atrial fibrillation (H)      Cardiomyopathy in other diseases classified elsewhere      Chest pain      HLD (hyperlipidemia)      HTN (hypertension)      Ischemia      Morbid obesity (H)        PAST SURGICAL HISTORY:  Past Surgical History:   Procedure Laterality Date     CV CORONARY ANGIOGRAM Left 5/31/2019    Procedure: Coronary Angiogram;  Surgeon: Bogdan Ernandez MD;  Location:  HEART CARDIAC CATH LAB     CV LEFT HEART CATH N/A 5/31/2019    Procedure: Left Heart Cath;  Surgeon: Bogdan Ernandez MD;  Location:  HEART CARDIAC CATH LAB     CV LEFT VENTRICULOGRAM N/A 5/31/2019    Procedure: Left Ventriculogram;  Surgeon: Bogdan Ernandez MD;  Location:  HEART CARDIAC CATH LAB     CV PCI STENT DRUG ELUTING N/A 5/31/2019    Procedure: PCI Stent Drug Eluting;  Surgeon: Bogdan Ernandez MD;  Location:  HEART CARDIAC CATH LAB       FAMILY HISTORY:  Family History   Problem Relation Age of Onset     Cancer - colorectal Mother      C.A.D. Father      Heart Disease Father      Unknown/Adopted Maternal Grandmother       Unknown/Adopted Paternal Grandmother      Cerebrovascular Disease Paternal Grandfather      Depression Daughter        SOCIAL HISTORY:  Social History     Socioeconomic History     Marital status: Single     Spouse name: None     Number of children: None     Years of education: None     Highest education level: None   Occupational History     None   Social Needs     Financial resource strain: None     Food insecurity     Worry: None     Inability: None     Transportation needs     Medical: None     Non-medical: None   Tobacco Use     Smoking status: Former Smoker     Types: Cigarettes     Last attempt to quit: 1996     Years since quittin.6     Smokeless tobacco: Never Used   Substance and Sexual Activity     Alcohol use: Yes     Comment: Hasn't had anything since 2015 Occsional beer, not often.  Quit drinking 4-1-10/  drank around [place] 2015 2-3 beers every other week.      Drug use: No     Sexual activity: Not Currently   Lifestyle     Physical activity     Days per week: None     Minutes per session: None     Stress: None   Relationships     Social connections     Talks on phone: None     Gets together: None     Attends Quaker service: None     Active member of club or organization: None     Attends meetings of clubs or organizations: None     Relationship status: None     Intimate partner violence     Fear of current or ex partner: None     Emotionally abused: None     Physically abused: None     Forced sexual activity: None   Other Topics Concern     Parent/sibling w/ CABG, MI or angioplasty before 65F 55M? Yes     Comment: Father fatal MI at 48yo   Social History Narrative     None       Review of Systems:  Skin:  Negative       Eyes:  Positive for glasses    ENT:  Negative      Respiratory:  Negative       Cardiovascular:  Negative      Gastroenterology: Negative      Genitourinary:  Negative      Musculoskeletal:  Negative      Neurologic:  Positive for numbness  or tingling of hands    Psychiatric:  Negative      Heme/Lymph/Imm:  Negative      Endocrine:  Negative        Physical Exam:  Vitals: Wt 122.5 kg (270 lb)   BMI 45.65 kg/m      Constitutional:           Skin:             Head:           Eyes:           Lymph:      ENT:           Neck:           Respiratory:            Cardiac:                                                           GI:           Extremities and Muscular Skeletal:                 Neurological:           Psych:           CC  No referring provider defined for this encounter.

## 2020-08-13 NOTE — LETTER
8/13/2020    Ruben Teran MD  [address] [facility] [facility] [address]    RE: Benjamín Hyman       Dear Colleague,    I had the pleasure of seeing Benjamín Hyman in the [facility].    Benjamín Hyman is a 69 year old male who is being evaluated via a billable telephone visit.        Cardiology Clinic Progress Note  Benjamín Hyman MRN# [id number]   YOB: 1950 Age: 68 year old     Primary Cardiologist:   Dr. Fletcher           History of Presenting Illness:    Benjamín Hyman is a pleasant 68 year old patient with a past cardiac history significant for   1. chronic atrial fibrillation,   2. CAD,   3. Nonischemic cardiomyopathy,   4. hypertension,   5. hyperlipidemia    He has a history of chronic atrial fibrillation starting in 2004.  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 Fib RVR in 2008 with LVEF 35-40%. Pt was seen by Dr. Ernandez in May 2019 in consultation for abnormal stress test.   He complained of left axillary pressure on exertion for the last 2-3 years. Coronary angiogram 5/31/2019 with ROSY ×1 to the mid LAD with residual 20% mid LCx and he was noted to have small diagonals and a small distal LCx. He was noted to have reduced LVEF but degree of LV dysfunction was more than his degree of coronary disease.    Echocardiogram 7/2019 showed LVEF 32%, moderate RV dysfunction, and 1+ MR.  He complained of mild left-sided chest pressure and mild dyspnea but was not limited in his activities. He was going to be starting cardiac rehabilitation.  If EF did not improve, would consider cardiac MRI to assess etiology of his cardiomyopathy.  Cardiac MRI January 2020 showed borderline LV dilation, EF 31% with global hypokinesis, mild RV hypokinesis, moderate to severe biatrial enlargement, 1-2+ MR, no delayed enhancement.    Patient was last seen by Dr. Fletcher in April 2020 for routine follow-up.  Weights were steady at 272 pounds.  He  remained on Plavix.  He had complaints of finger pain overnight.  His Plavix was discontinued and placed on low-dose aspirin.  Given his atrial fibrillation, atrial enlargement, low EF, and possible carpal tunnel syndrome, there was suspicion for amyloidosis.  However given COVID-19 pandemic, work-up was deferred and can be looked into at follow-up. Work-up for amyloidosis, especially TTR type, order serum and urine immunofixation and serum free light chain analysis.  Will consider nuclear PYP scan also.    Pt presents today for 4-month follow-up.  BMP 8/12/2020 showing normal renal function and electrolytes. .  He has not had any lipids checked for over a year. I have added these labs to his draw from yesterday.  Since he was last seen, he is doing well from a cardiac standpoint.  He denies any anginal symptoms and continues with chronic stable dyspnea on exertion.  His weight is up 5 pounds and he believes this is due to caloric intake as he has not been as active due to COVID-19 pandemic.  He unfortunately has not been checking daily weights and I explained the importance of this.  He denies any heart failure symptoms.  His prior arm pain has resolved.  He is agreeable to having amyloidosis labs for work-up but is hesitant about undergoing the PYP scan at [facility].  I have explained that this is like a CT machine and is not claustrophobic like an MRI.  He will let us know if he is agreeable to this. Patient reports no chest pain, PND, orthopnea, presyncope, syncope, edema, heart racing, or palpitations.      Current Cardiac Medications   Digoxin 250  g alternating with 125 mcg every other day  Lisinopril 40 mg daily  Metoprolol  mg daily  Simvastatin 40 mg every other day   Coumadin                   Assessment and Plan:     Plan  1. Check daily weights and call the clinic if your weight has increased more than 2 lbs in one day or 5 lbs in one week.   2. Can find information regarding amyloidosis on  [facility] or American Heart Association website.     Follow-up:  1. Call to schedule lab for amyloidosis work-up and let us know if you are ok with having the scan at [facility]   2. Call to schedule consult with EP cardiologist to discuss defibrillator  3. See Dr. Fletcher for cardiology follow up at [facility]: Nov 2020.       1. CAD    Angio 5/2019 ROSY ×1 to the mid LAD with residual 20% mid LCx and he was noted to have small diagonals and a small distal LCx    No angina (prior angina Left axillary pressure on exertion)     Continue statin, aspirin, ACE inhibitor, beta blocker    Could consider imdur for small vessel disease, if needed       2. Nonischemic cardiomyopathy     H/o tachycardia mediated LVEF 35-40% 2008    LVEF 31% CMR 1/2020     Possible amyloidosis but work-up on hold due to COVID-19 pandemic    no signs of heart failure     Dry weight: 270 pounds    continue ACE inhibitor, beta blocker    Unable to add spironolactone d/t soft BP and higher normal potassium    2000 mg Na diet     Consider BIV ICD for primary prevention, has LBBB      3. Chronic atrial fibrillation    Asymptomatic    Elevated [id number] score  For age, hypertension, CAD, heart failure    Continue metoprolol and digoxin for rate control and Coumadin for anticoagulation      4. hypertension    controlled    Continue lisinopril, metoprolol      5. hyperlipidemia    last LDL 64 on 4/2019    Continue simvastatin 40 mg daily           Thank you for allowing me to participate in this delightful patient's care.      This note was completed in part using Dragon voice recognition software. Although reviewed after completion, some word and grammatical errors may occur.    Roseann Briggs, APRN, CNP           Data:   All laboratory data reviewed        HPI and Plan:   See dictation    Orders Placed This Encounter   Procedures     NM Tc PYP Imaging for Transthyretin Cardiac Amyloidosis     Lipid Profile     ALT     Protein  Immunofixation Serum     Protein immunofixation urine     Kappa and lambda light chain     Follow-Up with Electrophysiologist     Follow-Up with Cardiologist       Orders Placed This Encounter   Medications     order for DME     Sig: Equipment being ordered: Digital home blood pressure monitor kit     Dispense:  1 each     Refill:  0       Medications Discontinued During This Encounter   Medication Reason     clopidogrel (PLAVIX) 75 MG tablet Alternate therapy         Encounter Diagnoses   Name Primary?     Coronary artery disease involving native coronary artery of native heart without angina pectoris      Chronic atrial fibrillation (H)      Hyperlipidemia LDL goal <70      NICM (nonischemic cardiomyopathy) (H) Yes     Benign essential hypertension        CURRENT MEDICATIONS:  Current Outpatient Medications   Medication Sig Dispense Refill     digoxin (LANOXIN) 250 MCG tablet Take 1 tab on even days & 1/2 tab on odd day of the month. Patient has been on this medication for year & atrial fibrillation is controlled. 70 tablet 3     lisinopril (ZESTRIL) 40 MG tablet Take 1 tablet (40 mg) by mouth daily 90 tablet 3     metoprolol succinate ER (TOPROL-XL) 200 MG 24 hr tablet Take 1 tablet (200 mg) by mouth daily 90 tablet 3     omeprazole (PRILOSEC OTC) 20 MG tablet Takes PRN 30 tablet      order for DME Equipment being ordered: Digital home blood pressure monitor kit 1 each 0     simvastatin (ZOCOR) 40 MG tablet Take 1 tablet (40 mg) by mouth every other day 45 tablet 3     warfarin ANTICOAGULANT (COUMADIN) 7.5 MG tablet TAKE 1 TABLET BY MOUTH ONCE DAILY OR  AS  DIRECTED  BY  THE  ANTICOAGULATION  CLINIC 100 tablet 0       ALLERGIES     Allergies   Allergen Reactions     Latex      Adhesive Tape Rash     Reacted to the EKG stickers       PAST MEDICAL HISTORY:  Past Medical History:   Diagnosis Date     Atrial fibrillation (H)      Cardiomyopathy in other diseases classified elsewhere      Chest pain      HLD  (hyperlipidemia)      HTN (hypertension)      Ischemia      Morbid obesity (H)        PAST SURGICAL HISTORY:  Past Surgical History:   Procedure Laterality Date     CV CORONARY ANGIOGRAM Left 2019    Procedure: Coronary Angiogram;  Surgeon: Bogdan Ernandez MD;  Location:  HEART CARDIAC CATH LAB     CV LEFT HEART CATH N/A 2019    Procedure: Left Heart Cath;  Surgeon: Bogdan Ernandez MD;  Location: [facility] HEART CARDIAC CATH LAB     CV LEFT VENTRICULOGRAM N/A 2019    Procedure: Left Ventriculogram;  Surgeon: Bogdan Ernandez MD;  Location: [facility] HEART CARDIAC CATH LAB     CV PCI STENT DRUG ELUTING N/A 2019    Procedure: PCI Stent Drug Eluting;  Surgeon: Bogdan Ernandez MD;  Location: [facility] HEART CARDIAC CATH LAB       FAMILY HISTORY:  Family History   Problem Relation Age of Onset     Cancer - colorectal Mother      C.A.D. Father      Heart Disease Father      Unknown/Adopted Maternal Grandmother      Unknown/Adopted Paternal Grandmother      Cerebrovascular Disease Paternal Grandfather      Depression Daughter        SOCIAL HISTORY:  Social History     Socioeconomic History     Marital status: Single     Spouse name: None     Number of children: None     Years of education: None     Highest education level: None   Occupational History     None   Social Needs     Financial resource strain: None     Food insecurity     Worry: None     Inability: None     Transportation needs     Medical: None     Non-medical: None   Tobacco Use     Smoking status: Former Smoker     Types: Cigarettes     Last attempt to quit: 1996     Years since quittin.6     Smokeless tobacco: Never Used   Substance and Sexual Activity     Alcohol use: Yes     Comment: Hasn't had anything since 2015 Occsional beer, not often.  Quit drinking 4-1-10/  drank around [place] 2015 2-3 beers every other week.      Drug use: No     Sexual activity: Not Currently   Lifestyle      Physical activity     Days per week: None     Minutes per session: None     Stress: None   Relationships     Social connections     Talks on phone: None     Gets together: None     Attends Yazidi service: None     Active member of club or organization: None     Attends meetings of clubs or organizations: None     Relationship status: None     Intimate partner violence     Fear of current or ex partner: None     Emotionally abused: None     Physically abused: None     Forced sexual activity: None   Other Topics Concern     Parent/sibling w/ CABG, MI or angioplasty before 65F 55M? Yes     Comment: Father fatal MI at 48yo   Social History Narrative     None       Review of Systems:  Skin:  Negative       Eyes:  Positive for glasses    ENT:  Negative      Respiratory:  Negative       Cardiovascular:  Negative      Gastroenterology: Negative      Genitourinary:  Negative      Musculoskeletal:  Negative      Neurologic:  Positive for numbness or tingling of hands    Psychiatric:  Negative      Heme/Lymph/Imm:  Negative      Endocrine:  Negative        Physical Exam:  Vitals: Wt 122.5 kg (270 lb)   BMI 45.65 kg/m      Constitutional:           Skin:             Head:           Eyes:           Lymph:      ENT:           Neck:           Respiratory:            Cardiac:                                                           GI:           Extremities and Muscular Skeletal:                 Neurological:           Psych:             Thank you for allowing me to participate in the care of your patient.    Sincerely,     BRENNA Craig [facility]

## 2020-08-13 NOTE — PATIENT INSTRUCTIONS
Medication Changes:  None     Recommendations:  1. Check daily weights and call the clinic if your weight has increased more than 2 lbs in one day or 5 lbs in one week.   2. Can find information regarding amyloidosis on [facility] or [employer] website.     Follow-up:  1. Call to schedule lab for amyloidosis work-up and let us know if you are ok with having the scan at [facility]   2. Call to schedule consult with EP cardiologist to discuss defibrillator  3. See Dr. Fletcher for cardiology follow up at [place]: Nov 2020. Call in Sept. to schedule.     Cardiology Scheduling~[phone]  Cardiology Clinic RNs~[phone] (Ange Gan RN and Ruby Combs RN)

## 2020-09-23 ENCOUNTER — ANTICOAGULATION THERAPY VISIT (OUTPATIENT)
Dept: ANTICOAGULATION | Facility: CLINIC | Age: 70
End: 2020-09-23

## 2020-09-23 DIAGNOSIS — I42.8 NICM (NONISCHEMIC CARDIOMYOPATHY) (H): ICD-10-CM

## 2020-09-23 DIAGNOSIS — Z79.01 LONG TERM CURRENT USE OF ANTICOAGULANT THERAPY: ICD-10-CM

## 2020-09-23 DIAGNOSIS — I48.0 PAROXYSMAL ATRIAL FIBRILLATION (H): ICD-10-CM

## 2020-09-23 DIAGNOSIS — I48.20 CHRONIC ATRIAL FIBRILLATION (H): ICD-10-CM

## 2020-09-23 LAB — INR PPP: 2.9 (ref 0.86–1.14)

## 2020-09-23 PROCEDURE — 85610 PROTHROMBIN TIME: CPT | Performed by: FAMILY MEDICINE

## 2020-09-23 PROCEDURE — 99207 ZZC NO CHARGE NURSE ONLY: CPT

## 2020-09-23 PROCEDURE — 36415 COLL VENOUS BLD VENIPUNCTURE: CPT | Performed by: FAMILY MEDICINE

## 2020-09-23 PROCEDURE — 82784 ASSAY IGA/IGD/IGG/IGM EACH: CPT | Performed by: FAMILY MEDICINE

## 2020-09-23 PROCEDURE — 83883 ASSAY NEPHELOMETRY NOT SPEC: CPT | Performed by: FAMILY MEDICINE

## 2020-09-23 PROCEDURE — 86334 IMMUNOFIX E-PHORESIS SERUM: CPT | Performed by: FAMILY MEDICINE

## 2020-09-23 PROCEDURE — 86335 IMMUNFIX E-PHORSIS/URINE/CSF: CPT | Performed by: NURSE PRACTITIONER

## 2020-09-23 NOTE — PROGRESS NOTES
ANTICOAGULATION FOLLOW-UP CLINIC VISIT    Patient Name:  Benjamín Hyman  Date:  2020  Contact Type:  Telephone    SUBJECTIVE:  Patient Findings     Comments:   No changes in medications, activity, or diet noted. No concerns with clotting, bleeding, or increased bruising noted. Took warfarin as prescribed.  Patient is to continue maintenance warfarin plan, and check INR in 6 weeks.  Patient verbalizes understanding and agrees to plan. No further questions or concerns.        Clinical Outcomes     Negatives:   Major bleeding event, Thromboembolic event, Anticoagulation-related hospital admission, Anticoagulation-related ED visit, Anticoagulation-related fatality    Comments:   No changes in medications, activity, or diet noted. No concerns with clotting, bleeding, or increased bruising noted. Took warfarin as prescribed.  Patient is to continue maintenance warfarin plan, and check INR in 6 weeks.  Patient verbalizes understanding and agrees to plan. No further questions or concerns.           OBJECTIVE    Recent labs: (last 7 days)     20  0812   INR 2.90*       ASSESSMENT / PLAN  INR assessment THER    Recheck INR In: 6 WEEKS    INR Location Clinic      Anticoagulation Summary  As of 2020    INR goal:   2.0-3.0   TTR:   88.3 % (1 y)   INR used for dosin.90 (2020)   Warfarin maintenance plan:   7.5 mg (7.5 mg x 1) every day   Full warfarin instructions:   7.5 mg every day   Weekly warfarin total:   52.5 mg   No change documented:   Kirti Merrill RN   Plan last modified:   Melia Villagran RN (2019)   Next INR check:   2020   Priority:   Maintenance   Target end date:   Indefinite    Indications    Atrial fibrillation (H) [I48.91]  Long term current use of anticoagulant therapy [Z79.01]  Paroxysmal atrial fibrillation (H) [I48.0]             Anticoagulation Episode Summary     INR check location:       Preferred lab:       Send INR reminders to:   [facility]     Comments:   * 7.5mg tablets. Dr. Teran Ok with 12 week rechecks. 6/3/2020 discontinued plavix and is taking 81 mg asprin daily      Anticoagulation Care Providers     Provider Role Specialty Phone number    Ruben Teran MD Referring [facility] [phone]            See the Encounter Report to view Anticoagulation Flowsheet and Dosing Calendar (Go to Encounters tab in chart review, and find the Anticoagulation Therapy Visit)        Kirti Merrill RN

## 2020-09-24 LAB
IGA SERPL-MCNC: 190 MG/DL (ref 84–499)
IGG SERPL-MCNC: 1530 MG/DL (ref 610–1616)
IGM SERPL-MCNC: 22 MG/DL (ref 35–242)
KAPPA LC UR-MCNC: 2.81 MG/DL (ref 0.33–1.94)
KAPPA LC/LAMBDA SER: 0.99 {RATIO} (ref 0.26–1.65)
LAMBDA LC SERPL-MCNC: 2.83 MG/DL (ref 0.57–2.63)
PROT ELPH PNL UR ELPH: NORMAL
PROT PATTERN SERPL IFE-IMP: ABNORMAL

## 2020-09-25 DIAGNOSIS — I42.8 NICM (NONISCHEMIC CARDIOMYOPATHY) (H): Primary | ICD-10-CM

## 2020-09-25 NOTE — RESULT ENCOUNTER NOTE
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 Needs to schedule EP consult. Will discuss with Roseann Porter NP for further recommendations.

## 2020-09-25 NOTE — RESULT ENCOUNTER NOTE
Pt called back to discuss. He would like me to make an appointment for him and call him back with the date and time. Needs to be after 10/3/20 and mornings are best. LM at Heme/Onc clinic at [place].

## 2020-09-28 NOTE — TELEPHONE ENCOUNTER
RECORDS STATUS - ALL OTHER DIAGNOSIS      RECORDS RECEIVED FROM: INTERNAL   DATE RECEIVED: 09/28/2020   NOTES STATUS DETAILS   OFFICE NOTE from referring provider YES IN [facility]  09/25/2020   OFFICE NOTE from medical oncologist NA    DISCHARGE SUMMARY from hospital NA    DISCHARGE REPORT from the ER NA    OPERATIVE REPORT NA    MEDICATION LIST YES IN [facility]   CLINICAL TRIAL TREATMENTS TO DATE NA    LABS YES 09/23/2020   PATHOLOGY REPORTS NA    ANYTHING RELATED TO DIAGNOSIS NA    GENONOMIC TESTING NA    TYPE:     IMAGING (NEED IMAGES & REPORT) NA

## 2020-10-16 ENCOUNTER — PRE VISIT (OUTPATIENT)
Dept: ONCOLOGY | Facility: CLINIC | Age: 70
End: 2020-10-16

## 2020-10-20 ENCOUNTER — VIRTUAL VISIT (OUTPATIENT)
Dept: ONCOLOGY | Facility: CLINIC | Age: 70
End: 2020-10-20
Payer: COMMERCIAL

## 2020-10-20 DIAGNOSIS — I42.8 NICM (NONISCHEMIC CARDIOMYOPATHY) (H): ICD-10-CM

## 2020-10-20 RX ORDER — ASPIRIN 81 MG/1
81 TABLET ORAL DAILY
COMMUNITY

## 2020-10-20 NOTE — LETTER
"    10/20/2020         RE: Benjamín Hyman  [address] [place] [address] Dr Dolly Pelayo [address] [place] [place] [id number]        Dear Colleague,    Thank you for referring your patient, Benjamín Hyman, to the [facility]. Please see a copy of my visit note below.    Benjamín Hyman is a 69 year old male who is being evaluated via a billable telephone visit.      The patient has been notified of following:     \"This telephone visit will be conducted via a call between you and your physician/provider. We have found that certain health care needs can be provided without the need for a physical exam.  This service lets us provide the care you need with a short phone conversation.  If a prescription is necessary we can send it directly to your pharmacy.  If lab work is needed we can place an order for that and you can then stop by our lab to have the test done at a later time.    Telephone visits are billed at different rates depending on your insurance coverage. During this emergency period, for some insurers they may be billed the same as an in-person visit.  Please reach out to your insurance provider with any questions.    If during the course of the call the physician/provider feels a telephone visit is not appropriate, you will not be charged for this service.\"    Patient has given verbal consent for Telephone visit?  Yes    What phone number would you like to be contacted at?    How would you like to obtain your AVS? Mail a copy      Oncology Rooming Note    October 20, 2020 9:47 AM   Benjamín Hyman is a 69 year old male who presents for:    Chief Complaint   Patient presents with     Hematology     Longterm use of anticoagulants     Initial Vitals: There were no vitals taken for this visit. Estimated body mass index is 45.65 kg/m  as calculated from the following:    Height as of 11/14/19: 1.638 m (5' 4.49\").    Weight as of 8/13/20: 122.5 kg (270 lb). There is no height or weight on file to calculate " BSA.  Data Unavailable Comment: Data Unavailable   No LMP for male patient.  Allergies reviewed: Yes  Medications reviewed: Yes    Medications: Medication refills not needed today.  Pharmacy name entered into [employer]: [facility] [place] [address] [address] [facility]    Clinical concerns: None. Concerns reviewed with Dr. Alonso Kendrick [facility]                  Again, thank you for allowing me to participate in the care of your patient.        Sincerely,        Julio Gupta MD

## 2020-10-20 NOTE — PROGRESS NOTES
"Benjamín Hyman is a 69 year old male who is being evaluated via a billable telephone visit.      The patient has been notified of following:     \"This telephone visit will be conducted via a call between you and your physician/provider. We have found that certain health care needs can be provided without the need for a physical exam.  This service lets us provide the care you need with a short phone conversation.  If a prescription is necessary we can send it directly to your pharmacy.  If lab work is needed we can place an order for that and you can then stop by our lab to have the test done at a later time.    Telephone visits are billed at different rates depending on your insurance coverage. During this emergency period, for some insurers they may be billed the same as an in-person visit.  Please reach out to your insurance provider with any questions.    If during the course of the call the physician/provider feels a telephone visit is not appropriate, you will not be charged for this service.\"    Patient has given verbal consent for Telephone visit?  Yes    What phone number would you like to be contacted at?    How would you like to obtain your AVS? Mail a copy      Oncology Rooming Note    October 20, 2020 9:47 AM   Benjamín Hyman is a 69 year old male who presents for:    Chief Complaint   Patient presents with     Hematology     Longterm use of anticoagulants     Initial Vitals: There were no vitals taken for this visit. Estimated body mass index is 45.65 kg/m  as calculated from the following:    Height as of 11/14/19: 1.638 m (5' 4.49\").    Weight as of 8/13/20: 122.5 kg (270 lb). There is no height or weight on file to calculate BSA.  Data Unavailable Comment: Data Unavailable   No LMP for male patient.  Allergies reviewed: Yes  Medications reviewed: Yes    Medications: Medication refills not needed today.  Pharmacy name entered into [employer]: [facility] - [place], [place] - [address] [place]    Clinical concerns: None. Concerns reviewed with Dr. Alonso Kendrick, [facility]

## 2020-10-20 NOTE — PATIENT INSTRUCTIONS
Patient was called around 10:20 AM.  He did not want to proceed with the visit as we called him later than expected time.  Please cancel this appointment and reschedule the patient for future visit.

## 2020-10-20 NOTE — LETTER
"    10/20/2020         RE: Benjamín Hyman  [address] [place] [address] Dr Dolly Pelayo [address] [place] [phone]        Dear Colleague,    Thank you for referring your patient, Benjamín Hyman, to the [facility]. Please see a copy of my visit note below.    Benjamín Hyman is a 69 year old male who is being evaluated via a billable telephone visit.      The patient has been notified of following:     \"This telephone visit will be conducted via a call between you and your physician/provider. We have found that certain health care needs can be provided without the need for a physical exam.  This service lets us provide the care you need with a short phone conversation.  If a prescription is necessary we can send it directly to your pharmacy.  If lab work is needed we can place an order for that and you can then stop by our lab to have the test done at a later time.    Telephone visits are billed at different rates depending on your insurance coverage. During this emergency period, for some insurers they may be billed the same as an in-person visit.  Please reach out to your insurance provider with any questions.    If during the course of the call the physician/provider feels a telephone visit is not appropriate, you will not be charged for this service.\"    Patient has given verbal consent for Telephone visit?  Yes    What phone number would you like to be contacted at?    How would you like to obtain your AVS? Mail a copy      Oncology Rooming Note    October 20, 2020 9:47 AM   Benjamín Hyman is a 69 year old male who presents for:    Chief Complaint   Patient presents with     Hematology     Longterm use of anticoagulants     Initial Vitals: There were no vitals taken for this visit. Estimated body mass index is 45.65 kg/m  as calculated from the following:    Height as of 11/14/19: 1.638 m (5' 4.49\").    Weight as of 8/13/20: 122.5 kg (270 lb). There is no height or weight on file to calculate " BSA.  Data Unavailable Comment: Data Unavailable   No LMP for male patient.  Allergies reviewed: Yes  Medications reviewed: Yes    Medications: Medication refills not needed today.  Pharmacy name entered into [employer]: [facility] - [place] [address] [address] [facility]    Clinical concerns: None. Concerns reviewed with Dr. Alonso Kendrick [facility]                  Again, thank you for allowing me to participate in the care of your patient.        Sincerely,        Julio Gupta MD

## 2020-10-23 NOTE — TELEPHONE ENCOUNTER
RECORDS STATUS - ALL OTHER DIAGNOSIS      RECORDS RECEIVED FROM: [facility]   DATE RECEIVED: 11/11/2020    NOTES STATUS DETAILS   OFFICE NOTE from referring provider Complete Phi Fletcher MD   OFFICE NOTE from medical oncologist Complete Epic 10/16/2020 Oncology Visit    DISCHARGE SUMMARY from hospital N/A    DISCHARGE REPORT from the ER     OPERATIVE REPORT N.A    MEDICATION LIST Complete [facility]   CLINICAL TRIAL TREATMENTS TO DATE     LABS     PATHOLOGY REPORTS     ANYTHING RELATED TO DIAGNOSIS Complete Labs last updated on 9/23/2020    GENONOMIC TESTING     TYPE:     IMAGING (NEED IMAGES & REPORT)     CT SCANS     MRI     MAMMO     ULTRASOUND     PET

## 2020-11-04 ENCOUNTER — ANTICOAGULATION THERAPY VISIT (OUTPATIENT)
Dept: ANTICOAGULATION | Facility: CLINIC | Age: 70
End: 2020-11-04

## 2020-11-04 DIAGNOSIS — I48.20 CHRONIC ATRIAL FIBRILLATION (H): ICD-10-CM

## 2020-11-04 DIAGNOSIS — Z79.01 LONG TERM CURRENT USE OF ANTICOAGULANT THERAPY: ICD-10-CM

## 2020-11-04 DIAGNOSIS — I48.0 PAROXYSMAL ATRIAL FIBRILLATION (H): ICD-10-CM

## 2020-11-04 LAB
CAPILLARY BLOOD COLLECTION: NORMAL
INR PPP: 3 (ref 0.86–1.14)

## 2020-11-04 PROCEDURE — 36416 COLLJ CAPILLARY BLOOD SPEC: CPT | Performed by: FAMILY MEDICINE

## 2020-11-04 PROCEDURE — 99207 PR NO CHARGE NURSE ONLY: CPT

## 2020-11-04 PROCEDURE — 85610 PROTHROMBIN TIME: CPT | Performed by: FAMILY MEDICINE

## 2020-11-04 NOTE — PROGRESS NOTES
ANTICOAGULATION FOLLOW-UP CLINIC VISIT    Patient Name:  Benjamín Hyman  Date:  11/4/2020  Contact Type:  Telephone    SUBJECTIVE:  Patient Findings     Comments:  No changes in medications, activity, health, or diet noted. No bleeding or increased bruising noted. Took warfarin as prescribed.  Patient will continue weekly maintenance dose. INR is therapeutic.   Recheck in 6 weeks.   Patient verbalizes understanding and agrees to plan. No further questions or concerns.          Clinical Outcomes     Negatives:  Major bleeding event, Thromboembolic event, Anticoagulation-related hospital admission, Anticoagulation-related ED visit, Anticoagulation-related fatality    Comments:  No changes in medications, activity, health, or diet noted. No bleeding or increased bruising noted. Took warfarin as prescribed.  Patient will continue weekly maintenance dose. INR is therapeutic.   Recheck in 6 weeks.   Patient verbalizes understanding and agrees to plan. No further questions or concerns.             OBJECTIVE    Recent labs: (last 7 days)     11/04/20  0812   INR 3.00*       ASSESSMENT / PLAN  INR assessment THER    Recheck INR In: 6 WEEKS    INR Location Outside lab      Anticoagulation Summary  As of 11/4/2020    INR goal:  2.0-3.0   TTR:  88.3 % (1 y)   INR used for dosing:  3.00 (11/4/2020)   Warfarin maintenance plan:  7.5 mg (7.5 mg x 1) every day   Full warfarin instructions:  7.5 mg every day   Weekly warfarin total:  52.5 mg   No change documented:  Horace Barnett RN   Plan last modified:  Melia Villagran RN (7/9/2019)   Next INR check:  12/16/2020   Priority:  Maintenance   Target end date:  Indefinite    Indications    Atrial fibrillation (H) [I48.91]  Long term current use of anticoagulant therapy [Z79.01]  Paroxysmal atrial fibrillation (H) [I48.0]             Anticoagulation Episode Summary     INR check location:      Preferred lab:      Send INR reminders to:  [facility]    Comments:  *  7.5mg tablets. Dr. Teran Ok with 12 week rechecks. 6/3/2020 discontinued plavix and is taking 81 mg asprin daily      Anticoagulation Care Providers     Provider Role Specialty Phone number    Ruben Teran MD Referring Family Medicine [phone]            See the Encounter Report to view Anticoagulation Flowsheet and Dosing Calendar (Go to Encounters tab in chart review, and find the Anticoagulation Therapy Visit)        Horace Barnett RN

## 2020-11-11 ENCOUNTER — PRE VISIT (OUTPATIENT)
Dept: ONCOLOGY | Facility: CLINIC | Age: 70
End: 2020-11-11

## 2020-11-11 ENCOUNTER — VIRTUAL VISIT (OUTPATIENT)
Dept: ONCOLOGY | Facility: CLINIC | Age: 70
End: 2020-11-11
Attending: INTERNAL MEDICINE
Payer: COMMERCIAL

## 2020-11-11 DIAGNOSIS — I42.8 NICM (NONISCHEMIC CARDIOMYOPATHY) (H): Primary | ICD-10-CM

## 2020-11-11 PROCEDURE — 999N001193 HC VIDEO/TELEPHONE VISIT; NO CHARGE

## 2020-11-11 PROCEDURE — 99204 OFFICE O/P NEW MOD 45 MIN: CPT | Mod: TEL | Performed by: INTERNAL MEDICINE

## 2020-11-11 NOTE — PROGRESS NOTES
"Benjamín Hyman is a 69 year old male who is being evaluated via a billable telephone visit.      The patient has been notified of following:     \"This telephone visit will be conducted via a call between you and your physician/provider. We have found that certain health care needs can be provided without the need for a physical exam.  This service lets us provide the care you need with a short phone conversation.  If a prescription is necessary we can send it directly to your pharmacy.  If lab work is needed we can place an order for that and you can then stop by our lab to have the test done at a later time.    Telephone visits are billed at different rates depending on your insurance coverage. During this emergency period, for some insurers they may be billed the same as an in-person visit.  Please reach out to your insurance provider with any questions.    If during the course of the call the physician/provider feels a telephone visit is not appropriate, you will not be charged for this service.\"    Patient has given verbal consent for Telephone visit?  Yes      How would you like to obtain your AVS? Mail a copy      Carol Beth RN    "

## 2020-11-11 NOTE — PATIENT INSTRUCTIONS
Arrange for a bone marrow biopsy if the patient agrees.  Otherwise, follow-up in 3 months with repeat laboratory tests.

## 2020-11-11 NOTE — LETTER
"    11/11/2020         RE: Benjamín Hyman  [address] [place] [address] Dr Dolly Pelayo [address] [place] [id number]        Dear Colleague,    Thank you for referring your patient, Benjamín Hyman, to the [facility]. Please see a copy of my visit note below.    Benjamín Hyman is a 69 year old male who is being evaluated via a billable telephone visit.      The patient has been notified of following:     \"This telephone visit will be conducted via a call between you and your physician/provider. We have found that certain health care needs can be provided without the need for a physical exam.  This service lets us provide the care you need with a short phone conversation.  If a prescription is necessary we can send it directly to your pharmacy.  If lab work is needed we can place an order for that and you can then stop by our lab to have the test done at a later time.    Telephone visits are billed at different rates depending on your insurance coverage. During this emergency period, for some insurers they may be billed the same as an in-person visit.  Please reach out to your insurance provider with any questions.    If during the course of the call the physician/provider feels a telephone visit is not appropriate, you will not be charged for this service.\"    Patient has given verbal consent for Telephone visit?  Yes      How would you like to obtain your AVS? Mail a copy      Carol Beth RN      Hematology/ Oncology Telephone Visit:  Nov 11, 2020    Due to the concerns around COVID-19 and adhering to social distancing we conducted this visit over the telephone.      Reason for Visit:   Chief Complaint   Patient presents with     Hematology     New Patient-  NICM (nonischemic cardiomyopathy)        History of present illness:    Benjamín Hyman 70-year-old male patient was referred for evaluation for cardiac amyloidosis.  Patient is known with long history of chronic atrial fibrillation, " coronary artery disease, cardiomyopathy, hypertension and dyslipidemia.  He has a history of atrial fibrillation since 2004 with reduced ejection fraction echocardiogram in 2008 came back with ejection fraction of 35-40.  I in May 2019 he had abnormal stress test and underwent for an angiogram with a single drug eluting stent to the mid LAD.  Echocardiogram at that time revealed ejection fraction of 52% with moderate RV dysfunction and MR.  Cardiac MRI in January 2020 showed borderline left ventricular dilatation, ejection fraction of 31% with global hypokinesia, mild RV hypokinesis, moderate to severe biatrial enlargement.  Also concerned about cardiac amyloidosis.  The patient had work-up included kappa free light chain at 2.8 lambda free light chain at 2.8 and kappa lambda ratio of 0.99.  IgG level of 1530 IgA 190 and IgM is 22.  Urine shows no monoclonal gammopathy.  Patient denies any recent weight loss or night sweats or fever or chills.  He denies any nausea vomiting or diarrhea.  Denies any bone aches or pains.    Review of systems:  Pertinent positives have been included in HPI; remainder of detailed complete 20-point ROS was negative.    Past medical, social, surgical, and family histories reviewed.    Allergies:  Allergies as of 11/11/2020 - Reviewed 11/11/2020   Allergen Reaction Noted     Latex  10/10/2014     Adhesive tape Rash 04/22/2015       Current Medications:  Current Outpatient Medications   Medication Sig Dispense Refill     [START ON 11/25/2020] dexamethasone (DECADRON) 6 MG tablet Take 1 tablet (6 mg) by mouth daily for 8 days 8 tablet 0        Laboratory/Imaging Studies:  Orders Only on 11/04/2020   Component Date Value Ref Range Status     INR 11/04/2020 3.00* 0.86 - 1.14 Final    Comment: This test is intended for monitoring Coumadin therapy.  Results are not   accurate in patients with prolonged INR due to factor deficiency.       Capillary Blood Collection 11/04/2020 Capillary collection  performed   Final        Recent Results (from the past 744 hour(s))   CT Chest w/o Contrast    Narrative    EXAM: CT CHEST W/O CONTRAST  LOCATION: [facility]  DATE/TIME: 11/23/2020 12:18 AM    INDICATION: Shortness of breath, cough, fever.  COMPARISON: None.  TECHNIQUE: CT chest without IV contrast. Multiplanar reformats were obtained. Dose reduction techniques were used.  CONTRAST: None.    FINDINGS:   LUNGS AND PLEURA: Peripheral predominant nodular ground-glass opacities. No cavitation. Multiple scattered calcified granulomata.    MEDIASTINUM/AXILLAE: Normal size heart. No pericardial effusion. Calcified mediastinal and hilar lymph nodes. Coronary artery calcifications.    UPPER ABDOMEN: Hepatic and splenic granuloma. Partially visualized small bowel containing ventral hernia.    MUSCULOSKELETAL: Unremarkable.      Impression    IMPRESSION:   1.  Multifocal peripheral nodular ground-glass opacities, findings favor infectious or inflammatory etiology, to include COVID-19. Recommend follow-up to resolution.  2.  Scattered calcified granulomata with calcified hilar and mediastinal lymph nodes and hepatic and splenic granulomata consistent with prior granulomatous disease/infection.  3.  Coronary artery disease and atherosclerotic vascular disease.    Imaging features can be seen with (COVID-19)  pneumonia, though are nonspecific and can occur with a variety of infectious and noninfectious processes.    REFERENCE:  Guidelines for Management of Incidental Pulmonary Nodules Detected on CT Images: From the Fleischner Society 2017.   Guidelines apply to incidental nodules in patients who are 35 years or older.  Guidelines do not apply to lung cancer screening, patients with immunosuppression, or patients with known primary cancer.    SUBSOLID NODULES    Multiple  CT at 3-6 months. If stable, consider CT at 2 and 4 years. Management based on the most suspicious nodule.    Consider referral to lung nodule  clinic.     Echocardiogram Complete    Narrative    [id number]  [id number]  [id number]  [id number]^JOSEPH^EVGENY^WENDI           [facility]  Echocardiography Laboratory  [address] [facility].  [place] [id number]        Name: MARIAN MOHAN  MRN: [id number]  : 1950  Study Date: 2020 08:10 AM  Age: 69 yrs  Gender: Male  Patient Location: [facility]  Reason For Study: Syncope and Collapse  Ordering Physician: EVGENY RUIZ  Referring Physician: Ruben Teran  Performed By: Sahra Hernández RDCS     BSA: 2.2 m2  Height: 64 in  Weight: 275 lb  HR: 73  BP: 119/65 mmHg  _____________________________________________________________________________  __        Procedure  Complete Portable Echo Adult. Optison (NDC #[id number]) given intravenously.  Complete Portable Bubble Echo Adult.  _____________________________________________________________________________  __        Interpretation Summary     The left ventricle is mildly dilated.  There is mild global hypokinesia of the left ventricle.  The inferior and inferolateral walls may be more severe hypokinetic, but  images were challenging, even with contrast.  Left ventricular systolic function is mildly reduced.  The visual ejection fraction is estimated at 45-50%.  There is mild (1+) mitral regurgitation.  The rhythm was atrial fibrillation.     Since the previous study, the LV function has improved.  _____________________________________________________________________________  __        Left Ventricle  The left ventricle is mildly dilated. There is normal left ventricular wall  thickness. Diastolic Doppler findings (E/E' ratio and/or other parameters)  suggest left ventricular filling pressures are normal. The visual ejection  fraction is estimated at 45-50%. Left ventricular systolic function is mildly  reduced. There is mild global hypokinesia of the left ventricle. The inferior  and inferolateral walls may be more severe hypokinetic, but images  were  challenging, even with contrast.     Right Ventricle  The right ventricle is normal size. The right ventricular systolic function is  normal.     Atria  The left atrium is moderately dilated. The right atrium is mildly dilated.  Intact atrial septum.     Mitral Valve  There is mild (1+) mitral regurgitation.        Tricuspid Valve  The tricuspid valve is normal in structure and function. There is trace  tricuspid regurgitation. Right ventricular systolic pressure could not be  approximated due to inadequate tricuspid regurgitation. IVC diameter <2.1 cm  collapsing >50% with sniff suggests a normal RA pressure of 3 mmHg.     Aortic Valve  The aortic valve is normal in structure and function. No aortic regurgitation  is present. No aortic stenosis is present.     Pulmonic Valve  The pulmonic valve is not well seen, but is grossly normal. There is trace  pulmonic valvular regurgitation.     Vessels  The aortic root is normal size.     Pericardium  There is no pericardial effusion.        Rhythm  The rhythm was atrial fibrillation.  _____________________________________________________________________________  __  MMode/2D Measurements & Calculations  IVSd: 1.1 cm     LVIDd: 5.9 cm  LVIDs: 4.2 cm  LVPWd: 1.0 cm  FS: 28.9 %  LV mass(C)d: 268.9 grams  LV mass(C)dI: 120.1 grams/m2  Ao root diam: 3.5 cm  LA dimension: 4.9 cm  asc Aorta Diam: 3.3 cm  LA/Ao: 1.4  LA Volume (BP): 101.0 ml  LA Volume Index (BP): 45.1 ml/m2  RWT: 0.35           Doppler Measurements & Calculations  MV E max sneha: 76.5 cm/sec  MV dec time: 0.20 sec  E/E' av.2  Lateral E/e': 6.0  Medial E/e': 8.4           _____________________________________________________________________________  __           Report approved by: Norma Yang 2020 12:43 PM          Assessment and plan:  (I42.8) NICM (nonischemic cardiomyopathy) (H)  (primary encounter diagnosis)  This is a 70-year-old male patient with nonischemic cardiomyopathy.  I discussed  with the patient today the differential diagnosis of cardiomyopathy.  We talked about cardiac myeloid.  Amyloidosis in general.  Today we will check free light chain levels.  We will also check serum for immunofixation.  I will recommend to proceed with bone marrow biopsy and possibly.  I gave the patient overview about potential benefit and risk of bone marrow biopsy.  The patient would think about the procedure and call us back if he decided he will go ahead with the work-up.  Otherwise I will recommend to continue monitoring light chain levels.  I will see the patient again in 3 months or sooner if there are new developments or concerns.    The patient is ready to learn, no apparent learning barriers were identified.  Diagnosis and treatment plans were explained to the patient. The patient expressed understanding of the content. The patient asked appropriate questions. The patient questions were answered to his satisfaction.    Telephone call lasted 45 minutes.    Julio Gupta MD    Chart documentation with Dragon Voice recognition Software. Although reviewed after completion, some words and grammatical errors may remain.                                                      Again, thank you for allowing me to participate in the care of your patient.        Sincerely,        Julio Gupta MD

## 2020-11-11 NOTE — LETTER
"    11/11/2020         RE: Benjamín Hyman  [address] [place] [address] Dr Dolly Pelayo [address] [place] [phone]        Dear Colleague,    Thank you for referring your patient, Benjamín Hyman, to the [facility]. Please see a copy of my visit note below.    Benjamín Hyman is a 69 year old male who is being evaluated via a billable telephone visit.      The patient has been notified of following:     \"This telephone visit will be conducted via a call between you and your physician/provider. We have found that certain health care needs can be provided without the need for a physical exam.  This service lets us provide the care you need with a short phone conversation.  If a prescription is necessary we can send it directly to your pharmacy.  If lab work is needed we can place an order for that and you can then stop by our lab to have the test done at a later time.    Telephone visits are billed at different rates depending on your insurance coverage. During this emergency period, for some insurers they may be billed the same as an in-person visit.  Please reach out to your insurance provider with any questions.    If during the course of the call the physician/provider feels a telephone visit is not appropriate, you will not be charged for this service.\"    Patient has given verbal consent for Telephone visit?  Yes      How would you like to obtain your AVS? Mail a copy      Carol Beth RN      Hematology/ Oncology Telephone Visit:  Nov 11, 2020    Due to the concerns around COVID-19 and adhering to social distancing we conducted this visit over the telephone.      Reason for Visit:   Chief Complaint   Patient presents with     Hematology     New Patient-  NICM (nonischemic cardiomyopathy)        History of present illness:    Benjamín Hyman 70-year-old male patient was referred for evaluation for cardiac amyloidosis.  Patient is known with long history of chronic atrial fibrillation, " coronary artery disease, cardiomyopathy, hypertension and dyslipidemia.  He has a history of atrial fibrillation since 2004 with reduced ejection fraction echocardiogram in 2008 came back with ejection fraction of 35-40.  I in May 2019 he had abnormal stress test and underwent for an angiogram with a single drug eluting stent to the mid LAD.  Echocardiogram at that time revealed ejection fraction of 52% with moderate RV dysfunction and MR.  Cardiac MRI in January 2020 showed borderline left ventricular dilatation, ejection fraction of 31% with global hypokinesia, mild RV hypokinesis, moderate to severe biatrial enlargement.  Also concerned about cardiac amyloidosis.  The patient had work-up included kappa free light chain at 2.8 lambda free light chain at 2.8 and kappa lambda ratio of 0.99.  IgG level of 1530 IgA 190 and IgM is 22.  Urine shows no monoclonal gammopathy.  Patient denies any recent weight loss or night sweats or fever or chills.  He denies any nausea vomiting or diarrhea.  Denies any bone aches or pains.    Review of systems:  Pertinent positives have been included in HPI; remainder of detailed complete 20-point ROS was negative.    Past medical, social, surgical, and family histories reviewed.    Allergies:  Allergies as of 11/11/2020 - Reviewed 11/11/2020   Allergen Reaction Noted     Latex  10/10/2014     Adhesive tape Rash 04/22/2015       Current Medications:  Current Outpatient Medications   Medication Sig Dispense Refill     [START ON 11/25/2020] dexamethasone (DECADRON) 6 MG tablet Take 1 tablet (6 mg) by mouth daily for 8 days 8 tablet 0        Laboratory/Imaging Studies:  Orders Only on 11/04/2020   Component Date Value Ref Range Status     INR 11/04/2020 3.00* 0.86 - 1.14 Final    Comment: This test is intended for monitoring Coumadin therapy.  Results are not   accurate in patients with prolonged INR due to factor deficiency.       Capillary Blood Collection 11/04/2020 Capillary collection  performed   Final        Recent Results (from the past 744 hour(s))   CT Chest w/o Contrast    Narrative    EXAM: CT CHEST W/O CONTRAST  LOCATION: [facility]  DATE/TIME: 11/23/2020 12:18 AM    INDICATION: Shortness of breath, cough, fever.  COMPARISON: None.  TECHNIQUE: CT chest without IV contrast. Multiplanar reformats were obtained. Dose reduction techniques were used.  CONTRAST: None.    FINDINGS:   LUNGS AND PLEURA: Peripheral predominant nodular ground-glass opacities. No cavitation. Multiple scattered calcified granulomata.    MEDIASTINUM/AXILLAE: Normal size heart. No pericardial effusion. Calcified mediastinal and hilar lymph nodes. Coronary artery calcifications.    UPPER ABDOMEN: Hepatic and splenic granuloma. Partially visualized small bowel containing ventral hernia.    MUSCULOSKELETAL: Unremarkable.      Impression    IMPRESSION:   1.  Multifocal peripheral nodular ground-glass opacities, findings favor infectious or inflammatory etiology, to include COVID-19. Recommend follow-up to resolution.  2.  Scattered calcified granulomata with calcified hilar and mediastinal lymph nodes and hepatic and splenic granulomata consistent with prior granulomatous disease/infection.  3.  Coronary artery disease and atherosclerotic vascular disease.    Imaging features can be seen with (COVID-19)  pneumonia, though are nonspecific and can occur with a variety of infectious and noninfectious processes.    REFERENCE:  Guidelines for Management of Incidental Pulmonary Nodules Detected on CT Images: From the Fleischner Society 2017.   Guidelines apply to incidental nodules in patients who are 35 years or older.  Guidelines do not apply to lung cancer screening, patients with immunosuppression, or patients with known primary cancer.    SUBSOLID NODULES    Multiple  CT at 3-6 months. If stable, consider CT at 2 and 4 years. Management based on the most suspicious nodule.    Consider referral to lung nodule  clinic.     Echocardiogram Complete    Narrative    [id number]  [id number]  [id number]  [id number]^JOSEPH^EVGENY^WENDI           [facility]  Echocardiography Laboratory  [address] [facility].  [place] [id number]        Name: MARIAN MOHAN  MRN: [id number]  : 1950  Study Date: 2020 08:10 AM  Age: 69 yrs  Gender: Male  Patient Location: [facility]  Reason For Study: Syncope and Collapse  Ordering Physician: EVGENY RUIZ  Referring Physician: Ruben Teran  Performed By: Sahra Hernández RDCS     BSA: 2.2 m2  Height: 64 in  Weight: 275 lb  HR: 73  BP: 119/65 mmHg  _____________________________________________________________________________  __        Procedure  Complete Portable Echo Adult. Optison (NDC #[id number]) given intravenously.  Complete Portable Bubble Echo Adult.  _____________________________________________________________________________  __        Interpretation Summary     The left ventricle is mildly dilated.  There is mild global hypokinesia of the left ventricle.  The inferior and inferolateral walls may be more severe hypokinetic, but  images were challenging, even with contrast.  Left ventricular systolic function is mildly reduced.  The visual ejection fraction is estimated at 45-50%.  There is mild (1+) mitral regurgitation.  The rhythm was atrial fibrillation.     Since the previous study, the LV function has improved.  _____________________________________________________________________________  __        Left Ventricle  The left ventricle is mildly dilated. There is normal left ventricular wall  thickness. Diastolic Doppler findings (E/E' ratio and/or other parameters)  suggest left ventricular filling pressures are normal. The visual ejection  fraction is estimated at 45-50%. Left ventricular systolic function is mildly  reduced. There is mild global hypokinesia of the left ventricle. The inferior  and inferolateral walls may be more severe hypokinetic, but images  were  challenging, even with contrast.     Right Ventricle  The right ventricle is normal size. The right ventricular systolic function is  normal.     Atria  The left atrium is moderately dilated. The right atrium is mildly dilated.  Intact atrial septum.     Mitral Valve  There is mild (1+) mitral regurgitation.        Tricuspid Valve  The tricuspid valve is normal in structure and function. There is trace  tricuspid regurgitation. Right ventricular systolic pressure could not be  approximated due to inadequate tricuspid regurgitation. IVC diameter <2.1 cm  collapsing >50% with sniff suggests a normal RA pressure of 3 mmHg.     Aortic Valve  The aortic valve is normal in structure and function. No aortic regurgitation  is present. No aortic stenosis is present.     Pulmonic Valve  The pulmonic valve is not well seen, but is grossly normal. There is trace  pulmonic valvular regurgitation.     Vessels  The aortic root is normal size.     Pericardium  There is no pericardial effusion.        Rhythm  The rhythm was atrial fibrillation.  _____________________________________________________________________________  __  MMode/2D Measurements & Calculations  IVSd: 1.1 cm     LVIDd: 5.9 cm  LVIDs: 4.2 cm  LVPWd: 1.0 cm  FS: 28.9 %  LV mass(C)d: 268.9 grams  LV mass(C)dI: 120.1 grams/m2  Ao root diam: 3.5 cm  LA dimension: 4.9 cm  asc Aorta Diam: 3.3 cm  LA/Ao: 1.4  LA Volume (BP): 101.0 ml  LA Volume Index (BP): 45.1 ml/m2  RWT: 0.35           Doppler Measurements & Calculations  MV E max sneha: 76.5 cm/sec  MV dec time: 0.20 sec  E/E' av.2  Lateral E/e': 6.0  Medial E/e': 8.4           _____________________________________________________________________________  __           Report approved by: Norma Yang 2020 12:43 PM          Assessment and plan:  (I42.8) NICM (nonischemic cardiomyopathy) (H)  (primary encounter diagnosis)  This is a 70-year-old male patient with nonischemic cardiomyopathy.  I discussed  with the patient today the differential diagnosis of cardiomyopathy.  We talked about cardiac myeloid.  Amyloidosis in general.  Today we will check free light chain levels.  We will also check serum for immunofixation.  I will recommend to proceed with bone marrow biopsy and possibly.  I gave the patient overview about potential benefit and risk of bone marrow biopsy.  The patient would think about the procedure and call us back if he decided he will go ahead with the work-up.  Otherwise I will recommend to continue monitoring light chain levels.  I will see the patient again in 3 months or sooner if there are new developments or concerns.    The patient is ready to learn, no apparent learning barriers were identified.  Diagnosis and treatment plans were explained to the patient. The patient expressed understanding of the content. The patient asked appropriate questions. The patient questions were answered to his satisfaction.    Telephone call lasted 45 minutes.    Julio Gupta MD    Chart documentation with Dragon Voice recognition Software. Although reviewed after completion, some words and grammatical errors may remain.                                                      Again, thank you for allowing me to participate in the care of your patient.        Sincerely,        Julio Gupta MD

## 2020-11-13 DIAGNOSIS — I48.19 PERSISTENT ATRIAL FIBRILLATION (H): ICD-10-CM

## 2020-11-13 RX ORDER — WARFARIN SODIUM 7.5 MG/1
TABLET ORAL
Qty: 100 TABLET | Refills: 0 | Status: SHIPPED | OUTPATIENT
Start: 2020-11-13 | End: 2021-01-19

## 2020-11-13 NOTE — TELEPHONE ENCOUNTER
Refill sent to pharmacy - informed pt.  Patient verbalizes understanding and agrees to plan. No further questions or concerns.  Kirti Merrill RN on 11/13/2020 at 11:58 AM

## 2020-11-17 ENCOUNTER — PATIENT OUTREACH (OUTPATIENT)
Dept: ONCOLOGY | Facility: CLINIC | Age: 70
End: 2020-11-17

## 2020-11-17 NOTE — PROGRESS NOTES
Called patient to follow up on visit with Dr. Gupta from 11/11/20. Patient is declining bone marrow biopsy at this time as recommended by Dr. Gupta. He will continue with Follow up in 3 months with labs however. Carol Beth RN on 11/17/2020 at 3:06 PM'

## 2020-11-22 ENCOUNTER — HOSPITAL ENCOUNTER (INPATIENT)
Facility: CLINIC | Age: 70
LOS: 1 days | Discharge: HOME | DRG: 177 | End: 2020-11-24
Attending: FAMILY MEDICINE | Admitting: INTERNAL MEDICINE
Payer: MEDICARE

## 2020-11-22 ENCOUNTER — APPOINTMENT (OUTPATIENT)
Dept: CT IMAGING | Facility: CLINIC | Age: 70
DRG: 177 | End: 2020-11-22
Attending: FAMILY MEDICINE
Payer: MEDICARE

## 2020-11-22 DIAGNOSIS — R53.1 GENERALIZED WEAKNESS: ICD-10-CM

## 2020-11-22 DIAGNOSIS — J12.82 PNEUMONIA DUE TO 2019 NOVEL CORONAVIRUS: Primary | ICD-10-CM

## 2020-11-22 DIAGNOSIS — J18.9 PNEUMONIA OF BOTH LUNGS DUE TO INFECTIOUS ORGANISM, UNSPECIFIED PART OF LUNG: ICD-10-CM

## 2020-11-22 DIAGNOSIS — U07.1 PNEUMONIA DUE TO 2019 NOVEL CORONAVIRUS: Primary | ICD-10-CM

## 2020-11-22 PROCEDURE — U0003 INFECTIOUS AGENT DETECTION BY NUCLEIC ACID (DNA OR RNA); SEVERE ACUTE RESPIRATORY SYNDROME CORONAVIRUS 2 (SARS-COV-2) (CORONAVIRUS DISEASE [COVID-19]), AMPLIFIED PROBE TECHNIQUE, MAKING USE OF HIGH THROUGHPUT TECHNOLOGIES AS DESCRIBED BY CMS-2020-01-R: HCPCS | Performed by: FAMILY MEDICINE

## 2020-11-22 PROCEDURE — 99285 EMERGENCY DEPT VISIT HI MDM: CPT | Mod: 25 | Performed by: FAMILY MEDICINE

## 2020-11-22 PROCEDURE — 93005 ELECTROCARDIOGRAM TRACING: CPT | Performed by: FAMILY MEDICINE

## 2020-11-22 PROCEDURE — 96360 HYDRATION IV INFUSION INIT: CPT | Performed by: FAMILY MEDICINE

## 2020-11-22 PROCEDURE — 96361 HYDRATE IV INFUSION ADD-ON: CPT | Performed by: FAMILY MEDICINE

## 2020-11-22 PROCEDURE — 93010 ELECTROCARDIOGRAM REPORT: CPT | Performed by: FAMILY MEDICINE

## 2020-11-22 PROCEDURE — 71250 CT THORAX DX C-: CPT

## 2020-11-22 PROCEDURE — C9803 HOPD COVID-19 SPEC COLLECT: HCPCS | Performed by: FAMILY MEDICINE

## 2020-11-22 PROCEDURE — 258N000003 HC RX IP 258 OP 636: Performed by: FAMILY MEDICINE

## 2020-11-22 RX ORDER — SODIUM CHLORIDE, SODIUM LACTATE, POTASSIUM CHLORIDE, CALCIUM CHLORIDE 600; 310; 30; 20 MG/100ML; MG/100ML; MG/100ML; MG/100ML
INJECTION, SOLUTION INTRAVENOUS CONTINUOUS
Status: DISCONTINUED | OUTPATIENT
Start: 2020-11-22 | End: 2020-11-23

## 2020-11-22 RX ADMIN — SODIUM CHLORIDE, POTASSIUM CHLORIDE, SODIUM LACTATE AND CALCIUM CHLORIDE 1000 ML: 600; 310; 30; 20 INJECTION, SOLUTION INTRAVENOUS at 23:52

## 2020-11-22 ASSESSMENT — ENCOUNTER SYMPTOMS
NEUROLOGICAL NEGATIVE: 1
SORE THROAT: 1
CHILLS: 1
COUGH: 1
ENDOCRINE NEGATIVE: 1
DIAPHORESIS: 1
HEMATOLOGIC/LYMPHATIC NEGATIVE: 1
ALLERGIC/IMMUNOLOGIC NEGATIVE: 1
PSYCHIATRIC NEGATIVE: 1
EYES NEGATIVE: 1
SHORTNESS OF BREATH: 1
FATIGUE: 1
DIARRHEA: 1
ACTIVITY CHANGE: 1
APPETITE CHANGE: 1
NAUSEA: 1
MUSCULOSKELETAL NEGATIVE: 1
CARDIOVASCULAR NEGATIVE: 1

## 2020-11-22 ASSESSMENT — MIFFLIN-ST. JEOR: SCORE: 1905.25

## 2020-11-23 ENCOUNTER — APPOINTMENT (OUTPATIENT)
Dept: CARDIOLOGY | Facility: CLINIC | Age: 70
DRG: 177 | End: 2020-11-23
Attending: INTERNAL MEDICINE
Payer: MEDICARE

## 2020-11-23 PROBLEM — R53.1 GENERALIZED WEAKNESS: Status: ACTIVE | Noted: 2020-11-23

## 2020-11-23 PROBLEM — J18.9 PNEUMONIA OF BOTH LUNGS DUE TO INFECTIOUS ORGANISM, UNSPECIFIED PART OF LUNG: Status: ACTIVE | Noted: 2020-11-23

## 2020-11-23 LAB
ABO + RH BLD: NORMAL
ABO + RH BLD: NORMAL
ALBUMIN SERPL-MCNC: 2.9 G/DL (ref 3.4–5)
ALBUMIN SERPL-MCNC: 3 G/DL (ref 3.4–5)
ALP SERPL-CCNC: 81 U/L (ref 40–150)
ALP SERPL-CCNC: 86 U/L (ref 40–150)
ALT SERPL W P-5'-P-CCNC: 19 U/L (ref 0–70)
ALT SERPL W P-5'-P-CCNC: 19 U/L (ref 0–70)
ANION GAP SERPL CALCULATED.3IONS-SCNC: 5 MMOL/L (ref 3–14)
ANION GAP SERPL CALCULATED.3IONS-SCNC: 7 MMOL/L (ref 3–14)
APTT PPP: 40 SEC (ref 22–37)
AST SERPL W P-5'-P-CCNC: 14 U/L (ref 0–45)
AST SERPL W P-5'-P-CCNC: 19 U/L (ref 0–45)
AT III ACT/NOR PPP CHRO: 80 % (ref 85–135)
BASOPHILS # BLD AUTO: 0 10E9/L (ref 0–0.2)
BASOPHILS # BLD AUTO: 0.1 10E9/L (ref 0–0.2)
BASOPHILS NFR BLD AUTO: 0.5 %
BASOPHILS NFR BLD AUTO: 0.6 %
BILIRUB SERPL-MCNC: 0.4 MG/DL (ref 0.2–1.3)
BILIRUB SERPL-MCNC: 0.4 MG/DL (ref 0.2–1.3)
BUN SERPL-MCNC: 14 MG/DL (ref 7–30)
BUN SERPL-MCNC: 16 MG/DL (ref 7–30)
CALCIUM SERPL-MCNC: 7.8 MG/DL (ref 8.5–10.1)
CALCIUM SERPL-MCNC: 8.2 MG/DL (ref 8.5–10.1)
CHLORIDE SERPL-SCNC: 106 MMOL/L (ref 94–109)
CHLORIDE SERPL-SCNC: 106 MMOL/L (ref 94–109)
CK SERPL-CCNC: 237 U/L (ref 30–300)
CO2 SERPL-SCNC: 22 MMOL/L (ref 20–32)
CO2 SERPL-SCNC: 23 MMOL/L (ref 20–32)
CREAT SERPL-MCNC: 0.97 MG/DL (ref 0.66–1.25)
CREAT SERPL-MCNC: 1.03 MG/DL (ref 0.66–1.25)
CRP SERPL-MCNC: 22.5 MG/L (ref 0–8)
D DIMER PPP FEU-MCNC: 0.3 UG/ML FEU (ref 0–0.5)
DIFFERENTIAL METHOD BLD: ABNORMAL
DIFFERENTIAL METHOD BLD: NORMAL
EOSINOPHIL # BLD AUTO: 0 10E9/L (ref 0–0.7)
EOSINOPHIL # BLD AUTO: 0 10E9/L (ref 0–0.7)
EOSINOPHIL NFR BLD AUTO: 0 %
EOSINOPHIL NFR BLD AUTO: 0 %
ERYTHROCYTE [DISTWIDTH] IN BLOOD BY AUTOMATED COUNT: 12 % (ref 10–15)
ERYTHROCYTE [DISTWIDTH] IN BLOOD BY AUTOMATED COUNT: 12.1 % (ref 10–15)
ETHANOL SERPL-MCNC: <0.01 G/DL
FERRITIN SERPL-MCNC: 356 NG/ML (ref 26–388)
FIBRINOGEN PPP-MCNC: 474 MG/DL (ref 200–420)
GFR SERPL CREATININE-BSD FRML MDRD: 73 ML/MIN/{1.73_M2}
GFR SERPL CREATININE-BSD FRML MDRD: 79 ML/MIN/{1.73_M2}
GLUCOSE SERPL-MCNC: 132 MG/DL (ref 70–99)
GLUCOSE SERPL-MCNC: 135 MG/DL (ref 70–99)
HCT VFR BLD AUTO: 43.7 % (ref 40–53)
HCT VFR BLD AUTO: 44.3 % (ref 40–53)
HGB BLD-MCNC: 14 G/DL (ref 13.3–17.7)
HGB BLD-MCNC: 14.2 G/DL (ref 13.3–17.7)
IMM GRANULOCYTES # BLD: 0 10E9/L (ref 0–0.4)
IMM GRANULOCYTES # BLD: 0 10E9/L (ref 0–0.4)
IMM GRANULOCYTES NFR BLD: 0.3 %
IMM GRANULOCYTES NFR BLD: 0.7 %
INR PPP: 1.99 (ref 0.86–1.14)
INR PPP: 2.3 (ref 0.86–1.14)
LABORATORY COMMENT REPORT: ABNORMAL
LDH SERPL L TO P-CCNC: 158 U/L (ref 85–227)
LYMPHOCYTES # BLD AUTO: 0.9 10E9/L (ref 0.8–5.3)
LYMPHOCYTES # BLD AUTO: 1 10E9/L (ref 0.8–5.3)
LYMPHOCYTES NFR BLD AUTO: 10.9 %
LYMPHOCYTES NFR BLD AUTO: 21.1 %
MAGNESIUM SERPL-MCNC: 2.1 MG/DL (ref 1.6–2.3)
MCH RBC QN AUTO: 30.3 PG (ref 26.5–33)
MCH RBC QN AUTO: 30.8 PG (ref 26.5–33)
MCHC RBC AUTO-ENTMCNC: 31.6 G/DL (ref 31.5–36.5)
MCHC RBC AUTO-ENTMCNC: 32.5 G/DL (ref 31.5–36.5)
MCV RBC AUTO: 95 FL (ref 78–100)
MCV RBC AUTO: 96 FL (ref 78–100)
MONOCYTES # BLD AUTO: 0.2 10E9/L (ref 0–1.3)
MONOCYTES # BLD AUTO: 0.6 10E9/L (ref 0–1.3)
MONOCYTES NFR BLD AUTO: 3.4 %
MONOCYTES NFR BLD AUTO: 6.7 %
NEUTROPHILS # BLD AUTO: 3.2 10E9/L (ref 1.6–8.3)
NEUTROPHILS # BLD AUTO: 7.2 10E9/L (ref 1.6–8.3)
NEUTROPHILS NFR BLD AUTO: 74.3 %
NEUTROPHILS NFR BLD AUTO: 81.5 %
NRBC # BLD AUTO: 0 10*3/UL
NRBC # BLD AUTO: 0 10*3/UL
NRBC BLD AUTO-RTO: 0 /100
NRBC BLD AUTO-RTO: 0 /100
PLATELET # BLD AUTO: 147 10E9/L (ref 150–450)
PLATELET # BLD AUTO: 193 10E9/L (ref 150–450)
POTASSIUM SERPL-SCNC: 4.2 MMOL/L (ref 3.4–5.3)
POTASSIUM SERPL-SCNC: 4.4 MMOL/L (ref 3.4–5.3)
PROCALCITONIN SERPL-MCNC: 0.05 NG/ML
PROT SERPL-MCNC: 6.5 G/DL (ref 6.8–8.8)
PROT SERPL-MCNC: 6.6 G/DL (ref 6.8–8.8)
RBC # BLD AUTO: 4.61 10E12/L (ref 4.4–5.9)
RBC # BLD AUTO: 4.62 10E12/L (ref 4.4–5.9)
RETICS # AUTO: 44.4 10E9/L (ref 25–95)
RETICS/RBC NFR AUTO: 1 % (ref 0.5–2)
SARS-COV-2 RNA SPEC QL NAA+PROBE: NORMAL
SARS-COV-2 RNA SPEC QL NAA+PROBE: POSITIVE
SODIUM SERPL-SCNC: 134 MMOL/L (ref 133–144)
SODIUM SERPL-SCNC: 135 MMOL/L (ref 133–144)
SPECIMEN EXP DATE BLD: NORMAL
SPECIMEN SOURCE: ABNORMAL
SPECIMEN SOURCE: NORMAL
TROPONIN I SERPL-MCNC: <0.015 UG/L (ref 0–0.04)
TROPONIN I SERPL-MCNC: <0.015 UG/L (ref 0–0.04)
WBC # BLD AUTO: 4.4 10E9/L (ref 4–11)
WBC # BLD AUTO: 8.8 10E9/L (ref 4–11)

## 2020-11-23 PROCEDURE — 93306 TTE W/DOPPLER COMPLETE: CPT | Mod: 26 | Performed by: INTERNAL MEDICINE

## 2020-11-23 PROCEDURE — 255N000002 HC RX 255 OP 636: Performed by: INTERNAL MEDICINE

## 2020-11-23 PROCEDURE — 250N000012 HC RX MED GY IP 250 OP 636 PS 637: Performed by: FAMILY MEDICINE

## 2020-11-23 PROCEDURE — 85730 THROMBOPLASTIN TIME PARTIAL: CPT | Performed by: INTERNAL MEDICINE

## 2020-11-23 PROCEDURE — 85610 PROTHROMBIN TIME: CPT | Performed by: INTERNAL MEDICINE

## 2020-11-23 PROCEDURE — 120N000001 HC R&B MED SURG/OB

## 2020-11-23 PROCEDURE — 85025 COMPLETE CBC W/AUTO DIFF WBC: CPT | Performed by: INTERNAL MEDICINE

## 2020-11-23 PROCEDURE — 83615 LACTATE (LD) (LDH) ENZYME: CPT | Performed by: INTERNAL MEDICINE

## 2020-11-23 PROCEDURE — 85384 FIBRINOGEN ACTIVITY: CPT | Performed by: INTERNAL MEDICINE

## 2020-11-23 PROCEDURE — 82728 ASSAY OF FERRITIN: CPT | Performed by: INTERNAL MEDICINE

## 2020-11-23 PROCEDURE — 86140 C-REACTIVE PROTEIN: CPT | Performed by: INTERNAL MEDICINE

## 2020-11-23 PROCEDURE — 96361 HYDRATE IV INFUSION ADD-ON: CPT | Performed by: FAMILY MEDICINE

## 2020-11-23 PROCEDURE — 85025 COMPLETE CBC W/AUTO DIFF WBC: CPT | Performed by: FAMILY MEDICINE

## 2020-11-23 PROCEDURE — 82550 ASSAY OF CK (CPK): CPT | Performed by: INTERNAL MEDICINE

## 2020-11-23 PROCEDURE — 258N000003 HC RX IP 258 OP 636: Performed by: FAMILY MEDICINE

## 2020-11-23 PROCEDURE — 85300 ANTITHROMBIN III ACTIVITY: CPT | Performed by: INTERNAL MEDICINE

## 2020-11-23 PROCEDURE — 80053 COMPREHEN METABOLIC PANEL: CPT | Performed by: FAMILY MEDICINE

## 2020-11-23 PROCEDURE — 99223 1ST HOSP IP/OBS HIGH 75: CPT | Mod: AI | Performed by: INTERNAL MEDICINE

## 2020-11-23 PROCEDURE — 86901 BLOOD TYPING SEROLOGIC RH(D): CPT | Performed by: INTERNAL MEDICINE

## 2020-11-23 PROCEDURE — 85610 PROTHROMBIN TIME: CPT | Performed by: FAMILY MEDICINE

## 2020-11-23 PROCEDURE — 93306 TTE W/DOPPLER COMPLETE: CPT

## 2020-11-23 PROCEDURE — 80320 DRUG SCREEN QUANTALCOHOLS: CPT | Performed by: FAMILY MEDICINE

## 2020-11-23 PROCEDURE — 36415 COLL VENOUS BLD VENIPUNCTURE: CPT | Performed by: INTERNAL MEDICINE

## 2020-11-23 PROCEDURE — 83520 IMMUNOASSAY QUANT NOS NONAB: CPT | Performed by: INTERNAL MEDICINE

## 2020-11-23 PROCEDURE — 250N000012 HC RX MED GY IP 250 OP 636 PS 637: Performed by: INTERNAL MEDICINE

## 2020-11-23 PROCEDURE — 85045 AUTOMATED RETICULOCYTE COUNT: CPT | Performed by: INTERNAL MEDICINE

## 2020-11-23 PROCEDURE — 250N000013 HC RX MED GY IP 250 OP 250 PS 637: Performed by: INTERNAL MEDICINE

## 2020-11-23 PROCEDURE — 84484 ASSAY OF TROPONIN QUANT: CPT | Performed by: FAMILY MEDICINE

## 2020-11-23 PROCEDURE — 83735 ASSAY OF MAGNESIUM: CPT | Performed by: INTERNAL MEDICINE

## 2020-11-23 PROCEDURE — 84145 PROCALCITONIN (PCT): CPT | Performed by: FAMILY MEDICINE

## 2020-11-23 PROCEDURE — 85379 FIBRIN DEGRADATION QUANT: CPT | Performed by: INTERNAL MEDICINE

## 2020-11-23 PROCEDURE — 80053 COMPREHEN METABOLIC PANEL: CPT | Performed by: INTERNAL MEDICINE

## 2020-11-23 PROCEDURE — 86900 BLOOD TYPING SEROLOGIC ABO: CPT | Performed by: INTERNAL MEDICINE

## 2020-11-23 PROCEDURE — 99207 PR CDG-CODE CATEGORY CHANGED: CPT | Performed by: INTERNAL MEDICINE

## 2020-11-23 PROCEDURE — 84484 ASSAY OF TROPONIN QUANT: CPT | Performed by: INTERNAL MEDICINE

## 2020-11-23 RX ORDER — AMOXICILLIN 250 MG
2 CAPSULE ORAL 2 TIMES DAILY
Status: DISCONTINUED | OUTPATIENT
Start: 2020-11-23 | End: 2020-11-24 | Stop reason: HOSPADM

## 2020-11-23 RX ORDER — ONDANSETRON 4 MG/1
4 TABLET, ORALLY DISINTEGRATING ORAL EVERY 6 HOURS PRN
Status: DISCONTINUED | OUTPATIENT
Start: 2020-11-23 | End: 2020-11-23

## 2020-11-23 RX ORDER — WARFARIN SODIUM 7.5 MG/1
7.5 TABLET ORAL
Status: COMPLETED | OUTPATIENT
Start: 2020-11-23 | End: 2020-11-23

## 2020-11-23 RX ORDER — SODIUM CHLORIDE 9 MG/ML
INJECTION, SOLUTION INTRAVENOUS CONTINUOUS
Status: DISCONTINUED | OUTPATIENT
Start: 2020-11-23 | End: 2020-11-23

## 2020-11-23 RX ORDER — SIMVASTATIN 40 MG
40 TABLET ORAL EVERY OTHER DAY
Status: DISCONTINUED | OUTPATIENT
Start: 2020-11-23 | End: 2020-11-24 | Stop reason: HOSPADM

## 2020-11-23 RX ORDER — ASPIRIN 81 MG/1
81 TABLET ORAL DAILY
Status: DISCONTINUED | OUTPATIENT
Start: 2020-11-23 | End: 2020-11-24 | Stop reason: HOSPADM

## 2020-11-23 RX ORDER — PROCHLORPERAZINE 25 MG
12.5 SUPPOSITORY, RECTAL RECTAL EVERY 12 HOURS PRN
Status: DISCONTINUED | OUTPATIENT
Start: 2020-11-23 | End: 2020-11-24 | Stop reason: HOSPADM

## 2020-11-23 RX ORDER — BISACODYL 10 MG
10 SUPPOSITORY, RECTAL RECTAL DAILY PRN
Status: DISCONTINUED | OUTPATIENT
Start: 2020-11-23 | End: 2020-11-24 | Stop reason: HOSPADM

## 2020-11-23 RX ORDER — ONDANSETRON 4 MG/1
4 TABLET, ORALLY DISINTEGRATING ORAL EVERY 6 HOURS PRN
Status: DISCONTINUED | OUTPATIENT
Start: 2020-11-23 | End: 2020-11-24 | Stop reason: HOSPADM

## 2020-11-23 RX ORDER — AMOXICILLIN 250 MG
1 CAPSULE ORAL 2 TIMES DAILY
Status: DISCONTINUED | OUTPATIENT
Start: 2020-11-23 | End: 2020-11-24 | Stop reason: HOSPADM

## 2020-11-23 RX ORDER — METOPROLOL SUCCINATE 100 MG/1
200 TABLET, EXTENDED RELEASE ORAL DAILY
Status: DISCONTINUED | OUTPATIENT
Start: 2020-11-23 | End: 2020-11-24 | Stop reason: HOSPADM

## 2020-11-23 RX ORDER — POLYETHYLENE GLYCOL 3350 17 G/17G
17 POWDER, FOR SOLUTION ORAL DAILY PRN
Status: DISCONTINUED | OUTPATIENT
Start: 2020-11-23 | End: 2020-11-24 | Stop reason: HOSPADM

## 2020-11-23 RX ORDER — ONDANSETRON 2 MG/ML
4 INJECTION INTRAMUSCULAR; INTRAVENOUS EVERY 6 HOURS PRN
Status: DISCONTINUED | OUTPATIENT
Start: 2020-11-23 | End: 2020-11-24 | Stop reason: HOSPADM

## 2020-11-23 RX ORDER — ACETAMINOPHEN 325 MG/1
650 TABLET ORAL EVERY 4 HOURS PRN
Status: DISCONTINUED | OUTPATIENT
Start: 2020-11-23 | End: 2020-11-24 | Stop reason: HOSPADM

## 2020-11-23 RX ORDER — WARFARIN SODIUM 7.5 MG/1
7.5 TABLET ORAL
Status: DISCONTINUED | OUTPATIENT
Start: 2020-11-23 | End: 2020-11-23

## 2020-11-23 RX ORDER — CALCIUM CARBONATE 500 MG/1
1000 TABLET, CHEWABLE ORAL 4 TIMES DAILY PRN
Status: DISCONTINUED | OUTPATIENT
Start: 2020-11-23 | End: 2020-11-24 | Stop reason: HOSPADM

## 2020-11-23 RX ORDER — PROCHLORPERAZINE MALEATE 5 MG
5 TABLET ORAL EVERY 6 HOURS PRN
Status: DISCONTINUED | OUTPATIENT
Start: 2020-11-23 | End: 2020-11-24 | Stop reason: HOSPADM

## 2020-11-23 RX ORDER — AMOXICILLIN 250 MG
1 CAPSULE ORAL 2 TIMES DAILY PRN
Status: DISCONTINUED | OUTPATIENT
Start: 2020-11-23 | End: 2020-11-24 | Stop reason: HOSPADM

## 2020-11-23 RX ORDER — AMOXICILLIN 250 MG
2 CAPSULE ORAL 2 TIMES DAILY PRN
Status: DISCONTINUED | OUTPATIENT
Start: 2020-11-23 | End: 2020-11-24 | Stop reason: HOSPADM

## 2020-11-23 RX ORDER — LISINOPRIL 10 MG/1
40 TABLET ORAL DAILY
Status: DISCONTINUED | OUTPATIENT
Start: 2020-11-23 | End: 2020-11-23

## 2020-11-23 RX ORDER — ONDANSETRON 2 MG/ML
4 INJECTION INTRAMUSCULAR; INTRAVENOUS EVERY 6 HOURS PRN
Status: DISCONTINUED | OUTPATIENT
Start: 2020-11-23 | End: 2020-11-23

## 2020-11-23 RX ORDER — DIGOXIN 125 MCG
250 TABLET ORAL
Status: DISCONTINUED | OUTPATIENT
Start: 2020-11-24 | End: 2020-11-24 | Stop reason: HOSPADM

## 2020-11-23 RX ORDER — DIGOXIN 125 MCG
125 TABLET ORAL
Status: DISCONTINUED | OUTPATIENT
Start: 2020-11-23 | End: 2020-11-24 | Stop reason: HOSPADM

## 2020-11-23 RX ORDER — POLYETHYLENE GLYCOL 3350 17 G/17G
17 POWDER, FOR SOLUTION ORAL DAILY
Status: DISCONTINUED | OUTPATIENT
Start: 2020-11-23 | End: 2020-11-24 | Stop reason: HOSPADM

## 2020-11-23 RX ADMIN — SODIUM CHLORIDE, POTASSIUM CHLORIDE, SODIUM LACTATE AND CALCIUM CHLORIDE: 600; 310; 30; 20 INJECTION, SOLUTION INTRAVENOUS at 01:23

## 2020-11-23 RX ADMIN — HUMAN ALBUMIN MICROSPHERES AND PERFLUTREN 9 ML: 10; .22 INJECTION, SOLUTION INTRAVENOUS at 08:46

## 2020-11-23 RX ADMIN — SIMVASTATIN 40 MG: 40 TABLET, FILM COATED ORAL at 09:01

## 2020-11-23 RX ADMIN — DEXAMETHASONE 6 MG: 2 TABLET ORAL at 01:27

## 2020-11-23 RX ADMIN — DIGOXIN 125 MCG: 125 TABLET ORAL at 09:01

## 2020-11-23 RX ADMIN — WARFARIN SODIUM 7.5 MG: 7.5 TABLET ORAL at 17:36

## 2020-11-23 RX ADMIN — METOPROLOL SUCCINATE 200 MG: 100 TABLET, EXTENDED RELEASE ORAL at 09:00

## 2020-11-23 RX ADMIN — DEXAMETHASONE 6 MG: 2 TABLET ORAL at 09:00

## 2020-11-23 RX ADMIN — ASPIRIN 81 MG: 81 TABLET ORAL at 09:00

## 2020-11-23 RX ADMIN — SODIUM CHLORIDE: 9 INJECTION, SOLUTION INTRAVENOUS at 03:02

## 2020-11-23 ASSESSMENT — ACTIVITIES OF DAILY LIVING (ADL)
ADLS_ACUITY_SCORE: 14
ADLS_ACUITY_SCORE: 16
ADLS_ACUITY_SCORE: 14
ADLS_ACUITY_SCORE: 14
ADLS_ACUITY_SCORE: 16

## 2020-11-23 ASSESSMENT — MIFFLIN-ST. JEOR: SCORE: 1926.25

## 2020-11-23 NOTE — PHARMACY-ANTICOAGULATION SERVICE
Clinical Pharmacy - Warfarin Dosing Consult     Pharmacy has been consulted to manage this patient s warfarin therapy.  Indication: Atrial Fibrillation  Therapy Goal: INR 2-3  Provider/Team: R[facility]  Warfarin Prior to Admission: Yes  Warfarin PTA Regimen: 7.5mg daily    INR   Date Value Ref Range Status   11/23/2020 2.30 (H) 0.86 - 1.14 Final   11/04/2020 3.00 (H) 0.86 - 1.14 Final     Comment:     This test is intended for monitoring Coumadin therapy.  Results are not   accurate in patients with prolonged INR due to factor deficiency.         Recommend warfarin 7.5 mg today.  Pharmacy will monitor Benjamín Hyman daily and order warfarin doses to achieve specified goal.      Please contact pharmacy as soon as possible if the warfarin needs to be held for a procedure or if the warfarin goals change.

## 2020-11-23 NOTE — PLAN OF CARE
Vitals stable; no pain. On room air. Tolerates regular diet. Stand by assist. States he is feeling better. On tele. Plan of care reviewed with patient.

## 2020-11-23 NOTE — ED TRIAGE NOTES
"Cough started about a week ago has had chills but not checked temp progressively weaker and generally not feeling well.    Called EMS as he was feeling lightheaded weak and having trouble getting around    Nausea no emesis    2 days of loose stools     While EMS was in pts home he became lightheaded \"passed out\" and had brief LOC with \"slight twitching\"  Was orientated immediatly after no incontinence no biting of tongue or inside mouth    "

## 2020-11-23 NOTE — ED PROVIDER NOTES
History     Chief Complaint   Patient presents with     Cough     Generalized Weakness     HPI  Benjamín Hyman is a 69 year old male, asked medical history is significant for paroxysmal atrial fibrillation, nonischemic cardiomyopathy, hypertension, ASCVD, morbid obesity, peptic ulcer disease, presents to the emergency department with concerns of cough and generalized weakness.  History is obtained from the patient who arrives by EMS from [place] where he lives alone in his own subsidized housing.  The patient states that beginning about a week ago he started with cough, dry, nonproductive, he has had some chills and sweats throughout the week but not checked his temperature.  At the scene his temperature was 101 per EMS.  He called EMS today because he was feeling lightheaded, had not passed out, EMS assessed him at the scene and were going to allow him to drive down here and then he became lightheaded upon standing and slumped to the ground did not traumatize himself and had some brief twitching that they do not feel was seizure-like in appearance, he aroused quickly afterwards and apparently was not incontinent of stool or urine.  He has no pain complaints at the present time or throughout the course of the past 1 week.  He does note that he has had about 2 days of diarrheal type stools.  He does not notice any change in taste or smell recently.  At no point has he had any chest pain or back pain, no complaints of abdominal pain.  Patient is unaware of any Covid contacts however he does present during coronavirus pandemic.    Allergies:  Allergies   Allergen Reactions     Latex      Adhesive Tape Rash     Reacted to the EKG stickers       Problem List:    Patient Active Problem List    Diagnosis Date Noted     Generalized weakness 11/23/2020     Priority: Medium     Pneumonia of both lungs due to infectious organism, unspecified part of lung 11/23/2020     Priority: Medium     Paroxysmal atrial fibrillation  (H) 07/01/2020     Priority: Medium     NICM (nonischemic cardiomyopathy) (H) 06/12/2019     Priority: Medium     Benign essential hypertension 06/12/2019     Priority: Medium     Status post coronary angiogram 05/31/2019     Priority: Medium     Coronary artery disease involving native coronary artery of native heart without angina pectoris 05/20/2019     Priority: Medium     Added automatically from request for surgery [id number]       Chronic systolic heart failure (H) 05/20/2019     Priority: Medium     Added automatically from request for surgery [id number]       Chronic atrial fibrillation (H) 05/20/2019     Priority: Medium     Added automatically from request for surgery [id number]       Morbid obesity due to excess calories (H) 05/11/2016     Priority: Medium     Stomach ulcer 10/17/2014     Priority: Medium     Advanced directives, counseling/discussion 03/03/2014     Priority: Medium     Patient was given paperwork to fill out and return to [facility]       Hyperlipidemia LDL goal <70 03/03/2014     Priority: Medium     Diagnosis updated by automated process. Provider to review and confirm.       Health Care Home 07/24/2013     Priority: Medium     EMERGENCY CARE PLAN  August 19, 2013: No current Care Coordination follow up planned. Please refer if Care Coordination services are needed.      Presenting Problem Signs and Symptoms Treatment Plan   Questions or concerns   during clinic hours   I will call my clinic directly:  [facility]  [address] [place] [address] [id number] [phone].   Questions or concerns outside clinic hours   I will call the 24 hour nurse line at   [phone] or [facility].   Need to schedule an appointment   I will call the 24 hour scheduling team at [phone] or my clinic directly at [phone].   Same day treatment     I will call my clinic first, nurse line if after hours, urgent care and express care if needed.   Clinic care coordination services (regular clinic  hours)   I will call a clinic care coordinator directly:     Mari Allan RN  [phone]    MICHAEL Simon  [phone]        Or call my clinic at [phone] and ask to speak with care coordination.   Crisis Services: Behavioral or Mental Health  [facility] 24 Hour Phone Line  [phone]    [facility] 24 Hour Crisis Services  [phone]    [facility] (Behavioral Health Providers) Network [phone]    [facility]   [phone]     Emergency treatment -- Immediately    [facility] 911              Umbilical hernia 07/19/2013     Priority: Medium     Immunization (Tdap) not carried out because of patient refusal 01/19/2012     Priority: Medium     FAMILY HISTORY OF GI NEOPLASM 01/15/2008     Priority: Medium     Atrial fibrillation (H) 04/19/2006     Priority: Medium     Long term current use of anticoagulant therapy 04/19/2006     Priority: Medium     Problem list name updated by automated process. Provider to review          Past Medical History:    Past Medical History:   Diagnosis Date     Atrial fibrillation (H)      Cardiomyopathy in other diseases classified elsewhere      Chest pain      HLD (hyperlipidemia)      HTN (hypertension)      Ischemia      Morbid obesity (H)        Past Surgical History:    Past Surgical History:   Procedure Laterality Date     CV CORONARY ANGIOGRAM Left 5/31/2019    Procedure: Coronary Angiogram;  Surgeon: Bogdan Ernandez MD;  Location:  HEART CARDIAC CATH LAB     CV LEFT HEART CATH N/A 5/31/2019    Procedure: Left Heart Cath;  Surgeon: Bogdan Ernandez MD;  Location:  HEART CARDIAC CATH LAB     CV LEFT VENTRICULOGRAM N/A 5/31/2019    Procedure: Left Ventriculogram;  Surgeon: Bogdan Ernandez MD;  Location:  HEART CARDIAC CATH LAB     CV PCI STENT DRUG ELUTING N/A 5/31/2019    Procedure: PCI Stent Drug Eluting;  Surgeon: Bogdan Ernandez MD;  Location:  HEART CARDIAC CATH LAB       Family History:    Family History  "  Problem Relation Age of Onset     Cancer - colorectal Mother      C.A.D. Father      Heart Disease Father      Unknown/Adopted Maternal Grandmother      Unknown/Adopted Paternal Grandmother      Cerebrovascular Disease Paternal Grandfather      Depression Daughter        Social History:  Marital Status:  Single 1  Social History     Tobacco Use     Smoking status: Former Smoker     Types: Cigarettes     Quit date: 1996     Years since quittin.9     Smokeless tobacco: Never Used   Substance Use Topics     Alcohol use: Yes     Comment: Hasn't had anything since 2015 Occsional beer, not often.  Quit drinking 4-1-10/  drank around [place] 2015 2-3 beers every other week.      Drug use: No        Medications:         aspirin 81 MG EC tablet       dexamethasone (DECADRON) 6 MG tablet       digoxin (LANOXIN) 250 MCG tablet       metoprolol succinate ER (TOPROL-XL) 200 MG 24 hr tablet       simvastatin (ZOCOR) 40 MG tablet       warfarin ANTICOAGULANT (COUMADIN) 7.5 MG tablet       omeprazole (PRILOSEC OTC) 20 MG tablet       order for DME          Review of Systems   Constitutional: Positive for activity change, appetite change, chills, diaphoresis and fatigue.   HENT: Positive for congestion and sore throat.    Eyes: Negative.    Respiratory: Positive for cough and shortness of breath.    Cardiovascular: Negative.    Gastrointestinal: Positive for diarrhea and nausea.   Endocrine: Negative.    Genitourinary: Negative.    Musculoskeletal: Negative.    Skin: Negative.    Allergic/Immunologic: Negative.    Neurological: Negative.    Hematological: Negative.    Psychiatric/Behavioral: Negative.        Physical Exam   BP: 100/85  Pulse: 93  Temp: 100.7  F (38.2  C)  Resp: 20  Height: 162.6 cm (5' 4\")  Weight: 122.9 kg (271 lb)  SpO2: 97 %  Lying Orthostatic BP: 121/60  Lying Orthostatic Pulse: 77 bpm  Sitting Orthostatic BP: 128/53  Sitting Orthostatic Pulse: 81 bpm  Standing " Orthostatic BP: 131/53  Standing Orthostatic Pulse: 80 bpm      Physical Exam  Vitals signs and nursing note reviewed.   Constitutional:       General: He is not in acute distress.     Appearance: Normal appearance. He is obese. He is ill-appearing. He is not toxic-appearing or diaphoretic.      Comments: Alert, oriented x3, appears ill but nontoxic.   HENT:      Head: Normocephalic and atraumatic.      Right Ear: Tympanic membrane, ear canal and external ear normal.      Left Ear: Tympanic membrane, ear canal and external ear normal.      Nose: Nose normal.      Mouth/Throat:      Mouth: Mucous membranes are dry.      Pharynx: Oropharynx is clear.   Eyes:      Extraocular Movements: Extraocular movements intact.      Conjunctiva/sclera: Conjunctivae normal.      Pupils: Pupils are equal, round, and reactive to light.   Neck:      Musculoskeletal: Normal range of motion and neck supple.   Cardiovascular:      Rate and Rhythm: Normal rate and regular rhythm.      Pulses: Normal pulses.      Heart sounds: Normal heart sounds.   Pulmonary:      Effort: Pulmonary effort is normal.      Breath sounds: Normal breath sounds.   Abdominal:      General: Bowel sounds are normal.      Palpations: Abdomen is soft.   Skin:     General: Skin is warm and dry.      Capillary Refill: Capillary refill takes less than 2 seconds.   Neurological:      General: No focal deficit present.      Mental Status: He is alert and oriented to person, place, and time.   Psychiatric:         Mood and Affect: Mood normal.         Behavior: Behavior normal.         ED Course        Procedures               EKG Interpretation:      Interpreted by Felton Vang MD  Time reviewed: Time obtained 05 38 time interpreted same atrial fibrillation 87 bpm left axis deviation with left anterior fascicular block, poor R wave progression, Q's in V1 through V3 2 3 aVF.  Comparison cardiogram dated 6/18/2019: Unchanged on comparison.    Labs Ordered and  Resulted from Time of ED Arrival Up to the Time of Departure from the ED   INR - Abnormal; Notable for the following components:       Result Value    INR 2.30 (*)     All other components within normal limits   COMPREHENSIVE METABOLIC PANEL - Abnormal; Notable for the following components:    Glucose 132 (*)     Calcium 7.8 (*)     Albumin 3.0 (*)     Protein Total 6.5 (*)     All other components within normal limits   CBC WITH PLATELETS DIFFERENTIAL   TROPONIN I   ALCOHOL ETHYL     EXAM: CT CHEST W/O CONTRAST  LOCATION: [facility]  DATE/TIME: 11/23/2020 12:18 AM     INDICATION: Shortness of breath, cough, fever.  COMPARISON: None.  TECHNIQUE: CT chest without IV contrast. Multiplanar reformats were obtained. Dose reduction techniques were used.  CONTRAST: None.     FINDINGS:   LUNGS AND PLEURA: Peripheral predominant nodular ground-glass opacities. No cavitation. Multiple scattered calcified granulomata.     MEDIASTINUM/AXILLAE: Normal size heart. No pericardial effusion. Calcified mediastinal and hilar lymph nodes. Coronary artery calcifications.     UPPER ABDOMEN: Hepatic and splenic granuloma. Partially visualized small bowel containing ventral hernia.     MUSCULOSKELETAL: Unremarkable.                                                                      IMPRESSION:   1.  Multifocal peripheral nodular ground-glass opacities, findings favor infectious or inflammatory etiology, to include COVID-19. Recommend follow-up to resolution.  2.  Scattered calcified granulomata with calcified hilar and mediastinal lymph nodes and hepatic and splenic granulomata consistent with prior granulomatous disease/infection.  3.  Coronary artery disease and atherosclerotic vascular disease.     Imaging features can be seen with (COVID-19)  pneumonia, though are nonspecific and can occur with a variety of infectious and noninfectious processes.     REFERENCE:  Guidelines for Management of Incidental Pulmonary Nodules  Detected on CT Images: From the Fleischner Society 2017.   Guidelines apply to incidental nodules in patients who are 35 years or older.  Guidelines do not apply to lung cancer screening, patients with immunosuppression, or patients with known primary cancer.     SUBSOLID NODULES     Multiple  CT at 3-6 months. If stable, consider CT at 2 and 4 years. Management based on the most suspicious nodule.     Consider referral to lung nodule clinic.               Critical Care time:  none               No results found for this or any previous visit (from the past 24 hour(s)).  1:03 AM  Lab diagnostics and imaging reviewed with the patient and he recommended admission to hospital.  He is in agreement with the plan.  1:24 AM  Patient discussed with the on-call hospitalist [facility] for Dr. Felton Morton.    Medications   lactated ringers BOLUS 1,000 mL (0 mLs Intravenous Stopped 11/23/20 0123)   dexamethasone (DECADRON) tablet 6 mg (6 mg Oral Given 11/23/20 0127)   warfarin ANTICOAGULANT (COUMADIN) tablet 7.5 mg (7.5 mg Oral Given 11/23/20 1736)   perflutren diluted 1mL to 2mL with saline (OPTISON) diluted injection 2 mL (9 mLs Intravenous Given 11/23/20 0846)   sodium chloride (PF) 0.9% PF flush 10 mL (10 mLs Intravenous Given 11/23/20 0847)       Assessments & Plan (with Medical Decision Making)   70-year-old male past medical history reviewed as above presents to the emergency department concerns of cough and generalized weakness with associated symptoms as discussed in the HPI.  Probable presyncopal/syncopal episode at home witnessed by paramedics without trauma, spots of afterwards, no evidence really to support seizure at the time, no bowel or bladder incontinence.  On exam the patient is alert and in no acute distress appears ill but nontoxic.  The patient is normally hypertensive and he is running pressures between 100-1 15 systolic without sustained tachycardia, borderline around 90 to 100 bpm.  Temp of  100.7 at presentation acceptable oxygen saturation on room air.  CT of the chest is concerning for the possibility of Covid type pneumonia.  No abnormalities with his hemogram, cardiac troponin, no abnormal LFTs.  Covid swab is pending at the time of his admission although I strongly suspect based upon his historical features, exam features and CT that this gentleman has a Covid pneumonia.  He was discussed here with our hospitalist will be admitted to hospital.  Transition orders placed by myself in the emergency department.  The patient did receive 6 mg of oral dexamethasone while in the emergency department prior to admission.      Disclaimer: This note consists of symbols derived from keyboarding, dictation and/or voice recognition software. As a result, there may be errors in the script that have gone undetected. Please consider this when interpreting information found in this chart.      I have reviewed the nursing notes.    I have reviewed the findings, diagnosis, plan and need for follow up with the patient.       Discharge Medication List as of 11/24/2020 11:08 AM      START taking these medications    Details   dexamethasone (DECADRON) 6 MG tablet Take 1 tablet (6 mg) by mouth daily for 8 days, Disp-8 tablet, R-0, E-Prescribe             Final diagnoses:   Pneumonia of both lungs due to infectious organism, unspecified part of lung - Suspect Covid pneumonia   Generalized weakness       11/22/2020   [facility] EMERGENCY DEPT     Felton Vang MD  11/25/20 6070

## 2020-11-23 NOTE — UTILIZATION REVIEW
Admission Status; Secondary Review Determination    Under the authority of the Utilization Management Committee, the utilization review process indicated a secondary review on the above patient. The review outcome is based on review of the medical records, discussions with staff, and applying clinical experience noted on the date of the review.    (x) Inpatient Status Appropriate - This patient's medical care is consistent with medical management for inpatient care and reasonable inpatient medical practice.    RATIONALE FOR DETERMINATION: 69-year-old male who presents with findings consistent with acute COVID-19 illness manifesting with generalized weakness, syncope as well as wheezing and COVID-19 consistent infiltrates on imaging.  Patient having recurrent high fever and initiated on COVID-19 treatment.  Patient has significant comorbidities of hypertension, coronary artery disease with stent placement as well as significant nonischemic cardiomyopathy with ejection fraction less than 35%.  Patient thus expected require greater than 2 nights in the hospital appropriate for inpatient management.    At the time of admission with the information available to the attending physician more than 2 nights Hospital complex care was anticipated, based on patient risk of adverse outcome if treated as outpatient and complex care required. Inpatient admission is appropriate based on the Medicare guidelines.    This document was produced using voice recognition software    The information on this document is developed by the utilization review team in order for the business office to ensure compliance. This only denotes the appropriateness of proper admission status and does not reflect the quality of care rendered.    The definitions of Inpatient Status and Observation Status used in making the determination above are those provided in the CMS Coverage Manual, Chapter 1 and Chapter 6, section 70.4.    Sincerely,    Marcial  MD Emi  Utilization Review  Physician Advisor  [facility].

## 2020-11-23 NOTE — H&P
[facility]     History and Physical - Hospitalist Service       Date of Admission:  11/22/2020    Assessment & Plan   Benjamín Hyman is a 69 year old male who was admitted on 11/22/2020 with syncope and weakness due to COVID-19 infection.    Probable COVID-19 infection  Generalized weakness  CT scan the emergency department demonstrates characteristic multifocal peripheral nodular groundglass opacities.  COVID-19 PCR is pending.  Patient's been admitted to the hospital and placed on continuous pulse oximetry.  He was given a dose of dexamethasone in the emergency department, will continue dexamethasone to complete a 10-day course.  Hold on remdesivir due to lack of hypoxia.  -The patient will be treated empirically for COVID-19  -Procalcitonin 0.05, indicating low risk of bacterial infection, will not start antibiotics  -Continuous pulse oximetry  -Albuterol MDI as needed  -Supplemental oxygen to maintain saturations between 90 to 96%  -Avoid BiPAP and nebulized medications  -Covid labs per protocol    Syncope  I suspect this is due to hypovolemia.  Will trend troponins and place patient on telemetry.  Initial troponin is undetectable.  Echocardiogram is been ordered.  Given patient's history of coronary artery disease and heart failure, he is at increased risk for ventricular tachycardia.  Consider discussion with electrophysiology.    Chronic atrial fibrillation  [id number] score is 3 due to gender, age, hypertension, and heart failure.  Continue anticoagulation with Coumadin, dosing per pharmacy.  INR on admission is 2.30.  -Continue Toprol and digoxin for rate control  -Patient's been placed on telemetry due to recent syncope    Hypertension  Hyperlipidemia  Coronary artery disease status post drug-eluting stent placement to the mid LAD  -Continue aspirin, Toprol, and simvastatin.  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  -The patient was likely volume depleted on presentation to the emergency department.  He appears to be euvolemic currently, so I will discontinue IV fluids.  -Repeat echocardiogram due to syncope  -Continue prior to admission Toprol  -Hold lisinopril due to low normal blood pressure and recent syncope, consider restarting as blood pressure increases    Peptic ulcer disease  Continue prior to admission omeprazole     Diet: Combination Diet Low Saturated Fat Na <2400mg Diet    DVT Prophylaxis: Warfarin  Johnson Catheter: not present  Code Status: Full Code      Disposition Plan   Expected discharge:  Expected discharge in 1 to 2 days days once syncope evaluated and mobilizing better.  Entered: Moisés Kim MD 11/23/2020, 7:18 AM       Moisés Kim MD  [facility]     ______________________________________________________________________    Chief Complaint   Chief Complaint   Patient presents with     Cough     Generalized Weakness        History is obtained from the patient    History of Present Illness   Benjamín Hyman is a 69 year old male who presented to the emergency room with generalized weakness and syncope.  Patient states that he started feeling ill last Tuesday, 6 days ago.  Initially he developed a nonproductive cough and a slight wheeze.  A day or 2 later he developed runny nose and a tickle in the back of his throat.  He also had several days of watery nonbloody diarrhea.  He denies dysuria hematuria.  He denies abdominal pain.  Patient denies fevers and chills.  Patient also complained of generalized muscle aches.  He denies focal numbness  or weakness.  Patient states that over the last week it became increasingly difficult for him to move about in his apartment, due to lightheadedness and weakness.  He denies chest pain, pressure, or palpitations.  Patient denies loss of taste or smell, and appetite remain normal.  He denies nausea or vomiting.  Yesterday, patient called EMS due to weakness.  On arrival, patient's temperature was 101 per EMS.  Patient was allowed to drive the emergency department, on arrival, patient became lightheaded with standing and slumped to the ground.  He had some brief twitching that did not appear seizure-like.  No loss of bowel or bladder.  He regained full consciousness relatively quickly.  He denies headache, neck pain, or back pain.  Patient is admitted for further evaluation.    Review of Systems    The 10 point Review of Systems is negative other than noted in the HPI or here.     Past Medical History    I have reviewed this patient's medical history and updated it with pertinent information if needed.   Past Medical History:   Diagnosis Date     Atrial fibrillation (H)      Cardiomyopathy in other diseases classified elsewhere      Chest pain      HLD (hyperlipidemia)      HTN (hypertension)      Ischemia      Morbid obesity (H)        Patient Active Problem List    Diagnosis Date Noted     Generalized weakness 11/23/2020     Priority: Medium     Pneumonia of both lungs due to infectious organism, unspecified part of lung 11/23/2020     Priority: Medium     Paroxysmal atrial fibrillation (H) 07/01/2020     Priority: Medium     NICM (nonischemic cardiomyopathy) (H) 06/12/2019     Priority: Medium     Benign essential hypertension 06/12/2019     Priority: Medium     Status post coronary angiogram 05/31/2019     Priority: Medium     Coronary artery disease involving native coronary artery of native heart without angina pectoris 05/20/2019     Priority: Medium     Added automatically from request for surgery [id number]        Chronic systolic heart failure (H) 05/20/2019     Priority: Medium     Added automatically from request for surgery [id number]       Chronic atrial fibrillation (H) 05/20/2019     Priority: Medium     Added automatically from request for surgery [id number]       Morbid obesity due to excess calories (H) 05/11/2016     Priority: Medium     Stomach ulcer 10/17/2014     Priority: Medium     Advanced directives, counseling/discussion 03/03/2014     Priority: Medium     Patient was given paperwork to fill out and return to [facility]       Hyperlipidemia LDL goal <70 03/03/2014     Priority: Medium     Diagnosis updated by automated process. Provider to review and confirm.       Health Care Home 07/24/2013     Priority: Medium     EMERGENCY CARE PLAN  August 19, 2013: No current Care Coordination follow up planned. Please refer if Care Coordination services are needed.      Presenting Problem Signs and Symptoms Treatment Plan   Questions or concerns   during clinic hours   I will call my clinic directly:  [facility]  [address] [place] [id number]  [phone].   Questions or concerns outside clinic hours   I will call the 24 hour nurse line at   [phone] or [facility].   Need to schedule an appointment   I will call the 24 hour scheduling team at [phone] or my clinic directly at [phone].   Same day treatment     I will call my clinic first, nurse line if after hours, urgent care and express care if needed.   Clinic care coordination services (regular clinic hours)   I will call a clinic care coordinator directly:     Mari Allan RN  [phone]    MICHAEL Simon  [phone]        Or call my clinic at [phone] and ask to speak with care coordination.   Crisis Services: Behavioral or Mental Health  [facility] 24 Hour Phone Line  [phone]    [facility] 24 Hour Crisis Services  [phone]    [facility] [phone]    [place]  [facility]   [phone]     Emergency treatment -- Immediately    [facility] 911              Umbilical hernia 2013     Priority: Medium     Immunization (Tdap) not carried out because of patient refusal 2012     Priority: Medium     FAMILY HISTORY OF GI NEOPLASM 01/15/2008     Priority: Medium     Atrial fibrillation (H) 2006     Priority: Medium     Long term current use of anticoagulant therapy 2006     Priority: Medium     Problem list name updated by automated process. Provider to review          Past Surgical History   I have reviewed this patient's surgical history and updated it with pertinent information if needed.  Past Surgical History:   Procedure Laterality Date     CV CORONARY ANGIOGRAM Left 2019    Procedure: Coronary Angiogram;  Surgeon: Bogdan Ernandez MD;  Location:  HEART CARDIAC CATH LAB     CV LEFT HEART CATH N/A 2019    Procedure: Left Heart Cath;  Surgeon: Bogdan Ernandez MD;  Location:  HEART CARDIAC CATH LAB     CV LEFT VENTRICULOGRAM N/A 2019    Procedure: Left Ventriculogram;  Surgeon: Bogdan Eranndez MD;  Location:  HEART CARDIAC CATH LAB     CV PCI STENT DRUG ELUTING N/A 2019    Procedure: PCI Stent Drug Eluting;  Surgeon: Bogdan Ernandez MD;  Location:  HEART CARDIAC CATH LAB       Social History   I have reviewed this patient's social history and updated it with pertinent information if needed.  Social History     Tobacco Use     Smoking status: Former Smoker     Types: Cigarettes     Quit date: 1996     Years since quittin.9     Smokeless tobacco: Never Used   Substance Use Topics     Alcohol use: Yes     Comment: Hasn't had anything since 2015 Occsional beer, not often.  Quit drinking 4-1-10/  drank around [place] 2015 2-3 beers every other week.      Drug use: No       Family History   I have reviewed this patient's family history and updated it with pertinent  information if needed.   Family History   Problem Relation Age of Onset     Cancer - colorectal Mother      C.A.D. Father      Heart Disease Father      Unknown/Adopted Maternal Grandmother      Unknown/Adopted Paternal Grandmother      Cerebrovascular Disease Paternal Grandfather      Depression Daughter        Prior to Admission Medications   Prior to Admission Medications   Prescriptions Last Dose Informant Patient Reported? Taking?   aspirin 81 MG EC tablet 11/22/2020 at AM  Yes Yes   Sig: Take 81 mg by mouth daily   digoxin (LANOXIN) 250 MCG tablet 11/22/2020 at AM  No Yes   Sig: Take 1 tab on even days & 1/2 tab on odd day of the month. Patient has been on this medication for year & atrial fibrillation is controlled.   lisinopril (ZESTRIL) 40 MG tablet 11/22/2020 at am  No Yes   Sig: Take 1 tablet (40 mg) by mouth daily   metoprolol succinate ER (TOPROL-XL) 200 MG 24 hr tablet 11/22/2020 at AM  No Yes   Sig: Take 1 tablet (200 mg) by mouth daily   omeprazole (PRILOSEC OTC) 20 MG tablet   Yes No   Sig: Takes PRN   order for DME   No No   Sig: Equipment being ordered: Digital home blood pressure monitor kit   simvastatin (ZOCOR) 40 MG tablet Past Week at Unknown time  No Yes   Sig: Take 1 tablet (40 mg) by mouth every other day   warfarin ANTICOAGULANT (COUMADIN) 7.5 MG tablet 11/22/2020 at AM  No Yes   Sig: TAKE 1 TABLET BY MOUTH ONCE DAILY OR AS DIRECTED BY  THE  ANTICOAGULATION  CLINIC      Facility-Administered Medications: None     Allergies   Allergies   Allergen Reactions     Latex      Adhesive Tape Rash     Reacted to the EKG stickers       Physical Exam   Vital Signs: Temp: 99.2  F (37.3  C) Temp src: Oral BP: 119/65 Pulse: 79   Resp: 20 SpO2: 98 % O2 Device: None (Room air)    Weight: 275 lbs 9.2 oz    Gen: Well nourished, well developed, obese male, resting comfortably in bed, no acute distress  Head: atraumatic normocephalic; sclera non-injected, anicterric; oral mucosa moist, no lesions, no  exudates, normal dentition  Neck: supple without spinal abnormality  Chest: clear to auscultation bilaterally, no wheezes, no rhonchi, no rales.  Cardiovascular: regular rate and irregular rhythm, no gallops or rubs, no murmurs, no edema  Abdomen: bowel sounds normal, no hepatosplenomegaly, no masses, non-tender, non-distended, no guarding, no rebound tenderness  Musculoskeletal: normal muscle mass, normal muscle tone  Skin: no rashes, no chronic venous stasis  Lymph: no lymphadenopathy.  Neuro: cranial nerves II-XII intact, strength in all four extremities normal, reflexes normal, coordination normal.    Data   Data reviewed today: I reviewed all medications, new labs and imaging results over the last 24 hours. I personally reviewed the EKG tracing showing atrial fibrillation.    Recent Labs   Lab 11/23/20  0001   WBC 8.8   HGB 14.2   MCV 95      INR 2.30*      POTASSIUM 4.2   CHLORIDE 106   CO2 22   BUN 16   CR 1.03   ANIONGAP 7   LIMA 7.8*   *   ALBUMIN 3.0*   PROTTOTAL 6.5*   BILITOTAL 0.4   ALKPHOS 86   ALT 19   AST 14   TROPI <0.015         Recent Results (from the past 24 hour(s))   CT Chest w/o Contrast    Narrative    EXAM: CT CHEST W/O CONTRAST  LOCATION: [facility]  DATE/TIME: 11/23/2020 12:18 AM    INDICATION: Shortness of breath, cough, fever.  COMPARISON: None.  TECHNIQUE: CT chest without IV contrast. Multiplanar reformats were obtained. Dose reduction techniques were used.  CONTRAST: None.    FINDINGS:   LUNGS AND PLEURA: Peripheral predominant nodular ground-glass opacities. No cavitation. Multiple scattered calcified granulomata.    MEDIASTINUM/AXILLAE: Normal size heart. No pericardial effusion. Calcified mediastinal and hilar lymph nodes. Coronary artery calcifications.    UPPER ABDOMEN: Hepatic and splenic granuloma. Partially visualized small bowel containing ventral hernia.    MUSCULOSKELETAL: Unremarkable.      Impression    IMPRESSION:   1.  Multifocal  peripheral nodular ground-glass opacities, findings favor infectious or inflammatory etiology, to include COVID-19. Recommend follow-up to resolution.  2.  Scattered calcified granulomata with calcified hilar and mediastinal lymph nodes and hepatic and splenic granulomata consistent with prior granulomatous disease/infection.  3.  Coronary artery disease and atherosclerotic vascular disease.    Imaging features can be seen with (COVID-19)  pneumonia, though are nonspecific and can occur with a variety of infectious and noninfectious processes.    REFERENCE:  Guidelines for Management of Incidental Pulmonary Nodules Detected on CT Images: From the Fleischner Society 2017.   Guidelines apply to incidental nodules in patients who are 35 years or older.  Guidelines do not apply to lung cancer screening, patients with immunosuppression, or patients with known primary cancer.    SUBSOLID NODULES    Multiple  CT at 3-6 months. If stable, consider CT at 2 and 4 years. Management based on the most suspicious nodule.    Consider referral to lung nodule clinic.

## 2020-11-23 NOTE — PROGRESS NOTES
"[facility] ADMISSION NOTE    Patient admitted to room 2211 at approximately 0230 via cart from emergency room. Patient was accompanied by transport tech.     Verbal SBAR report received from Kristin prior to patient arrival.     Patient ambulated to bed with stand-by assist. Patient alert and oriented X 3. The patient is not having any pain.  . Admission vital signs: Blood pressure 119/65, pulse 79, temperature 99.2  F (37.3  C), temperature source Oral, resp. rate 20, height 1.626 m (5' 4.02\"), weight 125 kg (275 lb 9.2 oz), SpO2 98 %. Patient was oriented to plan of care, call light, bed controls, tv, telephone, bathroom and visiting hours.     Risk Assessment    The following safety risks were identified during admission: fall. Yellow risk band applied: YES.     Skin Initial Assessment    This writer admitted this patient and completed a full skin assessment and Samuel score in the Adult PCS flowsheet. Appropriate interventions initiated as needed.     Secondary skin check was not completed secondary to patient PUI.    Samuel Risk Assessment  Sensory Perception: 4-->no impairment  Moisture: 4-->rarely moist  Activity: 3-->walks occasionally  Mobility: 3-->slightly limited  Nutrition: 4-->excellent  Friction and Shear: 3-->no apparent problem  Samuel Score: 21  Bed Support Surface: Atmos Air mattress    Education    Patient has a Tate to Observation order: No  Observation education completed and documented: Michelle Rosen RN    "

## 2020-11-24 ENCOUNTER — DOCUMENTATION ONLY (OUTPATIENT)
Dept: FAMILY MEDICINE | Facility: CLINIC | Age: 70
End: 2020-11-24

## 2020-11-24 VITALS
WEIGHT: 275.57 LBS | BODY MASS INDEX: 47.05 KG/M2 | TEMPERATURE: 97.9 F | SYSTOLIC BLOOD PRESSURE: 124 MMHG | DIASTOLIC BLOOD PRESSURE: 47 MMHG | OXYGEN SATURATION: 95 % | HEART RATE: 65 BPM | RESPIRATION RATE: 16 BRPM | HEIGHT: 64 IN

## 2020-11-24 DIAGNOSIS — I48.0 PAROXYSMAL ATRIAL FIBRILLATION (H): ICD-10-CM

## 2020-11-24 DIAGNOSIS — Z79.01 LONG TERM CURRENT USE OF ANTICOAGULANT THERAPY: ICD-10-CM

## 2020-11-24 LAB
ANION GAP SERPL CALCULATED.3IONS-SCNC: 3 MMOL/L (ref 3–14)
BASOPHILS # BLD AUTO: 0 10E9/L (ref 0–0.2)
BASOPHILS NFR BLD AUTO: 0.3 %
BUN SERPL-MCNC: 18 MG/DL (ref 7–30)
CALCIUM SERPL-MCNC: 8.2 MG/DL (ref 8.5–10.1)
CHLORIDE SERPL-SCNC: 107 MMOL/L (ref 94–109)
CO2 SERPL-SCNC: 28 MMOL/L (ref 20–32)
CREAT SERPL-MCNC: 1.09 MG/DL (ref 0.66–1.25)
CRP SERPL-MCNC: 14.1 MG/L (ref 0–8)
D DIMER PPP FEU-MCNC: <0.3 UG/ML FEU (ref 0–0.5)
DIFFERENTIAL METHOD BLD: NORMAL
EOSINOPHIL # BLD AUTO: 0 10E9/L (ref 0–0.7)
EOSINOPHIL NFR BLD AUTO: 0 %
ERYTHROCYTE [DISTWIDTH] IN BLOOD BY AUTOMATED COUNT: 12 % (ref 10–15)
FIBRINOGEN PPP-MCNC: 497 MG/DL (ref 200–420)
GFR SERPL CREATININE-BSD FRML MDRD: 68 ML/MIN/{1.73_M2}
GLUCOSE SERPL-MCNC: 104 MG/DL (ref 70–99)
HCT VFR BLD AUTO: 45.2 % (ref 40–53)
HGB BLD-MCNC: 14.7 G/DL (ref 13.3–17.7)
IL6 SERPL-MCNC: 8.25 PG/ML
IMM GRANULOCYTES # BLD: 0 10E9/L (ref 0–0.4)
IMM GRANULOCYTES NFR BLD: 0.4 %
INR PPP: 2.79 (ref 0.86–1.14)
LDH SERPL L TO P-CCNC: 168 U/L (ref 85–227)
LYMPHOCYTES # BLD AUTO: 1.2 10E9/L (ref 0.8–5.3)
LYMPHOCYTES NFR BLD AUTO: 15.7 %
MCH RBC QN AUTO: 30.7 PG (ref 26.5–33)
MCHC RBC AUTO-ENTMCNC: 32.5 G/DL (ref 31.5–36.5)
MCV RBC AUTO: 94 FL (ref 78–100)
MONOCYTES # BLD AUTO: 0.8 10E9/L (ref 0–1.3)
MONOCYTES NFR BLD AUTO: 10.8 %
NEUTROPHILS # BLD AUTO: 5.7 10E9/L (ref 1.6–8.3)
NEUTROPHILS NFR BLD AUTO: 72.8 %
NRBC # BLD AUTO: 0 10*3/UL
NRBC BLD AUTO-RTO: 0 /100
PLATELET # BLD AUTO: 184 10E9/L (ref 150–450)
POTASSIUM SERPL-SCNC: 4.9 MMOL/L (ref 3.4–5.3)
RBC # BLD AUTO: 4.79 10E12/L (ref 4.4–5.9)
RETICS # AUTO: 45.5 10E9/L (ref 25–95)
RETICS/RBC NFR AUTO: 1 % (ref 0.5–2)
SODIUM SERPL-SCNC: 138 MMOL/L (ref 133–144)
TROPONIN I SERPL-MCNC: <0.015 UG/L (ref 0–0.04)
WBC # BLD AUTO: 7.8 10E9/L (ref 4–11)

## 2020-11-24 PROCEDURE — 85025 COMPLETE CBC W/AUTO DIFF WBC: CPT | Performed by: INTERNAL MEDICINE

## 2020-11-24 PROCEDURE — 36415 COLL VENOUS BLD VENIPUNCTURE: CPT | Performed by: INTERNAL MEDICINE

## 2020-11-24 PROCEDURE — 250N000012 HC RX MED GY IP 250 OP 636 PS 637: Performed by: INTERNAL MEDICINE

## 2020-11-24 PROCEDURE — 85379 FIBRIN DEGRADATION QUANT: CPT | Performed by: INTERNAL MEDICINE

## 2020-11-24 PROCEDURE — 86140 C-REACTIVE PROTEIN: CPT | Performed by: INTERNAL MEDICINE

## 2020-11-24 PROCEDURE — 85610 PROTHROMBIN TIME: CPT | Performed by: INTERNAL MEDICINE

## 2020-11-24 PROCEDURE — 99239 HOSP IP/OBS DSCHRG MGMT >30: CPT | Performed by: INTERNAL MEDICINE

## 2020-11-24 PROCEDURE — 250N000013 HC RX MED GY IP 250 OP 250 PS 637: Performed by: INTERNAL MEDICINE

## 2020-11-24 PROCEDURE — 80048 BASIC METABOLIC PNL TOTAL CA: CPT | Performed by: INTERNAL MEDICINE

## 2020-11-24 PROCEDURE — 85045 AUTOMATED RETICULOCYTE COUNT: CPT | Performed by: INTERNAL MEDICINE

## 2020-11-24 PROCEDURE — 84484 ASSAY OF TROPONIN QUANT: CPT | Performed by: INTERNAL MEDICINE

## 2020-11-24 PROCEDURE — 85384 FIBRINOGEN ACTIVITY: CPT | Performed by: INTERNAL MEDICINE

## 2020-11-24 PROCEDURE — 83615 LACTATE (LD) (LDH) ENZYME: CPT | Performed by: INTERNAL MEDICINE

## 2020-11-24 RX ORDER — LISINOPRIL 40 MG/1
40 TABLET ORAL DAILY
Status: ON HOLD | COMMUNITY
Start: 2020-10-19 | End: 2020-11-24

## 2020-11-24 RX ORDER — DEXAMETHASONE 6 MG/1
6 TABLET ORAL DAILY
Qty: 8 TABLET | Refills: 0 | Status: SHIPPED | OUTPATIENT
Start: 2020-11-25 | End: 2022-04-20

## 2020-11-24 RX ADMIN — METOPROLOL SUCCINATE 200 MG: 100 TABLET, EXTENDED RELEASE ORAL at 08:25

## 2020-11-24 RX ADMIN — DEXAMETHASONE 6 MG: 2 TABLET ORAL at 08:26

## 2020-11-24 RX ADMIN — DIGOXIN 250 MCG: 125 TABLET ORAL at 08:26

## 2020-11-24 RX ADMIN — ASPIRIN 81 MG: 81 TABLET ORAL at 08:26

## 2020-11-24 ASSESSMENT — ACTIVITIES OF DAILY LIVING (ADL)
ADLS_ACUITY_SCORE: 14
ADLS_ACUITY_SCORE: 14
DEPENDENT_IADLS:: INDEPENDENT
ADLS_ACUITY_SCORE: 14
ADLS_ACUITY_SCORE: 14

## 2020-11-24 NOTE — PLAN OF CARE
Vitals stable; denies pain.  States he is feeling weak but overall improving.  Able to ambulate around room without difficulty. IV removed.  Discharge instructions reviewed with patient and paperwork given to patient; verbalized understanding and agreement.  Decadron obtained from outpatient pharmacy and given to patient.  Belongings packed up.

## 2020-11-24 NOTE — DISCHARGE INSTRUCTIONS
Coumadin Discharge Instructions:  1. Take Coumadin 3.75 mg today (11/24), then resume 7.5 mg daily.  2. Recommend rechecking INR in 1-2 weeks.

## 2020-11-24 NOTE — PLAN OF CARE
S:  Patient inpatient at [facility]    B:  Admitted for syncopal episode at home, turns out COVID +    A:  Vitals stable on room air  Denies pain  Up independent in room  Tolerates regular diet  Asking if he will be able to discharge tomorrow    R:  Patient slept much of NOC shift comfortably in room    Rafat Hatch RN

## 2020-11-24 NOTE — PROGRESS NOTES
Patient refused bedtime senna-doc stating that this medication gives him diarrhea if he takes too much.  Patient denies pressure, bloating, discomfort in abdomen and reports that he is passing gas.  Patient states his last BM was Sunday 11/21 and that he expects he will probably have a BM tomorrow.  Patient reports this is his normal bowel routine.  Rafat Hatch RN

## 2020-11-24 NOTE — PROGRESS NOTES
ANTICOAGULATION  MANAGEMENT: Discharge Review    Benjamín Hyman chart reviewed for anticoagulation continuity of care    Hospital Admission on 11/22/2020 for Pneumonia r/t  COVID.    Discharge disposition: Home with Home Care    Results:    Recent labs: (last 7 days)     11/23/20  0001 11/23/20  0719 11/24/20  0507   INR 2.30* 1.99* 2.79*     Anticoagulation inpatient management:     home regimen continued and less warfarin administered than maintenance regimen    Anticoagulation discharge instructions:     Warfarin dosing: home regimen continued   Bridging: No   INR goal change: No      Medication changes affecting anticoagulation: Yes: Decadron    Additional factors affecting anticoagulation: Yes: Covid    Plan     No adjustment to anticoagulation plan needed  Agree with dosing adjustment on discharge    Left a detailed message for [facility]    Anticoagulation Calendar updated    Horace Barnett RN

## 2020-11-24 NOTE — PHARMACY-ANTICOAGULATION SERVICE
Clinical Pharmacy- Warfarin Discharge Note  This patient is currently on warfarin for the treatment of Atrial fibrillation.  INR Goal= 2-3  Expected length of therapy lifetime.    Warfarin PTA Regimen: 7.5mg daily      Anticoagulation Dose History     Recent Dosing and Labs Latest Ref Rng & Units 8/12/2020 9/23/2020 11/4/2020 11/23/2020 11/23/2020 11/23/2020 11/24/2020    Warfarin 7.5 mg - - - - - - 7.5 mg -    INR 0.86 - 1.14 3.00(H) 2.90(H) 3.00(H) 2.30(H) 1.99(H) - 2.79(H)    INR 0.86 - 1.14 - - - - - - -    INR Point of Care 0.86 - 1.14 - - - - - - -          Vitamin K doses administered during the last 7 days: none  FFP administered during the last 7 days: none  Recommend discharging the patient on a warfarin regimen of  3.75 mg today, then resume 7.5 mg daily.       The patient should have an INR checked in 1-2 weeks.    Kayleen Koroma, PharmD

## 2020-11-24 NOTE — CONSULTS
Care Management Initial Consult    General Information  Assessment completed with: Benjamín Lehman  Type of CM/SW Visit: Initial Assessment  Primary Care Provider verified and updated as needed: Yes   Readmission within the last 30 days: no previous admission in last 30 days      Reason for Consult: discharge planning  Advance Care Planning: Advance Care Planning Reviewed: no concerns identified          Communication Assessment  Patient's communication style: spoken language (English or Bilingual)    Hearing Difficulty or Deaf: no   Wear Glasses or Blind: no    Cognitive  Cognitive/Neuro/Behavioral: WDL                      Living Environment:   People in home: alone     Current living Arrangements: apartment      Able to return to prior arrangements: yes       Family/Social Support:  Care provided by: self  Provides care for: no one  Marital Status: Single  Children          Description of Support System: Supportive    Support Assessment: Adequate family and caregiver support    Current Resources:   Skilled Home Care Services:    Community Resources: None  Equipment currently used at home: none  Supplies currently used at home: None    Employment/Financial:  Employment Status: retired        Financial Concerns: No concerns identified   Referral to Financial Counselor: No       Lifestyle & Psychosocial Needs:        Socioeconomic History     Marital status: Single     Spouse name: Not on file     Number of children: Not on file     Years of education: Not on file     Highest education level: Not on file     Tobacco Use     Smoking status: Former Smoker     Types: Cigarettes     Quit date: 1996     Years since quittin.9     Smokeless tobacco: Never Used   Substance and Sexual Activity     Alcohol use: Yes     Comment: Hasn't had anything since 2015 Occsional beer, not often.  Quit drinking 4-1-10/  drank around [place] 2015 2-3 beers every other week.      Drug use: No      Sexual activity: Not Currently       Functional Status:  Prior to admission patient needed assistance:   Dependent ADLs:: Independent  Dependent IADLs:: Independent  Assesssment of Functional Status: At functional baseline    Mental Health Status:  Mental Health Status: No Current Concerns       Chemical Dependency Status:  Chemical Dependency Status: No Current Concerns             Values/Beliefs:  Spiritual, Cultural Beliefs, Yarsanism Practices, Values that affect care: no               Care Management Discharge Note    Discharge Date: 11/24/20       Discharge Disposition: Home Care;Home    Discharge Services:   [facility] ([phone] Fax: [phone])    Discharge DME: None    Discharge Transportation: agency    Private pay costs discussed: transportation costs    Patient/family educated on Medicare website which has current facility and service quality ratings: yes    Education Provided on the Discharge Plan:  yes  Persons Notified of Discharge Plans: patient  Patient/Family in Agreement with the Plan: yes    Handoff Referral Completed: Yes    Additional Information:  Spoke with patient via phone, introduced self and role.  Patient currently lives alone in an apartment in [place].  He is independent with ADLs at baseline.  He does not use assistive devices.  He continues to drive.  His daughter lives in [place], is supportive.      Discussed discharge planning.  Discussed MD recommendation for home care follow up.  Discussed medicare regulations related to home care services.  Patient is agreeable.  He chooses [facility] ([phone] Fax: [phone]). Order/referral placed for RN.      Discussed transportation home today.  Patient does NOT have a ride home today.  His family is concerned about exposure to COVID.  [facility] today.      Cleopatra STREETN RN  Inpatient Care Coordinator  [facility]  [phone]  [facility] [phone]      HOME CARE HAND OFF  Patient Name: Benjamín Hyman    MRN: [id number]    : 1950    Patient Zip Code: [address]    Admit Diagnosis: Generalized weakness [R53.1]  Pneumonia of both lungs due to infectious organism, unspecified part of lung [J18.9]      Services Pt Needs at Home: RN    Discharge Support: Independent-Alone    Living Arrangements: Alone     or Address Other Than Pt: No    Wound Care: No    Anticipate DC Date: 2020

## 2020-11-24 NOTE — DISCHARGE SUMMARY
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  The following labs/tests are recommended: BMP   and CBC.           Unresulted Labs Ordered in the Past 30 Days of this Admission     Date and Time Order Name Status Description    11/23/2020 0655 Interleukin 6 Blood In process       These results will be followed up by Moisés Kim    Discharge Disposition   Discharged to home  Condition at discharge: Stable    Hospital Course   Benjamín Hyman is a 69 year old male who was admitted on 11/22/2020 with syncope and weakness due to COVID-19 infection.    Pneumonia due to COVID-19 infection  Generalized weakness  CT scan the emergency department demonstrates characteristic multifocal peripheral nodular groundglass opacities.  COVID-19 PCR is pending.  Patient's been admitted to the hospital and placed on continuous pulse oximetry.  He was given a dose of dexamethasone in the emergency department, will continue dexamethasone to complete a 10-day course.  Hold on remdesivir due to lack of hypoxia.  -The patient will be treated with a 10 day course of dexamethasone for COVID-19   -Procalcitonin 0.05, indicating low risk of bacterial infection, no indication for antibiotics    Syncope  I suspect this is due to hypovolemia.  Will trend troponins  and place patient on telemetry.  Initial troponin is undetectable.  Echocardiogram improved from prior.  Given patient's history of coronary artery disease and heart failure, he is at increased risk for ventricular tachycardia.  Consider discussion with electrophysiology.  -Continue holding lisinopril at discharge and follow up with PCP to possibly restart following resolution of COVID-19 infection.    Chronic atrial fibrillation  [id number] score is 3 due to gender, age, hypertension, and heart failure.  Continue anticoagulation with Coumadin, dosing per pharmacy.  INR on admission is 2.30.  -Continue Toprol and digoxin for rate control  -No arrhythmias seen on telemetry    Hypertension  Hyperlipidemia  Coronary artery disease status post drug-eluting stent placement to the mid LAD  -Continue aspirin, Toprol, and simvastatin.  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  -The patient was likely volume depleted on presentation to the emergency department.  He appears to be euvolemic currently, so I will discontinue IV fluids.  -Continue prior to admission Toprol  -Hold lisinopril due to low normal blood pressure and recent syncope, consider restarting as outpatient when blood pressure increases  -Echo demonstrates mildly dilated LV with mild global hypokinesia, possible increased hypokinesia of inferior and inferior lateral walls, EF 45 to 50%, mild mitral regurgitation, moderate LA dilation, mild RA dilation.    Peptic ulcer disease  Continue prior to admission omeprazole    Consultations This Hospital Stay    PHARMACY TO DOSE WARFARIN  SOCIAL WORK IP CONSULT    Code Status   Full Code    Time Spent on this Encounter   I, Moisés Kim MD, personally saw the patient today and spent greater than 30 minutes discharging this patient.       Moisés Kim MD  [facility]   ______________________________________________________________________    Physical Exam   Vital Signs: Temp: 98.7  F (37.1  C) Temp src: Oral BP: 124/47 Pulse: 65   Resp: 18 SpO2: 95 % O2 Device: None (Room air)    Weight: 275 lbs 9.2 oz       Gen: Well nourished, obese debilitated male, alert and oriented x 3, no acute distressed  HEENT: Atraumatic, normocephalic; sclera non-injected, anicterric; oral mucosa moist, no lesion, no exudate  Lungs: Clear to ausculation, no wheezes, no rhonchi, no rales  Heart: Regular rate, irregular rhythm, no gallops, no rubs, no murmurs  GI: Bowel sound normal, no hepatosplenomegaly, no masses, non-tender, non-distended, no guarding, no rebound tenderness  Lymph: No lymphadenopathy, no edema  Skin: No rashes, no chronic venous stasis     Primary Care Physician   Ruben Teran    Discharge Orders      Reason for your hospital stay    This is a 70 year old male admitted with pneumonia due to COVID-19.     Follow-up and recommended labs and tests    Follow up with primary care provider, Ruben Teran, within 7 days for hospital follow- up.  The following labs/tests are recommended: BMP and CBC.     Activity    Your activity upon discharge: activity as tolerated     Full Code     Diet    Follow this diet upon discharge: Orders Placed This Encounter      Combination Diet Low Saturated Fat Na <2400mg Diet       Significant Results and Procedures   Most Recent 3 CBC's:  Recent Labs   Lab Test 11/24/20  0507 11/23/20  0719 11/23/20  0001   WBC 7.8 4.4 8.8   HGB 14.7 14.0 14.2   MCV 94 96 95    147* 193     Most Recent 3 BMP's:  Recent Labs   Lab Test 11/24/20  8975  11/23/20 0719 11/23/20 0001    134 135   POTASSIUM 4.9 4.4 4.2   CHLORIDE 107 106 106   CO2 28 23 22   BUN 18 14 16   CR 1.09 0.97 1.03   ANIONGAP 3 5 7   LIMA 8.2* 8.2* 7.8*   * 135* 132*     Most Recent 2 LFT's:  Recent Labs   Lab Test 11/23/20 0719 11/23/20 0001   AST 19 14   ALT 19 19   ALKPHOS 81 86   BILITOTAL 0.4 0.4     Most Recent 3 INR's:  Recent Labs   Lab Test 11/24/20  0507 11/23/20 0719 11/23/20 0001   INR 2.79* 1.99* 2.30*     Most Recent 3 Troponin's:  Recent Labs   Lab Test 11/24/20 0507 11/23/20 0719 11/23/20 0001   TROPI <0.015 <0.015 <0.015     Most Recent D-dimer:  Recent Labs   Lab Test 11/24/20 0507   DD <0.3     Most Recent 6 Bacteria Isolates From Any Culture (See EPIC Reports for Culture Details):  Recent Labs   Lab Test 02/06/13  1338   CULT <10,000 colonies/mL Mixed gram positive alana     Most Recent Urinalysis:  Recent Labs   Lab Test 02/06/13  1338   COLOR Yellow   APPEARANCE Clear   URINEGLC Negative   URINEBILI Negative   URINEKETONE Negative   SG 1.005   UBLD Trace*   URINEPH 5.0   PROTEIN Negative   NITRITE Negative   LEUKEST Moderate*   RBCU 1   WBCU 6*     Most Recent ABG:No lab results found.  Most Recent ESR & CRP:  Recent Labs   Lab Test 11/24/20 0507   CRP 14.1*     Most Recent CPK:  Recent Labs   Lab Test 11/23/20 0719      ,   Results for orders placed or performed during the hospital encounter of 11/22/20   CT Chest w/o Contrast    Narrative    EXAM: CT CHEST W/O CONTRAST  LOCATION: [facility]  DATE/TIME: 11/23/2020 12:18 AM    INDICATION: Shortness of breath, cough, fever.  COMPARISON: None.  TECHNIQUE: CT chest without IV contrast. Multiplanar reformats were obtained. Dose reduction techniques were used.  CONTRAST: None.    FINDINGS:   LUNGS AND PLEURA: Peripheral predominant nodular ground-glass opacities. No cavitation. Multiple scattered calcified granulomata.    MEDIASTINUM/AXILLAE: Normal size heart. No pericardial  effusion. Calcified mediastinal and hilar lymph nodes. Coronary artery calcifications.    UPPER ABDOMEN: Hepatic and splenic granuloma. Partially visualized small bowel containing ventral hernia.    MUSCULOSKELETAL: Unremarkable.      Impression    IMPRESSION:   1.  Multifocal peripheral nodular ground-glass opacities, findings favor infectious or inflammatory etiology, to include COVID-19. Recommend follow-up to resolution.  2.  Scattered calcified granulomata with calcified hilar and mediastinal lymph nodes and hepatic and splenic granulomata consistent with prior granulomatous disease/infection.  3.  Coronary artery disease and atherosclerotic vascular disease.    Imaging features can be seen with (COVID-19)  pneumonia, though are nonspecific and can occur with a variety of infectious and noninfectious processes.    REFERENCE:  Guidelines for Management of Incidental Pulmonary Nodules Detected on CT Images: From the Fleischner Society 2017.   Guidelines apply to incidental nodules in patients who are 35 years or older.  Guidelines do not apply to lung cancer screening, patients with immunosuppression, or patients with known primary cancer.    SUBSOLID NODULES    Multiple  CT at 3-6 months. If stable, consider CT at 2 and 4 years. Management based on the most suspicious nodule.    Consider referral to lung nodule clinic.     Echocardiogram Complete    Narrative    [id number]  [id number]  [id number]  [id number]^JOSEPH^EVGENY^WENDI           [facility]  Echocardiography Laboratory  [address] [place] [place] [address]        Name: MARIAN MOHAN  MRN: [id number]  : 1950  Study Date: 2020 08:10 AM  Age: 69 yrs  Gender: Male  Patient Location: [facility]  Reason For Study: Syncope and Collapse  Ordering Physician: EVGENY RUIZ  Referring Physician: Ruben Teran  Performed By: Sahra Hernández RDCS     BSA: 2.2 m2  Height: 64 in  Weight: 275 lb  HR: 73  BP: 119/65  mmHg  _____________________________________________________________________________  __        Procedure  Complete Portable Echo Adult. Optison (NDC #[id number]) given intravenously.  Complete Portable Bubble Echo Adult.  _____________________________________________________________________________  __        Interpretation Summary     The left ventricle is mildly dilated.  There is mild global hypokinesia of the left ventricle.  The inferior and inferolateral walls may be more severe hypokinetic, but  images were challenging, even with contrast.  Left ventricular systolic function is mildly reduced.  The visual ejection fraction is estimated at 45-50%.  There is mild (1+) mitral regurgitation.  The rhythm was atrial fibrillation.     Since the previous study, the LV function has improved.  _____________________________________________________________________________  __        Left Ventricle  The left ventricle is mildly dilated. There is normal left ventricular wall  thickness. Diastolic Doppler findings (E/E' ratio and/or other parameters)  suggest left ventricular filling pressures are normal. The visual ejection  fraction is estimated at 45-50%. Left ventricular systolic function is mildly  reduced. There is mild global hypokinesia of the left ventricle. The inferior  and inferolateral walls may be more severe hypokinetic, but images were  challenging, even with contrast.     Right Ventricle  The right ventricle is normal size. The right ventricular systolic function is  normal.     Atria  The left atrium is moderately dilated. The right atrium is mildly dilated.  Intact atrial septum.     Mitral Valve  There is mild (1+) mitral regurgitation.        Tricuspid Valve  The tricuspid valve is normal in structure and function. There is trace  tricuspid regurgitation. Right ventricular systolic pressure could not be  approximated due to inadequate tricuspid regurgitation. IVC diameter <2.1 cm  collapsing >50% with  sniff suggests a normal RA pressure of 3 mmHg.     Aortic Valve  The aortic valve is normal in structure and function. No aortic regurgitation  is present. No aortic stenosis is present.     Pulmonic Valve  The pulmonic valve is not well seen, but is grossly normal. There is trace  pulmonic valvular regurgitation.     Vessels  The aortic root is normal size.     Pericardium  There is no pericardial effusion.        Rhythm  The rhythm was atrial fibrillation.  _____________________________________________________________________________  __  MMode/2D Measurements & Calculations  IVSd: 1.1 cm     LVIDd: 5.9 cm  LVIDs: 4.2 cm  LVPWd: 1.0 cm  FS: 28.9 %  LV mass(C)d: 268.9 grams  LV mass(C)dI: 120.1 grams/m2  Ao root diam: 3.5 cm  LA dimension: 4.9 cm  asc Aorta Diam: 3.3 cm  LA/Ao: 1.4  LA Volume (BP): 101.0 ml  LA Volume Index (BP): 45.1 ml/m2  RWT: 0.35           Doppler Measurements & Calculations  MV E max sneha: 76.5 cm/sec  MV dec time: 0.20 sec  E/E' av.2  Lateral E/e': 6.0  Medial E/e': 8.4           _____________________________________________________________________________  __           Report approved by: Norma Yang 2020 12:43 PM            Discharge Medications   Current Discharge Medication List      START taking these medications    Details   dexamethasone (DECADRON) 6 MG tablet Take 1 tablet (6 mg) by mouth daily for 8 days  Qty: 8 tablet, Refills: 0    Associated Diagnoses: Pneumonia due to 2019 novel coronavirus         CONTINUE these medications which have NOT CHANGED    Details   aspirin 81 MG EC tablet Take 81 mg by mouth daily      digoxin (LANOXIN) 250 MCG tablet Take 1 tab on even days & 1/2 tab on odd day of the month. Patient has been on this medication for year & atrial fibrillation is controlled.  Qty: 70 tablet, Refills: 3    Associated Diagnoses: Persistent atrial fibrillation (H)      metoprolol succinate ER (TOPROL-XL) 200 MG 24 hr tablet Take 1 tablet (200 mg) by mouth  daily  Qty: 90 tablet, Refills: 3    Associated Diagnoses: Persistent atrial fibrillation (H)      simvastatin (ZOCOR) 40 MG tablet Take 1 tablet (40 mg) by mouth every other day  Qty: 45 tablet, Refills: 3    Associated Diagnoses: Persistent atrial fibrillation (H)      warfarin ANTICOAGULANT (COUMADIN) 7.5 MG tablet TAKE 1 TABLET BY MOUTH ONCE DAILY OR AS DIRECTED BY  THE  ANTICOAGULATION  CLINIC  Qty: 100 tablet, Refills: 0    Associated Diagnoses: Persistent atrial fibrillation (H)      omeprazole (PRILOSEC OTC) 20 MG tablet Take 20 mg by mouth daily as needed   Qty: 30 tablet      order for DME Equipment being ordered: Digital home blood pressure monitor kit  Qty: 1 each, Refills: 0    Associated Diagnoses: Benign essential hypertension         STOP taking these medications       lisinopril (ZESTRIL) 40 MG tablet Comments:   Reason for Stopping:             Allergies   Allergies   Allergen Reactions     Latex      Adhesive Tape Rash     Reacted to the EKG stickers

## 2020-11-24 NOTE — PROGRESS NOTES
Hematology/ Oncology Telephone Visit:  Nov 11, 2020    Due to the concerns around COVID-19 and adhering to social distancing we conducted this visit over the telephone.      Reason for Visit:   Chief Complaint   Patient presents with     Hematology     New Patient-  NICM (nonischemic cardiomyopathy)        History of present illness:    Benjamín Hyman 70-year-old male patient was referred for evaluation for cardiac amyloidosis.  Patient is known with long history of chronic atrial fibrillation, coronary artery disease, cardiomyopathy, hypertension and dyslipidemia.  He has a history of atrial fibrillation since 2004 with reduced ejection fraction echocardiogram in 2008 came back with ejection fraction of 35-40.  I in May 2019 he had abnormal stress test and underwent for an angiogram with a single drug eluting stent to the mid LAD.  Echocardiogram at that time revealed ejection fraction of 52% with moderate RV dysfunction and MR.  Cardiac MRI in January 2020 showed borderline left ventricular dilatation, ejection fraction of 31% with global hypokinesia, mild RV hypokinesis, moderate to severe biatrial enlargement.  Also concerned about cardiac amyloidosis.  The patient had work-up included kappa free light chain at 2.8 lambda free light chain at 2.8 and kappa lambda ratio of 0.99.  IgG level of 1530 IgA 190 and IgM is 22.  Urine shows no monoclonal gammopathy.  Patient denies any recent weight loss or night sweats or fever or chills.  He denies any nausea vomiting or diarrhea.  Denies any bone aches or pains.    Review of systems:  Pertinent positives have been included in HPI; remainder of detailed complete 20-point ROS was negative.    Past medical, social, surgical, and family histories reviewed.    Allergies:  Allergies as of 11/11/2020 - Reviewed 11/11/2020   Allergen Reaction Noted     Latex  10/10/2014     Adhesive tape Rash 04/22/2015       Current Medications:  Current Outpatient Medications   Medication  Sig Dispense Refill     [START ON 11/25/2020] dexamethasone (DECADRON) 6 MG tablet Take 1 tablet (6 mg) by mouth daily for 8 days 8 tablet 0        Laboratory/Imaging Studies:  Orders Only on 11/04/2020   Component Date Value Ref Range Status     INR 11/04/2020 3.00* 0.86 - 1.14 Final    Comment: This test is intended for monitoring Coumadin therapy.  Results are not   accurate in patients with prolonged INR due to factor deficiency.       Capillary Blood Collection 11/04/2020 Capillary collection performed   Final        Recent Results (from the past 744 hour(s))   CT Chest w/o Contrast    Narrative    EXAM: CT CHEST W/O CONTRAST  LOCATION: [facility]  DATE/TIME: 11/23/2020 12:18 AM    INDICATION: Shortness of breath, cough, fever.  COMPARISON: None.  TECHNIQUE: CT chest without IV contrast. Multiplanar reformats were obtained. Dose reduction techniques were used.  CONTRAST: None.    FINDINGS:   LUNGS AND PLEURA: Peripheral predominant nodular ground-glass opacities. No cavitation. Multiple scattered calcified granulomata.    MEDIASTINUM/AXILLAE: Normal size heart. No pericardial effusion. Calcified mediastinal and hilar lymph nodes. Coronary artery calcifications.    UPPER ABDOMEN: Hepatic and splenic granuloma. Partially visualized small bowel containing ventral hernia.    MUSCULOSKELETAL: Unremarkable.      Impression    IMPRESSION:   1.  Multifocal peripheral nodular ground-glass opacities, findings favor infectious or inflammatory etiology, to include COVID-19. Recommend follow-up to resolution.  2.  Scattered calcified granulomata with calcified hilar and mediastinal lymph nodes and hepatic and splenic granulomata consistent with prior granulomatous disease/infection.  3.  Coronary artery disease and atherosclerotic vascular disease.    Imaging features can be seen with (COVID-19)  pneumonia, though are nonspecific and can occur with a variety of infectious and noninfectious  processes.    REFERENCE:  Guidelines for Management of Incidental Pulmonary Nodules Detected on CT Images: From the Fleischner Society 2017.   Guidelines apply to incidental nodules in patients who are 35 years or older.  Guidelines do not apply to lung cancer screening, patients with immunosuppression, or patients with known primary cancer.    SUBSOLID NODULES    Multiple  CT at 3-6 months. If stable, consider CT at 2 and 4 years. Management based on the most suspicious nodule.    Consider referral to lung nodule clinic.     Echocardiogram Complete    Narrative    [id number]  [id number]  [id number]^JOSEPH^EVGENY^WENDI           [facility]  Echocardiography Laboratory  [address] [place] [address]        Name: MARIAN MOHAN  MRN: [id number]  : 1950  Study Date: 2020 08:10 AM  Age: 69 yrs  Gender: Male  Patient Location: [facility]  Reason For Study: Syncope and Collapse  Ordering Physician: EVGENY RUIZ  Referring Physician: Ruben Teran  Performed By: Sahra Hernández RDCS     BSA: 2.2 m2  Height: 64 in  Weight: 275 lb  HR: 73  BP: 119/65 mmHg  _____________________________________________________________________________  __        Procedure  Complete Portable Echo Adult. Optison (NDC #[id number]) given intravenously.  Complete Portable Bubble Echo Adult.  _____________________________________________________________________________  __        Interpretation Summary     The left ventricle is mildly dilated.  There is mild global hypokinesia of the left ventricle.  The inferior and inferolateral walls may be more severe hypokinetic, but  images were challenging, even with contrast.  Left ventricular systolic function is mildly reduced.  The visual ejection fraction is estimated at 45-50%.  There is mild (1+) mitral regurgitation.  The rhythm was atrial fibrillation.     Since the previous study, the LV function has  improved.  _____________________________________________________________________________  __        Left Ventricle  The left ventricle is mildly dilated. There is normal left ventricular wall  thickness. Diastolic Doppler findings (E/E' ratio and/or other parameters)  suggest left ventricular filling pressures are normal. The visual ejection  fraction is estimated at 45-50%. Left ventricular systolic function is mildly  reduced. There is mild global hypokinesia of the left ventricle. The inferior  and inferolateral walls may be more severe hypokinetic, but images were  challenging, even with contrast.     Right Ventricle  The right ventricle is normal size. The right ventricular systolic function is  normal.     Atria  The left atrium is moderately dilated. The right atrium is mildly dilated.  Intact atrial septum.     Mitral Valve  There is mild (1+) mitral regurgitation.        Tricuspid Valve  The tricuspid valve is normal in structure and function. There is trace  tricuspid regurgitation. Right ventricular systolic pressure could not be  approximated due to inadequate tricuspid regurgitation. IVC diameter <2.1 cm  collapsing >50% with sniff suggests a normal RA pressure of 3 mmHg.     Aortic Valve  The aortic valve is normal in structure and function. No aortic regurgitation  is present. No aortic stenosis is present.     Pulmonic Valve  The pulmonic valve is not well seen, but is grossly normal. There is trace  pulmonic valvular regurgitation.     Vessels  The aortic root is normal size.     Pericardium  There is no pericardial effusion.        Rhythm  The rhythm was atrial fibrillation.  _____________________________________________________________________________  __  MMode/2D Measurements & Calculations  IVSd: 1.1 cm     LVIDd: 5.9 cm  LVIDs: 4.2 cm  LVPWd: 1.0 cm  FS: 28.9 %  LV mass(C)d: 268.9 grams  LV mass(C)dI: 120.1 grams/m2  Ao root diam: 3.5 cm  LA dimension: 4.9 cm  asc Aorta Diam: 3.3 cm  LA/Ao:  1.4  LA Volume (BP): 101.0 ml  LA Volume Index (BP): 45.1 ml/m2  RWT: 0.35           Doppler Measurements & Calculations  MV E max sneha: 76.5 cm/sec  MV dec time: 0.20 sec  E/E' av.2  Lateral E/e': 6.0  Medial E/e': 8.4           _____________________________________________________________________________  __           Report approved by: Norma Yang 2020 12:43 PM          Assessment and plan:  (I42.8) NICM (nonischemic cardiomyopathy) (H)  (primary encounter diagnosis)  This is a 70-year-old male patient with nonischemic cardiomyopathy.  I discussed with the patient today the differential diagnosis of cardiomyopathy.  We talked about cardiac myeloid.  Amyloidosis in general.  Today we will check free light chain levels.  We will also check serum for immunofixation.  I will recommend to proceed with bone marrow biopsy and possibly.  I gave the patient overview about potential benefit and risk of bone marrow biopsy.  The patient would think about the procedure and call us back if he decided he will go ahead with the work-up.  Otherwise I will recommend to continue monitoring light chain levels.  I will see the patient again in 3 months or sooner if there are new developments or concerns.    The patient is ready to learn, no apparent learning barriers were identified.  Diagnosis and treatment plans were explained to the patient. The patient expressed understanding of the content. The patient asked appropriate questions. The patient questions were answered to his satisfaction.    Telephone call lasted 45 minutes.    Julio Gupta MD    Chart documentation with Dragon Voice recognition Software. Although reviewed after completion, some words and grammatical errors may remain.

## 2020-11-25 ENCOUNTER — PATIENT OUTREACH (OUTPATIENT)
Dept: NURSING | Facility: CLINIC | Age: 70
End: 2020-11-25
Payer: COMMERCIAL

## 2020-11-25 ENCOUNTER — TELEPHONE (OUTPATIENT)
Dept: FAMILY MEDICINE | Facility: CLINIC | Age: 70
End: 2020-11-25

## 2020-11-25 DIAGNOSIS — U07.1 2019 NOVEL CORONAVIRUS DISEASE (COVID-19): Primary | ICD-10-CM

## 2020-11-25 ASSESSMENT — ACTIVITIES OF DAILY LIVING (ADL): DEPENDENT_IADLS:: INDEPENDENT

## 2020-11-25 NOTE — TELEPHONE ENCOUNTER
[facility], calling and will be doing start of care on patient on 11/27.  Will check INR and call results to the [place] office.    Patricia Cuba RN  [facility]tico[facility]

## 2020-11-25 NOTE — PROGRESS NOTES
Clinic Care Coordination Contact    Clinic Care Coordination Contact  OUTREACH    Referral Information:  Referral Source: IP Report    Primary Diagnosis: Other (include Comment box)(COVID-19)    Chief Complaint   Patient presents with     Clinic Care Coordination - Post Hospital     Clinic Care Coordination - Initial     RN CC        Wing Utilization:   Clinic Utilization  Difficulty keeping appointments:: No  Utilization    Last refreshed: 2020  8:39 AM: Hospital Admissions 1           Last refreshed: 2020  8:39 AM: ED Visits 1           Last refreshed: 2020  8:39 AM: No Show Count (past year) 1              Current as of: 2020  8:39 AM              Clinical Concerns:  Current Medical Concerns:  Patient was admitted at [facility] from  to  with diagnosis of pneumonia due to COVID-19.  He reports he is doing well since hospital discharge. He just woke up before my call, denies any questions and declines to speak for further assessment. He agrees to call [facility] [facility] if any questions arise.  Home care referral is pending per AVS and hospital discharge notes. PCP follow up (virtual) is scheduled.     Education Provided to patient: explained role and reason for call      Health Maintenance Reviewed:    Clinical Pathway: None        Functional Status:  Dependent ADLs:: Independent  Dependent IADLs:: Independent  Mobility Status: Independent    Living Situation:  Current living arrangement:: I live alone    Islam or spiritual beliefs that impact treatment:: No  Mental health DX:: No  Informal Support system:: Family   Socioeconomic History     Marital status: Single     Spouse name: Not on file     Number of children: Not on file     Years of education: Not on file     Highest education level: Not on file     Tobacco Use     Smoking status: Former Smoker     Types: Cigarettes     Quit date: 1996     Years since quittin.9     Smokeless tobacco:  Never Used   Substance and Sexual Activity     Alcohol use: Yes     Comment: Hasn't had anything since June 2017 9/28/2015 Occsional beer, not often.  Quit drinking 4-1-10/  drank around Shanon 2010 4- 2-3 beers every other week.      Drug use: No     Sexual activity: Not Currently        Resources and Interventions:  Current Resources:   List of home care services:: Skilled Nursing  Community Resources: Home Care  Supplies used at home:: None  Equipment Currently Used at Home: none  Employment Status: retired)   )    Advance Care Plan/Directive  Advanced Care Plan/Directive Status: Declined Further Information    Referrals Placed: [facility] referral in place       Patient/Caregiver understanding: Yes       Future Appointments              In 6 days Ruben Teran MD [facility]  Arrive at: Clinic A    In 3 weeks [facility] [facility]    In 2 months [facility] LAB [facility]    In 2 months Julio Gupta MD [facility]          Plan: Patient will continue to follow home symptom support instructions during acute COVID-19 recovery.  He will contact clinic as needed with questions or concerns.     No further outreaches by care coordination planned at this time.    KIM Kearns, RN   [facility]  - [facility] Care Coordinator

## 2020-11-26 ENCOUNTER — TRANSFERRED RECORDS (OUTPATIENT)
Dept: HEALTH INFORMATION MANAGEMENT | Facility: CLINIC | Age: 70
End: 2020-11-26

## 2020-12-02 ENCOUNTER — TELEPHONE (OUTPATIENT)
Dept: FAMILY MEDICINE | Facility: CLINIC | Age: 70
End: 2020-12-02

## 2020-12-02 NOTE — TELEPHONE ENCOUNTER
I spoke to the patient and we reviewed meds. He should have homecare starting today or tomorrow. I called homecare to verify that.  Cary Lee RN

## 2020-12-02 NOTE — TELEPHONE ENCOUNTER
"Reason for call:  Patient reporting a symptom    Symptom or request: Pt was recently hospitalized with COVID and wants to know how long he should stay on the \"COVID Medicine?\"  Please call patient and advise.      Duration (how long have symptoms been present): ongoing    Have you been treated for this before? Yes    Additional comments:     Phone Number patient can be reached at:  Home number on file [phone] (home)    Best Time:  any    Can we leave a detailed message on this number:  YES    Call taken on 12/2/2020 at 9:23 AM by Natalie Melgar    "

## 2020-12-03 ENCOUNTER — TELEPHONE (OUTPATIENT)
Dept: FAMILY MEDICINE | Facility: CLINIC | Age: 70
End: 2020-12-03

## 2020-12-03 ENCOUNTER — ANTICOAGULATION THERAPY VISIT (OUTPATIENT)
Dept: FAMILY MEDICINE | Facility: CLINIC | Age: 70
End: 2020-12-03

## 2020-12-03 DIAGNOSIS — Z79.01 LONG TERM CURRENT USE OF ANTICOAGULANT THERAPY: ICD-10-CM

## 2020-12-03 DIAGNOSIS — I48.0 PAROXYSMAL ATRIAL FIBRILLATION (H): ICD-10-CM

## 2020-12-03 LAB — INR PPP: 4.4 (ref 0.9–1.1)

## 2020-12-03 NOTE — PROGRESS NOTES
ANTICOAGULATION MANAGEMENT     Patient Name:  Benjamín Hyman  Date:  12/3/2020    ASSESSMENT /SUBJECTIVE:    Today's INR result of 4.4 is supratherapeutic. Goal INR of 2.0-3.0      Warfarin dose taken: Warfarin taken as instructed    Diet: No new diet changes affecting INR    Medication changes/ interactions: Interaction between doxycycline and omnicef started on - and warfarin may be affecting INR    Previous INR: Therapeutic     S/S of bleeding or thromboembolism: No    New injury or illness: Yes: recent admission, COVID-19    Upcoming surgery, procedure or cardioversion: No    Additional findings: patient continues to recover from covid 19, antibiotics continue until tomorrow      PLAN:    Telephone call with home care nurse Debra regarding INR result and instructed:     Warfarin Dosing Instructions: Hold today, take 3.75 mg tomorrow then continue your current warfarin dose of 7.5 mg daily    Instructed patient to follow up no later than:   Orders given to  Homecare nurse/facility to recheck    Education provided: Potential interaction between warfarin and omnicef, doxycycline, Monitoring for bleeding signs and symptoms and When to seek medical attention/emergency care      Debra verbalizes understanding and agrees to warfarin dosing plan.    Instructed to call the Anticoagulation Clinic for any changes, questions or concerns. (#[phone])        Genesis Rosa RN      OBJECTIVE:  Recent labs: (last 7 days)     20   INR 4.4*         No question data found.  Anticoagulation Summary  As of 12/3/2020    INR goal:  2.0-3.0   TTR:  88.0 % (11.8 mo)   INR used for dosin.4 (12/3/2020)   Warfarin maintenance plan:  7.5 mg (7.5 mg x 1) every day   Full warfarin instructions:  12/3: Hold; : 3.75 mg; Otherwise 7.5 mg every day   Weekly warfarin total:  52.5 mg   Plan last modified:  Melia Villagran RN (2019)   Next INR check:  2020   Priority:  High   Target end  date:  Indefinite    Indications    Atrial fibrillation (H) [I48.91]  Long term current use of anticoagulant therapy [Z79.01]  Paroxysmal atrial fibrillation (H) [I48.0]             Anticoagulation Episode Summary     INR check location:      Preferred lab:      Send INR reminders to:  MARY KATE    Comments:  * 7.5mg tablets. Dr. Teran Ok with 12 week rechecks. 6/3/2020 discontinued plavix and is taking 81 mg asprin daily      Anticoagulation Care Providers     Provider Role Specialty Phone number    Ruben Teran MD Referring Family Medicine [phone]

## 2020-12-08 ENCOUNTER — TELEPHONE (OUTPATIENT)
Dept: FAMILY MEDICINE | Facility: CLINIC | Age: 70
End: 2020-12-08

## 2020-12-08 ENCOUNTER — ANTICOAGULATION THERAPY VISIT (OUTPATIENT)
Dept: FAMILY MEDICINE | Facility: CLINIC | Age: 70
End: 2020-12-08

## 2020-12-08 DIAGNOSIS — I48.91 ATRIAL FIBRILLATION (H): ICD-10-CM

## 2020-12-08 DIAGNOSIS — I48.0 PAROXYSMAL ATRIAL FIBRILLATION (H): ICD-10-CM

## 2020-12-08 DIAGNOSIS — Z79.01 LONG TERM CURRENT USE OF ANTICOAGULANT THERAPY: ICD-10-CM

## 2020-12-08 LAB — INR PPP: 5.6 (ref 0.9–1.1)

## 2020-12-08 NOTE — PROGRESS NOTES
Incoming call from Home Care to report that the patient did take his 7.5mg this AM.     Nurse requesting a call back - [phone]    Lavelle Turner RN

## 2020-12-08 NOTE — PROGRESS NOTES
ANTICOAGULATION MANAGEMENT     Patient Name:  Benjamín Hyman  Date:  2020    ASSESSMENT /SUBJECTIVE:    Today's INR result of 5.6 is supratherapeutic. Goal INR of 2.0-3.0      Warfarin dose taken: Warfarin taken as instructed    Diet: No new diet changes affecting INR    Medication changes/ interactions: Finished abx on 20    Previous INR: Supratherapeutic     S/S of bleeding or thromboembolism: No    New injury or illness: Yes: covid positive 20    Upcoming surgery, procedure or cardioversion: No    Additional findings: None      PLAN:    Telephone call with home care nurse Karolyn regarding INR result and instructed:     Warfarin Dosing Instructions: patient already took todays dose.  Will hold tomorrow and Thursday's dose.    Instructed patient to follow up no later than: 3 days  Orders given to  Homecare nurse/facility to recheck Will attempt venous stick; lab ordered.    Education provided: Monitoring for bleeding signs and symptoms and When to seek medical attention/emergency care      Karolyn verbalizes understanding and agrees to warfarin dosing plan.    PCP and RPh routed encounter as FYI.    Instructed to call the Anticoagulation Clinic for any changes, questions or concerns. (#[phone])        Ange Hinojosa RN      OBJECTIVE:  Recent labs: (last 7 days)     20   INR 4.4* 5.6*         INR assessment SUPRA    Recheck INR In: 3 DAYS    INR Location Homecare INR      Anticoagulation Summary  As of 2020    INR goal:  2.0-3.0   TTR:  86.6 % (11.8 mo)   INR used for dosin.6 (2020)   Warfarin maintenance plan:  7.5 mg (7.5 mg x 1) every day   Full warfarin instructions:  : Hold; : Hold; Otherwise 7.5 mg every day   Weekly warfarin total:  52.5 mg   Plan last modified:  Melia Villagran RN (2019)   Next INR check:  2020   Priority:  High   Target end date:  Indefinite    Indications    Atrial fibrillation (H) [I48.91]  Long term current  use of anticoagulant therapy [Z79.01]  Paroxysmal atrial fibrillation (H) [I48.0]             Anticoagulation Episode Summary     INR check location:      Preferred lab:      Send INR reminders to:  MARY KATE    Comments:  * 7.5mg tablets. Dr. Teran Ok with 12 week rechecks. 6/3/2020 discontinued plavix and is taking 81 mg asprin daily      Anticoagulation Care Providers     Provider Role Specialty Phone number    Ruben Teran MD Referring Family Medicine [phone]

## 2020-12-11 ENCOUNTER — TELEPHONE (OUTPATIENT)
Dept: FAMILY MEDICINE | Facility: CLINIC | Age: 70
End: 2020-12-11

## 2020-12-11 ENCOUNTER — ANTICOAGULATION THERAPY VISIT (OUTPATIENT)
Dept: FAMILY MEDICINE | Facility: CLINIC | Age: 70
End: 2020-12-11

## 2020-12-11 DIAGNOSIS — I48.0 PAROXYSMAL ATRIAL FIBRILLATION (H): ICD-10-CM

## 2020-12-11 DIAGNOSIS — Z79.01 LONG TERM CURRENT USE OF ANTICOAGULANT THERAPY: ICD-10-CM

## 2020-12-11 DIAGNOSIS — I48.91 ATRIAL FIBRILLATION (H): ICD-10-CM

## 2020-12-11 LAB — INR PPP: 2.5 (ref 0.9–1.1)

## 2020-12-11 NOTE — PROGRESS NOTES
ANTICOAGULATION MANAGEMENT     Patient Name:  Benjamín Hyman  Date:  2020    ASSESSMENT /SUBJECTIVE:    Today's INR result of 2.5 is therapeutic. Goal INR of 2.0-3.0      Warfarin dose taken: Warfarin taken as instructed    Diet: No new diet changes affecting INR    Medication changes/ interactions: No new medications/supplements affecting INR. Pt had finished omnicef and dexamethasone  which may have caused prior INR to be elevated.    Previous INR: Supratherapeutic     S/S of bleeding or thromboembolism: No    New injury or illness: No    Upcoming surgery, procedure or cardioversion: No    Additional findings:       PLAN:    Telephone call with home care nurse Karolyn regarding INR result and instructed:     Warfarin Dosing Instructions: Continue your current warfarin dose 7.5 mg every day    Instructed patient to follow up no later than: 12/15    Orders given to  Homecare nurse/facility to recheck    Education provided: Target INR goal and significance of current INR result      Karolyn verbalizes understanding and agrees to warfarin dosing plan.    Instructed to call the Anticoagulation Clinic for any changes, questions or concerns. (#[phone])        Devyn Calderón RN      OBJECTIVE:  Recent labs: (last 7 days)     20   INR 5.6* 2.5*         No question data found.  Anticoagulation Summary  As of 2020    INR goal:  2.0-3.0   TTR:  85.9 % (11.8 mo)   INR used for dosin.5 (2020)   Warfarin maintenance plan:  7.5 mg (7.5 mg x 1) every day   Full warfarin instructions:  7.5 mg every day   Weekly warfarin total:  52.5 mg   No change documented:  Devyn Calderón RN   Plan last modified:  Melia Villagran RN (2019)   Next INR check:  12/15/2020   Priority:  High   Target end date:  Indefinite    Indications    Atrial fibrillation (H) [I48.91]  Long term current use of anticoagulant therapy [Z79.01]  Paroxysmal atrial fibrillation (H) [I48.0]              Anticoagulation Episode Summary     INR check location:      Preferred lab:      Send INR reminders to:  MARY KATE    Comments:  * 7.5mg tablets. Dr. Teran Ok with 12 week rechecks. 6/3/2020 discontinued plavix and is taking 81 mg asprin daily      Anticoagulation Care Providers     Provider Role Specialty Phone number    Ruben Teran MD Referring Family Medicine [phone]

## 2020-12-11 NOTE — TELEPHONE ENCOUNTER
COVID update    Pt is better than earlier this week.  His INR has improved, he is still weak but working with PT and doing his exercises daily.  Appetite is improving.  No other concerns.    Please advise when COVID precautions can be removed.    Thanks    Karolyn Dean RN

## 2020-12-14 NOTE — TELEPHONE ENCOUNTER
Since I got the note he was doing better on Friday and it is now Monday, I have taken off the COVID-19 precautions.  Ruben Teran MD  Family Medicine

## 2020-12-15 ENCOUNTER — TELEPHONE (OUTPATIENT)
Dept: FAMILY MEDICINE | Facility: CLINIC | Age: 70
End: 2020-12-15

## 2020-12-15 ENCOUNTER — ANTICOAGULATION THERAPY VISIT (OUTPATIENT)
Dept: FAMILY MEDICINE | Facility: CLINIC | Age: 70
End: 2020-12-15

## 2020-12-15 DIAGNOSIS — I48.0 PAROXYSMAL ATRIAL FIBRILLATION (H): ICD-10-CM

## 2020-12-15 DIAGNOSIS — Z79.01 LONG TERM CURRENT USE OF ANTICOAGULANT THERAPY: ICD-10-CM

## 2020-12-15 DIAGNOSIS — I48.91 ATRIAL FIBRILLATION (H): ICD-10-CM

## 2020-12-15 LAB — INR PPP: 4.1 (ref 0.9–1.1)

## 2020-12-15 NOTE — PROGRESS NOTES
ANTICOAGULATION MANAGEMENT     Patient Name:  Benjamín Hyman  Date:  12/15/2020    ASSESSMENT /SUBJECTIVE:    Today's INR result of 4.1 is supratherapeutic. Goal INR of 2.0-3.0      Warfarin dose taken: Warfarin taken as instructed    Diet: Appetite is poor as pt is still recovering from COVID    Medication changes/ interactions: Stopped omnicef and dexamethasone . No changes since.    Previous INR: Therapeutic     S/S of bleeding or thromboembolism: No    New injury or illness: Yes: Continues to feel fatigue from COVID    Upcoming surgery, procedure or cardioversion: No    Additional findings: None      PLAN:    Telephone call with home care nurse Elvia regarding INR result and instructed:     Warfarin Dosing Instructions: Change your warfarin dose to 3.75 mg every Weaver, Thu; 7.5 mg all other days    Instructed patient to follow up no later than:   Lab visit scheduled    Education provided: Target INR goal and significance of current INR result      Elvia verbalizes understanding and agrees to warfarin dosing plan.    Instructed to call the Anticoagulation Clinic for any changes, questions or concerns. (#[phone])        Devyn Calderón RN      OBJECTIVE:  Recent labs: (last 7 days)     12/11/20 12/15/20   INR 2.5* 4.1*         No question data found.  Anticoagulation Summary  As of 12/15/2020    INR goal:  2.0-3.0   TTR:  85.1 % (11.8 mo)   INR used for dosin.1 (12/15/2020)   Warfarin maintenance plan:  3.75 mg (7.5 mg x 0.5) every Sun, Thu; 7.5 mg (7.5 mg x 1) all other days   Full warfarin instructions:  12/15: Hold; Otherwise 3.75 mg every Sun, Thu; 7.5 mg all other days   Weekly warfarin total:  45 mg   Plan last modified:  Devyn Calderón RN (12/15/2020)   Next INR check:  2020   Priority:  High   Target end date:  Indefinite    Indications    Atrial fibrillation (H) [I48.91]  Long term current use of anticoagulant therapy [Z79.01]  Paroxysmal atrial fibrillation (H) [I48.0]              Anticoagulation Episode Summary     INR check location:      Preferred lab:      Send INR reminders to:  MARY KATE    Comments:  * 7.5mg tablets. Dr. Teran Ok with 12 week rechecks. 6/3/2020 discontinued plavix and is taking 81 mg asprin daily      Anticoagulation Care Providers     Provider Role Specialty Phone number    Ruben Trean MD Referring Family Medicine [phone]

## 2020-12-16 NOTE — TELEPHONE ENCOUNTER
Prior Authorization Retail Medication Request    Medication/Dose: digoxin  ICD code (if different than what is on RX):    Previously Tried and Failed:  Lanoxin 0.125; 0.2; 0.25mg; 125, 250 mcg   Rationale:  Patient has used since 2007    Insurance Name:  Not provided  Insurance ID:  Not provided  [facility] Key: [id number]      Pharmacy Information (if different than what is on RX)  Name:    Phone:

## 2020-12-16 NOTE — TELEPHONE ENCOUNTER
PA Initiation    Medication: digoxin (LANOXIN) 250 MCG tablet  Insurance Company: [employer] Part D - Phone [phone] Fax [phone]  Pharmacy Filling the Rx: [facility] [address] [place] [address]  Filling Pharmacy Phone: [phone]  Filling Pharmacy Fax: [phone]  Start Date: 12/16/2020

## 2020-12-16 NOTE — TELEPHONE ENCOUNTER
Prior Authorization Approval    Authorization Effective Date: 9/17/2020  Authorization Expiration Date: 12/16/2021  Medication: digoxin (LANOXIN) 250 MCG tablet--APPROVED  Approved Dose/Quantity:   Reference #:     Insurance Company: [employer] Part D - Phone [phone] Fax [phone]  Expected CoPay:       CoPay Card Available:      Foundation Assistance Needed:    Which Pharmacy is filling the prescription (Not needed for infusion/clinic administered): [facility] [address] [place] [address]  Pharmacy Notified: Yes  Patient Notified: Yes **Instructed pharmacy to notify patient when script is ready to /ship.**

## 2020-12-21 DIAGNOSIS — Z53.9 DIAGNOSIS NOT YET DEFINED: Primary | ICD-10-CM

## 2020-12-21 PROCEDURE — 99207 PR MD CERTIFICATION HHA PATIENT: CPT | Performed by: FAMILY MEDICINE

## 2020-12-28 ENCOUNTER — MEDICAL CORRESPONDENCE (OUTPATIENT)
Dept: HEALTH INFORMATION MANAGEMENT | Facility: CLINIC | Age: 70
End: 2020-12-28

## 2020-12-30 ENCOUNTER — TELEPHONE (OUTPATIENT)
Dept: FAMILY MEDICINE | Facility: CLINIC | Age: 70
End: 2020-12-30

## 2020-12-30 NOTE — LETTER
December 30, 2020      Benjamín Hyman  [address] [place] [address] [address]RI[address] T[address] [place] [id number]      Dear Benjamín,    We are contacting you because our records show you were due for an INR on 12/24/2020.      There are potentially serious risks when taking warfarin without careful monitoring, and we want to make sure you are safely managed.    Please call the INR clinic at [phone] and we will be happy to help you schedule an appointment.  If there has been a change in your care, or other concerns, please let us know so we can help and/or update our records.    Sincerely,        Ruben Teran MD

## 2020-12-30 NOTE — TELEPHONE ENCOUNTER
ANTICOAGULATION     Benjamín Hyman is overdue for INR check.      Reminder letter sent    Horace Barnett RN

## 2021-01-08 DIAGNOSIS — Z53.9 DIAGNOSIS NOT YET DEFINED: Primary | ICD-10-CM

## 2021-01-08 PROCEDURE — G0180 MD CERTIFICATION HHA PATIENT: HCPCS | Performed by: FAMILY MEDICINE

## 2021-01-19 ENCOUNTER — ANTICOAGULATION THERAPY VISIT (OUTPATIENT)
Dept: ANTICOAGULATION | Facility: CLINIC | Age: 71
End: 2021-01-19

## 2021-01-19 DIAGNOSIS — I48.0 PAROXYSMAL ATRIAL FIBRILLATION (H): ICD-10-CM

## 2021-01-19 DIAGNOSIS — I48.19 PERSISTENT ATRIAL FIBRILLATION (H): ICD-10-CM

## 2021-01-19 DIAGNOSIS — I48.91 ATRIAL FIBRILLATION (H): ICD-10-CM

## 2021-01-19 DIAGNOSIS — Z79.01 LONG TERM CURRENT USE OF ANTICOAGULANT THERAPY: ICD-10-CM

## 2021-01-19 LAB — INR PPP: 3.53 (ref 0.86–1.14)

## 2021-01-19 PROCEDURE — 36416 COLLJ CAPILLARY BLOOD SPEC: CPT | Performed by: FAMILY MEDICINE

## 2021-01-19 PROCEDURE — 99207 PR NO CHARGE NURSE ONLY: CPT

## 2021-01-19 PROCEDURE — 85610 PROTHROMBIN TIME: CPT | Performed by: FAMILY MEDICINE

## 2021-01-19 RX ORDER — WARFARIN SODIUM 7.5 MG/1
TABLET ORAL
Qty: 100 TABLET | Refills: 0
Start: 2021-01-19 | End: 2021-02-11

## 2021-01-19 NOTE — PROGRESS NOTES
ANTICOAGULATION FOLLOW-UP CLINIC VISIT    Patient Name:  Benjamín Hyman  Date:  1/19/2021  Contact Type:  Telephone    SUBJECTIVE:  Patient Findings     Positives:  Extra doses    Comments:  No changes in diet, activity level, medications (including over the counter), or health. He is still easily tired after covid in Nov. No missed doses of warfarin. Patient has been taking half tab on Mondays only. This was not [facility] instructions. No signs of clots or bleeding concerns. Patient to correct and take 3.75 mg Tues/Fri instead of on Mondays. Recheck in 2 weeks.           Clinical Outcomes     Comments:  No changes in diet, activity level, medications (including over the counter), or health. He is still easily tired after covid in Nov. No missed doses of warfarin. Patient has been taking half tab on Mondays only. This was not [facility] instructions. No signs of clots or bleeding concerns. Patient to correct and take 3.75 mg Tues/Fri instead of on Mondays. Recheck in 2 weeks.              OBJECTIVE    Recent labs: (last 7 days)     01/19/21  0837   INR 3.53*       ASSESSMENT / PLAN  INR assessment SUPRA    Recheck INR In: 2 WEEKS    INR Location Clinic      Anticoagulation Summary  As of 1/19/2021    INR goal:  2.0-3.0   TTR:  75.2 % (11.8 mo)   INR used for dosing:  3.53 (1/19/2021)   Warfarin maintenance plan:  3.75 mg (7.5 mg x 0.5) every Tue, Fri; 7.5 mg (7.5 mg x 1) all other days   Full warfarin instructions:  3.75 mg every Tue, Fri; 7.5 mg all other days   Weekly warfarin total:  45 mg   Plan last modified:  Bing Sahu RN (1/19/2021)   Next INR check:  2/2/2021   Priority:  High   Target end date:  Indefinite    Indications    Atrial fibrillation (H) [I48.91]  Long term current use of anticoagulant therapy [Z79.01]  Paroxysmal atrial fibrillation (H) [I48.0]             Anticoagulation Episode Summary     INR check location:      Preferred lab:      Send INR reminders to:  MARY OSORIO    Comments:  * 7.5mg  tablets. Dr. Teran Ok with 12 week rechecks. 6/3/2020 discontinued plavix and is taking 81 mg asprin daily      Anticoagulation Care Providers     Provider Role Specialty Phone number    Ruben Teran MD Referring Family Medicine [phone]            See the Encounter Report to view Anticoagulation Flowsheet and Dosing Calendar (Go to Encounters tab in chart review, and find the Anticoagulation Therapy Visit)        Bing Sahu RN

## 2021-02-01 ENCOUNTER — ANTICOAGULATION THERAPY VISIT (OUTPATIENT)
Dept: ANTICOAGULATION | Facility: CLINIC | Age: 71
End: 2021-02-01

## 2021-02-01 DIAGNOSIS — I48.0 PAROXYSMAL ATRIAL FIBRILLATION (H): ICD-10-CM

## 2021-02-01 DIAGNOSIS — I48.20 CHRONIC ATRIAL FIBRILLATION (H): ICD-10-CM

## 2021-02-01 DIAGNOSIS — Z79.01 LONG TERM CURRENT USE OF ANTICOAGULANT THERAPY: ICD-10-CM

## 2021-02-01 DIAGNOSIS — I48.91 ATRIAL FIBRILLATION (H): ICD-10-CM

## 2021-02-01 LAB
CAPILLARY BLOOD COLLECTION: NORMAL
INR PPP: 2.4 (ref 0.86–1.14)

## 2021-02-01 PROCEDURE — 85610 PROTHROMBIN TIME: CPT | Performed by: FAMILY MEDICINE

## 2021-02-01 PROCEDURE — 36416 COLLJ CAPILLARY BLOOD SPEC: CPT | Performed by: FAMILY MEDICINE

## 2021-02-01 NOTE — PROGRESS NOTES
ANTICOAGULATION MANAGEMENT     Patient Name:  Benjamín Hyman  Date:  2021    ASSESSMENT /SUBJECTIVE:    Today's INR result of 2.40 is therapeutic. Goal INR of 2.0-3.0      Warfarin dose taken: Warfarin taken as instructed    Diet: No new diet changes affecting INR - patient has been eating iceberg lettuce salads daily for the last 3-4 days. Discussed vitamin K content of iceberg lettuce compared to other lettuce/leafy greens.     Medication changes/ interactions: No new medications/supplements affecting INR    Previous INR: Therapeutic     S/S of bleeding or thromboembolism: No    New injury or illness: No    Upcoming surgery, procedure or cardioversion: No    Additional findings: None      PLAN:    Telephone call with Benjamín regarding INR result and instructed:     Warfarin Dosing Instructions: Continue your current warfarin dose 3.75 mg every Tue, Fri; 7.5 mg all other days    Instructed patient to follow up no later than: 4 weeks  Lab visit scheduled    Education provided: Vitamin K content of foods and Target INR goal and significance of current INR result      Benjamín verbalizes understanding and agrees to warfarin dosing plan.    Instructed to call the Anticoagulation Clinic for any changes, questions or concerns. (#[phone])        Genesis Rosa RN      OBJECTIVE:  Recent labs: (last 7 days)     21  1039   INR 2.40*         No question data found.  Anticoagulation Summary  As of 2021    INR goal:  2.0-3.0   TTR:  73.4 % (11.8 mo)   INR used for dosin.40 (2021)   Warfarin maintenance plan:  3.75 mg (7.5 mg x 0.5) every Tue, Fri; 7.5 mg (7.5 mg x 1) all other days   Full warfarin instructions:  3.75 mg every Tue, Fri; 7.5 mg all other days   Weekly warfarin total:  45 mg   Plan last modified:  Bing Sahu RN (2021)   Next INR check:     Priority:  High   Target end date:  Indefinite    Indications    Atrial fibrillation (H) [I48.91]  Long term current use of  anticoagulant therapy [Z79.01]  Paroxysmal atrial fibrillation (H) [I48.0]             Anticoagulation Episode Summary     INR check location:      Preferred lab:      Send INR reminders to:  MARY OSORIO    Comments:  * 7.5mg tablets. Dr. Teran Ok with 12 week rechecks. 6/3/2020 discontinued plavix and is taking 81 mg asprin daily      Anticoagulation Care Providers     Provider Role Specialty Phone number    Ruben Teran MD Referring Family Medicine [phone]

## 2021-02-05 ENCOUNTER — TELEPHONE (OUTPATIENT)
Dept: ONCOLOGY | Facility: CLINIC | Age: 71
End: 2021-02-05

## 2021-02-05 NOTE — TELEPHONE ENCOUNTER
----- Message from Cleopatra Aden sent at 2/5/2021  8:45 AM CST -----  Regarding: patient canceling aptestela Hi,    This is the patient who decided to wait on getting labs and seeing Alonso. He would have been getting labs on 02/16 and then his virtual visit would have been on 02/24!        Ang

## 2021-02-10 DIAGNOSIS — I48.19 PERSISTENT ATRIAL FIBRILLATION (H): ICD-10-CM

## 2021-02-10 NOTE — TELEPHONE ENCOUNTER
"Requested Prescriptions   Pending Prescriptions Disp Refills     warfarin ANTICOAGULANT (COUMADIN) 7.5 MG tablet 100 tablet 0     Sig: Take 3.75 mg Tues/Fri; 7.5 mg all other days or as directed by INR clinic       Vitamin K Antagonists Failed - 2/10/2021  9:52 AM        Failed - INR is within goal in the past 6 weeks     Confirm INR is within goal in the past 6 weeks.     Recent Labs   Lab Test 02/01/21  1039   INR 2.40*                       Passed - Recent (12 mo) or future (30 days) visit within the authorizing provider's specialty     Patient has had an office visit with the authorizing provider or a provider within the authorizing providers department within the previous 12 mos or has a future within next 30 days. See \"Patient Info\" tab in inbasket, or \"Choose Columns\" in Meds & Orders section of the refill encounter.              Passed - Medication is active on med list        Passed - Patient is 18 years of age or older             "

## 2021-02-11 RX ORDER — WARFARIN SODIUM 7.5 MG/1
TABLET ORAL
Qty: 100 TABLET | Refills: 0 | Status: SHIPPED | OUTPATIENT
Start: 2021-02-11 | End: 2021-05-25

## 2021-02-25 ENCOUNTER — TELEPHONE (OUTPATIENT)
Dept: FAMILY MEDICINE | Facility: CLINIC | Age: 71
End: 2021-02-25

## 2021-02-25 NOTE — TELEPHONE ENCOUNTER
Reason for call:  Patient reporting a symptom    Symptom or request: Pt states he received the covid vaccine yesterday and has a sore arm and upset stomach today, denies vomiting or diarrhea, unsure if he has a fever as he has no thermometer.    Duration (how long have symptoms been present): today    Have you been treated for this before? No    Additional comments:     Phone Number patient can be reached at:  Home number on file [phone] (home) or Work number on file:    Best Time:  any    Can we leave a detailed message on this number:  YES    Call taken on 2/25/2021 at 9:46 AM by Gillian Stallworth

## 2021-02-25 NOTE — TELEPHONE ENCOUNTER
Patient has a sore arm after his covid vaccine. He has slight nausea but is able to eat and drink. He does not have abdominal pain. No chest pain or SOA. He does not have a rash. He will take over the counter Tylenol for his arm.  Peggy Pichardo RN

## 2021-03-09 ENCOUNTER — ANTICOAGULATION THERAPY VISIT (OUTPATIENT)
Dept: FAMILY MEDICINE | Facility: CLINIC | Age: 71
End: 2021-03-09

## 2021-03-09 DIAGNOSIS — I48.91 ATRIAL FIBRILLATION (H): ICD-10-CM

## 2021-03-09 DIAGNOSIS — I48.0 PAROXYSMAL ATRIAL FIBRILLATION (H): ICD-10-CM

## 2021-03-09 DIAGNOSIS — Z79.01 LONG TERM CURRENT USE OF ANTICOAGULANT THERAPY: ICD-10-CM

## 2021-03-09 DIAGNOSIS — I48.20 CHRONIC ATRIAL FIBRILLATION (H): ICD-10-CM

## 2021-03-09 LAB
CAPILLARY BLOOD COLLECTION: NORMAL
INR PPP: 2.3 (ref 0.86–1.14)

## 2021-03-09 PROCEDURE — 36416 COLLJ CAPILLARY BLOOD SPEC: CPT | Performed by: FAMILY MEDICINE

## 2021-03-09 PROCEDURE — 85610 PROTHROMBIN TIME: CPT | Performed by: FAMILY MEDICINE

## 2021-03-09 PROCEDURE — 99207 PR NO CHARGE NURSE ONLY: CPT | Performed by: FAMILY MEDICINE

## 2021-03-09 NOTE — PROGRESS NOTES
ANTICOAGULATION FOLLOW-UP CLINIC VISIT    Patient Name:  Benjamín Hyman  Date:  3/9/2021  Contact Type:  Telephone    SUBJECTIVE:  Patient Findings     Comments:    Assessed for S/S bleeding, clotting, medication, diet, health, activity and alcohol changes          Clinical Outcomes     Negatives:  Major bleeding event, Thromboembolic event, Anticoagulation-related hospital admission, Anticoagulation-related ED visit, Anticoagulation-related fatality    Comments:    Assessed for S/S bleeding, clotting, medication, diet, health, activity and alcohol changes             OBJECTIVE    Recent labs: (last 7 days)     21  1021   INR 2.30*       ASSESSMENT / PLAN  INR assessment THER    Recheck INR In: 5 WEEKS    INR Location Clinic      Anticoagulation Summary  As of 3/9/2021    INR goal:  2.0-3.0   TTR:  73.4 % (11.8 mo)   INR used for dosin.30 (3/9/2021)   Warfarin maintenance plan:  3.75 mg (7.5 mg x 0.5) every Tue, Fri; 7.5 mg (7.5 mg x 1) all other days   Full warfarin instructions:  3.75 mg every Tue, Fri; 7.5 mg all other days   Weekly warfarin total:  45 mg   Plan last modified:  Bing Sahu RN (2021)   Next INR check:  2021   Priority:  Maintenance   Target end date:  Indefinite    Indications    Atrial fibrillation (H) [I48.91]  Long term current use of anticoagulant therapy [Z79.01]  Paroxysmal atrial fibrillation (H) [I48.0]             Anticoagulation Episode Summary     INR check location:      Preferred lab:      Send INR reminders to:  MARY OSORIO    Comments:  * 7.5mg tablets. Dr. Teran Ok with 12 week rechecks. 6/3/2020 discontinued plavix and is taking 81 mg asprin daily      Anticoagulation Care Providers     Provider Role Specialty Phone number    Ruben Teran MD Referring Family Medicine [phone]            See the Encounter Report to view Anticoagulation Flowsheet and Dosing Calendar (Go to Encounters tab in chart review, and find the Anticoagulation  Therapy Visit)        Natali Moreno RN

## 2021-03-16 ENCOUNTER — TELEPHONE (OUTPATIENT)
Dept: ONCOLOGY | Facility: CLINIC | Age: 71
End: 2021-03-16

## 2021-03-16 ENCOUNTER — TELEPHONE (OUTPATIENT)
Dept: FAMILY MEDICINE | Facility: CLINIC | Age: 71
End: 2021-03-16

## 2021-03-16 NOTE — TELEPHONE ENCOUNTER
Patient is called and he is convinced he got covid from the covid shot.  He is not sure he is going to get the next shot.  I have advised this is not a live virus or a dead virus.  Sounds like he got a good response from the first shot.  His only complaint today is a stuffy nose.  He lives on social security and can not afford anything extra so not willing to purchase even vicks vapor rub or a decongestant.   He can try a humidifier or vaporizer in his home.  Not willing to try this either.  We have had a strange late winter with 50-60 degree weather.  Maybe he has developed a allergy.offered to make him a appt. Declined by patient.  Patient informs this RN that I have not helped him at all.  Advised to have him call back if needed. Radha GUILLORY RN

## 2021-03-16 NOTE — TELEPHONE ENCOUNTER
Reason for call:  Patient reporting a symptom    Symptom or request: Patient is calling stating that his 2nd covid vaccine on 2/24. He said he feels he has an allergy to it. He said he got sick after and still has a head cold.     Duration (how long have symptoms been present): since his first dose.    Have you been treated for this before? No    Phone Number patient can be reached at:  Home number on file [phone] (home)    Best Time:  any    Can we leave a detailed message on this number:  YES    Call taken on 3/16/2021 at 12:14 PM by Agueda Grant

## 2021-03-16 NOTE — TELEPHONE ENCOUNTER
----- Message from Debo Douglass sent at 3/16/2021 12:12 PM CDT -----  Patient is more worried about covid shot. Sent to family practice for now. I will try again later. Thanks.  Debo  ----- Message -----  From: Carol Beth RN  Sent: 3/16/2021  11:47 AM CDT  To: [facility]     Patient cancelled his appt with Dr. Gupta on 2/24. Please call him and see if he wants to reschedule.Thanks!Sussy

## 2021-03-19 ENCOUNTER — HOSPITAL ENCOUNTER (OUTPATIENT)
Dept: LAB | Facility: CLINIC | Age: 71
Discharge: HOME | End: 2021-03-19
Attending: NURSE PRACTITIONER | Admitting: INTERNAL MEDICINE
Payer: MEDICARE

## 2021-03-19 DIAGNOSIS — I42.8 NICM (NONISCHEMIC CARDIOMYOPATHY) (H): ICD-10-CM

## 2021-03-19 LAB
ALBUMIN SERPL-MCNC: 3.2 G/DL (ref 3.4–5)
ALP SERPL-CCNC: 116 U/L (ref 40–150)
ALT SERPL W P-5'-P-CCNC: 21 U/L (ref 0–70)
ANION GAP SERPL CALCULATED.3IONS-SCNC: 7 MMOL/L (ref 3–14)
AST SERPL W P-5'-P-CCNC: 13 U/L (ref 0–45)
BASOPHILS # BLD AUTO: 0.1 10E9/L (ref 0–0.2)
BASOPHILS NFR BLD AUTO: 0.7 %
BILIRUB SERPL-MCNC: 0.4 MG/DL (ref 0.2–1.3)
BUN SERPL-MCNC: 18 MG/DL (ref 7–30)
CALCIUM SERPL-MCNC: 8.5 MG/DL (ref 8.5–10.1)
CHLORIDE SERPL-SCNC: 107 MMOL/L (ref 94–109)
CO2 SERPL-SCNC: 26 MMOL/L (ref 20–32)
CREAT SERPL-MCNC: 0.98 MG/DL (ref 0.66–1.25)
DIFFERENTIAL METHOD BLD: ABNORMAL
EOSINOPHIL # BLD AUTO: 0 10E9/L (ref 0–0.7)
EOSINOPHIL NFR BLD AUTO: 0 %
ERYTHROCYTE [DISTWIDTH] IN BLOOD BY AUTOMATED COUNT: 12 % (ref 10–15)
GFR SERPL CREATININE-BSD FRML MDRD: 78 ML/MIN/{1.73_M2}
GLUCOSE SERPL-MCNC: 95 MG/DL (ref 70–99)
HCT VFR BLD AUTO: 46.2 % (ref 40–53)
HGB BLD-MCNC: 14.9 G/DL (ref 13.3–17.7)
IMM GRANULOCYTES # BLD: 0.1 10E9/L (ref 0–0.4)
IMM GRANULOCYTES NFR BLD: 0.4 %
LYMPHOCYTES # BLD AUTO: 3.3 10E9/L (ref 0.8–5.3)
LYMPHOCYTES NFR BLD AUTO: 28.2 %
MCH RBC QN AUTO: 31.1 PG (ref 26.5–33)
MCHC RBC AUTO-ENTMCNC: 32.3 G/DL (ref 31.5–36.5)
MCV RBC AUTO: 97 FL (ref 78–100)
MONOCYTES # BLD AUTO: 1.1 10E9/L (ref 0–1.3)
MONOCYTES NFR BLD AUTO: 9.3 %
NEUTROPHILS # BLD AUTO: 7.1 10E9/L (ref 1.6–8.3)
NEUTROPHILS NFR BLD AUTO: 61.4 %
NRBC # BLD AUTO: 0 10*3/UL
NRBC BLD AUTO-RTO: 0 /100
PLATELET # BLD AUTO: 284 10E9/L (ref 150–450)
POTASSIUM SERPL-SCNC: 4.5 MMOL/L (ref 3.4–5.3)
PROT SERPL-MCNC: 7.8 G/DL (ref 6.8–8.8)
RBC # BLD AUTO: 4.79 10E12/L (ref 4.4–5.9)
SODIUM SERPL-SCNC: 140 MMOL/L (ref 133–144)
WBC # BLD AUTO: 11.6 10E9/L (ref 4–11)

## 2021-03-19 PROCEDURE — 999N001036 HC STATISTIC TOTAL PROTEIN: Performed by: INTERNAL MEDICINE

## 2021-03-19 PROCEDURE — 82784 ASSAY IGA/IGD/IGG/IGM EACH: CPT | Performed by: INTERNAL MEDICINE

## 2021-03-19 PROCEDURE — 86334 IMMUNOFIX E-PHORESIS SERUM: CPT | Mod: TC | Performed by: INTERNAL MEDICINE

## 2021-03-19 PROCEDURE — 84165 PROTEIN E-PHORESIS SERUM: CPT | Mod: TC | Performed by: INTERNAL MEDICINE

## 2021-03-19 PROCEDURE — 80053 COMPREHEN METABOLIC PANEL: CPT | Performed by: INTERNAL MEDICINE

## 2021-03-19 PROCEDURE — 36415 COLL VENOUS BLD VENIPUNCTURE: CPT | Performed by: INTERNAL MEDICINE

## 2021-03-19 PROCEDURE — 84165 PROTEIN E-PHORESIS SERUM: CPT | Mod: 26 | Performed by: PATHOLOGY

## 2021-03-19 PROCEDURE — 86334 IMMUNOFIX E-PHORESIS SERUM: CPT | Mod: 26 | Performed by: PATHOLOGY

## 2021-03-19 PROCEDURE — 85025 COMPLETE CBC W/AUTO DIFF WBC: CPT | Performed by: INTERNAL MEDICINE

## 2021-03-22 ENCOUNTER — TELEPHONE (OUTPATIENT)
Dept: ONCOLOGY | Facility: CLINIC | Age: 71
End: 2021-03-22

## 2021-03-22 ENCOUNTER — VIRTUAL VISIT (OUTPATIENT)
Dept: ONCOLOGY | Facility: CLINIC | Age: 71
End: 2021-03-22
Attending: NURSE PRACTITIONER
Payer: COMMERCIAL

## 2021-03-22 DIAGNOSIS — I42.8 NICM (NONISCHEMIC CARDIOMYOPATHY) (H): Primary | ICD-10-CM

## 2021-03-22 LAB
ALBUMIN SERPL ELPH-MCNC: 3.8 G/DL (ref 3.7–5.1)
ALPHA1 GLOB SERPL ELPH-MCNC: 0.3 G/DL (ref 0.2–0.4)
ALPHA2 GLOB SERPL ELPH-MCNC: 0.9 G/DL (ref 0.5–0.9)
B-GLOBULIN SERPL ELPH-MCNC: 0.7 G/DL (ref 0.6–1)
GAMMA GLOB SERPL ELPH-MCNC: 1.6 G/DL (ref 0.7–1.6)
IGA SERPL-MCNC: 212 MG/DL (ref 84–499)
IGG SERPL-MCNC: 1821 MG/DL (ref 610–1616)
IGM SERPL-MCNC: 38 MG/DL (ref 35–242)
M PROTEIN SERPL ELPH-MCNC: 0.3 G/DL
PROT PATTERN SERPL ELPH-IMP: ABNORMAL
PROT PATTERN SERPL IFE-IMP: NORMAL

## 2021-03-22 PROCEDURE — 99214 OFFICE O/P EST MOD 30 MIN: CPT | Mod: 95 | Performed by: NURSE PRACTITIONER

## 2021-03-22 NOTE — LETTER
3/22/2021         RE: Benjamín Hyman  [address]  [facility] [address]        Dear Colleague,    Thank you for referring your patient, Benjamín Hyman, to the [facility]. Please see a copy of my visit note below.    Benjamín is a 70 year old who is being evaluated via a billable telephone visit.      What phone number would you like to be contacted at? [phone]  How would you like to obtain your AVS? Mail a copy  Phone call duration: 5 minutes    Oncology/Hematology Visit Note  Mar 22, 2021    Reason for Visit: follow up of nonischemic cardiomyopathy  Review of labs    11/2020-patient met with Dr. Gupta.  Discussion about cardiac myeloid and amyloidosis  discussed         Interval History:  Patient reports he is overall feeling well he denies fever chills sweats cough shortness of breath chest pain nausea vomiting diarrhea abdominal pain bleeding.    Review of Systems:  14 point ROS of systems including Constitutional, Eyes, Respiratory, Cardiovascular, Gastroenterology, Genitourinary, Integumentary, Muscularskeletal, Psychiatric were all negative except for pertinent positives noted in my HPI.      Current Outpatient Medications   Medication Sig Dispense Refill     aspirin 81 MG EC tablet Take 81 mg by mouth daily       digoxin (LANOXIN) 250 MCG tablet Take 1 tab on even days & 1/2 tab on odd day of the month. Patient has been on this medication for year & atrial fibrillation is controlled. 70 tablet 3     metoprolol succinate ER (TOPROL-XL) 200 MG 24 hr tablet Take 1 tablet (200 mg) by mouth daily 90 tablet 3     omeprazole (PRILOSEC OTC) 20 MG tablet Take 20 mg by mouth daily as needed  30 tablet      order for DME Equipment being ordered: Digital home blood pressure monitor kit 1 each 0     simvastatin (ZOCOR) 40 MG tablet Take 1 tablet (40 mg) by mouth every other day 45 tablet 3     warfarin ANTICOAGULANT (COUMADIN) 7.5 MG tablet Take 3.75 mg  Tues/Fri; 7.5 mg all other days or as directed by INR clinic 100 tablet 0     dexamethasone (DECADRON) 6 MG tablet Take 1 tablet (6 mg) by mouth daily for 8 days 8 tablet 0       Physical Examination:  Telephone visit no audible wheezing or cough    Laboratory Data:  Results for MARIAN MOHAN (MRN [id number]) as of 3/22/2021 13:59   Ref. Range 3/19/2021 10:41   Sodium Latest Ref Range: 133 - 144 mmol/L 140   Potassium Latest Ref Range: 3.4 - 5.3 mmol/L 4.5   Chloride Latest Ref Range: 94 - 109 mmol/L 107   Carbon Dioxide Latest Ref Range: 20 - 32 mmol/L 26   Urea Nitrogen Latest Ref Range: 7 - 30 mg/dL 18   Creatinine Latest Ref Range: 0.66 - 1.25 mg/dL 0.98   GFR Estimate Latest Ref Range: >60 mL/min/1.73_m2 78   GFR Estimate If Black Latest Ref Range: >60 mL/min/1.73_m2 90   Calcium Latest Ref Range: 8.5 - 10.1 mg/dL 8.5   Anion Gap Latest Ref Range: 3 - 14 mmol/L 7   Albumin Latest Ref Range: 3.4 - 5.0 g/dL 3.2 (L)   Protein Total Latest Ref Range: 6.8 - 8.8 g/dL 7.8   Bilirubin Total Latest Ref Range: 0.2 - 1.3 mg/dL 0.4   Alkaline Phosphatase Latest Ref Range: 40 - 150 U/L 116   ALT Latest Ref Range: 0 - 70 U/L 21   AST Latest Ref Range: 0 - 45 U/L 13   Glucose Latest Ref Range: 70 - 99 mg/dL 95   WBC Latest Ref Range: 4.0 - 11.0 10e9/L 11.6 (H)   Hemoglobin Latest Ref Range: 13.3 - 17.7 g/dL 14.9   Hematocrit Latest Ref Range: 40.0 - 53.0 % 46.2   Platelet Count Latest Ref Range: 150 - 450 10e9/L 284   RBC Count Latest Ref Range: 4.4 - 5.9 10e12/L 4.79   MCV Latest Ref Range: 78 - 100 fl 97   MCH Latest Ref Range: 26.5 - 33.0 pg 31.1   MCHC Latest Ref Range: 31.5 - 36.5 g/dL 32.3   RDW Latest Ref Range: 10.0 - 15.0 % 12.0   Diff Method Unknown Automated Method   % Neutrophils Latest Units: % 61.4   % Lymphocytes Latest Units: % 28.2   % Monocytes Latest Units: % 9.3   % Eosinophils Latest Units: % 0.0   % Basophils Latest Units: % 0.7   % Immature Granulocytes Latest Units: % 0.4   Nucleated RBCs Latest Ref  Range: 0 /100 0   Absolute Neutrophil Latest Ref Range: 1.6 - 8.3 10e9/L 7.1   Absolute Lymphocytes Latest Ref Range: 0.8 - 5.3 10e9/L 3.3   Absolute Monocytes Latest Ref Range: 0.0 - 1.3 10e9/L 1.1   Absolute Eosinophils Latest Ref Range: 0.0 - 0.7 10e9/L 0.0   Absolute Basophils Latest Ref Range: 0.0 - 0.2 10e9/L 0.1   Abs Immature Granulocytes Latest Ref Range: 0 - 0.4 10e9/L 0.1   Absolute Nucleated RBC Unknown 0.0   Albumin Fraction Latest Ref Range: 3.7 - 5.1 g/dL PENDING   Alpha 1 Fraction Latest Ref Range: 0.2 - 0.4 g/dL PENDING   Alpha 2 Fraction Latest Ref Range: 0.5 - 0.9 g/dL PENDING   Beta Fraction Latest Ref Range: 0.6 - 1.0 g/dL PENDING   ELP Interpretation: Unknown PENDING   Gamma Fraction Latest Ref Range: 0.7 - 1.6 g/dL PENDING   IGA Latest Ref Range: 84 - 499 mg/dL 212   IGG Latest Ref Range: 610 - 1,616 mg/dL 1,821 (H)   IGM Latest Ref Range: 35 - 242 mg/dL 38   Immunofixation ELP Unknown (Note)   Monoclonal Peak Latest Ref Range: 0.0 g/dL PENDING       Assessment and Plan:    This is a 70-year-old male with     nonischemic cardiomyopathy  11/2020-patient met with Dr. Gupta.  Dr. Pozo had discussion about cardiac myeloid and amyloidosis.  At that time patient refused bone marrow biopsy  I  discussed  bone marrow biopsy,  patient refuses now however would like to talk to one of the MDs  -Labs reviewed his IgG is elevated at 1821  Kappa lambda free chain not done    I informed patient as of now some of the labs are still pending   IgG is mildly elevated  -I informed patient that I would like to get light chain labs and  bone marrow biopsy.  Patient reports he is not too happy about the side effects from bone marrow biopsy but agrees that he would like to talk to one of the doctors who is covering for Dr. Gupta     Schedule with the doctor who is covering for BRENNA [facility]     Chart documentation with Dragon Voice  recognition Software. Although reviewed after completion, some words and grammatical errors may remain.            Again, thank you for allowing me to participate in the care of your patient.        Sincerely,        BRENNA Pfeiffer CNP

## 2021-03-22 NOTE — LETTER
3/22/2021         RE: Benjamín Hyman  [address]  [facility] [address]        Dear Colleague,    Thank you for referring your patient, Benjamín Hyman, to the [facility]. Please see a copy of my visit note below.    Benjamín is a 70 year old who is being evaluated via a billable telephone visit.      What phone number would you like to be contacted at? [phone]  How would you like to obtain your AVS? Mail a copy  Phone call duration: 5 minutes    Oncology/Hematology Visit Note  Mar 22, 2021    Reason for Visit: follow up of nonischemic cardiomyopathy  Review of labs    11/2020-patient met with Dr. Gupta.  Discussion about cardiac myeloid and amyloidosis  discussed         Interval History:  Patient reports he is overall feeling well he denies fever chills sweats cough shortness of breath chest pain nausea vomiting diarrhea abdominal pain bleeding.    Review of Systems:  14 point ROS of systems including Constitutional, Eyes, Respiratory, Cardiovascular, Gastroenterology, Genitourinary, Integumentary, Muscularskeletal, Psychiatric were all negative except for pertinent positives noted in my HPI.      Current Outpatient Medications   Medication Sig Dispense Refill     aspirin 81 MG EC tablet Take 81 mg by mouth daily       digoxin (LANOXIN) 250 MCG tablet Take 1 tab on even days & 1/2 tab on odd day of the month. Patient has been on this medication for year & atrial fibrillation is controlled. 70 tablet 3     metoprolol succinate ER (TOPROL-XL) 200 MG 24 hr tablet Take 1 tablet (200 mg) by mouth daily 90 tablet 3     omeprazole (PRILOSEC OTC) 20 MG tablet Take 20 mg by mouth daily as needed  30 tablet      order for DME Equipment being ordered: Digital home blood pressure monitor kit 1 each 0     simvastatin (ZOCOR) 40 MG tablet Take 1 tablet (40 mg) by mouth every other day 45 tablet 3     warfarin ANTICOAGULANT (COUMADIN) 7.5 MG tablet Take 3.75 mg  Tues/Fri; 7.5 mg all other days or as directed by INR clinic 100 tablet 0     dexamethasone (DECADRON) 6 MG tablet Take 1 tablet (6 mg) by mouth daily for 8 days 8 tablet 0       Physical Examination:  Telephone visit no audible wheezing or cough    Laboratory Data:  Results for MARIAN MOHAN (MRN [id number]) as of 3/22/2021 13:59   Ref. Range 3/19/2021 10:41   Sodium Latest Ref Range: 133 - 144 mmol/L 140   Potassium Latest Ref Range: 3.4 - 5.3 mmol/L 4.5   Chloride Latest Ref Range: 94 - 109 mmol/L 107   Carbon Dioxide Latest Ref Range: 20 - 32 mmol/L 26   Urea Nitrogen Latest Ref Range: 7 - 30 mg/dL 18   Creatinine Latest Ref Range: 0.66 - 1.25 mg/dL 0.98   GFR Estimate Latest Ref Range: >60 mL/min/1.73_m2 78   GFR Estimate If Black Latest Ref Range: >60 mL/min/1.73_m2 90   Calcium Latest Ref Range: 8.5 - 10.1 mg/dL 8.5   Anion Gap Latest Ref Range: 3 - 14 mmol/L 7   Albumin Latest Ref Range: 3.4 - 5.0 g/dL 3.2 (L)   Protein Total Latest Ref Range: 6.8 - 8.8 g/dL 7.8   Bilirubin Total Latest Ref Range: 0.2 - 1.3 mg/dL 0.4   Alkaline Phosphatase Latest Ref Range: 40 - 150 U/L 116   ALT Latest Ref Range: 0 - 70 U/L 21   AST Latest Ref Range: 0 - 45 U/L 13   Glucose Latest Ref Range: 70 - 99 mg/dL 95   WBC Latest Ref Range: 4.0 - 11.0 10e9/L 11.6 (H)   Hemoglobin Latest Ref Range: 13.3 - 17.7 g/dL 14.9   Hematocrit Latest Ref Range: 40.0 - 53.0 % 46.2   Platelet Count Latest Ref Range: 150 - 450 10e9/L 284   RBC Count Latest Ref Range: 4.4 - 5.9 10e12/L 4.79   MCV Latest Ref Range: 78 - 100 fl 97   MCH Latest Ref Range: 26.5 - 33.0 pg 31.1   MCHC Latest Ref Range: 31.5 - 36.5 g/dL 32.3   RDW Latest Ref Range: 10.0 - 15.0 % 12.0   Diff Method Unknown Automated Method   % Neutrophils Latest Units: % 61.4   % Lymphocytes Latest Units: % 28.2   % Monocytes Latest Units: % 9.3   % Eosinophils Latest Units: % 0.0   % Basophils Latest Units: % 0.7   % Immature Granulocytes Latest Units: % 0.4   Nucleated RBCs Latest Ref  Range: 0 /100 0   Absolute Neutrophil Latest Ref Range: 1.6 - 8.3 10e9/L 7.1   Absolute Lymphocytes Latest Ref Range: 0.8 - 5.3 10e9/L 3.3   Absolute Monocytes Latest Ref Range: 0.0 - 1.3 10e9/L 1.1   Absolute Eosinophils Latest Ref Range: 0.0 - 0.7 10e9/L 0.0   Absolute Basophils Latest Ref Range: 0.0 - 0.2 10e9/L 0.1   Abs Immature Granulocytes Latest Ref Range: 0 - 0.4 10e9/L 0.1   Absolute Nucleated RBC Unknown 0.0   Albumin Fraction Latest Ref Range: 3.7 - 5.1 g/dL PENDING   Alpha 1 Fraction Latest Ref Range: 0.2 - 0.4 g/dL PENDING   Alpha 2 Fraction Latest Ref Range: 0.5 - 0.9 g/dL PENDING   Beta Fraction Latest Ref Range: 0.6 - 1.0 g/dL PENDING   ELP Interpretation: Unknown PENDING   Gamma Fraction Latest Ref Range: 0.7 - 1.6 g/dL PENDING   IGA Latest Ref Range: 84 - 499 mg/dL 212   IGG Latest Ref Range: 610 - 1,616 mg/dL 1,821 (H)   IGM Latest Ref Range: 35 - 242 mg/dL 38   Immunofixation ELP Unknown (Note)   Monoclonal Peak Latest Ref Range: 0.0 g/dL PENDING       Assessment and Plan:    This is a 70-year-old male with     nonischemic cardiomyopathy  11/2020-patient met with Dr. Gupta.  Dr. Pozo had discussion about cardiac myeloid and amyloidosis.  At that time patient refused bone marrow biopsy  I  discussed  bone marrow biopsy,  patient refuses now however would like to talk to one of the MDs  -Labs reviewed his IgG is elevated at 1821  Kappa lambda free chain not done    I informed patient as of now some of the labs are still pending   IgG is mildly elevated  -I informed patient that I would like to get light chain labs and  bone marrow biopsy.  Patient reports he is not too happy about the side effects from bone marrow biopsy but agrees that he would like to talk to one of the doctors who is covering for Dr. Gupta     Schedule with the doctor who is covering for BRENNA [facility]     Chart documentation with Dragon Voice  recognition Software. Although reviewed after completion, some words and grammatical errors may remain.            Again, thank you for allowing me to participate in the care of your patient.        Sincerely,        BRENNA Pfeiffer CNP

## 2021-03-22 NOTE — PROGRESS NOTES
Oncology/Hematology Visit Note  Mar 22, 2021    Reason for Visit: follow up of nonischemic cardiomyopathy  Review of labs    11/2020-patient met with Dr. Gupta.  Discussion about cardiac myeloid and amyloidosis  discussed         Interval History:  Patient reports he is overall feeling well he denies fever chills sweats cough shortness of breath chest pain nausea vomiting diarrhea abdominal pain bleeding.    Review of Systems:  14 point ROS of systems including Constitutional, Eyes, Respiratory, Cardiovascular, Gastroenterology, Genitourinary, Integumentary, Muscularskeletal, Psychiatric were all negative except for pertinent positives noted in my HPI.      Current Outpatient Medications   Medication Sig Dispense Refill     aspirin 81 MG EC tablet Take 81 mg by mouth daily       digoxin (LANOXIN) 250 MCG tablet Take 1 tab on even days & 1/2 tab on odd day of the month. Patient has been on this medication for year & atrial fibrillation is controlled. 70 tablet 3     metoprolol succinate ER (TOPROL-XL) 200 MG 24 hr tablet Take 1 tablet (200 mg) by mouth daily 90 tablet 3     omeprazole (PRILOSEC OTC) 20 MG tablet Take 20 mg by mouth daily as needed  30 tablet      order for DME Equipment being ordered: Digital home blood pressure monitor kit 1 each 0     simvastatin (ZOCOR) 40 MG tablet Take 1 tablet (40 mg) by mouth every other day 45 tablet 3     warfarin ANTICOAGULANT (COUMADIN) 7.5 MG tablet Take 3.75 mg Tues/Fri; 7.5 mg all other days or as directed by INR clinic 100 tablet 0     dexamethasone (DECADRON) 6 MG tablet Take 1 tablet (6 mg) by mouth daily for 8 days 8 tablet 0       Physical Examination:  Telephone visit no audible wheezing or cough    Laboratory Data:  Results for MARIAN MOHAN (MRN [id number]) as of 3/22/2021 13:59   Ref. Range 3/19/2021 10:41   Sodium Latest Ref Range: 133 - 144 mmol/L 140   Potassium Latest Ref Range: 3.4 - 5.3 mmol/L 4.5   Chloride Latest Ref Range: 94 - 109 mmol/L 107    Carbon Dioxide Latest Ref Range: 20 - 32 mmol/L 26   Urea Nitrogen Latest Ref Range: 7 - 30 mg/dL 18   Creatinine Latest Ref Range: 0.66 - 1.25 mg/dL 0.98   GFR Estimate Latest Ref Range: >60 mL/min/1.73_m2 78   GFR Estimate If Black Latest Ref Range: >60 mL/min/1.73_m2 90   Calcium Latest Ref Range: 8.5 - 10.1 mg/dL 8.5   Anion Gap Latest Ref Range: 3 - 14 mmol/L 7   Albumin Latest Ref Range: 3.4 - 5.0 g/dL 3.2 (L)   Protein Total Latest Ref Range: 6.8 - 8.8 g/dL 7.8   Bilirubin Total Latest Ref Range: 0.2 - 1.3 mg/dL 0.4   Alkaline Phosphatase Latest Ref Range: 40 - 150 U/L 116   ALT Latest Ref Range: 0 - 70 U/L 21   AST Latest Ref Range: 0 - 45 U/L 13   Glucose Latest Ref Range: 70 - 99 mg/dL 95   WBC Latest Ref Range: 4.0 - 11.0 10e9/L 11.6 (H)   Hemoglobin Latest Ref Range: 13.3 - 17.7 g/dL 14.9   Hematocrit Latest Ref Range: 40.0 - 53.0 % 46.2   Platelet Count Latest Ref Range: 150 - 450 10e9/L 284   RBC Count Latest Ref Range: 4.4 - 5.9 10e12/L 4.79   MCV Latest Ref Range: 78 - 100 fl 97   MCH Latest Ref Range: 26.5 - 33.0 pg 31.1   MCHC Latest Ref Range: 31.5 - 36.5 g/dL 32.3   RDW Latest Ref Range: 10.0 - 15.0 % 12.0   Diff Method Unknown Automated Method   % Neutrophils Latest Units: % 61.4   % Lymphocytes Latest Units: % 28.2   % Monocytes Latest Units: % 9.3   % Eosinophils Latest Units: % 0.0   % Basophils Latest Units: % 0.7   % Immature Granulocytes Latest Units: % 0.4   Nucleated RBCs Latest Ref Range: 0 /100 0   Absolute Neutrophil Latest Ref Range: 1.6 - 8.3 10e9/L 7.1   Absolute Lymphocytes Latest Ref Range: 0.8 - 5.3 10e9/L 3.3   Absolute Monocytes Latest Ref Range: 0.0 - 1.3 10e9/L 1.1   Absolute Eosinophils Latest Ref Range: 0.0 - 0.7 10e9/L 0.0   Absolute Basophils Latest Ref Range: 0.0 - 0.2 10e9/L 0.1   Abs Immature Granulocytes Latest Ref Range: 0 - 0.4 10e9/L 0.1   Absolute Nucleated RBC Unknown 0.0   Albumin Fraction Latest Ref Range: 3.7 - 5.1 g/dL PENDING   Alpha 1 Fraction Latest Ref  Range: 0.2 - 0.4 g/dL PENDING   Alpha 2 Fraction Latest Ref Range: 0.5 - 0.9 g/dL PENDING   Beta Fraction Latest Ref Range: 0.6 - 1.0 g/dL PENDING   ELP Interpretation: Unknown PENDING   Gamma Fraction Latest Ref Range: 0.7 - 1.6 g/dL PENDING   IGA Latest Ref Range: 84 - 499 mg/dL 212   IGG Latest Ref Range: 610 - 1,616 mg/dL 1,821 (H)   IGM Latest Ref Range: 35 - 242 mg/dL 38   Immunofixation ELP Unknown (Note)   Monoclonal Peak Latest Ref Range: 0.0 g/dL PENDING       Assessment and Plan:    This is a 70-year-old male with     nonischemic cardiomyopathy  11/2020-patient met with Dr. Gupta.  Dr. Pozo had discussion about cardiac myeloid and amyloidosis.  At that time patient refused bone marrow biopsy  I  discussed  bone marrow biopsy,  patient refuses now however would like to talk to one of the MDs  -Labs reviewed his IgG is elevated at 1821  Kappa lambda free chain not done    I informed patient as of now some of the labs are still pending   IgG is mildly elevated  -I informed patient that I would like to get light chain labs and  bone marrow biopsy.  Patient reports he is not too happy about the side effects from bone marrow biopsy but agrees that he would like to talk to one of the doctors who is covering for Dr. Gupta     Schedule with the doctor who is covering for Dr Alonso Guo APRMELYSSA [facility]     Chart documentation with Dragon Voice recognition Software. Although reviewed after completion, some words and grammatical errors may remain.

## 2021-03-22 NOTE — PROGRESS NOTES
Benjamín is a 70 year old who is being evaluated via a billable telephone visit.      What phone number would you like to be contacted at? [phone]  How would you like to obtain your AVS? Mail a copy  Phone call duration: 5 minutes

## 2021-03-22 NOTE — TELEPHONE ENCOUNTER
Spoke with patient regarding appointment today with Brant. Patient was told he should have a bone marrow biopsy, he does not want this and would like another opinion from a provider here. Advised patient we can work on getting him scheduled and will call him once we have a spot available. Patient verbalized understanding.    Trudy Knight RN on 3/22/2021 at 2:57 PM

## 2021-03-25 ENCOUNTER — TRANSFERRED RECORDS (OUTPATIENT)
Dept: HEALTH INFORMATION MANAGEMENT | Facility: CLINIC | Age: 71
End: 2021-03-25

## 2021-03-26 LAB
CREAT SERPL-MCNC: 1.09 MG/DL (ref 0.6–1.3)
GFR SERPL CREATININE-BSD FRML MDRD: >60 ML/MIN/1.73M2
GLUCOSE SERPL-MCNC: 96 MG/DL (ref 70–100)
POTASSIUM SERPL-SCNC: 3.6 MMOL/L (ref 3.5–5.1)

## 2021-03-30 DIAGNOSIS — I48.91 ATRIAL FIBRILLATION (H): Primary | ICD-10-CM

## 2021-03-30 DIAGNOSIS — Z79.01 LONG TERM CURRENT USE OF ANTICOAGULANT THERAPY: ICD-10-CM

## 2021-04-05 NOTE — PROGRESS NOTES
CARDIOLOGY VISIT    REASON FOR VISIT: CAD, cardiomyopathy, A. fib    SUBJECTIVE:  71 y/o male seen for chronic A. fib, coronary disease, cardiomyopathy.  He also has hypertension and dyslipidemia.       He has a history of atrial fibrillation dating back to 2004.  He has reduced ejection fraction in the past when in rapid A. fib.  Echo in 2008 showed EF 35 to 40%.       May 2019 he had an abnormal stress test.  He underwent angiogram with single drug-eluting stent to the mid LAD, 20% mid circumflex disease, EF 30% on LV gram.       Echo July 2019 showed EF 32%, moderate RV dysfunction, 1+ MR.      Cardiac MRI January 2020 showed borderline LV dilation, EF 31% with global hypokinesis, mild RV hypokinesis, moderate to severe biatrial enlargement, 1-2+ MR, no delayed enhancement.     Echo November 2020 (afib) showed mild LV dilation, EF 45 to 50%, normal RV, no valve disease.     He has been doing about the same recently.  He lost some weight when he had Covid, he has gained a few pounds back.  He gets around slowly with a walker now, he has some gait imbalance.  He denies any lower extremity edema, dyspnea, or chest pain.  Blood pressure averages 120/70 with heart rate in the 60s or 70s.    MEDICATIONS:  Current Outpatient Medications   Medication     aspirin 81 MG EC tablet     dexamethasone (DECADRON) 6 MG tablet     digoxin (LANOXIN) 250 MCG tablet     metoprolol succinate ER (TOPROL-XL) 200 MG 24 hr tablet     omeprazole (PRILOSEC OTC) 20 MG tablet     order for DME     simvastatin (ZOCOR) 40 MG tablet     warfarin ANTICOAGULANT (COUMADIN) 7.5 MG tablet     No current facility-administered medications for this visit.        ALLERGIES:  Allergies   Allergen Reactions     Latex      Adhesive Tape Rash     Reacted to the EKG stickers       REVIEW OF SYSTEMS:  Constitutional:  No weight loss, fever, chills, weakness or fatigue.  HEENT:  Eyes:  No visual loss, blurred vision, double vision or yellow sclerae. No hearing  loss, sneezing, congestion, runny nose or sore throat.  Skin:  No rash or itching.  Cardiovascular: per HPI  Respiratory: per HPI  GI:  No anorexia, nausea, vomiting or diarrhea. No abdominal pain or blood.  :  No dysurea, hematuria  Neurologic:  No headache, dizziness, syncope, paralysis, ataxia, numbness or tingling in the extremities. No change in bowel or bladder control.  Musculoskeletal:  No muscle, back pain, joint pain or stiffness.  Hematologic:  No anemia, bleeding or bruising.  Lymphatics:  No enlarged nodes. No history of splenectomy.  Psychiatric:  No history of depression or anxiety.  Endocrine:  No reports of sweating, cold or heat intolerance. No polyuria or polydipsia.  Allergies:  No history of asthma, hives, eczema or rhinitis.    PHYSICAL EXAM:  /66   Pulse 73   Wt 119.4 kg (263 lb 3.2 oz)   BMI 45.16 kg/m      Constitutional: awake, alert, no distress  Eyes: PERRL, sclera nonicteric  ENT: trachea midline  Respiratory: Lungs clear  Cardiovascular: Regular rate, totally irregular rhythm, no lower extremity edema  GI: nondistended, nontender, bowel sounds present  Lymph/Hematologic: no lymphadenopathy  Skin: dry, no rash  Musculoskeletal: good muscle tone, strength 5/5 in upper and lower extremities  Neurologic: no focal deficits  Neuropsychiatric: appropriate affact    DATA:  Lab: March 2021: Potassium 4.5, creatinine 0.9  Recent Labs   Lab Test 08/12/20  0809 04/22/19  1013   CHOL 127 135   HDL 32* 33*   LDL 50 64   TRIG 227* 192*       ASSESSMENT:  70-year-old male seen for CAD, A. fib, and nonischemic cardiomyopathy.  His cardiac issues are stable.  Ejection fraction is up to 45 to 50% as of November.  Therefore he does not need to see EP to discuss ICD or biventricular device.  Medications we kept the same.  His A. fib is under good control.    There has been some thought of amyloid, however with recent improvement in ejection fraction would hold off on work-up now.  If ejection  fraction drops, would definitely recommend a PYP scan.    RECOMMENDATIONS:  1.  CAD  -Continue current medications    2.  Nonischemic cardiomyopathy, NYHA class I-II symptoms  -Continue medications  -Echo on follow-up in 4 months  -If EF drops, consider PYP scan to look for amyloid    3.  Chronic atrial fibrillation  -Continue rate control and warfarin    Follow-up in 4 months with PREM, repeat echo and lipids.    Phi Fletcher MD  Cardiology - [facility]  Pager:  [phone]  Text Page  April 7, 2021

## 2021-04-07 ENCOUNTER — OFFICE VISIT (OUTPATIENT)
Dept: CARDIOLOGY | Facility: CLINIC | Age: 71
End: 2021-04-07
Attending: NURSE PRACTITIONER
Payer: COMMERCIAL

## 2021-04-07 ENCOUNTER — DOCUMENTATION ONLY (OUTPATIENT)
Dept: LAB | Facility: CLINIC | Age: 71
End: 2021-04-07

## 2021-04-07 VITALS
WEIGHT: 263.2 LBS | HEART RATE: 73 BPM | BODY MASS INDEX: 45.16 KG/M2 | DIASTOLIC BLOOD PRESSURE: 66 MMHG | SYSTOLIC BLOOD PRESSURE: 121 MMHG

## 2021-04-07 DIAGNOSIS — I25.10 CORONARY ARTERY DISEASE INVOLVING NATIVE CORONARY ARTERY OF NATIVE HEART WITHOUT ANGINA PECTORIS: ICD-10-CM

## 2021-04-07 DIAGNOSIS — I10 BENIGN ESSENTIAL HYPERTENSION: ICD-10-CM

## 2021-04-07 DIAGNOSIS — I42.8 NICM (NONISCHEMIC CARDIOMYOPATHY) (H): ICD-10-CM

## 2021-04-07 DIAGNOSIS — I48.20 CHRONIC ATRIAL FIBRILLATION (H): ICD-10-CM

## 2021-04-07 DIAGNOSIS — E78.5 HYPERLIPIDEMIA LDL GOAL <70: ICD-10-CM

## 2021-04-07 PROCEDURE — 99214 OFFICE O/P EST MOD 30 MIN: CPT | Performed by: INTERNAL MEDICINE

## 2021-04-07 NOTE — NURSING NOTE
"Initial Wt 119.4 kg (263 lb 3.2 oz)   BMI 45.16 kg/m   Estimated body mass index is 45.16 kg/m  as calculated from the following:    Height as of 11/23/20: 1.626 m (5' 4.02\").    Weight as of this encounter: 119.4 kg (263 lb 3.2 oz). .    Jing Adames MA on 4/7/2021 at 10:57 AM    "

## 2021-04-07 NOTE — LETTER
4/7/2021    Ruben Teran MD  [address] [facility] [address]    RE: Benjamín AVITIA Boogie       Dear Colleague,    I had the pleasure of seeing Benjamín Hyman in the [facility].    CARDIOLOGY VISIT    REASON FOR VISIT: CAD, cardiomyopathy, A. fib    SUBJECTIVE:  69 y/o male seen for chronic A. fib, coronary disease, cardiomyopathy.  He also has hypertension and dyslipidemia.       He has a history of atrial fibrillation dating back to 2004.  He has reduced ejection fraction in the past when in rapid A. fib.  Echo in 2008 showed EF 35 to 40%.       May 2019 he had an abnormal stress test.  He underwent angiogram with single drug-eluting stent to the mid LAD, 20% mid circumflex disease, EF 30% on LV gram.       Echo July 2019 showed EF 32%, moderate RV dysfunction, 1+ MR.      Cardiac MRI January 2020 showed borderline LV dilation, EF 31% with global hypokinesis, mild RV hypokinesis, moderate to severe biatrial enlargement, 1-2+ MR, no delayed enhancement.     Echo November 2020 (afib) showed mild LV dilation, EF 45 to 50%, normal RV, no valve disease.     He has been doing about the same recently.  He lost some weight when he had Covid, he has gained a few pounds back.  He gets around slowly with a walker now, he has some gait imbalance.  He denies any lower extremity edema, dyspnea, or chest pain.  Blood pressure averages 120/70 with heart rate in the 60s or 70s.    MEDICATIONS:  Current Outpatient Medications   Medication     aspirin 81 MG EC tablet     dexamethasone (DECADRON) 6 MG tablet     digoxin (LANOXIN) 250 MCG tablet     metoprolol succinate ER (TOPROL-XL) 200 MG 24 hr tablet     omeprazole (PRILOSEC OTC) 20 MG tablet     order for DME     simvastatin (ZOCOR) 40 MG tablet     warfarin ANTICOAGULANT (COUMADIN) 7.5 MG tablet     No current facility-administered medications for this visit.        ALLERGIES:  Allergies   Allergen Reactions      Latex      Adhesive Tape Rash     Reacted to the EKG stickers       REVIEW OF SYSTEMS:  Constitutional:  No weight loss, fever, chills, weakness or fatigue.  HEENT:  Eyes:  No visual loss, blurred vision, double vision or yellow sclerae. No hearing loss, sneezing, congestion, runny nose or sore throat.  Skin:  No rash or itching.  Cardiovascular: per HPI  Respiratory: per HPI  GI:  No anorexia, nausea, vomiting or diarrhea. No abdominal pain or blood.  :  No dysurea, hematuria  Neurologic:  No headache, dizziness, syncope, paralysis, ataxia, numbness or tingling in the extremities. No change in bowel or bladder control.  Musculoskeletal:  No muscle, back pain, joint pain or stiffness.  Hematologic:  No anemia, bleeding or bruising.  Lymphatics:  No enlarged nodes. No history of splenectomy.  Psychiatric:  No history of depression or anxiety.  Endocrine:  No reports of sweating, cold or heat intolerance. No polyuria or polydipsia.  Allergies:  No history of asthma, hives, eczema or rhinitis.    PHYSICAL EXAM:  /66   Pulse 73   Wt 119.4 kg (263 lb 3.2 oz)   BMI 45.16 kg/m      Constitutional: awake, alert, no distress  Eyes: PERRL, sclera nonicteric  ENT: trachea midline  Respiratory: Lungs clear  Cardiovascular: Regular rate, totally irregular rhythm, no lower extremity edema  GI: nondistended, nontender, bowel sounds present  Lymph/Hematologic: no lymphadenopathy  Skin: dry, no rash  Musculoskeletal: good muscle tone, strength 5/5 in upper and lower extremities  Neurologic: no focal deficits  Neuropsychiatric: appropriate affact    DATA:  Lab: March 2021: Potassium 4.5, creatinine 0.9  Recent Labs   Lab Test 08/12/20  0809 04/22/19  1013   CHOL 127 135   HDL 32* 33*   LDL 50 64   TRIG 227* 192*       ASSESSMENT:  70-year-old male seen for CAD, A. fib, and nonischemic cardiomyopathy.  His cardiac issues are stable.  Ejection fraction is up to 45 to 50% as of November.  Therefore he does not need to see EP  to discuss ICD or biventricular device.  Medications we kept the same.  His A. fib is under good control.    There has been some thought of amyloid, however with recent improvement in ejection fraction would hold off on work-up now.  If ejection fraction drops, would definitely recommend a PYP scan.    RECOMMENDATIONS:  1.  CAD  -Continue current medications    2.  Nonischemic cardiomyopathy, NYHA class I-II symptoms  -Continue medications  -Echo on follow-up in 4 months  -If EF drops, consider PYP scan to look for amyloid    3.  Chronic atrial fibrillation  -Continue rate control and warfarin    Follow-up in 4 months with PREM, repeat echo and lipids.    Phi Fletcher MD  Cardiology - [facility]  Pager:  [phone]  Text Page  April 7, 2021    cc:   BRENNA Alberto CNP  [address]  [place] [id number]

## 2021-04-07 NOTE — PROGRESS NOTES
This patient will be coming to our lab Tuesday April 13th and does not have any orders from Dr. Gupta. Please put in future orders. Thanks.

## 2021-04-13 ENCOUNTER — ANTICOAGULATION THERAPY VISIT (OUTPATIENT)
Dept: ANTICOAGULATION | Facility: CLINIC | Age: 71
End: 2021-04-13

## 2021-04-13 DIAGNOSIS — I48.91 ATRIAL FIBRILLATION (H): ICD-10-CM

## 2021-04-13 DIAGNOSIS — Z79.01 LONG TERM CURRENT USE OF ANTICOAGULANT THERAPY: ICD-10-CM

## 2021-04-13 DIAGNOSIS — I48.0 PAROXYSMAL ATRIAL FIBRILLATION (H): ICD-10-CM

## 2021-04-13 DIAGNOSIS — I42.8 NICM (NONISCHEMIC CARDIOMYOPATHY) (H): ICD-10-CM

## 2021-04-13 LAB — INR PPP: 2.2 (ref 0.86–1.14)

## 2021-04-13 PROCEDURE — 36415 COLL VENOUS BLD VENIPUNCTURE: CPT | Performed by: NURSE PRACTITIONER

## 2021-04-13 PROCEDURE — 83883 ASSAY NEPHELOMETRY NOT SPEC: CPT | Performed by: NURSE PRACTITIONER

## 2021-04-13 PROCEDURE — 85610 PROTHROMBIN TIME: CPT | Performed by: FAMILY MEDICINE

## 2021-04-13 NOTE — PROGRESS NOTES
ANTICOAGULATION MANAGEMENT     Patient Name:  Benjamín Hyman  Date:  2021    ASSESSMENT /SUBJECTIVE:    Today's INR result of 2.20 is therapeutic. Goal INR of 2.0-3.0      Warfarin dose taken: Warfarin taken as instructed    Diet: No new diet changes affecting INR    Medication changes/ interactions: No new medications/supplements affecting INR    Previous INR: Therapeutic     S/S of bleeding or thromboembolism: No    New injury or illness: No    Upcoming surgery, procedure or cardioversion: No    Additional findings: None      PLAN:    Telephone call with Benjamín regarding INR result and instructed:     Warfarin Dosing Instructions: Continue your current warfarin dose 3.75 mg every Tue, Fri; 7.5 mg all other days    Instructed patient to follow up no later than: 6 weeks  Lab visit scheduled    Education provided: Target INR goal and significance of current INR result      Benjamín verbalizes understanding and agrees to warfarin dosing plan.    Instructed to call the Anticoagulation Clinic for any changes, questions or concerns. (#[phone])        Genesis Rosa RN      OBJECTIVE:  Recent labs: (last 7 days)     21  1011   INR 2.20*         No question data found.  Anticoagulation Summary  As of 2021    INR goal:  2.0-3.0   TTR:  73.8 % (11.8 mo)   INR used for dosin.20 (2021)   Warfarin maintenance plan:  3.75 mg (7.5 mg x 0.5) every Tue, Fri; 7.5 mg (7.5 mg x 1) all other days   Full warfarin instructions:  3.75 mg every Tue, Fri; 7.5 mg all other days   Weekly warfarin total:  45 mg   No change documented:  Genesis Rosa RN   Plan last modified:  Bing Sahu RN (2021)   Next INR check:  2021   Priority:  Maintenance   Target end date:  Indefinite    Indications    Atrial fibrillation (H) [I48.91]  Long term current use of anticoagulant therapy [Z79.01]  Paroxysmal atrial fibrillation (H) [I48.0]             Anticoagulation Episode Summary     INR check  location:      Preferred lab:      Send INR reminders to:  MARY OSORIO    Comments:  * 7.5mg tablets. Dr. Teran Ok with 12 week rechecks. 6/3/2020 discontinued plavix and is taking 81 mg asprin daily      Anticoagulation Care Providers     Provider Role Specialty Phone number    Ruben Teran MD Referring Family Medicine [phone]

## 2021-04-14 ENCOUNTER — TELEPHONE (OUTPATIENT)
Dept: ONCOLOGY | Facility: CLINIC | Age: 71
End: 2021-04-14

## 2021-04-14 NOTE — TELEPHONE ENCOUNTER
----- Message from BRENNA Pfeiffer CNP sent at 4/14/2021 12:04 PM CDT -----  Regarding: RE: Temporary Provider Needed while Dr. Gupta is on Leave  The patient told me he would like to talk to one of the doctors to discuss bone marrow biopsy-as per Dr. Gupta's recommendation  -It needs to come from one of the MDs to see if the patient really needs bone marrow biopsy or not    If the chains are stable, I guess we can wait unit Dr Gupta is back in June   Please schedule him with Dr. Gupta in June     ----- Message -----  From: Carol Beth RN  Sent: 4/14/2021  11:28 AM CDT  To: BRENNA Pfeiffer CNP  Subject: FW: Temporary Provider Needed while Dr. Clark#    Vadim España,  Please review previous messages on this patient and let me know your thoughts on patients light chain results when they come back.  Thanks!Sussy  ----- Message -----  From: Susan Basilio NP  Sent: 4/14/2021   7:48 AM CDT  To: Andra Guevara MD, BRENNA Pfeiffer CNP, #  Subject: RE: Temporary Provider Needed while Dr. Clark#    It seems reasonable to defer his follow-up with Hematology MD for this week and his appt with me on Thursday should be cancelled.     It looks like Taco will get his light chains back since she ordered them (or, Sussy, please monitor for final results). If these are not significant, seems okay to wait for Dr. Gupta's return in June/July for reassessment. If light chains notably elevated and Taco still thinks best to see MD sooner he will need to see another MD in system (virtually should be fine for this).    Thanks  Susan  ----- Message -----  From: Emily Haddad, GABRIELLE  Sent: 4/13/2021   6:55 PM CDT  To: Andra Guevara MD, Susan Basilio NP, #  Subject: RE: Temporary Provider Needed while Dr. Clark#    I'm not sure about the list referred to, but per my brief chart review it looks like pt has refused BMBx recommended back in November and when Taco Guo NP saw him on 3/22 and since then cardiology note  "indicates bc his EF has improved, no further work up for amyloidosis is needed at this time (see pasted note below)    He had labs on 3/18 with high IGG, monoclonal peak 0.3, had light chains today -- pending.    I think it is reasonable to have pt see Dr Gupta again when he is back unless the light chains drawn today look worrisome-- when is Dr Gupta back, Sussy?    Thanks!  Emily      ASSESSMENT:  70-year-old male seen for CAD, A. fib, and nonischemic cardiomyopathy.  His cardiac issues are stable.  Ejection fraction is up to 45 to 50% as of November.  Therefore he does not need to see EP to discuss ICD or biventricular device.  Medications we kept the same.  His A. fib is under good control.     There has been some thought of amyloid, however with recent improvement in ejection fraction would hold off on work-up now.  If ejection fraction drops, would definitely recommend a PYP scan.     RECOMMENDATIONS:  1.  CAD  -Continue current medications     2.  Nonischemic cardiomyopathy, NYHA class I-II symptoms  -Continue medications  -Echo on follow-up in 4 months  -If EF drops, consider PYP scan to look for amyloid     3.  Chronic atrial fibrillation  -Continue rate control and warfarin     Follow-up in 4 months with PREM, repeat echo and lipids.     Phi Fletcher MD  Cardiology - [facility]  ----- Message -----  From: Andra Guevara MD  Sent: 4/13/2021   5:40 PM CDT  To: Emily Haddad RN, Carol Beth RN, #  Subject: RE: Temporary Provider Needed while Dr. Clark#    Out of curiosity where is a list of \"temporary assigned\" docs. I have never heard anything about this. Unfortunately my schedule is already overbooked on Thursday so an apt that day is not possible. A visit to discuss other treatment options would require a full new patient visit so if this is required then our new patient intake team should help to facilitate a new patient visit with the next available heme malignancy doc. That may get " him in quicker.    Emily and Yari can you help with this??    isabel  ----- Message -----  From: Carol Beth RN  Sent: 4/13/2021   3:57 PM CDT  To: Isabel Guevara MD, [facility]   Subject: Temporary Provider Needed while Dr. Gupta #    Hi [place],  So sorry,  This patient is on Susan Sanabria's schedule for Thursday and she feels he should be seeing an MD. Please call patient and cancel his appt and see if we can get him on to see Dr. Isabel Guevara who it looks like is his assigned temporary care team provider.  Let me know,  Thanks!Sussy  ----- Message -----  From: Susan Basilio NP  Sent: 4/13/2021   3:34 PM CDT  To: Sherry Multani RN, Carol Beth RN  Subject: Needs to see MD                                  Hello-    Just noting this pt on my schedule Thursday at 9:0.  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 She wants him to meet with a MD in Dr. Gupta's absence to discuss further work-up and treatment options.     He should see a MD at another site, not me    Can you make sure this gets changed/communicated to pt?    Thanks!

## 2021-04-15 ENCOUNTER — TELEPHONE (OUTPATIENT)
Dept: ONCOLOGY | Facility: CLINIC | Age: 71
End: 2021-04-15

## 2021-04-15 ENCOUNTER — PATIENT OUTREACH (OUTPATIENT)
Dept: ONCOLOGY | Facility: CLINIC | Age: 71
End: 2021-04-15

## 2021-04-15 DIAGNOSIS — I42.8 NICM (NONISCHEMIC CARDIOMYOPATHY) (H): Primary | ICD-10-CM

## 2021-04-15 LAB
KAPPA LC UR-MCNC: 4.73 MG/DL (ref 0.33–1.94)
KAPPA LC/LAMBDA SER: 1.27 {RATIO} (ref 0.26–1.65)
LAMBDA LC SERPL-MCNC: 3.71 MG/DL (ref 0.57–2.63)

## 2021-04-15 NOTE — TELEPHONE ENCOUNTER
----- Message from BRENNA Pfeiffer CNP sent at 4/15/2021  8:46 AM CDT -----  Regarding: light chain  It is slowly trending up  Would recommend to see an MD. At least to discuss if bone marrow biopsy recommended ?

## 2021-04-15 NOTE — TELEPHONE ENCOUNTER
----- Message from Andra Guevara MD sent at 4/15/2021  9:44 AM CDT -----  Regarding: RE: light chains trending up  Light chains just mildly up but in the setting of even subtle increases in creatine (currently 1.07 from 0.8) this can effect. Hemoglobin fine, calcium fine, all else fine so no rush to see an MD right now  ----- Message -----  From: Carol Beth, RN  Sent: 4/15/2021   9:37 AM CDT  To: Andra Guevara MD, Emily Haddad, RN, #  Subject: FW: light chains trending up                     Hi Dr. Guevara,  This is Taco Moon recommendations on this patient of Dr Gupta's. Will you look at his chart and let me know what you think. Can he wait until June to see Dr. Gupta or would you be able to see this patient in the next couple weeks?  Let me know!  Thanks so much!  Sussy Beth RN  ----- Message -----  From: Taco Guo APRN CNP  Sent: 4/15/2021   8:46 AM CDT  To: Carol Beth, RN  Subject: light chain                                      It is slowly trending up  Would recommend to see an MD. At least to discuss if bone marrow biopsy recommended ?

## 2021-04-20 ENCOUNTER — OFFICE VISIT (OUTPATIENT)
Dept: FAMILY MEDICINE | Facility: CLINIC | Age: 71
End: 2021-04-20
Payer: COMMERCIAL

## 2021-04-20 VITALS
DIASTOLIC BLOOD PRESSURE: 60 MMHG | HEART RATE: 99 BPM | HEIGHT: 64 IN | TEMPERATURE: 98.7 F | SYSTOLIC BLOOD PRESSURE: 120 MMHG | BODY MASS INDEX: 43.36 KG/M2 | WEIGHT: 254 LBS | OXYGEN SATURATION: 98 % | RESPIRATION RATE: 20 BRPM

## 2021-04-20 DIAGNOSIS — E66.01 MORBID OBESITY DUE TO EXCESS CALORIES (H): ICD-10-CM

## 2021-04-20 DIAGNOSIS — I48.20 CHRONIC ATRIAL FIBRILLATION (H): ICD-10-CM

## 2021-04-20 DIAGNOSIS — I10 BENIGN ESSENTIAL HYPERTENSION: ICD-10-CM

## 2021-04-20 DIAGNOSIS — I50.22 CHRONIC SYSTOLIC HEART FAILURE (H): Primary | ICD-10-CM

## 2021-04-20 DIAGNOSIS — I42.8 NICM (NONISCHEMIC CARDIOMYOPATHY) (H): ICD-10-CM

## 2021-04-20 PROCEDURE — 99214 OFFICE O/P EST MOD 30 MIN: CPT | Performed by: FAMILY MEDICINE

## 2021-04-20 RX ORDER — LISINOPRIL 40 MG/1
TABLET ORAL
COMMUNITY
Start: 2021-01-17 | End: 2021-04-20

## 2021-04-20 RX ORDER — METOPROLOL SUCCINATE 200 MG/1
200 TABLET, EXTENDED RELEASE ORAL DAILY
Qty: 90 TABLET | Refills: 3 | Status: SHIPPED | OUTPATIENT
Start: 2021-04-20 | End: 2022-04-20

## 2021-04-20 RX ORDER — DIGOXIN 250 MCG
TABLET ORAL
Qty: 70 TABLET | Refills: 3 | Status: SHIPPED | OUTPATIENT
Start: 2021-04-20 | End: 2022-04-20

## 2021-04-20 RX ORDER — SIMVASTATIN 40 MG
40 TABLET ORAL EVERY OTHER DAY
Qty: 45 TABLET | Refills: 3 | Status: SHIPPED | OUTPATIENT
Start: 2021-04-20 | End: 2021-09-28

## 2021-04-20 RX ORDER — LISINOPRIL 40 MG/1
40 TABLET ORAL DAILY
Qty: 90 TABLET | Refills: 3 | Status: SHIPPED | OUTPATIENT
Start: 2021-04-20 | End: 2022-04-20

## 2021-04-20 ASSESSMENT — MIFFLIN-ST. JEOR: SCORE: 1819.17

## 2021-04-20 NOTE — PATIENT INSTRUCTIONS
Med refills provided today.     Schedule lab only appointment for Digoxin level. To be checked 6-12 hours after taking medicine.       Thank you for choosing [facility].  You may be receiving an email and/or telephone survey request from [facility] [employer] regarding your visit today.  Please take a few minutes to respond to the survey to let us know how we are doing.      If you have questions or concerns, please contact us via [facility] or you can contact your care team at [phone].    Our Clinic hours are:  Monday 6:40 am  to 7:00 pm  Tuesday -Friday 6:40 am to 5:00 pm    The [place] outpatient lab hours are:  Monday - Friday 6:10 am to 4:45 pm  Saturdays 7:00 am to 11:00 am  Appointments are required, call [phone]    If you have clinical questions after hours or would like to schedule an appointment,  call the clinic at [phone].

## 2021-04-20 NOTE — PROGRESS NOTES
Assessment & Plan     Chronic systolic heart failure (H)  Chronic atrial fibrillation (H)  Benign essential hypertension  NICM (nonischemic cardiomyopathy) (H)  Patient follows with cardiology.  Most recent visit 4/7/2021.  It was determined to continue with current medications at this time. Plan to follow up with Cardiology in 4 months with repeat echo and lipids.  -Provide patient with refills of lisinopril 40 mg daily, metoprolol succinate 200 mg daily, simvastatin 40 mg every other day, and also digoxin.  He is due for digoxin level and an order has been placed.  We will have this be obtained 6 to 12 hours after taking the medication. A future lab appointment was scheduled for patient to have this completed. He follows with anticoagulation clinic. All questions answered. Patient in agreement with the plan.   - Digoxin level  - lisinopril (ZESTRIL) 40 MG tablet  Dispense: 90 tablet; Refill: 3    Morbid obesity due to excess calories (H)  Encouraged healthy diet and exercise.       The risks, benefits and treatment options of prescribed medications or other treatments have been discussed with the patient. The patient verbalized their understanding and should call or follow up if no improvement or if they develop further problems.    Liam Ruggiero, [facility]    Romy Butts is a 70 year old who presents for the following health issues     HPI     Vascular Disease Urwfji-[id number]-[id number]      How often do you take nitroglycerin? Never    Do you take an aspirin every day? Yes      How many servings of fruits and vegetables do you eat daily?  0-1-eats 2 meals a day.    On average, how many sweetened beverages do you drink each day (Examples: soda, juice, sweet tea, etc.  Do NOT count diet or artificially sweetened beverages)?   0-1    How many days per week do you exercise enough to make your heart beat faster? 7    How many minutes a day do you exercise enough to make your heart beat  "faster? 9 or less    How many days per week do you miss taking your medication? 0    Patient follows with Cardiology due to history of chronic atrial fib, coronary artery disease, cardiomyopathy.  Most recent appointment 4/7/2021 with cardiology.  Discussed to continue with current medications at this time.  He is currently on lisinopril 40 mg daily, metoprolol succinate 200 mg daily, simvastatin 40 mg every other day, and digoxin.  Also on warfarin for anticoagulation.  Reports currently doing well on current medications.  No concerns at this time.    Review of Systems   Constitutional, HEENT, cardiovascular, pulmonary, gi and gu systems are negative, except as otherwise noted.      Objective    /60 (BP Location: Left arm, Patient Position: Chair, Cuff Size: Adult Large)   Pulse 99   Temp 98.7  F (37.1  C) (Tympanic)   Resp 20   Ht 1.619 m (5' 3.75\")   Wt 115.2 kg (254 lb)   SpO2 98%   BMI 43.94 kg/m    Body mass index is 43.94 kg/m .  Physical Exam   General: alert, cooperative, no acute distress   CV: irregularly irregular.  Resp: non-labored breathing, clear   Abdomen: BMI 43, non-tender.   Extremities: No peripheral edema, calves non-tender.     "

## 2021-04-26 DIAGNOSIS — I50.22 CHRONIC SYSTOLIC HEART FAILURE (H): ICD-10-CM

## 2021-04-26 DIAGNOSIS — I42.8 NICM (NONISCHEMIC CARDIOMYOPATHY) (H): ICD-10-CM

## 2021-04-26 LAB — DIGOXIN SERPL-MCNC: 1 UG/L (ref 0.5–2)

## 2021-04-26 PROCEDURE — 80162 ASSAY OF DIGOXIN TOTAL: CPT | Performed by: FAMILY MEDICINE

## 2021-04-26 PROCEDURE — 36415 COLL VENOUS BLD VENIPUNCTURE: CPT | Performed by: FAMILY MEDICINE

## 2021-05-25 ENCOUNTER — DOCUMENTATION ONLY (OUTPATIENT)
Dept: ANTICOAGULATION | Facility: CLINIC | Age: 71
End: 2021-05-25

## 2021-05-25 ENCOUNTER — ANTICOAGULATION THERAPY VISIT (OUTPATIENT)
Dept: ANTICOAGULATION | Facility: CLINIC | Age: 71
End: 2021-05-25

## 2021-05-25 DIAGNOSIS — Z79.01 LONG TERM CURRENT USE OF ANTICOAGULANT THERAPY: ICD-10-CM

## 2021-05-25 DIAGNOSIS — I42.8 NICM (NONISCHEMIC CARDIOMYOPATHY) (H): ICD-10-CM

## 2021-05-25 DIAGNOSIS — I48.91 ATRIAL FIBRILLATION (H): ICD-10-CM

## 2021-05-25 DIAGNOSIS — I48.19 PERSISTENT ATRIAL FIBRILLATION (H): ICD-10-CM

## 2021-05-25 DIAGNOSIS — I48.11 LONGSTANDING PERSISTENT ATRIAL FIBRILLATION (H): Primary | ICD-10-CM

## 2021-05-25 DIAGNOSIS — I48.0 PAROXYSMAL ATRIAL FIBRILLATION (H): ICD-10-CM

## 2021-05-25 LAB
ALBUMIN SERPL-MCNC: 3.3 G/DL (ref 3.4–5)
ALP SERPL-CCNC: 100 U/L (ref 40–150)
ALT SERPL W P-5'-P-CCNC: 20 U/L (ref 0–70)
ANION GAP SERPL CALCULATED.3IONS-SCNC: 7 MMOL/L (ref 3–14)
AST SERPL W P-5'-P-CCNC: 15 U/L (ref 0–45)
BASOPHILS # BLD AUTO: 0 10E9/L (ref 0–0.2)
BASOPHILS NFR BLD AUTO: 0.2 %
BILIRUB SERPL-MCNC: 0.5 MG/DL (ref 0.2–1.3)
BUN SERPL-MCNC: 19 MG/DL (ref 7–30)
CALCIUM SERPL-MCNC: 8.6 MG/DL (ref 8.5–10.1)
CHLORIDE SERPL-SCNC: 103 MMOL/L (ref 94–109)
CO2 SERPL-SCNC: 22 MMOL/L (ref 20–32)
CREAT SERPL-MCNC: 1.03 MG/DL (ref 0.66–1.25)
DIFFERENTIAL METHOD BLD: ABNORMAL
EOSINOPHIL # BLD AUTO: 0.2 10E9/L (ref 0–0.7)
EOSINOPHIL NFR BLD AUTO: 1.5 %
ERYTHROCYTE [DISTWIDTH] IN BLOOD BY AUTOMATED COUNT: 12.8 % (ref 10–15)
GFR SERPL CREATININE-BSD FRML MDRD: 73 ML/MIN/{1.73_M2}
GLUCOSE SERPL-MCNC: 113 MG/DL (ref 70–99)
HCT VFR BLD AUTO: 45.7 % (ref 40–53)
HGB BLD-MCNC: 14.8 G/DL (ref 13.3–17.7)
INR PPP: 1.9 (ref 0.86–1.14)
LYMPHOCYTES # BLD AUTO: 3.4 10E9/L (ref 0.8–5.3)
LYMPHOCYTES NFR BLD AUTO: 26.4 %
MCH RBC QN AUTO: 30.5 PG (ref 26.5–33)
MCHC RBC AUTO-ENTMCNC: 32.4 G/DL (ref 31.5–36.5)
MCV RBC AUTO: 94 FL (ref 78–100)
MONOCYTES # BLD AUTO: 1.1 10E9/L (ref 0–1.3)
MONOCYTES NFR BLD AUTO: 8.6 %
NEUTROPHILS # BLD AUTO: 8.2 10E9/L (ref 1.6–8.3)
NEUTROPHILS NFR BLD AUTO: 63.3 %
PLATELET # BLD AUTO: 271 10E9/L (ref 150–450)
POTASSIUM SERPL-SCNC: 4.5 MMOL/L (ref 3.4–5.3)
PROT SERPL-MCNC: 7.5 G/DL (ref 6.8–8.8)
RBC # BLD AUTO: 4.86 10E12/L (ref 4.4–5.9)
SODIUM SERPL-SCNC: 132 MMOL/L (ref 133–144)
WBC # BLD AUTO: 12.9 10E9/L (ref 4–11)

## 2021-05-25 PROCEDURE — 36416 COLLJ CAPILLARY BLOOD SPEC: CPT | Performed by: FAMILY MEDICINE

## 2021-05-25 PROCEDURE — 85610 PROTHROMBIN TIME: CPT | Performed by: FAMILY MEDICINE

## 2021-05-25 PROCEDURE — 85025 COMPLETE CBC W/AUTO DIFF WBC: CPT | Performed by: INTERNAL MEDICINE

## 2021-05-25 PROCEDURE — 82784 ASSAY IGA/IGD/IGG/IGM EACH: CPT | Performed by: INTERNAL MEDICINE

## 2021-05-25 PROCEDURE — 86334 IMMUNOFIX E-PHORESIS SERUM: CPT | Performed by: PATHOLOGY

## 2021-05-25 PROCEDURE — 80053 COMPREHEN METABOLIC PANEL: CPT | Performed by: INTERNAL MEDICINE

## 2021-05-25 PROCEDURE — 83883 ASSAY NEPHELOMETRY NOT SPEC: CPT | Performed by: INTERNAL MEDICINE

## 2021-05-25 RX ORDER — WARFARIN SODIUM 7.5 MG/1
TABLET ORAL
Qty: 100 TABLET | Refills: 0
Start: 2021-05-25 | End: 2021-06-29

## 2021-05-25 NOTE — PROGRESS NOTES
ANTICOAGULATION MANAGEMENT      Benjamín Hyman due for annual renewal of referral to anticoagulation monitoring. Order pended for your review and signature.      ANTICOAGULATION SUMMARY      Warfarin indication(s)     Atrial fibrillation    Heart valve present?  NO       Current goal range   INR: 2.0-3.0     Goal appropriate for indication? Yes, INR 2-3 appropriate for hx of DVT, PE, hypercoagulable state, Afib, LVAD, or bileaflet AVR without risk factors     Current duration of therapy Indefinite/long term therapy   Time in Therapeutic Range (TTR)  (Goal > 60%) 81.5 %       Office visit with referring provider's group within last year yes on 4-20-21       Bing Sahu RN

## 2021-05-25 NOTE — PROGRESS NOTES
ANTICOAGULATION MANAGEMENT     Patient Name:  Benjamín Hyman  Date:  2021    ASSESSMENT /SUBJECTIVE:    Today's INR result of 1.9 is subtherapeutic. Goal INR of 2.0-3.0      Warfarin dose taken: Warfarin taken as instructed    Diet: Increased greens/vitamin K in diet; ongoing change. Started drinking carnation instant breakfast every other day and will continue this.    Medication changes/ interactions: No new medications/supplements affecting INR    Previous INR: Therapeutic     S/S of bleeding or thromboembolism: No    New injury or illness: No    Upcoming surgery, procedure or cardioversion: No    Additional findings: None      PLAN:    Telephone call with Benjamín regarding INR result and instructed:     Warfarin Dosing Instructions: Change your warfarin dose to 3.75 mg ; 7.5 mg   . (8.3 % change)    Instructed patient to follow up no later than: 2 weeks  Lab visit scheduled    Education provided: Importance of consistent vitamin K intake, Impact of vitamin K foods on INR, Vitamin K content of foods, Monitoring for clotting signs and symptoms and Contact [facility]: [phone]  with any changes, questions or concerns.       Benjamín verbalizes understanding and agrees to warfarin dosing plan.    Instructed to call the Anticoagulation Clinic for any changes, questions or concerns. (#[phone])        Bing Sahu RN      OBJECTIVE:  Recent labs: (last 7 days)     21  1024   INR 1.90*         No question data found.  Anticoagulation Summary  As of 2021    INR goal:  2.0-3.0   TTR:  81.5 % (11.8 mo)   INR used for dosin.90 (2021)   Warfarin maintenance plan:  3.75 mg (7.5 mg x 0.5) every Fri; 7.5 mg (7.5 mg x 1) all other days   Full warfarin instructions:  3.75 mg every Fri; 7.5 mg all other days   Weekly warfarin total:  48.75 mg   Plan last modified:  Bing Sahu, RN (2021)   Next INR check:  2021   Priority:  High   Target end  date:  Indefinite    Indications    Atrial fibrillation (H) [I48.91]  Long term current use of anticoagulant therapy [Z79.01]  Paroxysmal atrial fibrillation (H) [I48.0]             Anticoagulation Episode Summary     INR check location:      Preferred lab:      Send INR reminders to:  [facility]    Comments:  * 7.5mg tablets. Dr. Teran Ok with 12 week rechecks. 6/3/2020 discontinued plavix and is taking 81 mg asprin daily      Anticoagulation Care Providers     Provider Role Specialty Phone number    Ruben Teran MD Referring Family Medicine [phone]

## 2021-05-26 LAB
IGA SERPL-MCNC: 192 MG/DL (ref 84–499)
IGG SERPL-MCNC: 1752 MG/DL (ref 610–1616)
IGM SERPL-MCNC: 33 MG/DL (ref 35–242)
KAPPA LC UR-MCNC: 3.85 MG/DL (ref 0.33–1.94)
KAPPA LC/LAMBDA SER: 1.15 {RATIO} (ref 0.26–1.65)
LAMBDA LC SERPL-MCNC: 3.35 MG/DL (ref 0.57–2.63)
PROT PATTERN SERPL IFE-IMP: ABNORMAL

## 2021-06-08 ENCOUNTER — ANTICOAGULATION THERAPY VISIT (OUTPATIENT)
Dept: ANTICOAGULATION | Facility: CLINIC | Age: 71
End: 2021-06-08

## 2021-06-08 DIAGNOSIS — Z79.01 LONG TERM CURRENT USE OF ANTICOAGULANT THERAPY: ICD-10-CM

## 2021-06-08 DIAGNOSIS — I48.0 PAROXYSMAL ATRIAL FIBRILLATION (H): ICD-10-CM

## 2021-06-08 DIAGNOSIS — I48.11 LONGSTANDING PERSISTENT ATRIAL FIBRILLATION (H): ICD-10-CM

## 2021-06-08 LAB — INR PPP: 2.1 (ref 0.86–1.14)

## 2021-06-08 PROCEDURE — 36416 COLLJ CAPILLARY BLOOD SPEC: CPT | Performed by: FAMILY MEDICINE

## 2021-06-08 PROCEDURE — 85610 PROTHROMBIN TIME: CPT | Performed by: FAMILY MEDICINE

## 2021-06-08 NOTE — PROGRESS NOTES
ANTICOAGULATION MANAGEMENT     Patient Name:  Benjamín Hyman  Date:  2021    ASSESSMENT /SUBJECTIVE:    Today's INR result of 2.10 is therapeutic. Goal INR of 2.0-3.0      Warfarin dose taken: Warfarin taken as instructed    Diet: No new diet changes affecting INR    Medication changes/ interactions: No new medications/supplements affecting INR    Previous INR: Subtherapeutic     S/S of bleeding or thromboembolism: No    New injury or illness: No    Upcoming surgery, procedure or cardioversion: No    Additional findings: None      PLAN:    Warfarin Dosing Instructions: Continue your current warfarin dose 3.75 mg Fri; 7.5 mg ROW.    Instructed patient to follow up no later than: 3 weeks  Lab visit scheduled    Education provided: Contact [facility] Anticoagulation: [phone]  with any changes, questions or concerns.     Telephone call with Benjamín whom verbalizes understanding and agrees to plan    Instructed to call the Anticoagulation Clinic for any changes, questions or concerns. (#[phone])        Horace Barnett RN      OBJECTIVE:  Recent labs: (last 7 days)     21  0902   INR 2.10*         No question data found.  Anticoagulation Summary  As of 2021    INR goal:  2.0-3.0   TTR:  79.5 % (11.8 mo)   INR used for dosin.10 (2021)   Warfarin maintenance plan:  3.75 mg (7.5 mg x 0.5) every Fri; 7.5 mg (7.5 mg x 1) all other days   Full warfarin instructions:  3.75 mg every Fri; 7.5 mg all other days   Weekly warfarin total:  48.75 mg   Plan last modified:  Bing Sahu RN (2021)   Next INR check:  2021   Priority:  Maintenance   Target end date:  Indefinite    Indications    Atrial fibrillation (H) [I48.91]  Long term current use of anticoagulant therapy [Z79.01]  Paroxysmal atrial fibrillation (H) [I48.0]  Longstanding persistent atrial fibrillation (H) [I48.11]             Anticoagulation Episode Summary     INR check location:      Preferred lab:       Send INR reminders to:  MARY OSORIO    Comments:  * 7.5mg tablets. Dr. Teran Ok with 12 week rechecks. 6/3/2020 discontinued plavix and is taking 81 mg asprin daily      Anticoagulation Care Providers     Provider Role Specialty Phone number    Ruben Teran MD Referring Family Medicine [phone]

## 2021-06-18 ENCOUNTER — ONCOLOGY VISIT (OUTPATIENT)
Dept: ONCOLOGY | Facility: CLINIC | Age: 71
End: 2021-06-18
Attending: INTERNAL MEDICINE
Payer: COMMERCIAL

## 2021-06-18 VITALS
BODY MASS INDEX: 46.18 KG/M2 | WEIGHT: 270.5 LBS | TEMPERATURE: 97 F | RESPIRATION RATE: 18 BRPM | DIASTOLIC BLOOD PRESSURE: 67 MMHG | OXYGEN SATURATION: 97 % | HEART RATE: 80 BPM | HEIGHT: 64 IN | SYSTOLIC BLOOD PRESSURE: 120 MMHG

## 2021-06-18 DIAGNOSIS — D47.2 MONOCLONAL GAMMOPATHY: Primary | ICD-10-CM

## 2021-06-18 PROCEDURE — 99213 OFFICE O/P EST LOW 20 MIN: CPT | Performed by: INTERNAL MEDICINE

## 2021-06-18 ASSESSMENT — MIFFLIN-ST. JEOR: SCORE: 1894.01

## 2021-06-18 ASSESSMENT — PAIN SCALES - GENERAL: PAINLEVEL: EXTREME PAIN (8)

## 2021-06-18 NOTE — PROGRESS NOTES
Hematology/ Oncology Follow-up Visit:  Jun 18, 2021    Reason for Visit:   Chief Complaint   Patient presents with     Oncology Clinic Visit     NICM (nonischemic cardiomyopathy).            Interval History:  Patient has been feeling well without any recent complaints of weight loss or night sweats or fever or chills.  He denies any nausea vomiting or diarrhea.  He denies any shortness of breath or cough or wheezing.  He declined biopsy.  Today he is here to discuss results of most recent laboratory test.    Review Of Systems:  Constitutional: Negative for fever, chills, and night sweats.  Skin: negative.  Eyes: negative.  Ears/Nose/Throat: negative.  Respiratory: No shortness of breath, dyspnea on exertion, cough, or hemoptysis.  Cardiovascular: negative.  Gastrointestinal: negative.  Genitourinary: negative.  Musculoskeletal: negative.  Neurologic: negative.  Psychiatric: negative.  Hematologic/Lymphatic/Immunologic: negative.  Endocrine: negative.    All other ROS negative unless mentioned in interval history.    Past medical, social, surgical, and family histories reviewed.    Allergies:  Allergies as of 06/18/2021 - Reviewed 06/18/2021   Allergen Reaction Noted     Latex  10/10/2014     Adhesive tape Rash 04/22/2015       Current Medications:  Current Outpatient Medications   Medication Sig Dispense Refill     aspirin 81 MG EC tablet Take 81 mg by mouth daily       digoxin (LANOXIN) 250 MCG tablet Take 1 tab on even days & 1/2 tab on odd day of the month. Patient has been on this medication for year & atrial fibrillation is controlled. 70 tablet 3     lisinopril (ZESTRIL) 40 MG tablet Take 1 tablet (40 mg) by mouth daily 90 tablet 3     metoprolol succinate ER (TOPROL-XL) 200 MG 24 hr tablet Take 1 tablet (200 mg) by mouth daily 90 tablet 3     order for DME Equipment being ordered: Digital home blood pressure monitor kit 1 each 0     simvastatin (ZOCOR) 40 MG tablet Take 1 tablet (40 mg) by mouth every other  "day 45 tablet 3     warfarin ANTICOAGULANT (COUMADIN) 7.5 MG tablet Take 3.75 mg Fri; 7.5 mg all other days or as directed by INR clinic 100 tablet 0     dexamethasone (DECADRON) 6 MG tablet Take 1 tablet (6 mg) by mouth daily for 8 days (Patient not taking: Reported on 6/18/2021) 8 tablet 0     omeprazole (PRILOSEC OTC) 20 MG tablet Take 20 mg by mouth daily as needed  30 tablet         Physical Exam:  /67 (BP Location: Right arm, Patient Position: Sitting, Cuff Size: Adult Large)   Pulse 80   Temp 97  F (36.1  C) (Oral)   Resp 18   Ht 1.619 m (5' 3.75\")   Wt 122.7 kg (270 lb 8 oz)   SpO2 97%   BMI 46.80 kg/m    Wt Readings from Last 12 Encounters:   06/18/21 122.7 kg (270 lb 8 oz)   04/20/21 115.2 kg (254 lb)   04/07/21 119.4 kg (263 lb 3.2 oz)   11/23/20 125 kg (275 lb 9.2 oz)   08/13/20 122.5 kg (270 lb)   04/13/20 123.4 kg (272 lb)   12/06/19 122.8 kg (270 lb 12.8 oz)   11/14/19 122.5 kg (270 lb)   10/31/19 122.5 kg (270 lb)   10/28/19 123.4 kg (272 lb)   09/27/19 124.3 kg (274 lb)   10/03/19 122.9 kg (271 lb)     GENERAL APPEARANCE: Healthy, alert and in no acute distress.  HEENT: Sclerae anicteric. PERRLA. Oropharynx without ulcers, lesions, or thrush.  NECK: Supple. No asymmetry or masses.  LYMPHATICS: No palpable cervical, supraclavicular, axillary, or inguinal lymphadenopathy.  RESP: Lungs clear to auscultation bilaterally without rales, rhonchi or wheezes.  CARDIOVASCULAR: Regular rate and rhythm. Normal S1, S2; no S3 or S4. No murmur, gallop, or rub.  ABDOMEN: Soft, nontender. Bowel sounds normal. No palpable organomegaly or masses.  MUSCULOSKELETAL: Extremities without gross deformities noted. No edema of bilateral lower extremities.  SKIN: No suspicious lesions or rashes.  NEURO: Alert and oriented x 3. Cranial nerves II-XII grossly intact.  PSYCHIATRIC: Mentation and affect appear normal.    Laboratory/Imaging Studies:  Orders Only on 06/08/2021   Component Date Value Ref Range Status     " INR 06/08/2021 2.10* 0.86 - 1.14 Final    Comment: This test is intended for monitoring Coumadin therapy.  Results are not   accurate in patients with prolonged INR due to factor deficiency.              Assessment and plan:    (D47.2) Monoclonal gammopathy  (primary encounter diagnosis)  I reviewed with patient today most recent laboratory test.  There is no clinical evidence of worsening of her disease light chain or evidence of endorgan dysfunction.  We will continue to monitor laboratory tests.  I will see him back in 6 months or sooner if there are new developments or concerns.    The patient is ready to learn, no apparent learning barriers were identified.  Diagnosis and treatment plans were explained to the patient. The patient expressed understanding of the content. The patient asked appropriate questions. The patient questions were answered to his satisfaction.    Chart documentation with Dragon Voice recognition Software. Although reviewed after completion, some words and grammatical errors may remain.

## 2021-06-18 NOTE — PROGRESS NOTES
"Oncology Rooming Note    June 18, 2021 1:58 PM   Benjamín Hyman is a 70 year old male who presents for:    Chief Complaint   Patient presents with     Oncology Clinic Visit     NICM (nonischemic cardiomyopathy).      Initial Vitals: /67 (BP Location: Right arm, Patient Position: Sitting, Cuff Size: Adult Large)   Pulse 80   Temp 97  F (36.1  C) (Oral)   Resp 18   Ht 1.619 m (5' 3.75\")   Wt 122.7 kg (270 lb 8 oz)   SpO2 97%   BMI 46.80 kg/m   Estimated body mass index is 46.8 kg/m  as calculated from the following:    Height as of this encounter: 1.619 m (5' 3.75\").    Weight as of this encounter: 122.7 kg (270 lb 8 oz). Body surface area is 2.35 meters squared.  Extreme Pain (8) Comment: Data Unavailable   No LMP for male patient.  Allergies reviewed: Yes  Medications reviewed: Yes    Medications: Medication refills not needed today.  Pharmacy name entered into Med[employer]: [facility] - [place], [place] - [facility] [address] [facility]    Clinical concerns:  NICM (nonischemic cardiomyopathy).       Marycarmen Ledezma, CMA              "

## 2021-06-29 ENCOUNTER — ANTICOAGULATION THERAPY VISIT (OUTPATIENT)
Dept: ANTICOAGULATION | Facility: CLINIC | Age: 71
End: 2021-06-29

## 2021-06-29 DIAGNOSIS — I48.19 PERSISTENT ATRIAL FIBRILLATION (H): ICD-10-CM

## 2021-06-29 DIAGNOSIS — I48.11 LONGSTANDING PERSISTENT ATRIAL FIBRILLATION (H): ICD-10-CM

## 2021-06-29 DIAGNOSIS — Z79.01 LONG TERM CURRENT USE OF ANTICOAGULANT THERAPY: ICD-10-CM

## 2021-06-29 DIAGNOSIS — I48.0 PAROXYSMAL ATRIAL FIBRILLATION (H): ICD-10-CM

## 2021-06-29 LAB
CAPILLARY BLOOD COLLECTION: NORMAL
INR PPP: 2.3 (ref 0.86–1.14)

## 2021-06-29 PROCEDURE — 36416 COLLJ CAPILLARY BLOOD SPEC: CPT | Performed by: FAMILY MEDICINE

## 2021-06-29 PROCEDURE — 85610 PROTHROMBIN TIME: CPT | Performed by: FAMILY MEDICINE

## 2021-06-29 RX ORDER — WARFARIN SODIUM 7.5 MG/1
TABLET ORAL
Qty: 100 TABLET | Refills: 0 | Status: SHIPPED | OUTPATIENT
Start: 2021-06-29 | End: 2021-09-22

## 2021-06-29 NOTE — PROGRESS NOTES
ANTICOAGULATION MANAGEMENT     Benjamín Hyman 70 year old male is on warfarin with therapeutic INR result. (Goal INR 2.0-3.0)    Recent labs: (last 7 days)     06/29/21  0912   INR 2.30*       ASSESSMENT     Source(s): Patient/Caregiver Call       Warfarin doses taken: Warfarin taken as instructed    Diet: No new diet changes identified    New illness, injury, or hospitalization: No    Medication/supplement changes: None noted    Signs or symptoms of bleeding or clotting: No    Previous INR: Therapeutic last visit; previously outside of goal range    Additional findings: None     PLAN     Recommended plan for no diet, medication or health factor changes affecting INR     Dosing Instructions: Continue your current warfarin dose with next INR in 4 weeks       Summary  As of 6/29/2021    Full warfarin instructions:  3.75 mg every Fri; 7.5 mg all other days   Next INR check:  7/27/2021             Telephone call with Benjamín whom verbalizes understanding and agrees to plan    Lab visit scheduled    Education provided: Contact [phone]  with any changes, questions or concerns.     Plan made per [facility] anticoagulation protocol    Genesis Rosa RN  Anticoagulation Clinic  6/29/2021    _______________________________________________________________________     Anticoagulation Episode Summary     Current INR goal:  2.0-3.0   TTR:  79.5 % (11.8 mo)   Target end date:  Indefinite   Send INR reminders to:  [facility]    Indications    Atrial fibrillation (H) [I48.91]  Long term current use of anticoagulant therapy [Z79.01]  Paroxysmal atrial fibrillation (H) [I48.0]  Longstanding persistent atrial fibrillation (H) [I48.11]           Comments:  * 7.5mg tablets. Dr. Teran Ok with 12 week rechecks. 6/3/2020 discontinued plavix and is taking 81 mg asprin daily         Anticoagulation Care Providers     Provider Role Specialty Phone number    Ruben Teran MD Referring Family Medicine [phone]

## 2021-07-02 DIAGNOSIS — I48.91 A-FIB (H): Primary | ICD-10-CM

## 2021-07-27 ENCOUNTER — LAB (OUTPATIENT)
Dept: LAB | Facility: CLINIC | Age: 71
End: 2021-07-27
Payer: COMMERCIAL

## 2021-07-27 ENCOUNTER — ANTICOAGULATION THERAPY VISIT (OUTPATIENT)
Dept: ANTICOAGULATION | Facility: CLINIC | Age: 71
End: 2021-07-27

## 2021-07-27 DIAGNOSIS — Z79.01 LONG TERM CURRENT USE OF ANTICOAGULANT THERAPY: ICD-10-CM

## 2021-07-27 DIAGNOSIS — I48.91 A-FIB (H): ICD-10-CM

## 2021-07-27 DIAGNOSIS — I48.0 PAROXYSMAL ATRIAL FIBRILLATION (H): ICD-10-CM

## 2021-07-27 DIAGNOSIS — I48.91 ATRIAL FIBRILLATION (H): Primary | ICD-10-CM

## 2021-07-27 DIAGNOSIS — I48.11 LONGSTANDING PERSISTENT ATRIAL FIBRILLATION (H): ICD-10-CM

## 2021-07-27 LAB — INR BLD: 2.2 (ref 0.9–1.1)

## 2021-07-27 PROCEDURE — 85610 PROTHROMBIN TIME: CPT

## 2021-07-27 PROCEDURE — 36416 COLLJ CAPILLARY BLOOD SPEC: CPT

## 2021-07-27 NOTE — PROGRESS NOTES
ANTICOAGULATION MANAGEMENT     Benjamín Hyman 70 year old male is on warfarin with therapeutic INR result. (Goal INR 2.0-3.0)    Recent labs: (last 7 days)     07/27/21  0919   INR 2.2*       ASSESSMENT     Source(s): Patient/Caregiver Call       Warfarin doses taken: Warfarin taken as instructed    Diet: No new diet changes identified    New illness, injury, or hospitalization: No    Medication/supplement changes: None noted    Signs or symptoms of bleeding or clotting: No    Previous INR: Therapeutic last 2(+) visits    Additional findings: None     PLAN     Recommended plan for no diet, medication or health factor changes affecting INR     Dosing Instructions: Continue your current warfarin dose with next INR in 5 weeks       Summary  As of 7/27/2021    Full warfarin instructions:  3.75 mg every Fri; 7.5 mg all other days   Next INR check:  8/31/2021             Telephone call with Benjamín who verbalizes understanding and agrees to plan    Lab visit scheduled    Education provided: Contact [phone]  with any changes, questions or concerns.     Plan made per [facility] anticoagulation protocol    Genesis Rosa RN  Anticoagulation Clinic  7/27/2021    _______________________________________________________________________     Anticoagulation Episode Summary     Current INR goal:  2.0-3.0   TTR:  79.5 % (11.8 mo)   Target end date:  Indefinite   Send INR reminders to:  [facility]    Indications    Atrial fibrillation (H) [I48.91]  Long term current use of anticoagulant therapy [Z79.01]  Paroxysmal atrial fibrillation (H) [I48.0]  Longstanding persistent atrial fibrillation (H) [I48.11]           Comments:  * 7.5mg tablets. Dr. Teran Ok with 12 week rechecks. 6/3/2020 discontinued plavix and is taking 81 mg asprin daily         Anticoagulation Care Providers     Provider Role Specialty Phone number    Ruben Teran MD Referring Family Medicine [phone]

## 2021-08-31 ENCOUNTER — LAB (OUTPATIENT)
Dept: LAB | Facility: CLINIC | Age: 71
End: 2021-08-31
Payer: COMMERCIAL

## 2021-08-31 ENCOUNTER — ANTICOAGULATION THERAPY VISIT (OUTPATIENT)
Dept: FAMILY MEDICINE | Facility: CLINIC | Age: 71
End: 2021-08-31

## 2021-08-31 DIAGNOSIS — Z79.01 LONG TERM CURRENT USE OF ANTICOAGULANT THERAPY: ICD-10-CM

## 2021-08-31 DIAGNOSIS — I48.11 LONGSTANDING PERSISTENT ATRIAL FIBRILLATION (H): ICD-10-CM

## 2021-08-31 DIAGNOSIS — I48.91 A-FIB (H): ICD-10-CM

## 2021-08-31 DIAGNOSIS — I48.91 ATRIAL FIBRILLATION (H): Primary | ICD-10-CM

## 2021-08-31 DIAGNOSIS — I48.0 PAROXYSMAL ATRIAL FIBRILLATION (H): ICD-10-CM

## 2021-08-31 LAB — INR BLD: 2.3 (ref 0.9–1.1)

## 2021-08-31 PROCEDURE — 36416 COLLJ CAPILLARY BLOOD SPEC: CPT

## 2021-08-31 PROCEDURE — 85610 PROTHROMBIN TIME: CPT

## 2021-08-31 NOTE — PROGRESS NOTES
ANTICOAGULATION MANAGEMENT     Benjamín Hyman 70 year old male is on warfarin with therapeutic INR result. (Goal INR 2.0-3.0)    Recent labs: (last 7 days)     08/31/21  0949   INR 2.3*       ASSESSMENT     Source(s): Chart Review       Warfarin doses taken: Reviewed in chart    Diet: LM    New illness, injury, or hospitalization: No    Medication/supplement changes: None noted    Signs or symptoms of bleeding or clotting: No    Previous INR: Therapeutic last 2(+) visits    Additional findings: None     PLAN     Recommended plan for no diet, medication or health factor changes affecting INR     Dosing Instructions: Continue your current warfarin dose with next INR in 6 weeks       Summary  As of 8/31/2021    Full warfarin instructions:  3.75 mg every Fri; 7.5 mg all other days   Next INR check:               Detailed voice message left for Benjamín with dosing instructions and follow up date.     Contact [phone]  to schedule and with any changes, questions or concerns.     Education provided: Please call back if any changes to your diet, medications or how you've been taking warfarin    Plan made per [facility] anticoagulation protocol    Leonor Hoyt RN  Anticoagulation Clinic  8/31/2021    _______________________________________________________________________     Anticoagulation Episode Summary     Current INR goal:  2.0-3.0   TTR:  79.5 % (11.8 mo)   Target end date:  Indefinite   Send INR reminders to:  [facility]    Indications    Atrial fibrillation (H) [I48.91]  Long term current use of anticoagulant therapy [Z79.01]  Paroxysmal atrial fibrillation (H) [I48.0]  Longstanding persistent atrial fibrillation (H) [I48.11]           Comments:  * 7.5mg tablets. Dr. Teran Ok with 12 week rechecks. 6/3/2020 discontinued plavix and is taking 81 mg asprin daily         Anticoagulation Care Providers     Provider Role Specialty Phone number    Ruben Teran MD Referring Family Medicine [phone]

## 2021-09-22 ENCOUNTER — TELEPHONE (OUTPATIENT)
Dept: FAMILY MEDICINE | Facility: CLINIC | Age: 71
End: 2021-09-22

## 2021-09-22 DIAGNOSIS — I48.19 PERSISTENT ATRIAL FIBRILLATION (H): ICD-10-CM

## 2021-09-22 RX ORDER — WARFARIN SODIUM 7.5 MG/1
TABLET ORAL
Qty: 100 TABLET | Refills: 0 | Status: SHIPPED | OUTPATIENT
Start: 2021-09-22 | End: 2021-12-29

## 2021-09-22 NOTE — TELEPHONE ENCOUNTER
Patient has enough warfarin to get to 9/25, needs refill asap. Patient confused and looking for clarification regarding INR clinic and pcp ordering med.    Thank you,    Adali Blair, GRISELDA Owen

## 2021-09-28 DIAGNOSIS — I25.10 CORONARY ARTERY DISEASE INVOLVING NATIVE CORONARY ARTERY OF NATIVE HEART WITHOUT ANGINA PECTORIS: Primary | ICD-10-CM

## 2021-09-28 RX ORDER — ATORVASTATIN CALCIUM 40 MG/1
40 TABLET, FILM COATED ORAL DAILY
Qty: 90 TABLET | Refills: 3 | Status: SHIPPED | OUTPATIENT
Start: 2021-09-28 | End: 2022-08-08

## 2021-10-12 ENCOUNTER — LAB (OUTPATIENT)
Dept: LAB | Facility: CLINIC | Age: 71
End: 2021-10-12
Payer: COMMERCIAL

## 2021-10-12 ENCOUNTER — ANTICOAGULATION THERAPY VISIT (OUTPATIENT)
Dept: ANTICOAGULATION | Facility: CLINIC | Age: 71
End: 2021-10-12

## 2021-10-12 DIAGNOSIS — Z79.01 LONG TERM CURRENT USE OF ANTICOAGULANT THERAPY: ICD-10-CM

## 2021-10-12 DIAGNOSIS — I48.0 PAROXYSMAL ATRIAL FIBRILLATION (H): ICD-10-CM

## 2021-10-12 DIAGNOSIS — I48.11 LONGSTANDING PERSISTENT ATRIAL FIBRILLATION (H): ICD-10-CM

## 2021-10-12 DIAGNOSIS — I48.91 A-FIB (H): ICD-10-CM

## 2021-10-12 DIAGNOSIS — I48.91 ATRIAL FIBRILLATION (H): Primary | ICD-10-CM

## 2021-10-12 LAB — INR BLD: 2 (ref 0.9–1.1)

## 2021-10-12 PROCEDURE — 85610 PROTHROMBIN TIME: CPT

## 2021-10-12 PROCEDURE — 36416 COLLJ CAPILLARY BLOOD SPEC: CPT

## 2021-10-12 NOTE — PROGRESS NOTES
ANTICOAGULATION MANAGEMENT     Benjamín Hyman 70 year old male is on warfarin with therapeutic INR result. (Goal INR 2.0-3.0)    Recent labs: (last 7 days)     10/12/21  0844   INR 2.0*       ASSESSMENT     Source(s): Chart Review and Patient/Caregiver Call       Warfarin doses taken: Missed dose(s) may be affecting INR    Diet: No new diet changes identified    New illness, injury, or hospitalization: No    Medication/supplement changes: None noted    Signs or symptoms of bleeding or clotting: No    Previous INR: Therapeutic last 2(+) visits    Additional findings: None     PLAN     Recommended plan for temporary change(s) affecting INR     Dosing Instructions: Continue your current warfarin dose with next INR in 7 weeks       Summary  As of 10/12/2021    Full warfarin instructions:  3.75 mg every Fri; 7.5 mg all other days   Next INR check:               Telephone call with Benjamín who verbalizes understanding and agrees to plan    Lab visit scheduled    Education provided: Contact [phone]  with any changes, questions or concerns.     Plan made per [facility] anticoagulation protocol    Genesis Rosa RN  Anticoagulation Clinic  10/12/2021    _______________________________________________________________________     Anticoagulation Episode Summary     Current INR goal:  2.0-3.0   TTR:  79.5 % (11.8 mo)   Target end date:  Indefinite   Send INR reminders to:  [facility]    Indications    Atrial fibrillation (H) [I48.91]  Long term current use of anticoagulant therapy [Z79.01]  Paroxysmal atrial fibrillation (H) [I48.0]  Longstanding persistent atrial fibrillation (H) [I48.11]           Comments:  * 7.5mg tablets. Dr. Teran Ok with 12 week rechecks. 6/3/2020 discontinued plavix and is taking 81 mg asprin daily         Anticoagulation Care Providers     Provider Role Specialty Phone number    Ruben Teran MD Referring Family Medicine [phone]

## 2021-11-30 ENCOUNTER — ANTICOAGULATION THERAPY VISIT (OUTPATIENT)
Dept: ANTICOAGULATION | Facility: CLINIC | Age: 71
End: 2021-11-30

## 2021-11-30 ENCOUNTER — LAB (OUTPATIENT)
Dept: LAB | Facility: CLINIC | Age: 71
End: 2021-11-30
Payer: COMMERCIAL

## 2021-11-30 DIAGNOSIS — I48.91 A-FIB (H): ICD-10-CM

## 2021-11-30 DIAGNOSIS — Z79.01 LONG TERM CURRENT USE OF ANTICOAGULANT THERAPY: ICD-10-CM

## 2021-11-30 DIAGNOSIS — I48.0 PAROXYSMAL ATRIAL FIBRILLATION (H): ICD-10-CM

## 2021-11-30 DIAGNOSIS — I48.91 ATRIAL FIBRILLATION (H): Primary | ICD-10-CM

## 2021-11-30 DIAGNOSIS — I48.19 PERSISTENT ATRIAL FIBRILLATION (H): ICD-10-CM

## 2021-11-30 DIAGNOSIS — I48.11 LONGSTANDING PERSISTENT ATRIAL FIBRILLATION (H): ICD-10-CM

## 2021-11-30 LAB — INR BLD: 2.2 (ref 0.9–1.1)

## 2021-11-30 PROCEDURE — 36416 COLLJ CAPILLARY BLOOD SPEC: CPT

## 2021-11-30 PROCEDURE — 85610 PROTHROMBIN TIME: CPT

## 2021-11-30 NOTE — PROGRESS NOTES
ANTICOAGULATION MANAGEMENT     Benjamín Hyman 71 year old male is on warfarin with therapeutic INR result. (Goal INR 2.0-3.0)    Recent labs: (last 7 days)     11/30/21  0930   INR 2.2*       ASSESSMENT     Source(s): Chart Review and Patient/Caregiver Call       Warfarin doses taken: Warfarin taken as instructed    Diet: No new diet changes identified    New illness, injury, or hospitalization: No    Medication/supplement changes: None noted    Signs or symptoms of bleeding or clotting: No    Previous INR: Therapeutic last 2(+) visits    Additional findings: None     PLAN     Recommended plan for no diet, medication or health factor changes affecting INR     Dosing Instructions: Continue your current warfarin dose with next INR in 8 weeks       Summary  As of 11/30/2021    Full warfarin instructions:  3.75 mg every Fri; 7.5 mg all other days   Next INR check:  1/25/2022             Telephone call with Benjamín who verbalizes understanding and agrees to plan    Lab visit scheduled    Education provided: Please call back if any changes to your diet, medications or how you've been taking warfarin    Plan made per [facility] anticoagulation protocol    Kirti Merrill RN  Anticoagulation Clinic  11/30/2021    _______________________________________________________________________     Anticoagulation Episode Summary     Current INR goal:  2.0-3.0   TTR:  79.9 % (1 y)   Target end date:  Indefinite   Send INR reminders to:  [facility]    Indications    Atrial fibrillation (H) [I48.91]  Long term current use of anticoagulant therapy [Z79.01]  Paroxysmal atrial fibrillation (H) [I48.0]  Longstanding persistent atrial fibrillation (H) [I48.11]           Comments:  Dr. Teran Ok with 12 week rechecks. 81 mg asprin daily         Anticoagulation Care Providers     Provider Role Specialty Phone number    Ruben Teran MD Referring Family Medicine [phone]

## 2021-12-02 ENCOUNTER — NURSE TRIAGE (OUTPATIENT)
Dept: NURSING | Facility: CLINIC | Age: 71
End: 2021-12-02
Payer: COMMERCIAL

## 2021-12-02 NOTE — TELEPHONE ENCOUNTER
Patient is calling regarding booster shot.  Today has questions on booster dose for Moderna.  Patient is calling and is wanting to know the strength of the dose.  Is it the same as other Moderna or weaker.  FNA advised to phone [facility] and patient agrees.    COVID 19 Nurse Triage Plan/Patient Instructions    Please be aware that novel coronavirus (COVID-19) may be circulating in the community. If you develop symptoms such as fever, cough, or SOB or if you have concerns about the presence of another infection including coronavirus (COVID-19), please contact your health care provider or visit [email or web address].     Disposition/Instructions    Home care recommended. Follow home care protocol based instructions.    Thank you for taking steps to prevent the spread of this virus.  o Limit your contact with others.  o Wear a simple mask to cover your cough.  o Wash your hands well and often.    Resources    [facility]: About COVID-19: [email or web address]    CDC: What to Do If You're Sick: www.cdc.gov/coronavirus/2019-ncov/about/steps-when-sick.html    CDC: Ending Home Isolation: www.cdc.gov/coronavirus/2019-ncov/hcp/disposition-in-home-patients.html     CDC: Caring for Someone: www.cdc.gov/coronavirus/2019-ncov/if-you-are-sick/care-for-someone.html     [facility]: Interim Guidance for Hospital Discharge to Home: www.health.[facility].[place]/diseases/coronavirus/hcp/hospdischarge.pdf    [facility] clinical trials (COVID-19 research studies): [email or web address]     Below are the COVID-19 hotlines at the [facility] of Health ([facility]). Interpreters are available.   o For health questions: Call 038-700-6641 or 1-377.470.8681 (7 a.m. to 7 p.m.)  o For questions about schools and childcare: Call [phone] or 1-830.750.2442 (7 a.m. to 7 p.m.)

## 2021-12-14 ENCOUNTER — LAB (OUTPATIENT)
Dept: LAB | Facility: CLINIC | Age: 71
End: 2021-12-14
Payer: COMMERCIAL

## 2021-12-14 DIAGNOSIS — D47.2 MONOCLONAL GAMMOPATHY: ICD-10-CM

## 2021-12-14 LAB
ALBUMIN SERPL-MCNC: 3 G/DL (ref 3.4–5)
ALP SERPL-CCNC: 120 U/L (ref 40–150)
ALT SERPL W P-5'-P-CCNC: 19 U/L (ref 0–70)
ANION GAP SERPL CALCULATED.3IONS-SCNC: 6 MMOL/L (ref 3–14)
AST SERPL W P-5'-P-CCNC: 17 U/L (ref 0–45)
BASOPHILS # BLD AUTO: 0 10E3/UL (ref 0–0.2)
BASOPHILS NFR BLD AUTO: 0 %
BILIRUB SERPL-MCNC: 0.5 MG/DL (ref 0.2–1.3)
BUN SERPL-MCNC: 18 MG/DL (ref 7–30)
CALCIUM SERPL-MCNC: 8.9 MG/DL (ref 8.5–10.1)
CHLORIDE BLD-SCNC: 105 MMOL/L (ref 94–109)
CO2 SERPL-SCNC: 26 MMOL/L (ref 20–32)
CREAT SERPL-MCNC: 0.91 MG/DL (ref 0.66–1.25)
EOSINOPHIL # BLD AUTO: 0.3 10E3/UL (ref 0–0.7)
EOSINOPHIL NFR BLD AUTO: 2 %
ERYTHROCYTE [DISTWIDTH] IN BLOOD BY AUTOMATED COUNT: 12.5 % (ref 10–15)
GFR SERPL CREATININE-BSD FRML MDRD: 84 ML/MIN/1.73M2
GLUCOSE BLD-MCNC: 102 MG/DL (ref 70–99)
HCT VFR BLD AUTO: 47 % (ref 40–53)
HGB BLD-MCNC: 15.1 G/DL (ref 13.3–17.7)
LYMPHOCYTES # BLD AUTO: 3 10E3/UL (ref 0.8–5.3)
LYMPHOCYTES NFR BLD AUTO: 24 %
MCH RBC QN AUTO: 30.1 PG (ref 26.5–33)
MCHC RBC AUTO-ENTMCNC: 32.1 G/DL (ref 31.5–36.5)
MCV RBC AUTO: 94 FL (ref 78–100)
MONOCYTES # BLD AUTO: 0.8 10E3/UL (ref 0–1.3)
MONOCYTES NFR BLD AUTO: 7 %
NEUTROPHILS # BLD AUTO: 8.1 10E3/UL (ref 1.6–8.3)
NEUTROPHILS NFR BLD AUTO: 67 %
PLATELET # BLD AUTO: 278 10E3/UL (ref 150–450)
POTASSIUM BLD-SCNC: 4.5 MMOL/L (ref 3.4–5.3)
PROT SERPL-MCNC: 7.6 G/DL (ref 6.8–8.8)
RBC # BLD AUTO: 5.01 10E6/UL (ref 4.4–5.9)
SODIUM SERPL-SCNC: 137 MMOL/L (ref 133–144)
TOTAL PROTEIN SERUM FOR ELP: 7.2 G/DL (ref 6.8–8.8)
WBC # BLD AUTO: 12.2 10E3/UL (ref 4–11)

## 2021-12-14 PROCEDURE — 36415 COLL VENOUS BLD VENIPUNCTURE: CPT

## 2021-12-14 PROCEDURE — 86334 IMMUNOFIX E-PHORESIS SERUM: CPT | Performed by: PATHOLOGY

## 2021-12-14 PROCEDURE — 83883 ASSAY NEPHELOMETRY NOT SPEC: CPT | Mod: 59

## 2021-12-14 PROCEDURE — 80053 COMPREHEN METABOLIC PANEL: CPT

## 2021-12-14 PROCEDURE — 85025 COMPLETE CBC W/AUTO DIFF WBC: CPT

## 2021-12-14 PROCEDURE — 82784 ASSAY IGA/IGD/IGG/IGM EACH: CPT | Mod: 59

## 2021-12-14 PROCEDURE — 84165 PROTEIN E-PHORESIS SERUM: CPT | Performed by: PATHOLOGY

## 2021-12-14 PROCEDURE — 84155 ASSAY OF PROTEIN SERUM: CPT | Mod: 59

## 2021-12-15 LAB
ALBUMIN SERPL ELPH-MCNC: 3.7 G/DL (ref 3.7–5.1)
ALPHA1 GLOB SERPL ELPH-MCNC: 0.4 G/DL (ref 0.2–0.4)
ALPHA2 GLOB SERPL ELPH-MCNC: 1 G/DL (ref 0.5–0.9)
B-GLOBULIN SERPL ELPH-MCNC: 0.7 G/DL (ref 0.6–1)
GAMMA GLOB SERPL ELPH-MCNC: 1.4 G/DL (ref 0.7–1.6)
IGA SERPL-MCNC: 180 MG/DL (ref 84–499)
IGG SERPL-MCNC: 1394 MG/DL (ref 610–1616)
IGM SERPL-MCNC: 25 MG/DL (ref 35–242)
KAPPA LC FREE SER-MCNC: 3.32 MG/DL (ref 0.33–1.94)
KAPPA LC FREE/LAMBDA FREE SER NEPH: 1.07 {RATIO} (ref 0.26–1.65)
LAMBDA LC FREE SERPL-MCNC: 3.09 MG/DL (ref 0.57–2.63)
M PROTEIN SERPL ELPH-MCNC: 0.5 G/DL
PROT PATTERN SERPL ELPH-IMP: ABNORMAL
PROT PATTERN SERPL IFE-IMP: NORMAL

## 2021-12-22 ENCOUNTER — VIRTUAL VISIT (OUTPATIENT)
Dept: ONCOLOGY | Facility: CLINIC | Age: 71
End: 2021-12-22
Attending: NURSE PRACTITIONER
Payer: COMMERCIAL

## 2021-12-22 DIAGNOSIS — D47.2 MONOCLONAL GAMMOPATHY: Primary | ICD-10-CM

## 2021-12-22 PROCEDURE — 99213 OFFICE O/P EST LOW 20 MIN: CPT | Mod: 95 | Performed by: NURSE PRACTITIONER

## 2021-12-22 NOTE — LETTER
"    12/22/2021         RE: Benjamín Hyman  [address] [place] [address] [address]  [facility] [address]        Dear Colleague,    Thank you for referring your patient, Benjamín yHman, to the [facility] [place]. Please see a copy of my visit note below.    Benjamín is a 71 year old who is being evaluated via a billable telephone visit.      What phone number would you like to be contacted at? [phone]  How would you like to obtain your AVS? Mail a copy  Emily Brunner        The patient has been notified of following:      \"This telephone visit will be conducted via a call between you and your physician/provider. We have found that certain health care needs can be provided without the need for a physical exam.  This service lets us provide the care you need with a short phone conversation during the COVID-19 pandemic.  If a prescription is necessary we can send it directly to your pharmacy.  If lab work is needed we can place an order for that and you can then stop by our lab to have the test done at a later time.     Telephone visits are billed at different rates depending on your insurance coverage. During this emergency period, for some insurers they may be billed the same as an in-person visit.  Please reach out to your insurance provider with any questions.     If during the course of the call the physician/provider feels a telephone visit is not appropriate, you will not be charged for this service.\"     Patient has given verbal consent for Telephone visit?  Yes       Telephone visit-15 minutes    Oncology/Hematology Visit Note  Dec 22, 2021    Reason for Visit: follow up of monoclonal gammopathy  Review of labs    11/2020-patient met with Dr. Gupta.  Discussion about cardiac myeloid and amyloidosis  discussed     Interval History:  Patient denies fever chills sweats cough shortness of breath chest pain nausea vomiting diarrhea abdominal pain bleeding denies bone pain    Review of " Systems:  14 point ROS of systems including Constitutional, Eyes, Respiratory, Cardiovascular, Gastroenterology, Genitourinary, Integumentary, Muscularskeletal, Psychiatric were all negative except for pertinent positives noted in my HPI.        Physical Examination:  Telephone visit no audible wheezing or cough    Laboratory Data:  CBC CMP SPEP kappa lambda free chain and IgG reviewed      Assessment and Plan:    This is a 71-year-old male with       Monoclonal gammopathy  11/2020-patient met with Dr. Gupta.  Dr. Pozo had discussion about cardiac myeloid and amyloidosis.  At that time patient refused bone marrow biopsy  I  discussed  bone marrow biopsy  Labs above reviewed abnormalities discussed    Schedule with MD covering for Dr. Gupta  in 4 to 6 months with labs      BRENNA Pfeiffer CNP  [facility]     Chart documentation with Dragon Voice recognition Software. Although reviewed after completion, some words and grammatical errors may remain.            Again, thank you for allowing me to participate in the care of your patient.        Sincerely,        BRENNA Pfeiffer CNP

## 2021-12-22 NOTE — LETTER
"    12/22/2021         RE: Benjamín Hyman  [address] [place] [address] [address]  [facility] [address]        Dear Colleague,    Thank you for referring your patient, Benjamín Hyman, to the [facility] [place]. Please see a copy of my visit note below.    Benjamín is a 71 year old who is being evaluated via a billable telephone visit.      What phone number would you like to be contacted at? [phone]  How would you like to obtain your AVS? Mail a copy  Emily Brunner        The patient has been notified of following:      \"This telephone visit will be conducted via a call between you and your physician/provider. We have found that certain health care needs can be provided without the need for a physical exam.  This service lets us provide the care you need with a short phone conversation during the COVID-19 pandemic.  If a prescription is necessary we can send it directly to your pharmacy.  If lab work is needed we can place an order for that and you can then stop by our lab to have the test done at a later time.     Telephone visits are billed at different rates depending on your insurance coverage. During this emergency period, for some insurers they may be billed the same as an in-person visit.  Please reach out to your insurance provider with any questions.     If during the course of the call the physician/provider feels a telephone visit is not appropriate, you will not be charged for this service.\"     Patient has given verbal consent for Telephone visit?  Yes       Telephone visit-15 minutes    Oncology/Hematology Visit Note  Dec 22, 2021    Reason for Visit: follow up of monoclonal gammopathy  Review of labs    11/2020-patient met with Dr. Gupta.  Discussion about cardiac myeloid and amyloidosis  discussed     Interval History:  Patient denies fever chills sweats cough shortness of breath chest pain nausea vomiting diarrhea abdominal pain bleeding denies bone pain    Review of " Systems:  14 point ROS of systems including Constitutional, Eyes, Respiratory, Cardiovascular, Gastroenterology, Genitourinary, Integumentary, Muscularskeletal, Psychiatric were all negative except for pertinent positives noted in my HPI.        Physical Examination:  Telephone visit no audible wheezing or cough    Laboratory Data:  CBC CMP SPEP kappa lambda free chain and IgG reviewed      Assessment and Plan:    This is a 71-year-old male with       Monoclonal gammopathy  11/2020-patient met with Dr. Gupta.  Dr. Pozo had discussion about cardiac myeloid and amyloidosis.  At that time patient refused bone marrow biopsy  I  discussed  bone marrow biopsy  Labs above reviewed abnormalities discussed    Schedule with MD covering for Dr. Gupta  in 4 to 6 months with labs      BRENNA Pfeiffer CNP  [facility]     Chart documentation with Dragon Voice recognition Software. Although reviewed after completion, some words and grammatical errors may remain.            Again, thank you for allowing me to participate in the care of your patient.        Sincerely,        BRENNA Pfeiffer CNP

## 2021-12-23 ENCOUNTER — ALLIED HEALTH/NURSE VISIT (OUTPATIENT)
Dept: FAMILY MEDICINE | Facility: CLINIC | Age: 71
End: 2021-12-23
Payer: COMMERCIAL

## 2021-12-23 VITALS — HEART RATE: 68 BPM | SYSTOLIC BLOOD PRESSURE: 124 MMHG | DIASTOLIC BLOOD PRESSURE: 58 MMHG

## 2021-12-23 DIAGNOSIS — I10 BENIGN ESSENTIAL HYPERTENSION: Primary | ICD-10-CM

## 2021-12-23 DIAGNOSIS — Z01.30 BP CHECK: ICD-10-CM

## 2021-12-23 PROCEDURE — 99207 PR NO CHARGE NURSE ONLY: CPT

## 2021-12-27 NOTE — PROGRESS NOTES
Benjamín Hyman is a 71 year old year old patient who comes in today for a Blood Pressure check because of ongoing blood pressure monitoring.  Vital Signs as repeated by /58 pulse 68  Patient is taking medication as prescribed  Patient is tolerating medications well.  Patient is not monitoring Blood Pressure at home.    Current complaints: none  Disposition:  patient to continue with the same medication  Jenn Dennis RN

## 2021-12-29 DIAGNOSIS — I48.19 PERSISTENT ATRIAL FIBRILLATION (H): ICD-10-CM

## 2021-12-29 RX ORDER — WARFARIN SODIUM 7.5 MG/1
TABLET ORAL
Qty: 100 TABLET | Refills: 0 | Status: SHIPPED | OUTPATIENT
Start: 2021-12-29 | End: 2022-04-07

## 2021-12-29 NOTE — TELEPHONE ENCOUNTER
Reason for Call:  Medication or medication refill:    Do you use a [facility]?  Name of the pharmacy and phone number for the current request:  [facility] [place] [address]     Name of the medication requested: Warfarin    Other request: out of medication    Can we leave a detailed message on this number? YES    Phone number patient can be reached at: Home number on file [phone] (home)    Best Time: any    Call taken on 12/29/2021 at 11:09 AM by Chandan Auguste

## 2022-01-27 ENCOUNTER — LAB (OUTPATIENT)
Dept: LAB | Facility: CLINIC | Age: 72
End: 2022-01-27
Payer: COMMERCIAL

## 2022-01-27 ENCOUNTER — ANTICOAGULATION THERAPY VISIT (OUTPATIENT)
Dept: ANTICOAGULATION | Facility: CLINIC | Age: 72
End: 2022-01-27

## 2022-01-27 DIAGNOSIS — I48.91 A-FIB (H): ICD-10-CM

## 2022-01-27 DIAGNOSIS — I48.91 ATRIAL FIBRILLATION (H): Primary | ICD-10-CM

## 2022-01-27 DIAGNOSIS — Z79.01 LONG TERM CURRENT USE OF ANTICOAGULANT THERAPY: ICD-10-CM

## 2022-01-27 DIAGNOSIS — I48.0 PAROXYSMAL ATRIAL FIBRILLATION (H): ICD-10-CM

## 2022-01-27 DIAGNOSIS — I48.11 LONGSTANDING PERSISTENT ATRIAL FIBRILLATION (H): ICD-10-CM

## 2022-01-27 LAB — INR BLD: 2 (ref 0.9–1.1)

## 2022-01-27 PROCEDURE — 85610 PROTHROMBIN TIME: CPT

## 2022-01-27 PROCEDURE — 36416 COLLJ CAPILLARY BLOOD SPEC: CPT

## 2022-01-27 NOTE — PROGRESS NOTES
ANTICOAGULATION MANAGEMENT     Benjamín Hyman 71 year old male is on warfarin with therapeutic INR result. (Goal INR 2.0-3.0)    Recent labs: (last 7 days)     01/27/22  1448   INR 2.0*       ASSESSMENT     Source(s): Patient/Caregiver Call       Warfarin doses taken: Warfarin taken as instructed    Diet: No new diet changes identified    New illness, injury, or hospitalization: No    Medication/supplement changes: None noted    Signs or symptoms of bleeding or clotting: No    Previous INR: Therapeutic last 2(+) visits    Additional findings: None     PLAN     Recommended plan for no diet, medication or health factor changes affecting INR     Dosing Instructions: Continue your current warfarin dose with next INR in 9 weeks       Summary  As of 1/27/2022    Full warfarin instructions:  3.75 mg every Fri; 7.5 mg all other days   Next INR check:  3/31/2022             Telephone call with Benjamín who verbalizes understanding and agrees to plan    Lab visit scheduled    Education provided: Please call back if any changes to your diet, medications or how you've been taking warfarin and Contact [phone]  with any changes, questions or concerns.     Plan made per [facility] anticoagulation protocol    Horace Barnett RN  Anticoagulation Clinic  1/27/2022    _______________________________________________________________________     Anticoagulation Episode Summary     Current INR goal:  2.0-3.0   TTR:  94.3 % (1 y)   Target end date:  Indefinite   Send INR reminders to:  [facility]    Indications    Atrial fibrillation (H) [I48.91]  Long term current use of anticoagulant therapy [Z79.01]  Paroxysmal atrial fibrillation (H) [I48.0]  Longstanding persistent atrial fibrillation (H) [I48.11]           Comments:  Dr. Teran Ok with 12 week rechecks. 81 mg asprin daily         Anticoagulation Care Providers     Provider Role Specialty Phone number    Ruben Teran MD Referring Family Medicine [phone]

## 2022-02-08 ENCOUNTER — NURSE TRIAGE (OUTPATIENT)
Dept: FAMILY MEDICINE | Facility: CLINIC | Age: 72
End: 2022-02-08
Payer: COMMERCIAL

## 2022-02-08 NOTE — TELEPHONE ENCOUNTER
Reason for Disposition    SEVERE diarrhea (e.g., 7 or more times / day more than normal)    Additional Information    Negative: Shock suspected (e.g., cold/pale/clammy skin, too weak to stand, low BP, rapid pulse)    Negative: Difficult to awaken or acting confused (e.g., disoriented, slurred speech)    Negative: Sounds like a life-threatening emergency to the triager    Negative: Vomiting also present and worse than the diarrhea    Negative: Blood in stool and without diarrhea    Negative: SEVERE abdominal pain (e.g., excruciating) and present > 1 hour    Negative: SEVERE abdominal pain and age > 60    Negative: Bloody, black, or tarry bowel movements (Exception: chronic-unchanged black-grey bowel movements and is taking iron pills or Pepto-bismol)    Negative: SEVERE diarrhea (e.g., 7 or more times / day more than normal) and age > 60 years    Negative: Constant abdominal pain lasting > 2 hours    Negative: Drinking very little and has signs of dehydration (e.g., no urine > 12 hours, very dry mouth, very lightheaded)    Negative: Patient sounds very sick or weak to the triager    Negative: Weak immune system (e.g., HIV positive, cancer chemo, splenectomy, organ transplant, chronic steroids)    Negative: SEVERE diarrhea (e.g., 7 or more times / day more than normal) and present > 24 hours (1 day)    Negative: MODERATE diarrhea (e.g., 4-6 times / day more than normal) and present > 48 hours (2 days)    Negative: MODERATE diarrhea (e.g., 4-6 times / day more than normal) and age > 70 years    Negative: Abdominal pain  (Exception: pain clears completely with each passage of diarrhea stool)    Negative: Fever > 101 F (38.3 C)    Negative: Blood in the stool    Negative: Mucus or pus in stool has been present > 2 days and diarrhea is more than mild    Negative: Travel to a foreign country in past month    Negative: Recent antibiotic therapy (i.e., within last 2 months) and diarrhea present > 3 days since antibiotic  "was stopped    Negative: Recent hospitalization and diarrhea present > 3 days    Negative: Tube feedings (e.g., nasogastric, g-tube, j-tube)    Negative: MILD diarrhea (e.g., 1-3 or more stools than normal in past 24 hours) diarrhea without known cause and present > 7 days    Negative: Patient wants to be seen    Negative: Diarrhea is a chronic symptom (recurrent or ongoing AND lasting > 4 weeks)    Answer Assessment - Initial Assessment Questions  1. DIARRHEA SEVERITY: \"How bad is the diarrhea?\" \"How many extra stools have you had in the past 24 hours than normal?\"     - NO DIARRHEA (SCALE 0)    - MILD (SCALE 1-3): Few loose or mushy BMs; increase of 1-3 stools over normal daily number of stools; mild increase in ostomy output.    -  MODERATE (SCALE 4-7): Increase of 4-6 stools daily over normal; moderate increase in ostomy output.  * SEVERE (SCALE 8-10; OR 'WORST POSSIBLE'): Increase of 7 or more stools daily over normal; moderate increase in ostomy output; incontinence.      Severe, every 15 minutes since 6 am today.  Stopped 90 min ago and doing better now.  2. ONSET: \"When did the diarrhea begin?\"       6 am today  3. BM CONSISTENCY: \"How loose or watery is the diarrhea?\"       watery  4. VOMITING: \"Are you also vomiting?\" If so, ask: \"How many times in the past 24 hours?\"       denies  5. ABDOMINAL PAIN: \"Are you having any abdominal pain?\" If yes: \"What does it feel like?\" (e.g., crampy, dull, intermittent, constant)       Denies    6. ABDOMINAL PAIN SEVERITY: If present, ask: \"How bad is the pain?\"  (e.g., Scale 1-10; mild, moderate, or severe)    - MILD (1-3): doesn't interfere with normal activities, abdomen soft and not tender to touch     - MODERATE (4-7): interferes with normal activities or awakens from sleep, tender to touch     - SEVERE (8-10): excruciating pain, doubled over, unable to do any normal activities        denies  7. ORAL INTAKE: If vomiting, \"Have you been able to drink liquids?\" \"How " "much fluids have you had in the past 24 hours?\"      Water and cranberry/apple juice  8. HYDRATION: \"Any signs of dehydration?\" (e.g., dry mouth [not just dry lips], too weak to stand, dizziness, new weight loss) \"When did you last urinate?\"      Urinated 30 min ago  9. EXPOSURE: \"Have you traveled to a foreign country recently?\" \"Have you been exposed to anyone with diarrhea?\" \"Could you have eaten any food that was spoiled?\"      denies  10. ANTIBIOTIC USE: \"Are you taking antibiotics now or have you taken antibiotics in the past 2 months?\"        denies  11. OTHER SYMPTOMS: \"Do you have any other symptoms?\" (e.g., fever, blood in stool)        denies  12. PREGNANCY: \"Is there any chance you are pregnant?\" \"When was your last menstrual period?\"        NA    Protocols used: DIARRHEA-A-OH    "

## 2022-02-08 NOTE — TELEPHONE ENCOUNTER
Reason for call:  Patient reporting a symptom    Symptom or request: Patient is calling stating that he has had Diarrhea all morning today and wants to know what he can do at home.    Duration (how long have symptoms been present): just today    Have you been treated for this before? No    Phone Number patient can be reached at:  Home number on file [phone] (home)    Best Time:  any    Can we leave a detailed message on this number:  YES    Call taken on 2/8/2022 at 2:13 PM by Agueda Grant

## 2022-04-07 ENCOUNTER — LAB (OUTPATIENT)
Dept: LAB | Facility: CLINIC | Age: 72
End: 2022-04-07
Payer: COMMERCIAL

## 2022-04-07 ENCOUNTER — ANTICOAGULATION THERAPY VISIT (OUTPATIENT)
Dept: ANTICOAGULATION | Facility: CLINIC | Age: 72
End: 2022-04-07

## 2022-04-07 DIAGNOSIS — I48.91 ATRIAL FIBRILLATION (H): Primary | ICD-10-CM

## 2022-04-07 DIAGNOSIS — I48.0 PAROXYSMAL ATRIAL FIBRILLATION (H): ICD-10-CM

## 2022-04-07 DIAGNOSIS — I48.91 A-FIB (H): ICD-10-CM

## 2022-04-07 DIAGNOSIS — Z79.01 LONG TERM CURRENT USE OF ANTICOAGULANT THERAPY: ICD-10-CM

## 2022-04-07 DIAGNOSIS — I48.19 PERSISTENT ATRIAL FIBRILLATION (H): ICD-10-CM

## 2022-04-07 DIAGNOSIS — I48.11 LONGSTANDING PERSISTENT ATRIAL FIBRILLATION (H): ICD-10-CM

## 2022-04-07 LAB — INR BLD: 2.6 (ref 0.9–1.1)

## 2022-04-07 PROCEDURE — 36416 COLLJ CAPILLARY BLOOD SPEC: CPT

## 2022-04-07 PROCEDURE — 85610 PROTHROMBIN TIME: CPT

## 2022-04-07 RX ORDER — WARFARIN SODIUM 7.5 MG/1
TABLET ORAL
Qty: 100 TABLET | Refills: 0 | Status: SHIPPED | OUTPATIENT
Start: 2022-04-07 | End: 2022-06-29

## 2022-04-07 NOTE — PROGRESS NOTES
ANTICOAGULATION MANAGEMENT     Benjamín Hyman 71 year old male is on warfarin with therapeutic INR result. (Goal INR 2.0-3.0)    Recent labs: (last 7 days)     04/07/22  0852   INR 2.6*       ASSESSMENT       Source(s): Chart Review and Patient/Caregiver Call       Warfarin doses taken: Warfarin taken as instructed    Diet: No new diet changes identified    New illness, injury, or hospitalization: Yes: had severe diarrhea for 3 weeks or more back in Feb    Medication/supplement changes: None noted    Signs or symptoms of bleeding or clotting: No    Previous INR: Therapeutic last 2(+) visits    Additional findings: None       PLAN     Recommended plan for no diet, medication or health factor changes affecting INR     Dosing Instructions: continue your current warfarin dose with next INR in 10 weeks       Summary  As of 4/7/2022    Full warfarin instructions:  3.75 mg every Fri; 7.5 mg all other days   Next INR check:  6/16/2022             Telephone call with Benjamín who verbalizes understanding and agrees to plan    Lab visit scheduled    Education provided: Please call back if any changes to your diet, medications or how you've been taking warfarin, Importance of notifying clinic for diarrhea, nausea/vomiting, reduced intake, and/or illness; a sooner lab recheck maybe needed. and Contact [phone]  with any changes, questions or concerns.     Plan made per [facility] anticoagulation protocol    Bing Sahu RN  Anticoagulation Clinic  4/7/2022    _______________________________________________________________________     Anticoagulation Episode Summary     Current INR goal:  2.0-3.0   TTR:  94.3 % (1 y)   Target end date:  Indefinite   Send INR reminders to:  [facility]    Indications    Atrial fibrillation (H) [I48.91]  Long term current use of anticoagulant therapy [Z79.01]  Paroxysmal atrial fibrillation (H) [I48.0]  Longstanding persistent atrial fibrillation (H) [I48.11]           Comments:  Dr. Teran  Ok with 12 week rechecks. 81 mg asprin daily         Anticoagulation Care Providers     Provider Role Specialty Phone number    Ruben Teran MD Referring Family Medicine [phone]

## 2022-04-20 ENCOUNTER — OFFICE VISIT (OUTPATIENT)
Dept: FAMILY MEDICINE | Facility: CLINIC | Age: 72
End: 2022-04-20
Payer: COMMERCIAL

## 2022-04-20 VITALS
SYSTOLIC BLOOD PRESSURE: 120 MMHG | BODY MASS INDEX: 43.98 KG/M2 | RESPIRATION RATE: 20 BRPM | WEIGHT: 257.6 LBS | OXYGEN SATURATION: 98 % | TEMPERATURE: 98 F | HEART RATE: 62 BPM | HEIGHT: 64 IN | DIASTOLIC BLOOD PRESSURE: 72 MMHG

## 2022-04-20 DIAGNOSIS — Z12.11 SCREEN FOR COLON CANCER: ICD-10-CM

## 2022-04-20 DIAGNOSIS — I48.11 LONGSTANDING PERSISTENT ATRIAL FIBRILLATION (H): ICD-10-CM

## 2022-04-20 DIAGNOSIS — I50.22 CHRONIC SYSTOLIC HEART FAILURE (H): ICD-10-CM

## 2022-04-20 DIAGNOSIS — I10 BENIGN ESSENTIAL HYPERTENSION: Primary | ICD-10-CM

## 2022-04-20 DIAGNOSIS — E66.01 MORBID OBESITY DUE TO EXCESS CALORIES (H): ICD-10-CM

## 2022-04-20 DIAGNOSIS — D47.2 MONOCLONAL GAMMOPATHY: ICD-10-CM

## 2022-04-20 LAB
ANION GAP SERPL CALCULATED.3IONS-SCNC: 9 MMOL/L (ref 3–14)
BUN SERPL-MCNC: 23 MG/DL (ref 7–30)
CALCIUM SERPL-MCNC: 9.2 MG/DL (ref 8.5–10.1)
CHLORIDE BLD-SCNC: 107 MMOL/L (ref 94–109)
CO2 SERPL-SCNC: 27 MMOL/L (ref 20–32)
CREAT SERPL-MCNC: 1.11 MG/DL (ref 0.66–1.25)
DIGOXIN SERPL-MCNC: 1.3 UG/L
ERYTHROCYTE [DISTWIDTH] IN BLOOD BY AUTOMATED COUNT: 13 % (ref 10–15)
GFR SERPL CREATININE-BSD FRML MDRD: 71 ML/MIN/1.73M2
GLUCOSE BLD-MCNC: 100 MG/DL (ref 70–99)
HCT VFR BLD AUTO: 50.8 % (ref 40–53)
HGB BLD-MCNC: 15.9 G/DL (ref 13.3–17.7)
MCH RBC QN AUTO: 30 PG (ref 26.5–33)
MCHC RBC AUTO-ENTMCNC: 31.3 G/DL (ref 31.5–36.5)
MCV RBC AUTO: 96 FL (ref 78–100)
PLATELET # BLD AUTO: 318 10E3/UL (ref 150–450)
POTASSIUM BLD-SCNC: 5 MMOL/L (ref 3.4–5.3)
RBC # BLD AUTO: 5.3 10E6/UL (ref 4.4–5.9)
SODIUM SERPL-SCNC: 143 MMOL/L (ref 133–144)
WBC # BLD AUTO: 14.7 10E3/UL (ref 4–11)

## 2022-04-20 PROCEDURE — 80048 BASIC METABOLIC PNL TOTAL CA: CPT | Performed by: FAMILY MEDICINE

## 2022-04-20 PROCEDURE — 80162 ASSAY OF DIGOXIN TOTAL: CPT | Performed by: FAMILY MEDICINE

## 2022-04-20 PROCEDURE — 99214 OFFICE O/P EST MOD 30 MIN: CPT | Performed by: FAMILY MEDICINE

## 2022-04-20 PROCEDURE — 36415 COLL VENOUS BLD VENIPUNCTURE: CPT | Performed by: FAMILY MEDICINE

## 2022-04-20 PROCEDURE — 85027 COMPLETE CBC AUTOMATED: CPT | Performed by: FAMILY MEDICINE

## 2022-04-20 RX ORDER — DIGOXIN 250 MCG
TABLET ORAL
Qty: 70 TABLET | Refills: 3 | Status: SHIPPED | OUTPATIENT
Start: 2022-04-20 | End: 2022-08-08

## 2022-04-20 RX ORDER — LISINOPRIL 40 MG/1
40 TABLET ORAL DAILY
Qty: 90 TABLET | Refills: 3 | Status: SHIPPED | OUTPATIENT
Start: 2022-04-20 | End: 2022-08-08

## 2022-04-20 RX ORDER — METOPROLOL SUCCINATE 200 MG/1
200 TABLET, EXTENDED RELEASE ORAL DAILY
Qty: 90 TABLET | Refills: 3 | Status: SHIPPED | OUTPATIENT
Start: 2022-04-20 | End: 2022-08-08

## 2022-04-20 ASSESSMENT — PAIN SCALES - GENERAL: PAINLEVEL: NO PAIN (0)

## 2022-04-20 NOTE — PROGRESS NOTES
"  Assessment & Plan     Benign essential hypertension  Blood pressure within target goal of less than 140/90.  Recommend to continue metoprolol and lisinopril  - metoprolol succinate ER (TOPROL-XL) 200 MG 24 hr tablet; Take 1 tablet (200 mg) by mouth daily  - lisinopril (ZESTRIL) 40 MG tablet; Take 1 tablet (40 mg) by mouth daily  - CBC with platelets; Future  - Basic metabolic panel  (Ca, Cl, CO2, Creat, Gluc, K, Na, BUN); Future    Longstanding persistent atrial fibrillation (H)  Continue digoxin, warfarin and aspirin.  Cardiology referral placed  - Digoxin level; Future  - digoxin (LANOXIN) 250 MCG tablet; Take 1 tab on even days & 1/2 tab on odd day of the month. Patient has been on this medication for year & atrial fibrillation is controlled.    Screen for colon cancer  - Fecal colorectal cancer screen FIT - Future (S+30); Future    Chronic systolic heart failure (H)  Continue digoxin, warfarin and metoprolol  - Adult Cardiology Eval  Referral; Future      Morbid obesity due to excess calories (H)  Healthy lifestyle modifications stressed including regular exercise, balanced diet, weight loss and limiting salt/caffeine/pop intake    Monoclonal gammopathy  Recommended to continue following hematology, referral placed  - Adult Oncology/Hematology Referral; Future         BMI:   Estimated body mass index is 44.22 kg/m  as calculated from the following:    Height as of this encounter: 1.626 m (5' 4\").    Weight as of this encounter: 116.8 kg (257 lb 9.6 oz).   Weight management plan: Discussed healthy diet and exercise guidelines      Jem Ryder MD  [facility]    Romy Butts is a 71 year old who presents for the following health issues     HPI     Hyperlipidemia Follow-Up      Are you regularly taking any medication or supplement to lower your cholesterol?   Yes- Atorvastatin    Are you having muscle aches or other side effects that you think could be caused by your " "cholesterol lowering medication?  No    Hypertension Follow-up      Do you check your blood pressure regularly outside of the clinic? Yes     Are you following a low salt diet? Yes    Are your blood pressures ever more than 140 on the top number (systolic) OR more   than 90 on the bottom number (diastolic), for example 140/90? Yes      How many servings of fruits and vegetables do you eat daily?  2-3    On average, how many sweetened beverages do you drink each day (Examples: soda, juice, sweet tea, etc.  Do NOT count diet or artificially sweetened beverages)?   0    How many days per week do you exercise enough to make your heart beat faster? 3 or less    How many minutes a day do you exercise enough to make your heart beat faster? 9 or less    How many days per week do you miss taking your medication? 0      Review of Systems   Constitutional, HEENT, cardiovascular, pulmonary, GI, , musculoskeletal, neuro, skin, endocrine and psych systems are negative, except as otherwise noted.      Objective    /72 (BP Location: Right arm, Patient Position: Sitting, Cuff Size: Adult Regular)   Pulse 62   Temp 98  F (36.7  C) (Tympanic)   Resp 20   Ht 1.626 m (5' 4\")   Wt 116.8 kg (257 lb 9.6 oz)   SpO2 98%   BMI 44.22 kg/m    Body mass index is 44.22 kg/m .  Physical Exam   GENERAL: alert, no distress and obese  EYES: Eyes grossly normal to inspection, PERRL and conjunctivae and sclerae normal  HENT: normal cephalic/atraumatic, nose and mouth without ulcers or lesions, oropharynx clear and oral mucous membranes moist  NECK: no adenopathy, no asymmetry, masses, or scars and thyroid normal to palpation  RESP: lungs clear to auscultation - no rales, rhonchi or wheezes  CV: regular rate and rhythm, normal S1 S2, no S3 or S4, no murmur, click or rub  MS: no gross musculoskeletal defects noted, no edema  SKIN: no suspicious lesions or rashes  NEURO: Normal strength and tone, mentation intact and speech normal  PSYCH: " mentation appears normal, affect normal/bright

## 2022-04-21 DIAGNOSIS — D72.829 LEUKOCYTOSIS, UNSPECIFIED TYPE: Primary | ICD-10-CM

## 2022-04-27 ENCOUNTER — APPOINTMENT (OUTPATIENT)
Dept: LAB | Facility: CLINIC | Age: 72
End: 2022-04-27
Payer: MEDICARE

## 2022-04-27 PROCEDURE — 82274 ASSAY TEST FOR BLOOD FECAL: CPT

## 2022-04-29 DIAGNOSIS — Z12.11 SCREEN FOR COLON CANCER: ICD-10-CM

## 2022-04-29 LAB — HEMOCCULT STL QL IA: NEGATIVE

## 2022-04-29 NOTE — LETTER
May 2, 2022      Benjamín Hyman  [address] [place] [address] [place] [id number]        Dear ,    We are writing to inform you of your test results.    Your recent FIT test (colon cancer screening) was normal. Please repeat this test yearly.    Resulted Orders   Fecal colorectal cancer screen FIT - Future (S+30)   Result Value Ref Range    Occult Blood Screen FIT Negative Negative       If you have any questions or concerns, please call the clinic at the number listed above.       Sincerely,      Jem Ryder MD/ ss

## 2022-06-14 ENCOUNTER — OFFICE VISIT (OUTPATIENT)
Dept: CARDIOLOGY | Facility: CLINIC | Age: 72
End: 2022-06-14
Attending: FAMILY MEDICINE
Payer: COMMERCIAL

## 2022-06-14 VITALS
WEIGHT: 262.23 LBS | DIASTOLIC BLOOD PRESSURE: 83 MMHG | OXYGEN SATURATION: 99 % | HEART RATE: 75 BPM | SYSTOLIC BLOOD PRESSURE: 136 MMHG | TEMPERATURE: 98.7 F | BODY MASS INDEX: 45.01 KG/M2

## 2022-06-14 DIAGNOSIS — I50.22 CHRONIC SYSTOLIC HEART FAILURE (H): ICD-10-CM

## 2022-06-14 PROCEDURE — 99215 OFFICE O/P EST HI 40 MIN: CPT | Performed by: INTERNAL MEDICINE

## 2022-06-14 NOTE — LETTER
6/14/2022    Ruben Teran MD  [address] [facility] [address]    RE: Benjamín Hyman       Dear Colleague,     I had the pleasure of seeing Benjamín Hyman in the [facility].  CARDIOLOGY CLINIC CONSULTATION    REASON FOR CONSULT: Heart failure consultation    PRIMARY CARE PHYSICIAN:  Ruben Teran    Today's clinic visit entailed:  Review of the result(s) of each unique test - Echo ECG stress test Cath labs  40 minutes spent on the date of the encounter doing chart review, history and exam, documentation and further activities per the note  Provider  Link to [facility] Help Grid     The level of medical decision making during this visit was of high complexity.      HISTORY OF PRESENT ILLNESS:  This a pleasant 71-year-old gentleman who is an established patient of Dr. Fletcher.  Patient was scheduled to see me for unclear reasons.  He was last seen by Dr. Fletcher in 2021.  Patient has a history of chronic atrial fibrillation for which he has been on anticoagulation.  He is on rate control strategy with metoprolol succinate.  He also takes digoxin.  Patient has a history of coronary artery disease with LAD stenting a few years ago without residual angina.  In the past he had mixed ischemic and nonischemic cardiomyopathy with a EF down to 30% which has recovered more recently per last echocardiogram up to 45% November 2020.  In addition to that patient has a history of chronic left bundle branch block.    Patient was recently seen by PCP.  Cardiology referral was placed and as such patient is seeing me.  Patient denies any new cardiovascular symptoms.  Stable NYHA class II heart failure symptoms.  No angina syncope presyncope.  No palpitations.  Denies bleeding problems.    PAST MEDICAL HISTORY:  Past Medical History:   Diagnosis Date     Atrial fibrillation (H)      Cardiomyopathy in other diseases classified elsewhere      Chest pain      HLD (hyperlipidemia)      HTN (hypertension)       Ischemia      Morbid obesity (H)        MEDICATIONS:  Current Outpatient Medications   Medication     aspirin 81 MG EC tablet     atorvastatin (LIPITOR) 40 MG tablet     digoxin (LANOXIN) 250 MCG tablet     lisinopril (ZESTRIL) 40 MG tablet     metoprolol succinate ER (TOPROL-XL) 200 MG 24 hr tablet     omeprazole (PRILOSEC OTC) 20 MG tablet     warfarin ANTICOAGULANT (COUMADIN) 7.5 MG tablet     order for DME     No current facility-administered medications for this visit.       ALLERGIES:  Allergies   Allergen Reactions     Latex      Adhesive Tape Rash     Reacted to the EKG stickers       SOCIAL HISTORY:  I have reviewed this patient's social history and updated it with pertinent information if needed. Benjamín Hyman  reports that he quit smoking about 26 years ago. His smoking use included cigarettes. He has never used smokeless tobacco. He reports previous alcohol use. He reports that he does not use drugs.    FAMILY HISTORY:  I have reviewed this patient's family history and updated it with pertinent information if needed.   Family History   Problem Relation Age of Onset     Cancer - colorectal Mother      C.A.D. Father      Heart Disease Father      Unknown/Adopted Maternal Grandmother      Unknown/Adopted Paternal Grandmother      Cerebrovascular Disease Paternal Grandfather      Unknown/Adopted Brother      Unknown/Adopted Sister      Depression Daughter      Unknown/Adopted Sister        REVIEW OF SYSTEMS:  Skin:        Eyes:       ENT:       Respiratory:  Positive for dyspnea on exertion  Cardiovascular:  Negative;palpitations;chest pain;edema;fatigue;lightheadedness;dizziness;syncope or near-syncope    Gastroenterology:      Genitourinary:       Musculoskeletal:       Neurologic:       Psychiatric:       Heme/Lymph/Imm:       Endocrine:           PHYSICAL EXAM:  Temp: 98.7  F (37.1  C) Temp src: Tympanic BP: 136/83 Pulse: 75     SpO2: 99 %      Vital Signs with Ranges  Temp:  [98.7  F (37.1  C)]  98.7  F (37.1  C)  Pulse:  [75] 75  BP: (136)/(83) 136/83  SpO2:  [99 %] 99 %  262 lbs 3.68 oz    Constitutional: awake, alert, no distress  Respiratory: Clear to auscultation  Cardiovascular: Irregular heart sounds no murmur rubs or gallops extremities warm trace edema JVP around 8 cm  GI: nondistended, obese   neuropsychiatric: appropriate affact    DATA:  Labs: Pertinent cardiac labs reviewed    Echocardiogram: Last echo 45-50 %    ASSESSMENT:  Permanent atrial fibrillation  Chronic anticoagulation for above  Mixed ischemic and nonischemic cardiomyopathy  Coronary artery disease with LAD stenting  Left bundle branch block  Obesity  Hypertension    RECOMMENDATIONS:  1. Patient denies any new cardiovascular symptoms at this point.  Hemodynamically appears stable.  Home blood pressures run in the 130 systolic range.  His last EF was 45 to 50%.  Clinically does not appear to be volume overloaded.  2. Recommend getting an echocardiogram.  3. Continue current medications including lisinopril metoprolol.   4. Continue statin for CAD.  5. Continue anticoagulation for atrial fibrillation.  Denies any bleeding problems.    Follow-up with primary cardiology team in 3 months with an echocardiogram.  See me as needed in the future.    JANETH Wilburn, [facility]  Cardiology - [facility]  June 14, 2022      Thank you for allowing me to participate in the care of your patient.      Sincerely,     Bennie Adrian MD     [facility]  cc:   Jem Ryder MD  [address] [place] [id number]

## 2022-06-14 NOTE — PROGRESS NOTES
CARDIOLOGY CLINIC CONSULTATION    REASON FOR CONSULT: Heart failure consultation    PRIMARY CARE PHYSICIAN:  Ruben Teran    Today's clinic visit entailed:  Review of the result(s) of each unique test - Echo ECG stress test Cath labs  40 minutes spent on the date of the encounter doing chart review, history and exam, documentation and further activities per the note  Provider  Link to [facility] Help Grid     The level of medical decision making during this visit was of high complexity.      HISTORY OF PRESENT ILLNESS:  This a pleasant 71-year-old gentleman who is an established patient of Dr. Fletcher.  Patient was scheduled to see me for unclear reasons.  He was last seen by Dr. Fletcher in 2021.  Patient has a history of chronic atrial fibrillation for which he has been on anticoagulation.  He is on rate control strategy with metoprolol succinate.  He also takes digoxin.  Patient has a history of coronary artery disease with LAD stenting a few years ago without residual angina.  In the past he had mixed ischemic and nonischemic cardiomyopathy with a EF down to 30% which has recovered more recently per last echocardiogram up to 45% November 2020.  In addition to that patient has a history of chronic left bundle branch block.    Patient was recently seen by PCP.  Cardiology referral was placed and as such patient is seeing me.  Patient denies any new cardiovascular symptoms.  Stable NYHA class II heart failure symptoms.  No angina syncope presyncope.  No palpitations.  Denies bleeding problems.    PAST MEDICAL HISTORY:  Past Medical History:   Diagnosis Date     Atrial fibrillation (H)      Cardiomyopathy in other diseases classified elsewhere      Chest pain      HLD (hyperlipidemia)      HTN (hypertension)      Ischemia      Morbid obesity (H)        MEDICATIONS:  Current Outpatient Medications   Medication     aspirin 81 MG EC tablet     atorvastatin (LIPITOR) 40 MG tablet     digoxin (LANOXIN) 250 MCG tablet      lisinopril (ZESTRIL) 40 MG tablet     metoprolol succinate ER (TOPROL-XL) 200 MG 24 hr tablet     omeprazole (PRILOSEC OTC) 20 MG tablet     warfarin ANTICOAGULANT (COUMADIN) 7.5 MG tablet     order for DME     No current facility-administered medications for this visit.       ALLERGIES:  Allergies   Allergen Reactions     Latex      Adhesive Tape Rash     Reacted to the EKG stickers       SOCIAL HISTORY:  I have reviewed this patient's social history and updated it with pertinent information if needed. Benjamín Hyman  reports that he quit smoking about 26 years ago. His smoking use included cigarettes. He has never used smokeless tobacco. He reports previous alcohol use. He reports that he does not use drugs.    FAMILY HISTORY:  I have reviewed this patient's family history and updated it with pertinent information if needed.   Family History   Problem Relation Age of Onset     Cancer - colorectal Mother      C.A.D. Father      Heart Disease Father      Unknown/Adopted Maternal Grandmother      Unknown/Adopted Paternal Grandmother      Cerebrovascular Disease Paternal Grandfather      Unknown/Adopted Brother      Unknown/Adopted Sister      Depression Daughter      Unknown/Adopted Sister        REVIEW OF SYSTEMS:  Skin:        Eyes:       ENT:       Respiratory:  Positive for dyspnea on exertion  Cardiovascular:  Negative;palpitations;chest pain;edema;fatigue;lightheadedness;dizziness;syncope or near-syncope    Gastroenterology:      Genitourinary:       Musculoskeletal:       Neurologic:       Psychiatric:       Heme/Lymph/Imm:       Endocrine:           PHYSICAL EXAM:  Temp: 98.7  F (37.1  C) Temp src: Tympanic BP: 136/83 Pulse: 75     SpO2: 99 %      Vital Signs with Ranges  Temp:  [98.7  F (37.1  C)] 98.7  F (37.1  C)  Pulse:  [75] 75  BP: (136)/(83) 136/83  SpO2:  [99 %] 99 %  262 lbs 3.68 oz    Constitutional: awake, alert, no distress  Respiratory: Clear to auscultation  Cardiovascular: Irregular heart  sounds no murmur rubs or gallops extremities warm trace edema JVP around 8 cm  GI: nondistended, obese   neuropsychiatric: appropriate affact    DATA:  Labs: Pertinent cardiac labs reviewed    Echocardiogram: Last echo 45-50 %    ASSESSMENT:  Permanent atrial fibrillation  Chronic anticoagulation for above  Mixed ischemic and nonischemic cardiomyopathy  Coronary artery disease with LAD stenting  Left bundle branch block  Obesity  Hypertension    RECOMMENDATIONS:  1. Patient denies any new cardiovascular symptoms at this point.  Hemodynamically appears stable.  Home blood pressures run in the 130 systolic range.  His last EF was 45 to 50%.  Clinically does not appear to be volume overloaded.  2. Recommend getting an echocardiogram.  3. Continue current medications including lisinopril metoprolol.   4. Continue statin for CAD.  5. Continue anticoagulation for atrial fibrillation.  Denies any bleeding problems.    Follow-up with primary cardiology team in 3 months with an echocardiogram.  See me as needed in the future.    JANETH Wilburn, [facility]  Cardiology - [facility]  June 14, 2022

## 2022-06-16 ENCOUNTER — LAB (OUTPATIENT)
Dept: LAB | Facility: CLINIC | Age: 72
End: 2022-06-16
Payer: COMMERCIAL

## 2022-06-16 DIAGNOSIS — I48.91 A-FIB (H): ICD-10-CM

## 2022-06-16 DIAGNOSIS — D47.2 MONOCLONAL GAMMOPATHY: ICD-10-CM

## 2022-06-16 DIAGNOSIS — Z11.59 NEED FOR HEPATITIS C SCREENING TEST: Primary | ICD-10-CM

## 2022-06-16 LAB — INR BLD: 2.3 (ref 0.9–1.1)

## 2022-06-16 PROCEDURE — 36416 COLLJ CAPILLARY BLOOD SPEC: CPT

## 2022-06-16 PROCEDURE — 85610 PROTHROMBIN TIME: CPT

## 2022-06-17 ENCOUNTER — DOCUMENTATION ONLY (OUTPATIENT)
Dept: ANTICOAGULATION | Facility: CLINIC | Age: 72
End: 2022-06-17
Payer: COMMERCIAL

## 2022-06-17 ENCOUNTER — ANTICOAGULATION THERAPY VISIT (OUTPATIENT)
Dept: ANTICOAGULATION | Facility: CLINIC | Age: 72
End: 2022-06-17
Payer: COMMERCIAL

## 2022-06-17 DIAGNOSIS — I48.91 ATRIAL FIBRILLATION (H): Primary | ICD-10-CM

## 2022-06-17 DIAGNOSIS — Z79.01 LONG TERM CURRENT USE OF ANTICOAGULANT THERAPY: ICD-10-CM

## 2022-06-17 DIAGNOSIS — I48.11 LONGSTANDING PERSISTENT ATRIAL FIBRILLATION (H): ICD-10-CM

## 2022-06-17 DIAGNOSIS — I48.11 LONGSTANDING PERSISTENT ATRIAL FIBRILLATION (H): Primary | ICD-10-CM

## 2022-06-17 DIAGNOSIS — I48.0 PAROXYSMAL ATRIAL FIBRILLATION (H): ICD-10-CM

## 2022-06-17 NOTE — PROGRESS NOTES
To my Team,  Benjamín needs an office visit with me for medicare annual wellness.  Sincerely,  Ruben Teran MD

## 2022-06-17 NOTE — PROGRESS NOTES
ANTICOAGULATION MANAGEMENT      Benjamín SADI Boogie due for annual renewal of referral to anticoagulation monitoring. Order pended for your review and signature.      ANTICOAGULATION SUMMARY      Warfarin indication(s)     Atrial fibrillation    Heart valve present?  NO       Current goal range   INR: 2.0-3.0     Goal appropriate for indication? Yes, INR 2-3 appropriate for hx of DVT, PE, hypercoagulable state, Afib, LVAD, or bileaflet AVR without risk factors     Current duration of therapy Indefinite/long term therapy   Time in Therapeutic Range (TTR)  (Goal > 60%) 100.0%       Office visit with referring provider's group within last year No - last visit with PCP on 4/24/20. Patient needs current appointment with provider.        Lavelle Turner RN

## 2022-06-17 NOTE — PROGRESS NOTES
ANTICOAGULATION MANAGEMENT     Benjamín Hyman 71 year old male is on warfarin with therapeutic INR result. (Goal INR 2.0-3.0)    Recent labs: (last 7 days)     06/16/22  0841   INR 2.3*       ASSESSMENT       Source(s): Chart Review and Patient/Caregiver Call       Warfarin doses taken: Warfarin taken as instructed    Diet: No new diet changes identified    New illness, injury, or hospitalization: No    Medication/supplement changes: None noted    Signs or symptoms of bleeding or clotting: No    Previous INR: Therapeutic last 2(+) visits    Additional findings: Reminded patient that he needs to schedule appt with PCP as he has not seen Dr. Teran in over a year as this is a requirement to have warfarin managed by [facility].     PLAN     Recommended plan for no diet, medication or health factor changes affecting INR     Dosing Instructions: continue your current warfarin dose with next INR in 10 weeks       Summary  As of 6/17/2022    Full warfarin instructions:  3.75 mg every Fri; 7.5 mg all other days   Next INR check:  8/25/2022             Telephone call with Benjamín who verbalizes understanding and agrees to plan    Lab visit scheduled    Education provided: Goal range and significance of current result and Importance of therapeutic range    Plan made per [facility] anticoagulation protocol    Lavelle Turner RN  Anticoagulation Clinic  6/17/2022    _______________________________________________________________________     Anticoagulation Episode Summary     Current INR goal:  2.0-3.0   TTR:  100.0 % (1 y)   Target end date:  Indefinite   Send INR reminders to:  MARY OSORIO    Indications    Atrial fibrillation (H) [I48.91]  Long term current use of anticoagulant therapy [Z79.01]  Paroxysmal atrial fibrillation (H) [I48.0]  Longstanding persistent atrial fibrillation (H) [I48.11]           Comments:  Dr. Teran Ok with 12 week rechecks. 81 mg asprin daily         Anticoagulation Care Providers     Provider Role  Specialty Phone number    Ruben Teran MD Referring Family Medicine [phone]

## 2022-06-22 ENCOUNTER — LAB (OUTPATIENT)
Dept: LAB | Facility: CLINIC | Age: 72
End: 2022-06-22
Payer: MEDICARE

## 2022-06-22 DIAGNOSIS — D47.2 MONOCLONAL GAMMOPATHY: ICD-10-CM

## 2022-06-22 LAB
ALBUMIN SERPL-MCNC: 3.5 G/DL (ref 3.4–5)
ALP SERPL-CCNC: 143 U/L (ref 40–150)
ALT SERPL W P-5'-P-CCNC: 21 U/L (ref 0–70)
ANION GAP SERPL CALCULATED.3IONS-SCNC: 7 MMOL/L (ref 3–14)
AST SERPL W P-5'-P-CCNC: 12 U/L (ref 0–45)
BASOPHILS # BLD AUTO: 0.1 10E3/UL (ref 0–0.2)
BASOPHILS NFR BLD AUTO: 1 %
BILIRUB SERPL-MCNC: 0.9 MG/DL (ref 0.2–1.3)
BUN SERPL-MCNC: 20 MG/DL (ref 7–30)
CALCIUM SERPL-MCNC: 8.7 MG/DL (ref 8.5–10.1)
CHLORIDE BLD-SCNC: 107 MMOL/L (ref 94–109)
CO2 SERPL-SCNC: 24 MMOL/L (ref 20–32)
CREAT SERPL-MCNC: 0.96 MG/DL (ref 0.66–1.25)
EOSINOPHIL # BLD AUTO: 0 10E3/UL (ref 0–0.7)
EOSINOPHIL NFR BLD AUTO: 0 %
ERYTHROCYTE [DISTWIDTH] IN BLOOD BY AUTOMATED COUNT: 12.7 % (ref 10–15)
GFR SERPL CREATININE-BSD FRML MDRD: 85 ML/MIN/1.73M2
GLUCOSE BLD-MCNC: 108 MG/DL (ref 70–99)
HCT VFR BLD AUTO: 49.8 % (ref 40–53)
HGB BLD-MCNC: 16 G/DL (ref 13.3–17.7)
IMM GRANULOCYTES # BLD: 0.1 10E3/UL
IMM GRANULOCYTES NFR BLD: 0 %
LYMPHOCYTES # BLD AUTO: 3.2 10E3/UL (ref 0.8–5.3)
LYMPHOCYTES NFR BLD AUTO: 22 %
MCH RBC QN AUTO: 29.7 PG (ref 26.5–33)
MCHC RBC AUTO-ENTMCNC: 32.1 G/DL (ref 31.5–36.5)
MCV RBC AUTO: 93 FL (ref 78–100)
MONOCYTES # BLD AUTO: 1 10E3/UL (ref 0–1.3)
MONOCYTES NFR BLD AUTO: 7 %
NEUTROPHILS # BLD AUTO: 10.2 10E3/UL (ref 1.6–8.3)
NEUTROPHILS NFR BLD AUTO: 70 %
NRBC # BLD AUTO: 0 10E3/UL
NRBC BLD AUTO-RTO: 0 /100
PLATELET # BLD AUTO: 321 10E3/UL (ref 150–450)
POTASSIUM BLD-SCNC: 4.7 MMOL/L (ref 3.4–5.3)
PROT SERPL-MCNC: 8.1 G/DL (ref 6.8–8.8)
RBC # BLD AUTO: 5.38 10E6/UL (ref 4.4–5.9)
SODIUM SERPL-SCNC: 138 MMOL/L (ref 133–144)
TOTAL PROTEIN SERUM FOR ELP: 7.2 G/DL (ref 6.4–8.3)
WBC # BLD AUTO: 14.7 10E3/UL (ref 4–11)

## 2022-06-22 PROCEDURE — 84165 PROTEIN E-PHORESIS SERUM: CPT | Mod: TC | Performed by: PATHOLOGY

## 2022-06-22 PROCEDURE — 82784 ASSAY IGA/IGD/IGG/IGM EACH: CPT

## 2022-06-22 PROCEDURE — 36415 COLL VENOUS BLD VENIPUNCTURE: CPT

## 2022-06-22 PROCEDURE — 85025 COMPLETE CBC W/AUTO DIFF WBC: CPT

## 2022-06-22 PROCEDURE — 84155 ASSAY OF PROTEIN SERUM: CPT | Mod: 91

## 2022-06-22 PROCEDURE — 80053 COMPREHEN METABOLIC PANEL: CPT

## 2022-06-22 PROCEDURE — 86334 IMMUNOFIX E-PHORESIS SERUM: CPT | Performed by: PATHOLOGY

## 2022-06-22 PROCEDURE — 83521 IG LIGHT CHAINS FREE EACH: CPT | Mod: 59

## 2022-06-23 LAB
ALBUMIN SERPL ELPH-MCNC: 3.8 G/DL (ref 3.7–5.1)
ALPHA1 GLOB SERPL ELPH-MCNC: 0.4 G/DL (ref 0.2–0.4)
ALPHA2 GLOB SERPL ELPH-MCNC: 0.9 G/DL (ref 0.5–0.9)
B-GLOBULIN SERPL ELPH-MCNC: 0.8 G/DL (ref 0.6–1)
GAMMA GLOB SERPL ELPH-MCNC: 1.4 G/DL (ref 0.7–1.6)
IGA SERPL-MCNC: 197 MG/DL (ref 84–499)
IGG SERPL-MCNC: 1501 MG/DL (ref 610–1616)
IGM SERPL-MCNC: 28 MG/DL (ref 35–242)
KAPPA LC FREE SER-MCNC: 3.32 MG/DL (ref 0.33–1.94)
KAPPA LC FREE/LAMBDA FREE SER NEPH: 1.17 {RATIO} (ref 0.26–1.65)
LAMBDA LC FREE SERPL-MCNC: 2.84 MG/DL (ref 0.57–2.63)
M PROTEIN SERPL ELPH-MCNC: 0.5 G/DL
PROT PATTERN SERPL ELPH-IMP: ABNORMAL
PROT PATTERN SERPL IFE-IMP: NORMAL

## 2022-06-23 PROCEDURE — 84165 PROTEIN E-PHORESIS SERUM: CPT | Mod: 26 | Performed by: PATHOLOGY

## 2022-06-23 PROCEDURE — 86334 IMMUNOFIX E-PHORESIS SERUM: CPT | Mod: 26 | Performed by: PATHOLOGY

## 2022-06-29 DIAGNOSIS — I48.19 PERSISTENT ATRIAL FIBRILLATION (H): ICD-10-CM

## 2022-06-29 RX ORDER — WARFARIN SODIUM 7.5 MG/1
TABLET ORAL
Qty: 100 TABLET | Refills: 1 | Status: SHIPPED | OUTPATIENT
Start: 2022-06-29 | End: 2022-12-09

## 2022-06-29 NOTE — TELEPHONE ENCOUNTER
ANTICOAGULATION MANAGEMENT:  Medication Refill    Anticoagulation Summary  As of 6/17/2022    Warfarin maintenance plan:  3.75 mg (7.5 mg x 0.5) every Fri; 7.5 mg (7.5 mg x 1) all other days   Next INR check:  8/25/2022   Target end date:  Indefinite    Indications    Atrial fibrillation (H) [I48.91]  Long term current use of anticoagulant therapy [Z79.01]  Paroxysmal atrial fibrillation (H) [I48.0]  Longstanding persistent atrial fibrillation (H) [I48.11]             Anticoagulation Care Providers     Provider Role Specialty Phone number    Ruben Teran MD Referring Family Medicine [phone]          Visit with referring provider/group within last year: Yes    [facility] referral signed within last year: Yes    Benjamín meets all criteria for refill (current [facility] referral, office visit with referring provider/group in last year, lab monitoring up to date or not exceeding 2 weeks overdue). Rx instructions and quantity match patient's current dosing plan. Warfarin 90 day supply with 1 refill granted per [facility] protocol     Patricia Cuba RN  Anticoagulation Clinic

## 2022-06-29 NOTE — TELEPHONE ENCOUNTER
Pending Prescriptions:                       Disp   Refills    warfarin ANTICOAGULANT (COUMADIN) 7.5 MG t*100 ta*0        Sig: TAKE ONE-HALF TABLET (3.75MG) BY MOUTH ON FRIDAY.           TAKE 1 TABLET (7.5MG) ALL OTHER DAYS OR AS           DIRECTED BY INR CLINIC.    Robina Koroma RN on 6/29/2022 at 12:05 PM

## 2022-07-13 ENCOUNTER — ONCOLOGY VISIT (OUTPATIENT)
Dept: ONCOLOGY | Facility: CLINIC | Age: 72
End: 2022-07-13
Attending: INTERNAL MEDICINE
Payer: COMMERCIAL

## 2022-07-13 VITALS
BODY MASS INDEX: 44.9 KG/M2 | DIASTOLIC BLOOD PRESSURE: 68 MMHG | RESPIRATION RATE: 12 BRPM | WEIGHT: 263 LBS | SYSTOLIC BLOOD PRESSURE: 134 MMHG | HEART RATE: 53 BPM | TEMPERATURE: 97.7 F | OXYGEN SATURATION: 100 % | HEIGHT: 64 IN

## 2022-07-13 DIAGNOSIS — D47.2 MONOCLONAL GAMMOPATHY: ICD-10-CM

## 2022-07-13 DIAGNOSIS — D72.828 NEUTROPHILIA: Primary | ICD-10-CM

## 2022-07-13 PROCEDURE — 99214 OFFICE O/P EST MOD 30 MIN: CPT | Performed by: INTERNAL MEDICINE

## 2022-07-13 PROCEDURE — G0463 HOSPITAL OUTPT CLINIC VISIT: HCPCS

## 2022-07-13 ASSESSMENT — PAIN SCALES - GENERAL: PAINLEVEL: NO PAIN (0)

## 2022-07-13 NOTE — LETTER
"    7/13/2022         RE: Benjamín Hyman  [address]  [place] [address]        Dear Colleague,    Thank you for referring your patient, Benjamín Hyman, to the [facility]. Please see a copy of my visit note below.    Oncology Rooming Note    July 13, 2022 9:59 AM   Benjamín Hyman is a 71 year old male who presents for:    Chief Complaint   Patient presents with     Oncology Clinic Visit     Hematology     Monoclonal gammopathy - Provider visit only     Initial Vitals: /68 (BP Location: Right arm, Patient Position: Sitting, Cuff Size: Adult Large)   Pulse 53   Temp 97.7  F (36.5  C) (Tympanic)   Resp 12   Ht 1.626 m (5' 4\")   Wt 119.3 kg (263 lb)   SpO2 100%   BMI 45.14 kg/m   Estimated body mass index is 45.14 kg/m  as calculated from the following:    Height as of this encounter: 1.626 m (5' 4\").    Weight as of this encounter: 119.3 kg (263 lb). Body surface area is 2.32 meters squared.  No Pain (0) Comment: Data Unavailable   No LMP for male patient.  Allergies reviewed: Yes  Medications reviewed: Yes    Medications: Medication refills not needed today.  Pharmacy name entered into [employer]: [id number] - [place], [place] - [address] [facility]    Clinical concerns:  None      Marisa Rivera CMA              The patient is being seen for the following issue/s:  1. Monoclonal gammopathy      The patient is a pleasant non-smoker who was previously followed by Dr. Pozo for monoclonal gammopathy of undetermined significance.    I have reviewed previous oncology documentation and it appears that he has been followed for MGUS without active intervention or treatment.      His M protein has remained stable.  However, he is also had stable mild neutrophilia since March 2021.  He denies any recurrent infections, fevers, chills, night sweats, unintentional weight loss, abdominal pain/bloating, self palpated lumps/bumps, or unusual bone pains.    He has " to sit to urinate at night and is not able to urinate standing anymore which she thinks might be due to his overweight condition.    PHYSICAL EXAMINATION:    General: Todays' vital signs reviewed in the EMR.    ECOG PS is 1  HEENT: No scleral icterus  Cardiovascular: Cor RRR  Respiratory: Lungs clear to auscultation bilaterally  Gastrointestinal: Obese, no abdominal tenderness.  Skin: No skin rashes    ASSESSMENT/PLAN:  1. Monoclonal gammopathy      Recent labs were reviewed by me today.  These were drawn on June 22, 2022:    CBC was normal except for mildly elevated white blood cell count of 14,700.  His neutrophils were elevated at 10,200.  This has been ongoing for at least 1 year.    M protein was 0.5 g/DL and unchanged from 7 months ago.  Immunofixation is positive for monoclonal IgG lambda.    Quantitative immunoglobulins were normal except for mildly reduced IgM level of 28.    Kappa and lambda free light chains were both slightly elevated at 3.32 and 2.84 respectively with a normal kappa/lambda ratio.    CMP was normal except for mildly elevated serum glucose of 108.     I discussed your elevated white blood cell count with you today.  I recommended additional testing today but you declined as you were not interested in having your blood drawn and are feeling well.    We made the mutual decision that as long as you continue to feel well we will have you just stop by in 6 months to repeat MGUS labs and also obtain further testing regarding persistent leukocytosis/neutrophilia and see you in the clinic at that time.    I spent a total of 30 minutes on the care of this patient on the day of service including face to face time and the remainder in chart review, care coordination, and documentation on the day of service.        Again, thank you for allowing me to participate in the care of your patient.        Sincerely,        Eric Reece MD

## 2022-07-13 NOTE — PROGRESS NOTES
"Oncology Rooming Note    July 13, 2022 9:59 AM   Benjamín Hyman is a 71 year old male who presents for:    Chief Complaint   Patient presents with     Oncology Clinic Visit     Hematology     Monoclonal gammopathy - Provider visit only     Initial Vitals: /68 (BP Location: Right arm, Patient Position: Sitting, Cuff Size: Adult Large)   Pulse 53   Temp 97.7  F (36.5  C) (Tympanic)   Resp 12   Ht 1.626 m (5' 4\")   Wt 119.3 kg (263 lb)   SpO2 100%   BMI 45.14 kg/m   Estimated body mass index is 45.14 kg/m  as calculated from the following:    Height as of this encounter: 1.626 m (5' 4\").    Weight as of this encounter: 119.3 kg (263 lb). Body surface area is 2.32 meters squared.  No Pain (0) Comment: Data Unavailable   No LMP for male patient.  Allergies reviewed: Yes  Medications reviewed: Yes    Medications: Medication refills not needed today.  Pharmacy name entered into [employer]: [facility] - [place], [place] - [facility] [address] [facility]    Clinical concerns:  None      Marisa Rivera CMA            "

## 2022-07-13 NOTE — PROGRESS NOTES
The patient is being seen for the following issue/s:  1. Monoclonal gammopathy      The patient is a pleasant non-smoker who was previously followed by Dr. Pozo for monoclonal gammopathy of undetermined significance.    I have reviewed previous oncology documentation and it appears that he has been followed for MGUS without active intervention or treatment.      His M protein has remained stable.  However, he is also had stable mild neutrophilia since March 2021.  He denies any recurrent infections, fevers, chills, night sweats, unintentional weight loss, abdominal pain/bloating, self palpated lumps/bumps, or unusual bone pains.    He has to sit to urinate at night and is not able to urinate standing anymore which she thinks might be due to his overweight condition.    PHYSICAL EXAMINATION:    General: Todays' vital signs reviewed in the EMR.    ECOG PS is 1  HEENT: No scleral icterus  Cardiovascular: Cor RRR  Respiratory: Lungs clear to auscultation bilaterally  Gastrointestinal: Obese, no abdominal tenderness.  Skin: No skin rashes    ASSESSMENT/PLAN:  1. Monoclonal gammopathy      Recent labs were reviewed by me today.  These were drawn on June 22, 2022:    CBC was normal except for mildly elevated white blood cell count of 14,700.  His neutrophils were elevated at 10,200.  This has been ongoing for at least 1 year.    M protein was 0.5 g/DL and unchanged from 7 months ago.  Immunofixation is positive for monoclonal IgG lambda.    Quantitative immunoglobulins were normal except for mildly reduced IgM level of 28.    Kappa and lambda free light chains were both slightly elevated at 3.32 and 2.84 respectively with a normal kappa/lambda ratio.    CMP was normal except for mildly elevated serum glucose of 108.     I discussed your elevated white blood cell count with you today.  I recommended additional testing today but you declined as you were not interested in having your blood drawn and are feeling well.    We  made the mutual decision that as long as you continue to feel well we will have you just stop by in 6 months to repeat MGUS labs and also obtain further testing regarding persistent leukocytosis/neutrophilia and see you in the clinic at that time.    I spent a total of 30 minutes on the care of this patient on the day of service including face to face time and the remainder in chart review, care coordination, and documentation on the day of service.

## 2022-07-29 ENCOUNTER — OFFICE VISIT (OUTPATIENT)
Dept: FAMILY MEDICINE | Facility: CLINIC | Age: 72
End: 2022-07-29
Payer: COMMERCIAL

## 2022-07-29 VITALS
TEMPERATURE: 97.8 F | WEIGHT: 260.4 LBS | DIASTOLIC BLOOD PRESSURE: 78 MMHG | BODY MASS INDEX: 43.39 KG/M2 | RESPIRATION RATE: 20 BRPM | HEART RATE: 58 BPM | OXYGEN SATURATION: 100 % | HEIGHT: 65 IN | SYSTOLIC BLOOD PRESSURE: 124 MMHG

## 2022-07-29 DIAGNOSIS — H61.23 BILATERAL IMPACTED CERUMEN: Primary | ICD-10-CM

## 2022-07-29 PROCEDURE — 99213 OFFICE O/P EST LOW 20 MIN: CPT | Performed by: FAMILY MEDICINE

## 2022-07-29 ASSESSMENT — PAIN SCALES - GENERAL: PAINLEVEL: NO PAIN (0)

## 2022-07-29 NOTE — PROGRESS NOTES
"ASSESSMENT:   (H61.23) Bilateral impacted cerumen  (primary encounter diagnosis)  Comment: both ears   Plan: CMA will irrigate ears today.    Ear wax is normal in the ear and typically comes out on its own.  If wax is causing problems with decreased hearing or discomfort, OTC peroxide type drops could be tried or irrigation with lukewarm water and infant bulb syringe. If unable to relieve symptoms, return for evaluation and possible irrigation.          Romy Butts is a 71 year old, presenting for the following health issues:  Ear Problem (Had a hearing test and was referred to have ears cleaned)  He has had decreased hearing acuity for the past year.  More in right ear.      Seen at [facility]".    Was recommended having cerumen removed.   No ear pain.     No other health concerns today.     Got sick with COVID-19 virus booster.  Has been putting off doing booster.     Patient Active Problem List   Diagnosis     Atrial fibrillation (H)     Long term current use of anticoagulant therapy     FAMILY HISTORY OF GI NEOPLASM     Immunization (Tdap) not carried out because of patient refusal     Umbilical hernia     Health Care Home     Advanced directives, counseling/discussion     Hyperlipidemia LDL goal <70     Stomach ulcer     Morbid obesity due to excess calories (H)     Coronary artery disease involving native coronary artery of native heart without angina pectoris     Chronic systolic heart failure (H)     Chronic atrial fibrillation (H)     Status post coronary angiogram     NICM (nonischemic cardiomyopathy) (H)     Benign essential hypertension     Paroxysmal atrial fibrillation (H)     Generalized weakness     Pneumonia of both lungs due to infectious organism, unspecified part of lung     Longstanding persistent atrial fibrillation (H)     Monoclonal gammopathy       OBJECTIVE:Blood pressure 124/78, pulse 58, temperature 97.8  F (36.6  C), temperature source Tympanic, resp. rate 20, height 1.638 m " "(5' 4.5\"), weight 118.1 kg (260 lb 6.4 oz), SpO2 100 %. BMI=Body mass index is 44.01 kg/m .  GENERAL APPEARANCE ADULT: Alert, no acute distress, morbidly obese  HENT: Both TMs are obscured by cerumen.  Ear canals otherwise normal.    Checked both ears after irrigation there was a little bit of cerumen remaining but both TMs look normal.  He reports his hearing was better after the wax removal.  "

## 2022-07-29 NOTE — PATIENT INSTRUCTIONS
ASSESSMENT:   (H61.23) Bilateral impacted cerumen  (primary encounter diagnosis)  Comment: both ears   Plan: CMA will irrigate ears today.    Ear wax is normal in the ear and typically comes out on its own.  If wax is causing problems with decreased hearing or discomfort, OTC peroxide type drops could be tried or irrigation with lukewarm water and infant bulb syringe. If unable to relieve symptoms, return for evaluation and possible irrigation.

## 2022-08-01 ENCOUNTER — TELEPHONE (OUTPATIENT)
Dept: FAMILY MEDICINE | Facility: CLINIC | Age: 72
End: 2022-08-01

## 2022-08-01 NOTE — TELEPHONE ENCOUNTER
Pt states he was given a different brand of warfarin and has been noticing some heartburn after he takes it. He is also concerned it is actually warfarin and if it is doing it's job since he has a tender spot on his forearm that gets raised if he makes a fist. He denies any warmth , redness or any bruising. I have set him up for an INR tomorrow to check to make sure he is in range but in the mean time, I have asked him to call [facility] and discuss this and go over with the pharmacy that he does in fact have the right medication. He was at the [facility] so didn't have his pills with him. Patient verbalized understanding and agrees with plan of care. Pt had no further questions or concerns at this time.      Gerhard Mcqueen RN, BSN  [facility] Anticoagulation Team

## 2022-08-01 NOTE — TELEPHONE ENCOUNTER
Reason for Call:  Other anti-coag    Detailed comments: Patient has been having severe indigestion about a half hour after taking it. Patient stated the medication is a different color & shape since his last refill & he has been having indigestion since then(x1 wk). Please advise.    Phone Number Patient can be reached at: Home number on file [phone] (home)    Best Time: any    Can we leave a detailed message on this number? YES    Call taken on 8/1/2022 at 4:19 PM by Juliet Weaver

## 2022-08-02 ENCOUNTER — LAB (OUTPATIENT)
Dept: LAB | Facility: CLINIC | Age: 72
End: 2022-08-02
Payer: COMMERCIAL

## 2022-08-02 ENCOUNTER — ANTICOAGULATION THERAPY VISIT (OUTPATIENT)
Dept: ANTICOAGULATION | Facility: CLINIC | Age: 72
End: 2022-08-02

## 2022-08-02 DIAGNOSIS — I48.11 LONGSTANDING PERSISTENT ATRIAL FIBRILLATION (H): ICD-10-CM

## 2022-08-02 DIAGNOSIS — Z79.01 LONG TERM CURRENT USE OF ANTICOAGULANT THERAPY: ICD-10-CM

## 2022-08-02 DIAGNOSIS — Z79.01 LONG TERM CURRENT USE OF ANTICOAGULANT THERAPY: Primary | ICD-10-CM

## 2022-08-02 LAB — INR BLD: 2.6 (ref 0.9–1.1)

## 2022-08-02 PROCEDURE — 85610 PROTHROMBIN TIME: CPT

## 2022-08-02 PROCEDURE — 36416 COLLJ CAPILLARY BLOOD SPEC: CPT

## 2022-08-02 NOTE — PROGRESS NOTES
ANTICOAGULATION MANAGEMENT     Benjamín Hyman 71 year old male is on warfarin with therapeutic INR result. (Goal INR 2.0-3.0)    Recent labs: (last 7 days)     08/02/22  1050   INR 2.6*       ASSESSMENT       Source(s): Chart Review and Patient/Caregiver Call       Warfarin doses taken: Warfarin taken as instructed    Diet: No new diet changes identified    New illness, injury, or hospitalization: Yes: some heart burn. Has annual visit with MD on 8/8.    Medication/supplement changes: None noted    Signs or symptoms of bleeding or clotting: No    Previous INR: Therapeutic last 2(+) visits    Additional findings: None       PLAN     Recommended plan for no diet, medication or health factor changes affecting INR     Dosing Instructions: Continue your current warfarin dose with next INR in 10 weeks       Summary  As of 8/2/2022    Full warfarin instructions:  3.75 mg every Fri; 7.5 mg all other days   Next INR check:  10/11/2022             Telephone call with Benjamín who verbalizes understanding and agrees to plan    Lab visit scheduled    Education provided: Please call back if any changes to your diet, medications or how you've been taking warfarin and Importance of therapeutic range    Plan made per [facility] anticoagulation protocol    Ibeth Sotomayor RN  Anticoagulation Clinic  8/2/2022    _______________________________________________________________________     Anticoagulation Episode Summary     Current INR goal:  2.0-3.0   TTR:  100.0 % (1 y)   Target end date:  Indefinite   Send INR reminders to:  MARY OSORIO    Indications    Long term current use of anticoagulant therapy [Z79.01]           Comments:  Dr. Teran Ok with 12 week rechecks. 81 mg asprin daily         Anticoagulation Care Providers     Provider Role Specialty Phone number    Ruben Teran MD Referring Family Medicine [phone]

## 2022-08-08 ENCOUNTER — OFFICE VISIT (OUTPATIENT)
Dept: FAMILY MEDICINE | Facility: CLINIC | Age: 72
End: 2022-08-08
Payer: COMMERCIAL

## 2022-08-08 ENCOUNTER — TELEPHONE (OUTPATIENT)
Dept: FAMILY MEDICINE | Facility: CLINIC | Age: 72
End: 2022-08-08

## 2022-08-08 VITALS
WEIGHT: 260.6 LBS | TEMPERATURE: 98.2 F | DIASTOLIC BLOOD PRESSURE: 74 MMHG | OXYGEN SATURATION: 98 % | RESPIRATION RATE: 20 BRPM | SYSTOLIC BLOOD PRESSURE: 120 MMHG | HEART RATE: 93 BPM | BODY MASS INDEX: 43.42 KG/M2 | HEIGHT: 65 IN

## 2022-08-08 DIAGNOSIS — I25.10 CORONARY ARTERY DISEASE INVOLVING NATIVE CORONARY ARTERY OF NATIVE HEART WITHOUT ANGINA PECTORIS: Primary | ICD-10-CM

## 2022-08-08 DIAGNOSIS — Z12.5 SCREENING FOR PROSTATE CANCER: ICD-10-CM

## 2022-08-08 DIAGNOSIS — I48.11 LONGSTANDING PERSISTENT ATRIAL FIBRILLATION (H): ICD-10-CM

## 2022-08-08 DIAGNOSIS — I10 BENIGN ESSENTIAL HYPERTENSION: ICD-10-CM

## 2022-08-08 DIAGNOSIS — E78.5 HYPERLIPIDEMIA LDL GOAL <70: ICD-10-CM

## 2022-08-08 DIAGNOSIS — E66.01 MORBID OBESITY DUE TO EXCESS CALORIES (H): ICD-10-CM

## 2022-08-08 DIAGNOSIS — M77.01 MEDIAL EPICONDYLITIS OF ELBOW, RIGHT: ICD-10-CM

## 2022-08-08 DIAGNOSIS — Z00.00 ENCOUNTER FOR MEDICARE ANNUAL WELLNESS EXAM: Primary | ICD-10-CM

## 2022-08-08 DIAGNOSIS — I48.20 CHRONIC ATRIAL FIBRILLATION (H): ICD-10-CM

## 2022-08-08 DIAGNOSIS — S56.911A MUSCLE STRAIN OF RIGHT FOREARM, INITIAL ENCOUNTER: ICD-10-CM

## 2022-08-08 DIAGNOSIS — I25.10 CORONARY ARTERY DISEASE INVOLVING NATIVE CORONARY ARTERY OF NATIVE HEART WITHOUT ANGINA PECTORIS: ICD-10-CM

## 2022-08-08 PROCEDURE — 99214 OFFICE O/P EST MOD 30 MIN: CPT | Mod: 25 | Performed by: FAMILY MEDICINE

## 2022-08-08 PROCEDURE — G0438 PPPS, INITIAL VISIT: HCPCS | Performed by: FAMILY MEDICINE

## 2022-08-08 RX ORDER — DIGOXIN 250 MCG
TABLET ORAL
Qty: 70 TABLET | Refills: 3 | Status: SHIPPED | OUTPATIENT
Start: 2022-08-08 | End: 2023-09-18

## 2022-08-08 RX ORDER — ATORVASTATIN CALCIUM 40 MG/1
40 TABLET, FILM COATED ORAL DAILY
Qty: 90 TABLET | Refills: 3 | Status: SHIPPED | OUTPATIENT
Start: 2022-08-08 | End: 2023-10-11

## 2022-08-08 RX ORDER — METOPROLOL SUCCINATE 200 MG/1
200 TABLET, EXTENDED RELEASE ORAL DAILY
Qty: 90 TABLET | Refills: 3 | Status: SHIPPED | OUTPATIENT
Start: 2022-08-08 | End: 2023-10-16

## 2022-08-08 RX ORDER — LISINOPRIL 40 MG/1
40 TABLET ORAL DAILY
Qty: 90 TABLET | Refills: 3 | Status: SHIPPED | OUTPATIENT
Start: 2022-08-08 | End: 2023-10-16

## 2022-08-08 ASSESSMENT — PAIN SCALES - GENERAL: PAINLEVEL: NO PAIN (0)

## 2022-08-08 ASSESSMENT — ACTIVITIES OF DAILY LIVING (ADL): CURRENT_FUNCTION: NO ASSISTANCE NEEDED

## 2022-08-08 NOTE — PATIENT INSTRUCTIONS
Please go to lab    I refilled your medications.    I completed the form for your handicapped sticker. Please take this to the license bureau.    If your right forearm is bothering you in 6 weeks please let me know and I will do a referral to occupational therapy.    Please be aware that there will be an additional charge during your preventative visit due to either a new diagnosis and/or chronic disease management.    Preventative visits screen for diseases prior to they occur.  They do not cover for any new diagnosis or chronic disease management which would include medication refills, labs etc.    If you have questions regarding your coverage please check with your insurance provider.  At [facility] we need to code correctly to be in compliance with all insurance companies.        Thank you for choosing [facility].  You may be receiving an email and/or telephone survey request from [facility] Customer Experience regarding your visit today.  Please take a few minutes to respond to the survey to let us know how we are doing.      If you have questions or concerns, please contact us via [employer] or you can contact your care team at [phone] option [phone].    Our Clinic hours are:  Monday - Thursday 7am-6pm  Friday 7am-5pm    The [place] outpatient lab hours are:  Monday - Friday 7am-4:30pm    Appointments are required, call [phone]    If you have clinical questions after hours or would like to schedule an appointment,  call the clinic at [phone].    Patient Education   Personalized Prevention Plan  You are due for the preventive services outlined below.  Your care team is available to assist you in scheduling these services.  If you have already completed any of these items, please share that information with your care team to update in your medical record.  Health Maintenance Due   Topic Date Due    Pneumococcal Vaccine (1 - PCV) Never done    Hepatitis C Screening  Never done    Zoster (Shingles)  Vaccine (1 of 2) Never done    Diptheria Tetanus Pertussis (DTAP/TDAP/TD) Vaccine (1 - Tdap) Never done    Discuss Advance Care Planning  03/03/2019    COVID-19 Vaccine (3 - Moderna risk series) 04/21/2021

## 2022-08-08 NOTE — TELEPHONE ENCOUNTER
Reason for Call: Request for an order or referral:    Order or referral being requested: Received a call from lab stating that they need to re-draw all of the patients labs today.   I called the patient and he said it wont happen today or in the next week he said that he is about out of gas money. He said he would call back to schedule.  Can new orders be placed for when the patient calls to schedule lab appointment.    Date needed: as soon as possible    Has the patient been seen by the PCP for this problem? YES    Phone number Patient can be reached at:  Home number on file [phone] (home)    Best Time:  any    Can we leave a detailed message on this number?  YES    Call taken on 8/8/2022 at 5:43 PM by Agueda Grant

## 2022-08-08 NOTE — PROGRESS NOTES
"SUBJECTIVE:   Benjamín Hyman is a 71 year old male who presents for Preventive Visit.    Patient has been advised of split billing requirements and indicates understanding: Yes  Are you in the first 12 months of your Medicare coverage?  No    Healthy Habits:    In general, how would you rate your overall health?  Fair    Frequency of exercise:  None (Is active throughout the day. PAtient lives on the top floor and does a lot of stair and does lift weights twice a week.)    Duration of exercise:  15-30 minutes    Do you usually eat at least 4 servings of fruit and vegetables a day, include whole grains    & fiber and avoid regularly eating high fat or \"junk\" foods?  No    Taking medications regularly:  Yes    Barriers to taking medications:  None    Medication side effects:  None    Ability to successfully perform activities of daily living:  No assistance needed    Home Safety:  No safety concerns identified    Hearing Impairment:  No hearing concerns    In the past 6 months, have you been bothered by leaking of urine?  No    In general, how would you rate your overall mental or emotional health?  Very good      PHQ-2 Total Score:    Additional concerns today:  Yes (Pain in right forearm whe he stretches his arm out. It had been going on for a couple of weeks. It feels like a pulling sensation.)    Do you feel safe in your environment? Yes    Have you ever done Advance Care Planning? (For example, a Health Directive, POLST, or a discussion with a medical provider or your loved ones about your wishes): No, advance care planning information given to patient to review.  Advanced care planning was discussed at today's visit.       Fall risk  Fallen 2 or more times in the past year?: No  Any fall with injury in the past year?: No    Cognitive Screening   1) Repeat 3 items (Leader, Season, Table)    2) Clock draw: NORMAL  3) 3 item recall: Recalls 3 objects  Results: 3 items recalled: COGNITIVE IMPAIRMENT LESS " LIKELY    Mini-CogTM Copyright S Justin. Licensed by the author for use in [facility]; reprinted with permission (zaira@.[facility]). All rights reserved.      Do you have sleep apnea, excessive snoring or daytime drowsiness?: no    Reviewed and updated as needed this visit by clinical staff   Tobacco  Allergies  Meds   Med Hx  Surg Hx  Fam Hx  Soc Hx          Reviewed and updated as needed this visit by Provider                   Social History     Tobacco Use     Smoking status: Former Smoker     Types: Cigarettes     Quit date: 1996     Years since quittin.6     Smokeless tobacco: Never Used   Substance Use Topics     Alcohol use: Not Currently     Comment: Sober since 2017     If you drink alcohol do you typically have >3 drinks per day or >7 drinks per week? Not applicable    No flowsheet data found.No flowsheet data found.          Current providers sharing in care for this patient include:   Patient Care Team:  Ruben Teran MD as PCP - General  Taco Guo APRN CNP as Assigned Cancer Care Provider  Jem Ryder MD as Assigned PCP  Bennie Adrian MD as Assigned Heart and Vascular Provider    The following health maintenance items are reviewed in Epic and correct as of today:  Health Maintenance Due   Topic Date Due     Pneumococcal Vaccine: 65+ Years (1 - PCV) Never done     HEPATITIS C SCREENING  Never done     ZOSTER IMMUNIZATION (1 of 2) Never done     DTAP/TDAP/TD IMMUNIZATION (1 - Tdap) Never done     MEDICARE ANNUAL WELLNESS VISIT  Never done     ADVANCE CARE PLANNING  2019     COVID-19 Vaccine (3 - Moderna risk series) 2021               Review of Systems  Review Of Systems  Skin: negative  Eyes: negative  Ears/Nose/Throat: negative  Respiratory: negative  Cardiovascular: negative  Gastrointestinal: negative  Genitourinary: negative  Musculoskeletal: having some weakness with walking and asking about a parking sticker. Has knee pain due to  "arthritis  Neurologic: negative  Psychiatric: negative  Hematologic/Lymphatic/Immunologic: negative  Endocrine: negative      OBJECTIVE:   /74   Pulse 93   Temp 98.2  F (36.8  C) (Tympanic)   Resp 20   Ht 1.638 m (5' 4.5\")   Wt 118.2 kg (260 lb 9.6 oz)   SpO2 98%   BMI 44.04 kg/m   Estimated body mass index is 44.04 kg/m  as calculated from the following:    Height as of this encounter: 1.638 m (5' 4.5\").    Weight as of this encounter: 118.2 kg (260 lb 9.6 oz).  Physical Exam  GENERAL: healthy, alert and no distress  EYES: Eyes grossly normal to inspection, PERRL and conjunctivae and sclerae normal  HENT: ear canals and TM's normal, nose and mouth without ulcers or lesions  NECK: no adenopathy, no asymmetry, masses, or scars and thyroid normal to palpation  RESP: lungs clear to auscultation - no rales, rhonchi or wheezes  CV: irregularly irregular rhythm, normal S1 S2, no S3 or S4, no murmur, click or rub, peripheral pulses strong and no peripheral edema  ABDOMEN: soft, nontender, without hepatosplenomegaly or masses, bowel sounds normal and noted abdominal wall hernia in the LLQ  MS: no gross musculoskeletal defects noted, no edema, has pain in knees getting up to exam table  SKIN: no suspicious lesions or rashes  NEURO: Normal strength and tone, mentation intact and speech normal  PSYCH: mentation appears normal, affect normal/bright  LYMPH: no cervical adenopathy        ASSESSMENT / PLAN:   (Z00.00) Encounter for Medicare annual wellness exam  (primary encounter diagnosis)  Comment: Discussed healthy lifestyle and preventative cares.    Plan:     (I25.10) Coronary artery disease involving native coronary artery of native heart without angina pectoris  Comment:  Stable on med and refilled  Plan: OFFICE/OUTPT VISIT,EST,LEVL IV, atorvastatin         (LIPITOR) 40 MG tablet, LDL cholesterol direct            (I48.11) Longstanding persistent atrial fibrillation (H)  Comment: stable on meds, seeing " "[facility]  Plan: OFFICE/OUTPT VISIT,EST,LEVL IV, digoxin         (LANOXIN) 250 MCG tablet, LDL cholesterol         direct, Digoxin level            (I10) Benign essential hypertension  Comment: controlled  Plan: lisinopril (ZESTRIL) 40 MG tablet, metoprolol         succinate ER (TOPROL XL) 200 MG 24 hr tablet,         Basic metabolic panel            (E66.01) Morbid obesity due to excess calories (H)  Comment: losing weight would help with HTN  Plan:     (Z12.5) Screening for prostate cancer  Comment:   Plan: Prostate Specific Antigen Screen            (I48.20) Chronic atrial fibrillation (H)  Comment:   Plan: OFFICE/OUTPT VISIT,EST,LEVL IV, LDL cholesterol        direct, Basic metabolic panel, Digoxin level            (M77.01) Medial epicondylitis of elbow, right  Comment: noted on exam and will go to OT if still present in a couple of months  Plan:     (S56.915L) Muscle strain of right forearm, initial encounter  Comment: noted and will do OT if needed  Plan:     Patient has been advised of split billing requirements and indicates understanding: Yes    COUNSELING:  Reviewed preventive health counseling, as reflected in patient instructions       Regular exercise       Healthy diet/nutrition       Vision screening       Colon cancer screening       Prostate cancer screening    Estimated body mass index is 44.04 kg/m  as calculated from the following:    Height as of this encounter: 1.638 m (5' 4.5\").    Weight as of this encounter: 118.2 kg (260 lb 9.6 oz).    Weight management plan: diet    He reports that he quit smoking about 26 years ago. His smoking use included cigarettes. He has never used smokeless tobacco.      Appropriate preventive services were discussed with this patient, including applicable screening as appropriate for cardiovascular disease, diabetes, osteopenia/osteoporosis, and glaucoma.  As appropriate for age/gender, discussed screening for colorectal cancer, prostate cancer, breast cancer, and " cervical cancer. Checklist reviewing preventive services available has been given to the patient.    Reviewed patients plan of care and provided an AVS. The Basic Care Plan (routine screening as documented in Health Maintenance) for Benjamín meets the Care Plan requirement. This Care Plan has been established and reviewed with the Patient.    Counseling Resources:  ATP IV Guidelines  Pooled Cohorts Equation Calculator  Breast Cancer Risk Calculator  Breast Cancer: Medication to Reduce Risk  FRAX Risk Assessment  ICSI Preventive Guidelines  Dietary Guidelines for Americans, 2010  USDA's MyPlate  ASA Prophylaxis  Lung CA Screening    Ruben Teran MD  [facility]    Identified Health Risks:

## 2022-08-15 ENCOUNTER — TELEPHONE (OUTPATIENT)
Dept: FAMILY MEDICINE | Facility: CLINIC | Age: 72
End: 2022-08-15

## 2022-08-15 NOTE — TELEPHONE ENCOUNTER
Yes, he can have his blood draw when he comes in for his other test. Please assist in scheduling to make sure he can have thm done. Thanks!

## 2022-08-15 NOTE — TELEPHONE ENCOUNTER
The last blood draw was hemolyzed.  Orders are already replaced.  Is it ok to wait until 8/29/22 to have his BMP. LDL, PSA and Dig levels done. Radha GUILLORY RN

## 2022-08-15 NOTE — TELEPHONE ENCOUNTER
The patient went to the [facility] lab on 8/8/22 for blood work. The patient got a call back stating he needed to come back for his blood work again.  He leaves in [place] and does not have money for gas.  He is wondering if he can wait until 8/29/22. He has an echo on that day and can have his blood drawn then.  Is it ok to wait until 8/29/22 for blood work?    Thank you    Zoila MAHAJAN RN

## 2022-08-29 ENCOUNTER — HOSPITAL ENCOUNTER (OUTPATIENT)
Dept: CARDIOLOGY | Facility: CLINIC | Age: 72
Discharge: HOME | End: 2022-08-29
Attending: INTERNAL MEDICINE | Admitting: INTERNAL MEDICINE
Payer: MEDICARE

## 2022-08-29 ENCOUNTER — ANTICOAGULATION THERAPY VISIT (OUTPATIENT)
Dept: ANTICOAGULATION | Facility: CLINIC | Age: 72
End: 2022-08-29

## 2022-08-29 ENCOUNTER — LAB (OUTPATIENT)
Dept: LAB | Facility: CLINIC | Age: 72
End: 2022-08-29
Payer: COMMERCIAL

## 2022-08-29 DIAGNOSIS — I48.11 LONGSTANDING PERSISTENT ATRIAL FIBRILLATION (H): ICD-10-CM

## 2022-08-29 DIAGNOSIS — I50.22 CHRONIC SYSTOLIC HEART FAILURE (H): ICD-10-CM

## 2022-08-29 DIAGNOSIS — Z79.01 LONG TERM CURRENT USE OF ANTICOAGULANT THERAPY: ICD-10-CM

## 2022-08-29 DIAGNOSIS — I10 BENIGN ESSENTIAL HYPERTENSION: ICD-10-CM

## 2022-08-29 DIAGNOSIS — Z12.5 SCREENING FOR PROSTATE CANCER: ICD-10-CM

## 2022-08-29 DIAGNOSIS — Z79.01 LONG TERM CURRENT USE OF ANTICOAGULANT THERAPY: Primary | ICD-10-CM

## 2022-08-29 DIAGNOSIS — I25.10 CORONARY ARTERY DISEASE INVOLVING NATIVE CORONARY ARTERY OF NATIVE HEART WITHOUT ANGINA PECTORIS: ICD-10-CM

## 2022-08-29 DIAGNOSIS — E78.5 HYPERLIPIDEMIA LDL GOAL <70: ICD-10-CM

## 2022-08-29 LAB
ANION GAP SERPL CALCULATED.3IONS-SCNC: 6 MMOL/L (ref 3–14)
BUN SERPL-MCNC: 15 MG/DL (ref 7–30)
CALCIUM SERPL-MCNC: 8.8 MG/DL (ref 8.5–10.1)
CHLORIDE BLD-SCNC: 105 MMOL/L (ref 94–109)
CO2 SERPL-SCNC: 29 MMOL/L (ref 20–32)
CREAT SERPL-MCNC: 0.98 MG/DL (ref 0.66–1.25)
DIGOXIN SERPL-MCNC: 1.1 UG/L
GFR SERPL CREATININE-BSD FRML MDRD: 82 ML/MIN/1.73M2
GLUCOSE BLD-MCNC: 103 MG/DL (ref 70–99)
INR BLD: 2.1 (ref 0.9–1.1)
LDLC SERPL CALC-MCNC: 62 MG/DL
LVEF ECHO: NORMAL
POTASSIUM BLD-SCNC: 5 MMOL/L (ref 3.4–5.3)
PSA SERPL-MCNC: 1.28 UG/L (ref 0–4)
SODIUM SERPL-SCNC: 140 MMOL/L (ref 133–144)

## 2022-08-29 PROCEDURE — 83721 ASSAY OF BLOOD LIPOPROTEIN: CPT | Performed by: FAMILY MEDICINE

## 2022-08-29 PROCEDURE — 93306 TTE W/DOPPLER COMPLETE: CPT

## 2022-08-29 PROCEDURE — 80162 ASSAY OF DIGOXIN TOTAL: CPT

## 2022-08-29 PROCEDURE — 36415 COLL VENOUS BLD VENIPUNCTURE: CPT

## 2022-08-29 PROCEDURE — 36416 COLLJ CAPILLARY BLOOD SPEC: CPT

## 2022-08-29 PROCEDURE — 93306 TTE W/DOPPLER COMPLETE: CPT | Mod: 26 | Performed by: INTERNAL MEDICINE

## 2022-08-29 PROCEDURE — 85610 PROTHROMBIN TIME: CPT

## 2022-08-29 PROCEDURE — G0103 PSA SCREENING: HCPCS

## 2022-08-29 PROCEDURE — 80048 BASIC METABOLIC PNL TOTAL CA: CPT

## 2022-08-29 NOTE — PROGRESS NOTES
ANTICOAGULATION MANAGEMENT     Benjamín Hyman 71 year old male is on warfarin with therapeutic INR result. (Goal INR 2.0-3.0)    Recent labs: (last 7 days)     08/29/22  0921   INR 2.1*       ASSESSMENT       Source(s): Chart Review and Patient/Caregiver Call       Warfarin doses taken: Warfarin taken as instructed    Diet: No new diet changes identified    New illness, injury, or hospitalization: No    Medication/supplement changes: None noted    Signs or symptoms of bleeding or clotting: No    Previous INR: Therapeutic last 2(+) visits    Additional findings: INR was not due today, but was done with other labs       PLAN     Recommended plan for no diet, medication or health factor changes affecting INR     Dosing Instructions: Continue your current warfarin dose with next INR in 11 weeks       Summary  As of 8/29/2022    Full warfarin instructions:  3.75 mg every Fri; 7.5 mg all other days   Next INR check:  11/15/2022             Telephone call with Benjamín who verbalizes understanding and agrees to plan    Lab visit scheduled    Education provided: Importance of notifying clinic for changes in medications; a sooner lab recheck maybe needed., Importance of notifying clinic for diarrhea, nausea/vomiting, reduced intake, and/or illness; a sooner lab recheck maybe needed. and Contact [phone]  with any changes, questions or concerns.     Plan made per [facility] anticoagulation protocol    Genesis Rosa RN  Anticoagulation Clinic  8/29/2022    _______________________________________________________________________     Anticoagulation Episode Summary     Current INR goal:  2.0-3.0   TTR:  100.0 % (1 y)   Target end date:  Indefinite   Send INR reminders to:  [facility]    Indications    Long term current use of anticoagulant therapy [Z79.01]           Comments:  Dr. Teran Ok with 12 week rechecks. 81 mg asprin daily         Anticoagulation Care Providers     Provider Role Specialty Phone number     Ruben Teran MD [facility] [phone]           diabetes education

## 2022-08-31 ENCOUNTER — OFFICE VISIT (OUTPATIENT)
Dept: CARDIOLOGY | Facility: CLINIC | Age: 72
End: 2022-08-31
Attending: INTERNAL MEDICINE
Payer: COMMERCIAL

## 2022-08-31 VITALS
BODY MASS INDEX: 44.62 KG/M2 | OXYGEN SATURATION: 96 % | DIASTOLIC BLOOD PRESSURE: 68 MMHG | WEIGHT: 264 LBS | HEART RATE: 60 BPM | SYSTOLIC BLOOD PRESSURE: 120 MMHG | TEMPERATURE: 98.6 F

## 2022-08-31 DIAGNOSIS — I10 BENIGN ESSENTIAL HYPERTENSION: ICD-10-CM

## 2022-08-31 DIAGNOSIS — I42.9 CARDIOMYOPATHY, UNSPECIFIED TYPE (H): ICD-10-CM

## 2022-08-31 DIAGNOSIS — E78.5 HYPERLIPIDEMIA LDL GOAL <70: ICD-10-CM

## 2022-08-31 DIAGNOSIS — I25.10 CORONARY ARTERY DISEASE INVOLVING NATIVE CORONARY ARTERY OF NATIVE HEART WITHOUT ANGINA PECTORIS: Primary | ICD-10-CM

## 2022-08-31 DIAGNOSIS — I50.22 CHRONIC SYSTOLIC HEART FAILURE (H): ICD-10-CM

## 2022-08-31 DIAGNOSIS — I48.20 CHRONIC ATRIAL FIBRILLATION (H): ICD-10-CM

## 2022-08-31 PROCEDURE — 99214 OFFICE O/P EST MOD 30 MIN: CPT | Performed by: NURSE PRACTITIONER

## 2022-08-31 NOTE — LETTER
8/31/2022    Ruben Teran MD  [address] [facility] [address]    RE: Benjamín Hyman       Dear Colleague,     I had the pleasure of seeing Benjamín Hyman in the [facility].  Cardiology Clinic Progress Note  Benjamín Hyman MRN# [id number]   YOB: 1950 Age: 71 year old          Primary Cardiologist:   Dr. Fletcher           History of Presenting Illness:    Benjamín Hyman is a pleasant 71 year old  patient with a past cardiac history significant for   1. chronic atrial fibrillation    Onset 2004    Rate controlled and anticoagulated  2. CAD    Angio 5/2019 ROSY ×1 to the mid LAD with residual 20% mid LCx and he was noted to have small diagonals and a small distal LCx  3. Cardiomyopathy mixed ischemic and nonischemic    Etiology: tachycardia mediated mixed with ischemic    EF 35 to 40% 2008 with RVR    EF 45% 2020  4. hypertension,   5. Hyperlipidemia  6.  Chronic LBBB  Past medical history significant for obesity.    Patient was seen by Dr. Adrian in June 2022 for annual follow-up.  He had mistakenly been set up as a new cardiology consult.  We will get him back to see Dr. Fletcher for follow-up next year.  He was euvolemic and denied any anginal symptoms.    Pt presents today for 2-month follow-up. Echocardiogram 8/29/2022 showing stable EF 45 to 50%, normal RV, mild MR, and no change compared to prior study per the reader. BMP 8/29/2022 showing normal renal function and electrolytes.  Digoxin level 8/2022 was WNL.  INRs have been therapeutic.  Results reviewed today.    Blood pressure today is controlled.  Weights have been stable.  He denies any anginal symptoms and is euvolemic on exam.  He denies any heart failure symptoms.  He denies any bleeding difficulty on anticoagulation.  We will add on lipids to his lab work from a couple days ago. Patient reports no chest pain, shortness of breath, PND, orthopnea, presyncope, syncope, edema, heart racing, or  palpitations.      Current Cardiac Medications   Aspirin 81 mg daily  Atorvastatin 40 mg daily  Digoxin 250 mcg every other day alternating with half tablet  Lisinopril 40 mg daily  Metoprolol  mg daily  Warfarin                   Assessment and Plan:     Plan    Patient Instructions   Medication Changes:  1. None     Recommendations:  1. Check blood pressure at least 1 hour after medications. Call the clinic if your blood pressure is consistently greater than 130/80.    2. Check daily weights and call the clinic if your weight has increased more than 2 lbs in one day or 5 lbs in one week; if you feel more short of breath or have worsening swelling in your legs or abdomen.     Follow-up:  1. Fasting lab Aug 2023 (lipid/ALT, BMP, digoxin level) - wait to take digoxin pill until after you have your lab drawn in the AM.   2. See Dr. Fletcher for cardiology follow up at [facility]: August 2023. Call in February 2023 to schedule.     Cardiology Scheduling~[phone]  Cardiology Clinic RN~[phone] (Ruby Combs RN)          1. CAD    No angina (prior angina Left axillary pressure on exertion)     Continue statin, aspirin, ACE inhibitor, beta blocker    Could consider imdur for small vessel disease, if needed       2. Cardiomyopathy     no signs of heart failure     Dry weight: 260 pounds    continue ACE inhibitor, beta blocker      3. Chronic atrial fibrillation    Asymptomatic    Elevated [id number] score  For age, hypertension, CAD, heart failure    Continue metoprolol and digoxin for rate control and Coumadin for anticoagulation      4. hypertension    controlled    Continue lisinopril, metoprolol      5. hyperlipidemia    last LDL 50 on 8/2020    Continue atorvastatin 40 mg daily           Thank you for allowing me to participate in this delightful patient's care.      This note was completed in part using Dragon voice recognition software. Although reviewed after completion, some word and  grammatical errors may occur.    BRENNA Craig, CNP           Data:   All laboratory data reviewed           Constitutional:  cooperative, alert and oriented, well developed, well nourished, in no acute distress  overweight    Skin:  warm and dry to the touch         Head:  normocephalic       Eyes:  pupils equal and round       ENT:  no pallor or cyanosis       Neck:  no stiffness       Respiratory:  clear to auscultation; normal symmetry        Cardiac: regular rate and rhythm; normal S1 and S2                 pulses full and equal       GI:  abdomen soft, nondistended     Extremities and Muscular Skeletal:  no edema        Neurological:  affect appropriate; no gross motor deficits       Psych:  Alert and Oriented x 3 , appropriate affact      Thank you for allowing me to participate in the care of your patient.      Sincerely,     BRENNA Craig CNP     [facility]  cc:   Bennie Adrian MD  [address]  [place]  [place] [address]

## 2022-08-31 NOTE — PATIENT INSTRUCTIONS
Medication Changes:  None     Recommendations:  Check blood pressure at least 1 hour after medications. Call the clinic if your blood pressure is consistently greater than 130/80.    Check daily weights and call the clinic if your weight has increased more than 2 lbs in one day or 5 lbs in one week; if you feel more short of breath or have worsening swelling in your legs or abdomen.     Follow-up:  Fasting lab Aug 2023 (lipid/ALT, BMP, digoxin level) - wait to take digoxin pill until after you have your lab drawn in the AM.   See Dr. Fletcher for cardiology follow up at [facility]: August 2023. Call in February 2023 to schedule.     Cardiology Scheduling~[phone]  Cardiology Clinic RN~[phone] (Ruby Combs RN)

## 2022-08-31 NOTE — PROGRESS NOTES
Cardiology Clinic Progress Note  Benjamín Hyman MRN# [id number]   YOB: 1950 Age: 71 year old          Primary Cardiologist:   Dr. Fletcher           History of Presenting Illness:    Benjamín Hyman is a pleasant 71 year old  patient with a past cardiac history significant for   1. chronic atrial fibrillation    Onset 2004    Rate controlled and anticoagulated  2. CAD    Angio 5/2019 ROSY ×1 to the mid LAD with residual 20% mid LCx and he was noted to have small diagonals and a small distal LCx  3. Cardiomyopathy mixed ischemic and nonischemic    Etiology: tachycardia mediated mixed with ischemic    EF 35 to 40% 2008 with RVR    EF 45% 2020  4. hypertension,   5. Hyperlipidemia  6.  Chronic LBBB  Past medical history significant for obesity.    Patient was seen by Dr. Adrian in June 2022 for annual follow-up.  He had mistakenly been set up as a new cardiology consult.  We will get him back to see Dr. Fletcher for follow-up next year.  He was euvolemic and denied any anginal symptoms.    Pt presents today for 2-month follow-up. Echocardiogram 8/29/2022 showing stable EF 45 to 50%, normal RV, mild MR, and no change compared to prior study per the reader. BMP 8/29/2022 showing normal renal function and electrolytes.  Digoxin level 8/2022 was WNL.  INRs have been therapeutic.  Results reviewed today.    Blood pressure today is controlled.  Weights have been stable.  He denies any anginal symptoms and is euvolemic on exam.  He denies any heart failure symptoms.  He denies any bleeding difficulty on anticoagulation.  We will add on lipids to his lab work from a couple days ago. Patient reports no chest pain, shortness of breath, PND, orthopnea, presyncope, syncope, edema, heart racing, or palpitations.      Current Cardiac Medications   Aspirin 81 mg daily  Atorvastatin 40 mg daily  Digoxin 250 mcg every other day alternating with half tablet  Lisinopril 40 mg daily  Metoprolol  mg daily  Warfarin                    Assessment and Plan:     Plan    Patient Instructions   Medication Changes:  1. None     Recommendations:  1. Check blood pressure at least 1 hour after medications. Call the clinic if your blood pressure is consistently greater than 130/80.    2. Check daily weights and call the clinic if your weight has increased more than 2 lbs in one day or 5 lbs in one week; if you feel more short of breath or have worsening swelling in your legs or abdomen.     Follow-up:  1. Fasting lab Aug 2023 (lipid/ALT, BMP, digoxin level) - wait to take digoxin pill until after you have your lab drawn in the AM.   2. See Dr. Fletcher for cardiology follow up at [facility]: August 2023. Call in February 2023 to schedule.     Cardiology Scheduling~[phone]  Cardiology Clinic RN~[phone] (Ruby Combs RN)          1. CAD    No angina (prior angina Left axillary pressure on exertion)     Continue statin, aspirin, ACE inhibitor, beta blocker    Could consider imdur for small vessel disease, if needed       2. Cardiomyopathy     no signs of heart failure     Dry weight: 260 pounds    continue ACE inhibitor, beta blocker      3. Chronic atrial fibrillation    Asymptomatic    Elevated [id number] score  For age, hypertension, CAD, heart failure    Continue metoprolol and digoxin for rate control and Coumadin for anticoagulation      4. hypertension    controlled    Continue lisinopril, metoprolol      5. hyperlipidemia    last LDL 50 on 8/2020    Continue atorvastatin 40 mg daily           Thank you for allowing me to participate in this delightful patient's care.      This note was completed in part using Dragon voice recognition software. Although reviewed after completion, some word and grammatical errors may occur.    Roseann Briggs, BRENNA, CNP           Data:   All laboratory data reviewed           Constitutional:  cooperative, alert and oriented, well developed, well nourished, in no acute  distress  overweight    Skin:  warm and dry to the touch         Head:  normocephalic       Eyes:  pupils equal and round       ENT:  no pallor or cyanosis       Neck:  no stiffness       Respiratory:  clear to auscultation; normal symmetry        Cardiac: regular rate and rhythm; normal S1 and S2                 pulses full and equal       GI:  abdomen soft, nondistended     Extremities and Muscular Skeletal:  no edema        Neurological:  affect appropriate; no gross motor deficits       Psych:  Alert and Oriented x 3 , appropriate affact

## 2022-09-08 NOTE — TELEPHONE ENCOUNTER
COVID19    Please advise when pt may be removed from COVID precautions?  Thanks    Karolyn Dean RN    
Paul Parr,  Based on the guidelines for COVID precautions he has past past greater than 10 days since diagnosis (11/22/2020) and no fever for greater than 24 hours without using any fever reducing medication.  The last goal is to have significant improvement of his symptoms.  I am not seeing this as of yet.  I would recommend to let me know how he is doing on Friday morning to see if his COVID-19 precautions can be removed.  Ruben Teran MD  Family Medicine    
08-Sep-2022 04:09

## 2022-11-15 ENCOUNTER — ANTICOAGULATION THERAPY VISIT (OUTPATIENT)
Dept: ANTICOAGULATION | Facility: CLINIC | Age: 72
End: 2022-11-15

## 2022-11-15 ENCOUNTER — LAB (OUTPATIENT)
Dept: LAB | Facility: CLINIC | Age: 72
End: 2022-11-15
Payer: COMMERCIAL

## 2022-11-15 DIAGNOSIS — Z79.01 LONG TERM CURRENT USE OF ANTICOAGULANT THERAPY: Primary | ICD-10-CM

## 2022-11-15 DIAGNOSIS — I48.11 LONGSTANDING PERSISTENT ATRIAL FIBRILLATION (H): ICD-10-CM

## 2022-11-15 DIAGNOSIS — Z79.01 LONG TERM CURRENT USE OF ANTICOAGULANT THERAPY: ICD-10-CM

## 2022-11-15 LAB — INR BLD: 2 (ref 0.9–1.1)

## 2022-11-15 PROCEDURE — 85610 PROTHROMBIN TIME: CPT

## 2022-11-15 PROCEDURE — 36416 COLLJ CAPILLARY BLOOD SPEC: CPT

## 2022-11-15 NOTE — PROGRESS NOTES
ANTICOAGULATION MANAGEMENT     Benjamín Hyman 71 year old male is on warfarin with therapeutic INR result. (Goal INR 2.0-3.0)    Recent labs: (last 7 days)     11/15/22  0947   INR 2.0*       ASSESSMENT     Source(s): Chart Review and Patient/Caregiver Call     Warfarin doses taken: Missed dose(s) may be affecting INR  Diet: No new diet changes identified  New illness, injury, or hospitalization: No  Medication/supplement changes: None noted  Signs or symptoms of bleeding or clotting: No  Previous INR: Therapeutic last 2(+) visits  Additional findings: will check 12-28 with other labs       PLAN     Recommended plan for temporary change(s) affecting INR     Dosing Instructions: Continue your current warfarin dose with next INR in 6 weeks       Summary  As of 11/15/2022    Full warfarin instructions:  3.75 mg every Fri; 7.5 mg all other days; Starting 11/15/2022   Next INR check:  12/28/2022             Telephone call with Benjamín who verbalizes understanding and agrees to plan    Lab visit scheduled    Education provided:   Please call back if any changes to your diet, medications or how you've been taking warfarin  Contact [phone]  with any changes, questions or concerns.     Plan made per [facility] anticoagulation protocol    Bing Sahu RN  Anticoagulation Clinic  11/15/2022    _______________________________________________________________________     Anticoagulation Episode Summary     Current INR goal:  2.0-3.0   TTR:  100.0 % (1 y)   Target end date:  Indefinite   Send [facility]:  [facility]    Indications    Long term current use of anticoagulant therapy [Z79.01]           Comments:  Dr. Teran Ok with 12 week rechecks. 81 mg asprin daily         Anticoagulation Care Providers     Provider Role Specialty Phone number    Ruben Teran MD Referring Family Medicine [phone]

## 2022-11-22 NOTE — Clinical Note
Care Management Follow Up    Length of Stay (days): 0    Expected Discharge Date: 11/28/2022     Concerns to be Addressed:     ABN was given to sister/gaurdian which states none cover for medically stable patient.    Patient plan of care discussed at interdisciplinary rounds: Yes    Anticipated Discharge Disposition:  Waiting for a group home to accept patient.   Anticipated Discharge Services:  Group home services  Anticipated Discharge DME:  Not applicable    Patient/family educated on Medicare website which has current facility and service quality ratings:  NA  Education Provided on the Discharge Plan:  Yes   Patient/Family in Agreement with the Plan:  yes  Referrals Placed by CM/SW:  [facility] is making group home referrals  Private pay costs discussed: Not applicable    Additional Information:  SW and registration called sister/guardian and an ABN presented. Questions answered. Registration is emailing the form to the sister.    SEBASTIAN Blackwell   Inpatient Care Coordination   Supervisor  [facility]  [phone]      SEBASTIAN Whitney         Sheath prepped and inserted in the right radial artery.

## 2022-12-29 ENCOUNTER — LAB (OUTPATIENT)
Dept: LAB | Facility: CLINIC | Age: 72
End: 2022-12-29
Payer: COMMERCIAL

## 2022-12-29 ENCOUNTER — ANTICOAGULATION THERAPY VISIT (OUTPATIENT)
Dept: ANTICOAGULATION | Facility: CLINIC | Age: 72
End: 2022-12-29

## 2022-12-29 DIAGNOSIS — Z79.01 LONG TERM CURRENT USE OF ANTICOAGULANT THERAPY: Primary | ICD-10-CM

## 2022-12-29 DIAGNOSIS — I48.11 LONGSTANDING PERSISTENT ATRIAL FIBRILLATION (H): ICD-10-CM

## 2022-12-29 DIAGNOSIS — D72.828 NEUTROPHILIA: ICD-10-CM

## 2022-12-29 DIAGNOSIS — Z79.01 LONG TERM CURRENT USE OF ANTICOAGULANT THERAPY: ICD-10-CM

## 2022-12-29 DIAGNOSIS — E78.5 HYPERLIPIDEMIA LDL GOAL <70: ICD-10-CM

## 2022-12-29 LAB
ALT SERPL W P-5'-P-CCNC: 14 U/L (ref 10–50)
ANION GAP SERPL CALCULATED.3IONS-SCNC: 8 MMOL/L (ref 7–15)
BASOPHILS # BLD AUTO: 0 10E3/UL (ref 0–0.2)
BASOPHILS NFR BLD AUTO: 0 %
BUN SERPL-MCNC: 22.5 MG/DL (ref 8–23)
CALCIUM SERPL-MCNC: 9.4 MG/DL (ref 8.8–10.2)
CHLORIDE SERPL-SCNC: 102 MMOL/L (ref 98–107)
CHOLEST SERPL-MCNC: 98 MG/DL
CREAT SERPL-MCNC: 1.09 MG/DL (ref 0.67–1.17)
DEPRECATED HCO3 PLAS-SCNC: 25 MMOL/L (ref 22–29)
EOSINOPHIL # BLD AUTO: 0.2 10E3/UL (ref 0–0.7)
EOSINOPHIL NFR BLD AUTO: 1 %
ERYTHROCYTE [DISTWIDTH] IN BLOOD BY AUTOMATED COUNT: 12.6 % (ref 10–15)
GFR SERPL CREATININE-BSD FRML MDRD: 72 ML/MIN/1.73M2
GLUCOSE SERPL-MCNC: 95 MG/DL (ref 70–99)
HCT VFR BLD AUTO: 49.4 % (ref 40–53)
HDLC SERPL-MCNC: 32 MG/DL
HGB BLD-MCNC: 15.6 G/DL (ref 13.3–17.7)
INR BLD: 2.4 (ref 0.9–1.1)
LDLC SERPL CALC-MCNC: 27 MG/DL
LYMPHOCYTES # BLD AUTO: 3 10E3/UL (ref 0.8–5.3)
LYMPHOCYTES NFR BLD AUTO: 21 %
MCH RBC QN AUTO: 29.8 PG (ref 26.5–33)
MCHC RBC AUTO-ENTMCNC: 31.6 G/DL (ref 31.5–36.5)
MCV RBC AUTO: 94 FL (ref 78–100)
MONOCYTES # BLD AUTO: 1.1 10E3/UL (ref 0–1.3)
MONOCYTES NFR BLD AUTO: 8 %
NEUTROPHILS # BLD AUTO: 9.8 10E3/UL (ref 1.6–8.3)
NEUTROPHILS NFR BLD AUTO: 70 %
NONHDLC SERPL-MCNC: 66 MG/DL
PLATELET # BLD AUTO: 284 10E3/UL (ref 150–450)
POTASSIUM SERPL-SCNC: 5.2 MMOL/L (ref 3.4–5.3)
RBC # BLD AUTO: 5.24 10E6/UL (ref 4.4–5.9)
RETICS # AUTO: 0.1 10E6/UL (ref 0.03–0.1)
RETICS/RBC NFR AUTO: 1.9 % (ref 0.5–2)
SODIUM SERPL-SCNC: 135 MMOL/L (ref 136–145)
TOTAL PROTEIN SERUM FOR ELP: 6.9 G/DL (ref 6.4–8.3)
TRIGL SERPL-MCNC: 195 MG/DL
WBC # BLD AUTO: 14.1 10E3/UL (ref 4–11)

## 2022-12-29 PROCEDURE — 80048 BASIC METABOLIC PNL TOTAL CA: CPT | Performed by: INTERNAL MEDICINE

## 2022-12-29 PROCEDURE — 85610 PROTHROMBIN TIME: CPT

## 2022-12-29 PROCEDURE — 84165 PROTEIN E-PHORESIS SERUM: CPT

## 2022-12-29 PROCEDURE — 84155 ASSAY OF PROTEIN SERUM: CPT | Performed by: INTERNAL MEDICINE

## 2022-12-29 PROCEDURE — 84460 ALANINE AMINO (ALT) (SGPT): CPT | Performed by: NURSE PRACTITIONER

## 2022-12-29 PROCEDURE — 80061 LIPID PANEL: CPT | Performed by: NURSE PRACTITIONER

## 2022-12-29 PROCEDURE — 99207 BLOOD MORPHOLOGY PATHOLOGIST REVIEW: CPT | Performed by: PATHOLOGY

## 2022-12-29 PROCEDURE — 36415 COLL VENOUS BLD VENIPUNCTURE: CPT | Performed by: INTERNAL MEDICINE

## 2022-12-29 PROCEDURE — 36416 COLLJ CAPILLARY BLOOD SPEC: CPT

## 2022-12-29 PROCEDURE — 85025 COMPLETE CBC W/AUTO DIFF WBC: CPT | Performed by: INTERNAL MEDICINE

## 2022-12-29 PROCEDURE — 85045 AUTOMATED RETICULOCYTE COUNT: CPT | Performed by: INTERNAL MEDICINE

## 2022-12-29 NOTE — PROGRESS NOTES
ANTICOAGULATION MANAGEMENT     Benjamín Hyman 72 year old male is on warfarin with therapeutic INR result. (Goal INR 2.0-3.0)    Recent labs: (last 7 days)     12/29/22  1216   INR 2.4*       ASSESSMENT       Source(s): Chart Review and Patient/Caregiver Call       Warfarin doses taken: Warfarin taken as instructed    Diet: No new diet changes identified    New illness, injury, or hospitalization: No    Medication/supplement changes: None noted    Signs or symptoms of bleeding or clotting: No    Previous INR: Therapeutic last 2(+) visits    Additional findings: None     PLAN     Recommended plan for no diet, medication or health factor changes affecting INR     Dosing Instructions: Continue your current warfarin dose with next INR in 7 weeks       Summary  As of 12/29/2022    Full warfarin instructions:  3.75 mg every Fri; 7.5 mg all other days   Next INR check:  2/16/2023             Telephone call with Benjamín who verbalizes understanding and agrees to plan    Lab visit scheduled    Education provided:     Please call back if any changes to your diet, medications or how you've been taking warfarin    Plan made per [facility] anticoagulation protocol    Kirti Merrill RN  Anticoagulation Clinic  12/29/2022    _______________________________________________________________________     Anticoagulation Episode Summary     Current INR goal:  2.0-3.0   TTR:  100.0 % (1 y)   Target end date:  Indefinite   Send INR reminders to:  [facility]    Indications    Long term current use of anticoagulant therapy [Z79.01]           Comments:  Dr. Teran Ok with 12 week rechecks.         Anticoagulation Care Providers     Provider Role Specialty Phone number    Ruben Teran MD Referring Family Medicine [phone]

## 2022-12-30 LAB
ALBUMIN SERPL ELPH-MCNC: 3.6 G/DL (ref 3.7–5.1)
ALPHA1 GLOB SERPL ELPH-MCNC: 0.4 G/DL (ref 0.2–0.4)
ALPHA2 GLOB SERPL ELPH-MCNC: 0.9 G/DL (ref 0.5–0.9)
B-GLOBULIN SERPL ELPH-MCNC: 0.7 G/DL (ref 0.6–1)
GAMMA GLOB SERPL ELPH-MCNC: 1.3 G/DL (ref 0.7–1.6)
M PROTEIN SERPL ELPH-MCNC: 0.6 G/DL
PROT PATTERN SERPL ELPH-IMP: ABNORMAL

## 2023-01-02 LAB
PATH REPORT.COMMENTS IMP SPEC: NORMAL
PATH REPORT.COMMENTS IMP SPEC: NORMAL
PATH REPORT.FINAL DX SPEC: NORMAL
PATH REPORT.MICROSCOPIC SPEC OTHER STN: NORMAL
PATH REPORT.MICROSCOPIC SPEC OTHER STN: NORMAL
PATH REPORT.RELEVANT HX SPEC: NORMAL

## 2023-01-11 ENCOUNTER — VIRTUAL VISIT (OUTPATIENT)
Dept: ONCOLOGY | Facility: CLINIC | Age: 73
End: 2023-01-11
Attending: INTERNAL MEDICINE
Payer: COMMERCIAL

## 2023-01-11 DIAGNOSIS — D47.2 MONOCLONAL GAMMOPATHY: Primary | ICD-10-CM

## 2023-01-11 PROCEDURE — 99213 OFFICE O/P EST LOW 20 MIN: CPT | Mod: TEL | Performed by: INTERNAL MEDICINE

## 2023-01-11 RX ORDER — ACETAMINOPHEN 325 MG/1
650 TABLET ORAL EVERY 6 HOURS PRN
COMMUNITY

## 2023-01-11 NOTE — PROGRESS NOTES
Benjamín is a 72 year old who is being evaluated via a billable telephone visit.      What phone number would you like to be contacted at? [phone]  How would you like to obtain your AVS? Mail a copy  Distant Location (provider location):  Off-site      Marisa Rivera CMA on 1/11/2023 at 9:17 AM

## 2023-01-11 NOTE — PATIENT INSTRUCTIONS
ASSESSMENT/PLAN:  1. Monoclonal gammopathy      I recommended to continue to follow-up in the hematology clinic every 6 months.  He will be scheduled with Susan Salhe NP in  July.  We will recheck his CBC and also get another SPEP, kappa and lambda free light chains.

## 2023-01-11 NOTE — PROGRESS NOTES
The patient was called for a billable telephone visit regarding the following issue/s:  1. Monoclonal gammopathy      The patient is a pleasant non-smoker who was previously followed by Dr. Pozo for monoclonal gammopathy of undetermined significance.    I have reviewed previous oncology documentation and it appears that he has been followed for MGUS without active intervention or treatment.      His M protein has remained stable.  It was 0.6 back in December.  His creatinine and calcium levels are okay.  He has had stable mild leukocytosis/neutrophilia since March 2021 which remained stable back in December with a total white blood cell count of 14,100. He denies any recurrent infections, fevers, chills, night sweats, unintentional weight loss, abdominal pain/bloating, self palpated lumps/bumps, or unusual bone pains.    ASSESSMENT/PLAN:  1. Monoclonal gammopathy      I recommended to continue to follow-up in the hematology clinic every 6 months.  He will be scheduled with Susan Saleh NP in  July.  We will recheck his CBC and also get another SPEP, kappa and lambda free light chains.    Telephone call start time 9:30 AM  Telephone call stop time 9:35 AM    Patient location: Home  Provider location: Off-site

## 2023-01-11 NOTE — LETTER
1/11/2023         RE: Benjamín Hyman  [address] [place] [address] [address]  [facility] [address]        Dear Colleague,    Thank you for referring your patient, Benjamín Hyman, to the [facility]. Please see a copy of my visit note below.    The patient was called for a billable telephone visit regarding the following issue/s:  1. Monoclonal gammopathy      The patient is a pleasant non-smoker who was previously followed by Dr. Pozo for monoclonal gammopathy of undetermined significance.    I have reviewed previous oncology documentation and it appears that he has been followed for MGUS without active intervention or treatment.      His M protein has remained stable.  It was 0.6 back in December.  His creatinine and calcium levels are okay.  He has had stable mild leukocytosis/neutrophilia since March 2021 which remained stable back in December with a total white blood cell count of 14,100. He denies any recurrent infections, fevers, chills, night sweats, unintentional weight loss, abdominal pain/bloating, self palpated lumps/bumps, or unusual bone pains.    ASSESSMENT/PLAN:  1. Monoclonal gammopathy      I recommended to continue to follow-up in the hematology clinic every 6 months.  He will be scheduled with Susan Saleh NP in  July.  We will recheck his CBC and also get another SPEP, kappa and lambda free light chains.    Telephone call start time 9:30 AM  Telephone call stop time 9:35 AM    Patient location: Home  Provider location: Off-site        Benjamín is a 72 year old who is being evaluated via a billable telephone visit.      What phone number would you like to be contacted at? [phone]  How would you like to obtain your AVS? Mail a copy  Distant Location (provider location):  Off-site      Mairsa Rivera CMA on 1/11/2023 at 9:17 AM          Again, thank you for allowing me to participate in the care of your patient.        Sincerely,        Eric Reece MD

## 2023-01-11 NOTE — LETTER
1/11/2023         RE: Benjamín Hyman  [address] [place] [address] [address]  [facility] [address]        Dear Colleague,    Thank you for referring your patient, Benjamín Hyman, to the [facility]. Please see a copy of my visit note below.    The patient was called for a billable telephone visit regarding the following issue/s:  1. Monoclonal gammopathy      The patient is a pleasant non-smoker who was previously followed by Dr. Pozo for monoclonal gammopathy of undetermined significance.    I have reviewed previous oncology documentation and it appears that he has been followed for MGUS without active intervention or treatment.      His M protein has remained stable.  It was 0.6 back in December.  His creatinine and calcium levels are okay.  He has had stable mild leukocytosis/neutrophilia since March 2021 which remained stable back in December with a total white blood cell count of 14,100. He denies any recurrent infections, fevers, chills, night sweats, unintentional weight loss, abdominal pain/bloating, self palpated lumps/bumps, or unusual bone pains.    ASSESSMENT/PLAN:  1. Monoclonal gammopathy      I recommended to continue to follow-up in the hematology clinic every 6 months.  He will be scheduled with Susan Saleh NP in  July.  We will recheck his CBC and also get another SPEP, kappa and lambda free light chains.    Telephone call start time 9:30 AM  Telephone call stop time 9:35 AM    Patient location: Home  Provider location: Off-site        Benjamín is a 72 year old who is being evaluated via a billable telephone visit.      What phone number would you like to be contacted at? [phone]  How would you like to obtain your AVS? Mail a copy  Distant Location (provider location):  Off-site      Marisa Rivera CMA on 1/11/2023 at 9:17 AM          Again, thank you for allowing me to participate in the care of your patient.        Sincerely,        Eric Reece MD

## 2023-02-16 ENCOUNTER — LAB (OUTPATIENT)
Dept: LAB | Facility: CLINIC | Age: 73
End: 2023-02-16
Payer: COMMERCIAL

## 2023-02-16 ENCOUNTER — ANTICOAGULATION THERAPY VISIT (OUTPATIENT)
Dept: ANTICOAGULATION | Facility: CLINIC | Age: 73
End: 2023-02-16

## 2023-02-16 DIAGNOSIS — I48.11 LONGSTANDING PERSISTENT ATRIAL FIBRILLATION (H): ICD-10-CM

## 2023-02-16 DIAGNOSIS — Z79.01 LONG TERM CURRENT USE OF ANTICOAGULANT THERAPY: ICD-10-CM

## 2023-02-16 DIAGNOSIS — Z79.01 LONG TERM CURRENT USE OF ANTICOAGULANT THERAPY: Primary | ICD-10-CM

## 2023-02-16 LAB — INR BLD: 2.4 (ref 0.9–1.1)

## 2023-02-16 PROCEDURE — 85610 PROTHROMBIN TIME: CPT

## 2023-02-16 PROCEDURE — 36416 COLLJ CAPILLARY BLOOD SPEC: CPT

## 2023-02-16 NOTE — PROGRESS NOTES
ANTICOAGULATION MANAGEMENT     Benjamín Hyman 72 year old male is on warfarin with therapeutic INR result. (Goal INR 2.0-3.0)    Recent labs: (last 7 days)     02/16/23  0911   INR 2.4*       ASSESSMENT       Source(s): Chart Review and Patient/Caregiver Call       Warfarin doses taken: Warfarin taken as instructed    Diet: No new diet changes identified    New illness, injury, or hospitalization: No    Medication/supplement changes: None noted    Signs or symptoms of bleeding or clotting: No    Previous INR: Therapeutic last 2(+) visits    Additional findings: None       PLAN     Recommended plan for no diet, medication or health factor changes affecting INR     Dosing Instructions: Continue your current warfarin dose with next INR in 8 weeks       Summary  As of 2/16/2023    Full warfarin instructions:  3.75 mg every Fri; 7.5 mg all other days   Next INR check:  4/13/2023             Telephone call with Benjamín who verbalizes understanding and agrees to plan    Lab visit scheduled    Education provided:     Goal range and lab monitoring: goal range and significance of current result    Plan made per [facility] anticoagulation protocol    Lenore Campo RN  Anticoagulation Clinic  2/16/2023    _______________________________________________________________________     Anticoagulation Episode Summary     Current INR goal:  2.0-3.0   TTR:  100.0 % (1 y)   Target end date:  Indefinite   Send INR reminders to:  [facility]    Indications    Long term current use of anticoagulant therapy [Z79.01]           Comments:  Dr. Teran Ok with 12 week rechecks.         Anticoagulation Care Providers     Provider Role Specialty Phone number    Ruben Teran MD Referring Family Medicine [phone]

## 2023-04-13 ENCOUNTER — LAB (OUTPATIENT)
Dept: LAB | Facility: CLINIC | Age: 73
End: 2023-04-13
Payer: COMMERCIAL

## 2023-04-13 ENCOUNTER — ANTICOAGULATION THERAPY VISIT (OUTPATIENT)
Dept: ANTICOAGULATION | Facility: CLINIC | Age: 73
End: 2023-04-13

## 2023-04-13 DIAGNOSIS — I48.11 LONGSTANDING PERSISTENT ATRIAL FIBRILLATION (H): ICD-10-CM

## 2023-04-13 DIAGNOSIS — Z79.01 LONG TERM CURRENT USE OF ANTICOAGULANT THERAPY: Primary | ICD-10-CM

## 2023-04-13 DIAGNOSIS — Z79.01 LONG TERM CURRENT USE OF ANTICOAGULANT THERAPY: ICD-10-CM

## 2023-04-13 LAB — INR BLD: 2.2 (ref 0.9–1.1)

## 2023-04-13 PROCEDURE — 85610 PROTHROMBIN TIME: CPT

## 2023-04-13 PROCEDURE — 36416 COLLJ CAPILLARY BLOOD SPEC: CPT

## 2023-04-13 NOTE — PROGRESS NOTES
ANTICOAGULATION MANAGEMENT     Benjamín Hyman 72 year old male is on warfarin with therapeutic INR result. (Goal INR 2.0-3.0)    Recent labs: (last 7 days)     04/13/23  0923   INR 2.2*       ASSESSMENT       Source(s): Chart Review and Patient/Caregiver Call       Warfarin doses taken: Warfarin taken as instructed    Diet: No new diet changes identified    New illness, injury, or hospitalization: No    Medication/supplement changes: None noted    Signs or symptoms of bleeding or clotting: No    Previous INR: Therapeutic last 2(+) visits    Additional findings: None         PLAN     Recommended plan for no diet, medication or health factor changes affecting INR     Dosing Instructions: Continue your current warfarin dose with next INR in 9 weeks       Summary  As of 4/13/2023    Full warfarin instructions:  3.75 mg every Fri; 7.5 mg all other days   Next INR check:  6/15/2023             Telephone call with Benjamín who verbalizes understanding and agrees to plan    Lab visit scheduled    Education provided:     Goal range and lab monitoring: goal range and significance of current result    Plan made per [facility] anticoagulation protocol    Lenore Campo RN  Anticoagulation Clinic  4/13/2023    _______________________________________________________________________     Anticoagulation Episode Summary     Current INR goal:  2.0-3.0   TTR:  100.0 % (1 y)   Target end date:  Indefinite   Send INR reminders to:  [facility]    Indications    Long term current use of anticoagulant therapy [Z79.01]           Comments:  Dr. Teran Ok with 12 week rechecks.         Anticoagulation Care Providers     Provider Role Specialty Phone number    Ruben Teran MD Referring Family Medicine [phone]

## 2023-05-15 ENCOUNTER — TELEPHONE (OUTPATIENT)
Dept: FAMILY MEDICINE | Facility: CLINIC | Age: 73
End: 2023-05-15
Payer: COMMERCIAL

## 2023-05-15 NOTE — TELEPHONE ENCOUNTER
Dr. Teran,    Patient questions if he can take imodium with A- fib.  Patient is on Digoxin and warfarin.  I don't see any interactions listed with those meds.  Please advise. Radha GUILLORY RN

## 2023-05-15 NOTE — TELEPHONE ENCOUNTER
Medication Question or Refill        What medication are you calling about (include dose and sig)?: Imodium -Patient has questions if he should take it if he has A-fib. Because the directions says not to take if he has a irregular heart beat. He is not sure if A-fib counts as that.    Preferred Pharmacy:   [facility] [address] [place] [address] [address]  Phone: [phone] Fax: [phone]      Controlled Substance Agreement on file:   CSA -- Patient Level:    CSA: None found at the patient level.       Okay to leave a detailed message?: Yes at Home number on file [phone] (home)

## 2023-06-09 ENCOUNTER — OFFICE VISIT (OUTPATIENT)
Dept: FAMILY MEDICINE | Facility: CLINIC | Age: 73
End: 2023-06-09
Payer: COMMERCIAL

## 2023-06-09 VITALS
WEIGHT: 252.4 LBS | HEIGHT: 63 IN | SYSTOLIC BLOOD PRESSURE: 126 MMHG | OXYGEN SATURATION: 95 % | DIASTOLIC BLOOD PRESSURE: 82 MMHG | RESPIRATION RATE: 20 BRPM | TEMPERATURE: 98.4 F | BODY MASS INDEX: 44.72 KG/M2 | HEART RATE: 65 BPM

## 2023-06-09 DIAGNOSIS — Z11.59 NEED FOR HEPATITIS C SCREENING TEST: ICD-10-CM

## 2023-06-09 DIAGNOSIS — A04.72 C. DIFFICILE COLITIS: ICD-10-CM

## 2023-06-09 DIAGNOSIS — R19.5 LOOSE STOOLS: Primary | ICD-10-CM

## 2023-06-09 DIAGNOSIS — R10.814 LEFT LOWER QUADRANT ABDOMINAL TENDERNESS WITHOUT REBOUND TENDERNESS: ICD-10-CM

## 2023-06-09 DIAGNOSIS — Z23 NEED FOR DIPHTHERIA-TETANUS-PERTUSSIS (TDAP) VACCINE: ICD-10-CM

## 2023-06-09 DIAGNOSIS — Z12.11 SCREEN FOR COLON CANCER: ICD-10-CM

## 2023-06-09 DIAGNOSIS — Z23 NEED FOR SHINGLES VACCINE: ICD-10-CM

## 2023-06-09 PROCEDURE — 99214 OFFICE O/P EST MOD 30 MIN: CPT | Performed by: FAMILY MEDICINE

## 2023-06-09 ASSESSMENT — PAIN SCALES - GENERAL: PAINLEVEL: NO PAIN (0)

## 2023-06-09 NOTE — PATIENT INSTRUCTIONS
Do the stool test, return to any [facility] lab.    Do try the probiotic pill once a day.  If after 2-3 weeks this is not helpful then,    Then try metamucil one dose once a day    Avoid lactose and dairy    Could try to switch to half caff to see if this improves symptoms.    If no infection and no help with the above recommendations then next step is colonoscopy

## 2023-06-09 NOTE — PROGRESS NOTES
"  Assessment & Plan     Need for shingles vaccine  Check insurance    Need for diphtheria-tetanus-pertussis (Tdap) vaccine  Check insurance    Need for hepatitis C screening test      Screen for colon cancer  - Fecal colorectal cancer screen FIT - Future (S+30); Future    Loose stools  Rule out Cdiff after antibiotics for appendicitis, though not likely  Trial probiotic pills daily for 2-3 weeks, if not helpful trial metamucil daily for 2-3 weeks, if not help then plan to do a colonoscopy.  - C. difficile Toxin B PCR with reflex to C. difficile Antigen and Toxins A/B EIA; Future  - Enteric Bacteria and Virus Panel by TRENT Stool; Future  - Ova and Parasite Exam Routine; Future    Left lower quadrant abdominal tenderness without rebound tenderness  Rule out infection  CT 4/28/23 showed enlarged appendix, but abdominal pain has resolved fully, only loose fast stools after eating.  - Enteric Bacteria and Virus Panel by TRENT Stool; Future           MED REC REQUIRED  Post Medication Reconciliation Status:  Discharge medications reconciled, continue medications without change    BMI:   Estimated body mass index is 44.5 kg/m  as calculated from the following:    Height as of this encounter: 1.604 m (5' 3.15\").    Weight as of this encounter: 114.5 kg (252 lb 6.4 oz).       See Patient Instructions    Handy Loera MD  [facility] JO Butts is a 72 year old, presenting for the following health issues:  Follow Up (Hospital follow up- [facility], 4/28-4/30 High fever, cough, weak, couldn't walk.)        6/9/2023    10:46 AM   Additional Questions   Roomed by Tanisha Peters MA   Accompanied by self         6/9/2023    10:46 AM   Patient Reported Additional Medications   Patient reports taking the following new medications none     HPI       Hospital Follow-up Visit:    Hospital/Nursing Home/IP Rehab Facility: [facility]  Date of Admission: 4/28/2023  Date of Discharge: " 4/30/2023  Reason(s) for Admission: High fever, cough, weak, and couldn't walk.    Was your hospitalization related to COVID-19? YES   How are you feeling today? Much better  In the past 24 hours have you had shortness of breath when speaking, walking, or climbing stairs? I don't have breathing problems  Do you have a cough? I don't have a cough  When is the last time you had a fever greater than 100? None  Are you having any other symptoms? Diarrhea and Loss of sense of taste or smell . Patient states that before he left the hospital they thought he had appendicitis and gave him Metronid Azol 500mg,  Levofloxacin 750mg. And augmentin 875-125 mg. Patients diarrhea was really bad while taking these and his stomach is still messed up. Patient states he finished those medications about about a month ago. Lavon Marshall MD from [facility].  Wondering about a stool test. Patient had EKG, chest x-ray, and CT scan done while in hospital.    Now Usually loose stools 30 minutes after eating.  At first after hospitalization the stools were very watery and 3-5 times a day.  After that did start taking imodium about once a day, then stopped it and again was having stools that are loose.  Last took imodium yesterday.    Did try a yogurt with pro-biotic, but has loose stools with milk products.    Also had covid during hospitalization tested positive after day 7 of sysmptoms.     Do you have any other stressors you would like to discuss with your provider? No         Was the patient in the ICU or did the patient experience delirium during hospitalization?  No  Problems taking medications regularly:  None  Medication changes since discharge: Metronid Azol 500mg, Levofloxacin 750 mg, and Augmentin 875-125 mg for 5-7 days after.   Problems adhering to non-medication therapy:  None    Summary of hospitalization:  CareEverywhere information obtained and reviewed  Diagnostic Tests/Treatments reviewed.  Follow up needed: none  Other  "Healthcare Providers Involved in Patient s Care:         None  Update since discharge: improving.     Plan of care communicated with patient           Review of Systems   Constitutional, HEENT, cardiovascular, pulmonary, gi and gu systems are negative, except as otherwise noted.      Objective    /82 (BP Location: Right arm, Patient Position: Sitting, Cuff Size: Adult Large)   Pulse 65   Temp 98.4  F (36.9  C) (Tympanic)   Resp 20   Ht 1.604 m (5' 3.15\")   Wt 114.5 kg (252 lb 6.4 oz)   SpO2 95%   BMI 44.50 kg/m    Body mass index is 44.5 kg/m .  Physical Exam   GENERAL: healthy, alert and no distress  NECK: no adenopathy, no asymmetry, masses, or scars and thyroid normal to palpation  RESP: lungs clear to auscultation - no rales, rhonchi or wheezes  CV: regular rate and rhythm, normal S1 S2, no S3 or S4, no murmur, click or rub, no peripheral edema and peripheral pulses strong  ABDOMEN: soft, nontender, no hepatosplenomegaly, no masses and bowel sounds normal  MS: no gross musculoskeletal defects noted, no edema                "

## 2023-06-11 PROCEDURE — 87177 OVA AND PARASITES SMEARS: CPT

## 2023-06-11 PROCEDURE — 87209 SMEAR COMPLEX STAIN: CPT

## 2023-06-11 PROCEDURE — 87493 C DIFF AMPLIFIED PROBE: CPT | Mod: 59

## 2023-06-11 PROCEDURE — 87506 IADNA-DNA/RNA PROBE TQ 6-11: CPT

## 2023-06-11 PROCEDURE — 87324 CLOSTRIDIUM AG IA: CPT | Mod: 59

## 2023-06-12 ENCOUNTER — LAB (OUTPATIENT)
Dept: LAB | Facility: CLINIC | Age: 73
End: 2023-06-12
Payer: COMMERCIAL

## 2023-06-12 DIAGNOSIS — R10.814 LEFT LOWER QUADRANT ABDOMINAL TENDERNESS WITHOUT REBOUND TENDERNESS: ICD-10-CM

## 2023-06-12 DIAGNOSIS — R19.5 LOOSE STOOLS: ICD-10-CM

## 2023-06-12 DIAGNOSIS — Z11.59 NEED FOR HEPATITIS C SCREENING TEST: ICD-10-CM

## 2023-06-12 LAB
C COLI+JEJUNI+LARI FUSA STL QL NAA+PROBE: NOT DETECTED
C DIFF GDH STL QL IA: POSITIVE
C DIFF TOX A+B STL QL IA: NEGATIVE
C DIFF TOX B STL QL: POSITIVE
EC STX1 GENE STL QL NAA+PROBE: NOT DETECTED
EC STX2 GENE STL QL NAA+PROBE: NOT DETECTED
NOROV GI+II ORF1-ORF2 JNC STL QL NAA+PR: NOT DETECTED
RVA NSP5 STL QL NAA+PROBE: NOT DETECTED
SALMONELLA SP RPOD STL QL NAA+PROBE: NOT DETECTED
SHIGELLA SP+EIEC IPAH STL QL NAA+PROBE: NOT DETECTED
V CHOL+PARA RFBL+TRKH+TNAA STL QL NAA+PR: NOT DETECTED
Y ENTERO RECN STL QL NAA+PROBE: NOT DETECTED

## 2023-06-13 ENCOUNTER — NURSE TRIAGE (OUTPATIENT)
Dept: NURSING | Facility: CLINIC | Age: 73
End: 2023-06-13
Payer: COMMERCIAL

## 2023-06-13 LAB — O+P STL MICRO: NEGATIVE

## 2023-06-13 RX ORDER — VANCOMYCIN HYDROCHLORIDE 125 MG/1
125 CAPSULE ORAL 4 TIMES DAILY
Qty: 40 CAPSULE | Refills: 0 | Status: SHIPPED | OUTPATIENT
Start: 2023-06-13 | End: 2023-06-23

## 2023-06-13 NOTE — TELEPHONE ENCOUNTER
Nurse Triage SBAR    Situation:   -Medication question    Background:   -Patient calling, It is okay to leave a detailed message at this number.     Assessment:   -he is wondering if he can/should take his probiotic capsule (typicaly takes in the morning)?  -he is on vancomycin for C. Diff (started today)    Recommendation:   Care team: please call patient back at [phone] and advise if he should take the probiotic pills     ADI LEVY RN on 6/13/2023 at 3:45 PM      Reason for Disposition    [1] Caller has NON-URGENT medicine question about med that PCP prescribed AND [2] triager unable to answer question    Protocols used: MEDICATION QUESTION CALL-A-

## 2023-06-14 ENCOUNTER — TELEPHONE (OUTPATIENT)
Dept: FAMILY MEDICINE | Facility: CLINIC | Age: 73
End: 2023-06-14
Payer: COMMERCIAL

## 2023-06-14 NOTE — TELEPHONE ENCOUNTER
Yes, he can safely take an over the counter pro-biotic.  There is mixed evidence to show if probiotics help treat/prevent cdiff or not, so I don't usually recommend them.  Thank you,  Handy Loera MD

## 2023-06-14 NOTE — LETTER
June 14, 2023      [address]  [facility] [address]      June 14, 2023    To  Benjamín Hyman  [address]  [facility] [address]    Your team at [facility] cares about your health. We have reviewed your chart and based on our findings; we are making the following recommendations to better manage your health.     You are in particular need of attention regarding the following:     Call or MyChart message your clinic to schedule a colonoscopy, schedule/ a FIT Test, or order a Cologuard test. If you are unsure what type of test you need, please call your clinic and speak to clinic staff.   Colon cancer is now the second leading cause of cancer-related deaths in the [place] for both men and women and there are over 130,000 new cases and 50,000 deaths per year from colon cancer. Colonoscopies can prevent 90-95% of these deaths. Problem lesions can be removed before they ever become cancer. This test is not only looking for cancer, but also getting rid of precancerous lesions.   Please call [phone] to schedule a colonoscopy.  Contact your clinic to schedule/ your FIT Test.   Schedule an office visit with your provider if you are interested in completing your colon cancer screening with a Cologuard test    If you have already completed these items, please contact the clinic via phone or   [facility] so your care team can review and update your records. Thank you for   choosing [facility] Clinics for your healthcare needs. For any questions,   concerns, or to schedule an appointment please contact our clinic.    Healthy Regards,  Your [facility] Care Team    Sincerely,  Ruben Teran MD

## 2023-06-14 NOTE — TELEPHONE ENCOUNTER
Patient Quality Outreach    Patient is due for the following:   Colon Cancer Screening    Next Steps:   No follow up needed at this time.    Type of outreach:    Sent letter.      Questions for provider review:    None           Gillian Aceves, CMA

## 2023-06-19 ENCOUNTER — TELEPHONE (OUTPATIENT)
Dept: FAMILY MEDICINE | Facility: CLINIC | Age: 73
End: 2023-06-19
Payer: COMMERCIAL

## 2023-06-19 NOTE — TELEPHONE ENCOUNTER
Dr Teran,   Pt is on day 7 of his vancomycin for his C Diff.  He wonders if it is working effectively.  His stools are brown, soft and somewhat formed.  No blood in stool.  He said he is still having frequent soft stools.  No abdominal pain.

## 2023-06-26 ENCOUNTER — TELEPHONE (OUTPATIENT)
Dept: FAMILY MEDICINE | Facility: CLINIC | Age: 73
End: 2023-06-26
Payer: COMMERCIAL

## 2023-06-26 DIAGNOSIS — K52.9 CHRONIC DIARRHEA: Primary | ICD-10-CM

## 2023-06-26 NOTE — TELEPHONE ENCOUNTER
FYI - Status Update    Who is Calling: patient    Update: Finished C.Diff medication but is not feeling well still. Also has an appointment 6/27 for INR and is wondering if he should still go.    Does caller want a call/response back: Yes     Okay to leave a detailed message?: Yes at Home number on file [phone] (home)

## 2023-06-27 ENCOUNTER — TELEPHONE (OUTPATIENT)
Dept: FAMILY MEDICINE | Facility: CLINIC | Age: 73
End: 2023-06-27

## 2023-06-27 NOTE — TELEPHONE ENCOUNTER
Yes he can go to lab.  The issue is using the bathroom if he still has c diff.  Then the bathroom needs to be wiped down appropriately.  If he uses a bathroom here, he should let us know.  If he is having diarrhea, I can order another cdiff test to check.  Please let me know.  Sincerely,  Ruben Teran MD

## 2023-06-27 NOTE — TELEPHONE ENCOUNTER
See messages below. Pt called back and said that he'd like to proceed with c-diff testing. Says that his stools have been loose to soft brown, has not been having liquid stools. Says that he'll have 2 stools per day on average, but says that today has been worse. Also says that he'll have occasional incontinent episodes when he thinks he's going to be just passing gas. Denies pain or feeling feverish, says that he's been eating a bland diet, avoiding dairy/lactose, and is staying hydrated. Says that he's been using Imodium which has been helpful. Just started taking probiotics 3-4 days ago, has not tried using Metamucil (as recommended from Dr. Loera's office visit notes). Pt would also like to know if he should avoid contact with others; says that he has a friend who may run errand for him, but he's concerned about being contagious. Writer did reinforce importance of thorough handwashing with soap and water, will route to PCP for further review/recommendations.     Josselin Wray RN  [facility]

## 2023-06-27 NOTE — TELEPHONE ENCOUNTER
Pt called as he hasn't heard back from yesterday's message . Pt has finished the abx for c-diff, he is having stools that are not formed but not quite diarrhea. He has a BM 11/2 hours after he eats anything. He says he eats the main meal in the morning and very little throughout the day, he has lost 10-15# since start of c-diff. He is wondering if antibiotics should be continued. He also did not go to lab today to have INR drawn as he was told the c-diff is contagious. He does feel better overall than he did before starting abx.   Radha Gonzalez RN

## 2023-06-27 NOTE — TELEPHONE ENCOUNTER
I have ordered the future lab for C diff.  He needs to  the container at an [facility] and then return it to lab.  Sincerely,  Ruben Teran MD

## 2023-06-27 NOTE — TELEPHONE ENCOUNTER
Patient would like Dr. Teran to order a c diff test, please. . Sussy Bynum on 6/27/2023 at 11:34 AM

## 2023-06-27 NOTE — TELEPHONE ENCOUNTER
Patient notified with acknowledgement.  He will go through [facility], since he lives in [place] and it's closest.  He's not sure when he will go, maybe tomorrow morning.    Carlee Hatch RN  [facility]

## 2023-06-27 NOTE — TELEPHONE ENCOUNTER
See previous telephone encounter from yesterday. Closing this encounter to avoid duplication.    Josselin Wray RN  [facility]

## 2023-06-28 ENCOUNTER — TELEPHONE (OUTPATIENT)
Dept: ANTICOAGULATION | Facility: CLINIC | Age: 73
End: 2023-06-28

## 2023-06-28 NOTE — TELEPHONE ENCOUNTER
ANTICOAGULATION     Benjamín Hyman is overdue for INR check.      Spoke with Benjamín and scheduled lab appointment on 6/29/23    María Gorman RN

## 2023-06-29 ENCOUNTER — LAB (OUTPATIENT)
Dept: LAB | Facility: CLINIC | Age: 73
End: 2023-06-29
Payer: COMMERCIAL

## 2023-06-29 ENCOUNTER — DOCUMENTATION ONLY (OUTPATIENT)
Dept: ANTICOAGULATION | Facility: CLINIC | Age: 73
End: 2023-06-29

## 2023-06-29 ENCOUNTER — ANTICOAGULATION THERAPY VISIT (OUTPATIENT)
Dept: ANTICOAGULATION | Facility: CLINIC | Age: 73
End: 2023-06-29

## 2023-06-29 DIAGNOSIS — Z79.01 LONG TERM CURRENT USE OF ANTICOAGULANT THERAPY: Primary | ICD-10-CM

## 2023-06-29 DIAGNOSIS — Z79.01 LONG TERM CURRENT USE OF ANTICOAGULANT THERAPY: ICD-10-CM

## 2023-06-29 DIAGNOSIS — I48.11 LONGSTANDING PERSISTENT ATRIAL FIBRILLATION (H): ICD-10-CM

## 2023-06-29 LAB — INR BLD: 2.6 (ref 0.9–1.1)

## 2023-06-29 PROCEDURE — 85610 PROTHROMBIN TIME: CPT

## 2023-06-29 PROCEDURE — 36416 COLLJ CAPILLARY BLOOD SPEC: CPT

## 2023-06-29 NOTE — PROGRESS NOTES
ANTICOAGULATION CLINIC REFERRAL RENEWAL REQUEST       An annual renewal order is required for all patients referred to [facility].?  Please review and sign the pended referral order for Benjamín Hyman.       ANTICOAGULATION SUMMARY      Warfarin indication(s)   Atrial Fibrillation    Mechanical heart valve present?  NO       Current goal range   INR: 2.0-3.0     Goal appropriate for indication? Goal INR 2-3, standard for indication(s) above     Time in Therapeutic Range (TTR)  (Goal > 60%) 100%       Office visit with referring provider's group within last year yes on 6-9-2023       Bing Sahu RN  [facility]

## 2023-06-29 NOTE — PROGRESS NOTES
ANTICOAGULATION MANAGEMENT     Benjamín Hyman 72 year old male is on warfarin with therapeutic INR result. (Goal INR 2.0-3.0)    Recent labs: (last 7 days)     23  0838   INR 2.6*       ASSESSMENT       Source(s): Chart Review and Patient/Caregiver Call       Warfarin doses taken: Warfarin taken as instructed    Diet: No new diet changes identified    Medication/supplement changes: None noted    New illness, injury, or hospitalization: yes, was in hospital in April for appendicitis. Was on flagyl and levaquin. Then developed c diff and finished vanco on . Was very weak and sick with diarrhea. This is improved now.    Signs or symptoms of bleeding or clotting: No    Previous result: Therapeutic last 2(+) visits    Additional findings: patient did not have INRs after discharge from hospital. He had several med changes, diarrhea, changes in diet in the last two months without checking INR. He sent a stool sample again today to check for ongoing c diff. Stools are currently soft, not watery. Referral is  so sending for co sign today.         PLAN     Recommended plan for ongoing change(s) affecting INR     Dosing Instructions: Continue your current warfarin dose with next INR in 3 weeks or sooner if abx ordered or diarrhea increases again      Summary  As of 2023    Full warfarin instructions:  3.75 mg every Fri; 7.5 mg all other days   Next INR check:  2023             Telephone call with Benjamín who verbalizes understanding and agrees to plan    Lab visit scheduled    Education provided:     Please call back if any changes to your diet, medications or how you've been taking warfarin    Importance of notifying anticoagulation clinic for: changes in medications; a sooner lab recheck maybe needed and diarrhea, nausea/vomiting, reduced intake, cold/flu, and/or infections; a sooner lab recheck maybe needed    Contact [phone]  with any changes, questions or concerns.     Plan made per [facility]  anticoagulation protocol    Bing Sahu RN  Anticoagulation Clinic  6/29/2023    _______________________________________________________________________     Anticoagulation Episode Summary     Current INR goal:  2.0-3.0   TTR:  100.0 % (1 y)   Target end date:  Indefinite   Send INR reminders to:  [facility]    Indications    Long term current use of anticoagulant therapy [Z79.01]           Comments:  Dr. Teran Ok with 12 week rechecks.         Anticoagulation Care Providers     Provider Role Specialty Phone number    Ruben Teran MD Referring Family Medicine [phone]

## 2023-07-12 ENCOUNTER — PATIENT OUTREACH (OUTPATIENT)
Dept: ONCOLOGY | Facility: CLINIC | Age: 73
End: 2023-07-12
Payer: COMMERCIAL

## 2023-07-12 DIAGNOSIS — D47.2 MONOCLONAL GAMMOPATHY: Primary | ICD-10-CM

## 2023-07-20 ENCOUNTER — ANTICOAGULATION THERAPY VISIT (OUTPATIENT)
Dept: ANTICOAGULATION | Facility: CLINIC | Age: 73
End: 2023-07-20

## 2023-07-20 ENCOUNTER — LAB (OUTPATIENT)
Dept: LAB | Facility: CLINIC | Age: 73
End: 2023-07-20
Payer: COMMERCIAL

## 2023-07-20 DIAGNOSIS — Z79.01 LONG TERM CURRENT USE OF ANTICOAGULANT THERAPY: ICD-10-CM

## 2023-07-20 DIAGNOSIS — I48.11 LONGSTANDING PERSISTENT ATRIAL FIBRILLATION (H): ICD-10-CM

## 2023-07-20 DIAGNOSIS — Z11.59 NEED FOR HEPATITIS C SCREENING TEST: ICD-10-CM

## 2023-07-20 DIAGNOSIS — D47.2 MONOCLONAL GAMMOPATHY: ICD-10-CM

## 2023-07-20 DIAGNOSIS — Z79.01 LONG TERM CURRENT USE OF ANTICOAGULANT THERAPY: Primary | ICD-10-CM

## 2023-07-20 LAB
ALBUMIN SERPL BCG-MCNC: 3.6 G/DL (ref 3.5–5.2)
ALP SERPL-CCNC: 120 U/L (ref 40–129)
ALT SERPL W P-5'-P-CCNC: 17 U/L (ref 0–70)
ANION GAP SERPL CALCULATED.3IONS-SCNC: 10 MMOL/L (ref 7–15)
AST SERPL W P-5'-P-CCNC: 23 U/L (ref 0–45)
BASOPHILS # BLD AUTO: 0 10E3/UL (ref 0–0.2)
BASOPHILS NFR BLD AUTO: 0 %
BILIRUB SERPL-MCNC: 0.5 MG/DL
BUN SERPL-MCNC: 19.3 MG/DL (ref 8–23)
CALCIUM SERPL-MCNC: 8.9 MG/DL (ref 8.8–10.2)
CHLORIDE SERPL-SCNC: 104 MMOL/L (ref 98–107)
CREAT SERPL-MCNC: 0.95 MG/DL (ref 0.67–1.17)
DEPRECATED HCO3 PLAS-SCNC: 25 MMOL/L (ref 22–29)
EOSINOPHIL # BLD AUTO: 0 10E3/UL (ref 0–0.7)
EOSINOPHIL NFR BLD AUTO: 0 %
ERYTHROCYTE [DISTWIDTH] IN BLOOD BY AUTOMATED COUNT: 13 % (ref 10–15)
GFR SERPL CREATININE-BSD FRML MDRD: 85 ML/MIN/1.73M2
GLUCOSE SERPL-MCNC: 105 MG/DL (ref 70–99)
HCT VFR BLD AUTO: 47.7 % (ref 40–53)
HGB BLD-MCNC: 15.2 G/DL (ref 13.3–17.7)
IMM GRANULOCYTES # BLD: 0 10E3/UL
IMM GRANULOCYTES NFR BLD: 0 %
INR BLD: 2.8 (ref 0.9–1.1)
LYMPHOCYTES # BLD AUTO: 3.1 10E3/UL (ref 0.8–5.3)
LYMPHOCYTES NFR BLD AUTO: 26 %
MCH RBC QN AUTO: 29.9 PG (ref 26.5–33)
MCHC RBC AUTO-ENTMCNC: 31.9 G/DL (ref 31.5–36.5)
MCV RBC AUTO: 94 FL (ref 78–100)
MONOCYTES # BLD AUTO: 0.9 10E3/UL (ref 0–1.3)
MONOCYTES NFR BLD AUTO: 8 %
NEUTROPHILS # BLD AUTO: 7.8 10E3/UL (ref 1.6–8.3)
NEUTROPHILS NFR BLD AUTO: 66 %
PLATELET # BLD AUTO: 286 10E3/UL (ref 150–450)
POTASSIUM SERPL-SCNC: 4.7 MMOL/L (ref 3.4–5.3)
PROT SERPL-MCNC: 6.9 G/DL (ref 6.4–8.3)
RBC # BLD AUTO: 5.08 10E6/UL (ref 4.4–5.9)
SODIUM SERPL-SCNC: 139 MMOL/L (ref 136–145)
TOTAL PROTEIN SERUM FOR ELP: 7 G/DL (ref 6.4–8.3)
WBC # BLD AUTO: 11.8 10E3/UL (ref 4–11)

## 2023-07-20 PROCEDURE — 84155 ASSAY OF PROTEIN SERUM: CPT | Mod: 59

## 2023-07-20 PROCEDURE — 36415 COLL VENOUS BLD VENIPUNCTURE: CPT

## 2023-07-20 PROCEDURE — 80053 COMPREHEN METABOLIC PANEL: CPT

## 2023-07-20 PROCEDURE — 83521 IG LIGHT CHAINS FREE EACH: CPT

## 2023-07-20 PROCEDURE — 86803 HEPATITIS C AB TEST: CPT

## 2023-07-20 PROCEDURE — 87522 HEPATITIS C REVRS TRNSCRPJ: CPT

## 2023-07-20 PROCEDURE — 85610 PROTHROMBIN TIME: CPT

## 2023-07-20 PROCEDURE — 84165 PROTEIN E-PHORESIS SERUM: CPT

## 2023-07-20 PROCEDURE — 85025 COMPLETE CBC W/AUTO DIFF WBC: CPT

## 2023-07-20 NOTE — PROGRESS NOTES
ANTICOAGULATION MANAGEMENT     Benjamín Hyman 72 year old male is on warfarin with therapeutic INR result. (Goal INR 2.0-3.0)    Recent labs: (last 7 days)     07/20/23  0828   INR 2.8*       ASSESSMENT       Source(s): Chart Review and Patient/Caregiver Call       Warfarin doses taken: Warfarin taken as instructed    Diet: No new diet changes identified    Medication/supplement changes: None noted    New illness, injury, or hospitalization: No    Signs or symptoms of bleeding or clotting: No    Previous result: Therapeutic last 2(+) visits    Additional findings: if therapeutic next INR- can push back out to his previous interval of 7-8 weeks         PLAN     Recommended plan for no diet, medication or health factor changes affecting INR     Dosing Instructions: Continue your current warfarin dose with next INR in 3 weeks       Summary  As of 7/20/2023    Full warfarin instructions:  3.75 mg every Fri; 7.5 mg all other days   Next INR check:  8/10/2023             Telephone call with Benjamín who verbalizes understanding and agrees to plan    Lab visit scheduled    Education provided:     Goal range and lab monitoring: goal range and significance of current result    Plan made per [facility] anticoagulation protocol    Jeanie Murillo RN  Anticoagulation Clinic  7/20/2023    _______________________________________________________________________     Anticoagulation Episode Summary     Current INR goal:  2.0-3.0   TTR:  100.0 % (1 y)   Target end date:  Indefinite   Send INR reminders to:  [facility]    Indications    Long term current use of anticoagulant therapy [Z79.01]  Longstanding persistent atrial fibrillation (H) [I48.11]           Comments:  Dr. Teran Ok with 12 week rechecks.         Anticoagulation Care Providers     Provider Role Specialty Phone number    Ruben Teran MD Referring Family Medicine [phone]

## 2023-07-21 LAB
ALBUMIN SERPL ELPH-MCNC: 3.7 G/DL (ref 3.7–5.1)
ALPHA1 GLOB SERPL ELPH-MCNC: 0.4 G/DL (ref 0.2–0.4)
ALPHA2 GLOB SERPL ELPH-MCNC: 0.9 G/DL (ref 0.5–0.9)
B-GLOBULIN SERPL ELPH-MCNC: 0.7 G/DL (ref 0.6–1)
GAMMA GLOB SERPL ELPH-MCNC: 1.3 G/DL (ref 0.7–1.6)
HCV AB SERPL QL IA: REACTIVE
KAPPA LC FREE SER-MCNC: 3.51 MG/DL (ref 0.33–1.94)
KAPPA LC FREE/LAMBDA FREE SER NEPH: 1.12 {RATIO} (ref 0.26–1.65)
LAMBDA LC FREE SERPL-MCNC: 3.14 MG/DL (ref 0.57–2.63)
M PROTEIN SERPL ELPH-MCNC: 0.6 G/DL
PROT PATTERN SERPL ELPH-IMP: ABNORMAL

## 2023-07-22 LAB — HCV RNA SERPL NAA+PROBE-ACNC: ABNORMAL IU/ML

## 2023-07-24 ENCOUNTER — TELEPHONE (OUTPATIENT)
Dept: FAMILY MEDICINE | Facility: CLINIC | Age: 73
End: 2023-07-24
Payer: COMMERCIAL

## 2023-07-24 DIAGNOSIS — R76.8 POSITIVE HEPATITIS C ANTIBODY TEST: Primary | ICD-10-CM

## 2023-07-24 NOTE — TELEPHONE ENCOUNTER
Patient is reached and he is very upset about having to come in for a repeat blood draw.   Patient states this has happened before.  Patient refuses to come in today but will come in tomorrow.  Appt scheduled. Radha GUILLORY RN

## 2023-07-24 NOTE — TELEPHONE ENCOUNTER
Infectious disease lab @ [facility] called to report they were not able to run the test for HCV - Quantitative by PCR - she is not sure why- the machine was not able to complete the test. It may have been because of integrity of the specimen. They would need to redraw for this test.    Thank you    Zoila MAHAJAN RN

## 2023-07-24 NOTE — TELEPHONE ENCOUNTER
Covering for primary/ordering provider:  Please call patient, he had a positive hepatitis C screening test, however the lab was unable to run the confirmatory test due to a specimen issue. I have reordered the test, and we need him to come in for a new draw so that if he is truly positive for hepatitis C, we can get him set up with follow up for treatment.  Candice Crockett, CNP

## 2023-07-25 ENCOUNTER — LAB (OUTPATIENT)
Dept: LAB | Facility: CLINIC | Age: 73
End: 2023-07-25
Payer: COMMERCIAL

## 2023-07-25 DIAGNOSIS — R76.8 POSITIVE HEPATITIS C ANTIBODY TEST: ICD-10-CM

## 2023-07-25 LAB — HOLD SPECIMEN: NORMAL

## 2023-07-25 PROCEDURE — 36415 COLL VENOUS BLD VENIPUNCTURE: CPT

## 2023-07-25 PROCEDURE — 87522 HEPATITIS C REVRS TRNSCRPJ: CPT

## 2023-07-27 ENCOUNTER — ONCOLOGY VISIT (OUTPATIENT)
Dept: ONCOLOGY | Facility: CLINIC | Age: 73
End: 2023-07-27
Attending: INTERNAL MEDICINE
Payer: MEDICARE

## 2023-07-27 VITALS
TEMPERATURE: 98.1 F | RESPIRATION RATE: 16 BRPM | HEIGHT: 63 IN | BODY MASS INDEX: 45.71 KG/M2 | HEART RATE: 87 BPM | OXYGEN SATURATION: 98 % | SYSTOLIC BLOOD PRESSURE: 133 MMHG | DIASTOLIC BLOOD PRESSURE: 67 MMHG | WEIGHT: 258 LBS

## 2023-07-27 DIAGNOSIS — D47.2 MONOCLONAL GAMMOPATHY: Primary | ICD-10-CM

## 2023-07-27 LAB — HCV RNA SERPL NAA+PROBE-ACNC: NOT DETECTED IU/ML

## 2023-07-27 PROCEDURE — G0463 HOSPITAL OUTPT CLINIC VISIT: HCPCS | Performed by: INTERNAL MEDICINE

## 2023-07-27 PROCEDURE — 99213 OFFICE O/P EST LOW 20 MIN: CPT | Performed by: INTERNAL MEDICINE

## 2023-07-27 ASSESSMENT — PAIN SCALES - GENERAL: PAINLEVEL: NO PAIN (0)

## 2023-07-27 NOTE — PROGRESS NOTES
[facility] Hematology and Oncology Progress Note    Patient: Benjamín Hyman  MRN: [id number]  Date of Service: Jul 27, 2023         Reason for Visit  IgG lambda light chain MGUS    History of Present Illness    Mr. Benjamín Hyman seen again today for above-mentioned diagnosis.      Mr. Hyman is a pleasant non-smoker who was previously followed by Dr. Roberts for monoclonal gammopathy of undetermined significance.     I have reviewed previous oncology documentation and it appears that he has been followed for MGUS without active intervention or treatment.       He denies anorexia weight loss, night sweats fever rigors or chills or new palpable masses.           Review of systems.  8 point review of systems obtained and was negative except as mentioned above        Past History    Past Medical History:   Diagnosis Date    Atrial fibrillation (H)     Cardiomyopathy in other diseases classified elsewhere     Chest pain     HLD (hyperlipidemia)     HTN (hypertension)     Ischemia     Morbid obesity (H)        Past Surgical History:   Procedure Laterality Date    CV CORONARY ANGIOGRAM Left 5/31/2019    Procedure: Coronary Angiogram;  Surgeon: Bogdan Ernandez MD;  Location:  HEART CARDIAC CATH LAB    CV LEFT HEART CATH N/A 5/31/2019    Procedure: Left Heart Cath;  Surgeon: Bogdan Ernandez MD;  Location:  HEART CARDIAC CATH LAB    CV LEFT VENTRICULOGRAM N/A 5/31/2019    Procedure: Left Ventriculogram;  Surgeon: Bogdan Ernandez MD;  Location:  HEART CARDIAC CATH LAB    CV PCI STENT DRUG ELUTING N/A 5/31/2019    Procedure: PCI Stent Drug Eluting;  Surgeon: Bogdan Ernandez MD;  Location:  HEART CARDIAC CATH LAB         Physical Exam    There were no vitals taken for this visit.      GENERAL: Alert and oriented to time place and person. Seated comfortably. In no distress.      CHEST: clear to auscultation bilaterally.      CVS: S1 and S2 are heard. Regular rate and rhythm.  No murmur or  gallop or rub heard.      ABDOMEN: Soft. Not tender. Not distended.      Neurological: Alert and oriented, grossly intact    Psychiatric: No apparent distress        Lab Results    Recent Results (from the past 168 hour(s))   Extra Green Top (Lithium Heparin) Tube   Result Value Ref Range    Hold Specimen JIC        Imaging    No results found.    Assessment and Plan    IgG lambda light chain MGUS:  M protein stable at 0.6.  No indication for treatment.  We will continue to follow    Follow-up 6 months with CBC CMP and myeloma work-up    Patient completely understands and agrees with the plan.      Total time spent was over 20 minutes.  This includes chart prep time, review of clinical data including notes from outside physicians, labs, review of medical imaging, discussion about  plan of care, documentation and .    This note has been dictated using voice recognition software. Any grammatical or context distortions are unintentional and inherent to the software     Signed by: Gato Reed MD

## 2023-07-27 NOTE — LETTER
7/27/2023         RE: Benjamín Hyman  [address]  \[facility]\"" [id number]        Dear Colleague,    Thank you for referring your patient, Benjamín Hyman, to the [facility]. Please see a copy of my visit note below.    [facility] Hematology and Oncology Progress Note    Patient: Benjamín Hyman  MRN: [id number]  Date of Service: Jul 27, 2023         Reason for Visit  IgG lambda light chain MGUS    History of Present Illness    Mr. Benjamín Hyman seen again today for above-mentioned diagnosis.      Mr. Hyman is a pleasant non-smoker who was previously followed by Dr. Roberts for monoclonal gammopathy of undetermined significance.     I have reviewed previous oncology documentation and it appears that he has been followed for MGUS without active intervention or treatment.       He denies anorexia weight loss, night sweats fever rigors or chills or new palpable masses.           Review of systems.  8 point review of systems obtained and was negative except as mentioned above        Past History    Past Medical History:   Diagnosis Date     Atrial fibrillation (H)      Cardiomyopathy in other diseases classified elsewhere      Chest pain      HLD (hyperlipidemia)      HTN (hypertension)      Ischemia      Morbid obesity (H)        Past Surgical History:   Procedure Laterality Date     CV CORONARY ANGIOGRAM Left 5/31/2019    Procedure: Coronary Angiogram;  Surgeon: Bogdan Ernandez MD;  Location:  HEART CARDIAC CATH LAB     CV LEFT HEART CATH N/A 5/31/2019    Procedure: Left Heart Cath;  Surgeon: Bogdan Ernandez MD;  Location:  HEART CARDIAC CATH LAB     CV LEFT VENTRICULOGRAM N/A 5/31/2019    Procedure: Left Ventriculogram;  Surgeon: Bogdan Ernandez MD;  Location:  HEART CARDIAC CATH LAB     CV PCI STENT DRUG ELUTING N/A 5/31/2019    Procedure: PCI Stent Drug Eluting;  Surgeon: Bogdan Ernandez MD;  Location:  HEART CARDIAC CATH  "LAB         Physical Exam    There were no vitals taken for this visit.      GENERAL: Alert and oriented to time place and person. Seated comfortably. In no distress.      CHEST: clear to auscultation bilaterally.      CVS: S1 and S2 are heard. Regular rate and rhythm.  No murmur or gallop or rub heard.      ABDOMEN: Soft. Not tender. Not distended.      Neurological: Alert and oriented, grossly intact    Psychiatric: No apparent distress        Lab Results    Recent Results (from the past 168 hour(s))   Extra Green Top (Lithium Heparin) Tube   Result Value Ref Range    Hold Specimen JIC        Imaging    No results found.    Assessment and Plan    IgG lambda light chain MGUS:  M protein stable at 0.6.  No indication for treatment.  We will continue to follow    Follow-up 6 months with CBC CMP and myeloma work-up    Patient completely understands and agrees with the plan.      Total time spent was over 20 minutes.  This includes chart prep time, review of clinical data including notes from outside physicians, labs, review of medical imaging, discussion about  plan of care, documentation and .    This note has been dictated using voice recognition software. Any grammatical or context distortions are unintentional and inherent to the software     Signed by: Gato Reed MD       Oncology Rooming Note    July 27, 2023 9:31 AM   Benjamín Hyman is a 72 year old male who presents for:    Chief Complaint   Patient presents with     Hematology     Monoclonal gammopathy - provider visit only     Initial Vitals: /67 (BP Location: Right arm, Patient Position: Sitting, Cuff Size: Adult Large)   Pulse 87   Temp 98.1  F (36.7  C) (Tympanic)   Resp 16   Ht 1.604 m (5' 3.15\")   Wt 117 kg (258 lb)   SpO2 98%   BMI 45.49 kg/m   Estimated body mass index is 45.49 kg/m  as calculated from the following:    Height as of this encounter: 1.604 m (5' 3.15\").    Weight as of this encounter: 117 kg (258 lb). Body " surface area is 2.28 meters squared.  No Pain (0) Comment: Data Unavailable   No LMP for male patient.  Allergies reviewed: Yes  Medications reviewed: Yes    Medications: Medication refills not needed today.  Pharmacy name entered into [facility]: [id number] - [place], [place] - [facility] [address]     Clinical concerns:  None      Marisa Rivera CMA              Again, thank you for allowing me to participate in the care of your patient.        Sincerely,        Gato Reed MD

## 2023-07-27 NOTE — PROGRESS NOTES
"Oncology Rooming Note    July 27, 2023 9:31 AM   Benjamín Hyman is a 72 year old male who presents for:    Chief Complaint   Patient presents with    Hematology     Monoclonal gammopathy - provider visit only     Initial Vitals: /67 (BP Location: Right arm, Patient Position: Sitting, Cuff Size: Adult Large)   Pulse 87   Temp 98.1  F (36.7  C) (Tympanic)   Resp 16   Ht 1.604 m (5' 3.15\")   Wt 117 kg (258 lb)   SpO2 98%   BMI 45.49 kg/m   Estimated body mass index is 45.49 kg/m  as calculated from the following:    Height as of this encounter: 1.604 m (5' 3.15\").    Weight as of this encounter: 117 kg (258 lb). Body surface area is 2.28 meters squared.  No Pain (0) Comment: Data Unavailable   No LMP for male patient.  Allergies reviewed: Yes  Medications reviewed: Yes    Medications: Medication refills not needed today.  Pharmacy name entered into [employer]: [facility] - [place], [place] - [facility] [address]     Clinical concerns:  None      Marisa Rivera CMA            "

## 2023-08-02 DIAGNOSIS — I48.19 PERSISTENT ATRIAL FIBRILLATION (H): ICD-10-CM

## 2023-08-02 RX ORDER — WARFARIN SODIUM 7.5 MG/1
TABLET ORAL
Qty: 78 TABLET | Refills: 0 | Status: SHIPPED | OUTPATIENT
Start: 2023-08-02 | End: 2023-10-27

## 2023-08-02 NOTE — TELEPHONE ENCOUNTER
ANTICOAGULATION MANAGEMENT:  Medication Refill    Anticoagulation Summary  As of 7/20/2023      Warfarin maintenance plan:  3.75 mg (7.5 mg x 0.5) every Fri; 7.5 mg (7.5 mg x 1) all other days   Next INR check:  8/10/2023   Target end date:  Indefinite    Indications    Long term current use of anticoagulant therapy [Z79.01]  Longstanding persistent atrial fibrillation (H) [I48.11]                 Anticoagulation Care Providers       Provider Role Specialty Phone number    Ruben Teran MD Referring Family Medicine [phone]            Refill Criteria    Visit with referring provider/group: Meets criteria: office visit within referring provider group in the last 1 year on 6/9/23    [facility] referral signed last signed: 06/29/2023; within last year: Yes    Lab monitoring not exceeding 2 weeks overdue:  Yes    Benjamín meets all criteria for refill. Rx instructions and quantity match patient's current dosing plan. Warfarin 90 day supply with 1 refill granted per [facility] protocol     Lenore Campo RN  Anticoagulation Clinic

## 2023-08-03 DIAGNOSIS — I48.19 PERSISTENT ATRIAL FIBRILLATION (H): ICD-10-CM

## 2023-08-03 NOTE — TELEPHONE ENCOUNTER
Pending Prescriptions:                       Disp   Refills    warfarin ANTICOAGULANT (COUMADIN) 7.5 MG *78 tab*0            Sig: TAKE 1/2 (ONE-HALF) TABLET BY MOUTH ONCE DAILY ON           FRIDAYS,  TAKE  1  TAB  BY  MOUTH  ALL  OTHER            DAYS  OR  AS  DIRECTED

## 2023-08-04 RX ORDER — WARFARIN SODIUM 7.5 MG/1
TABLET ORAL
Qty: 78 TABLET | Refills: 0 | OUTPATIENT
Start: 2023-08-04

## 2023-08-11 ENCOUNTER — ANTICOAGULATION THERAPY VISIT (OUTPATIENT)
Dept: ANTICOAGULATION | Facility: CLINIC | Age: 73
End: 2023-08-11

## 2023-08-11 ENCOUNTER — LAB (OUTPATIENT)
Dept: LAB | Facility: CLINIC | Age: 73
End: 2023-08-11
Payer: COMMERCIAL

## 2023-08-11 DIAGNOSIS — Z79.01 LONG TERM CURRENT USE OF ANTICOAGULANT THERAPY: Primary | ICD-10-CM

## 2023-08-11 DIAGNOSIS — Z79.01 LONG TERM CURRENT USE OF ANTICOAGULANT THERAPY: ICD-10-CM

## 2023-08-11 DIAGNOSIS — I48.11 LONGSTANDING PERSISTENT ATRIAL FIBRILLATION (H): ICD-10-CM

## 2023-08-11 LAB — INR BLD: 2.9 (ref 0.9–1.1)

## 2023-08-11 PROCEDURE — 36416 COLLJ CAPILLARY BLOOD SPEC: CPT

## 2023-08-11 PROCEDURE — 85610 PROTHROMBIN TIME: CPT

## 2023-08-11 NOTE — PROGRESS NOTES
ANTICOAGULATION MANAGEMENT     Benjamín Hyman 72 year old male is on warfarin with therapeutic INR result. (Goal INR 2.0-3.0)    Recent labs: (last 7 days)     08/11/23  0814   INR 2.9*       ASSESSMENT     Source(s): Chart Review     Warfarin doses taken: Warfarin taken as instructed  Diet: No new diet changes identified  Medication/supplement changes: None noted  New illness, injury, or hospitalization: No  Signs or symptoms of bleeding or clotting: No  Previous result: Therapeutic last 2(+) visits  Additional findings: None       PLAN     Recommended plan for no diet, medication or health factor changes affecting INR     Dosing Instructions: Continue your current warfarin dose with next INR in 8 weeks       Summary  As of 8/11/2023      Full warfarin instructions:  3.75 mg every Fri; 7.5 mg all other days   Next INR check:  10/6/2023               Telephone call with Benjamín who verbalizes understanding and agrees to plan    Lab visit scheduled    Education provided:   Please call back if any changes to your diet, medications or how you've been taking warfarin  Contact [phone]  with any changes, questions or concerns.     Plan made per [facility] anticoagulation protocol    Bing Sahu RN  Anticoagulation Clinic  8/11/2023    _______________________________________________________________________     Anticoagulation Episode Summary       Current INR goal:  2.0-3.0   TTR:  100.0 % (1 y)   Target end date:  Indefinite   Send INR reminders to:  [facility]    Indications    Long term current use of anticoagulant therapy [Z79.01]  Longstanding persistent atrial fibrillation (H) [I48.11]             Comments:  Dr. Teran Ok with 12 week rechecks.             Anticoagulation Care Providers       Provider Role Specialty Phone number    Ruben Teran MD Referring Family Medicine [phone]

## 2023-09-16 DIAGNOSIS — I48.11 LONGSTANDING PERSISTENT ATRIAL FIBRILLATION (H): ICD-10-CM

## 2023-09-18 RX ORDER — DIGOXIN 250 MCG
TABLET ORAL
Qty: 70 TABLET | Refills: 0 | Status: SHIPPED | OUTPATIENT
Start: 2023-09-18 | End: 2023-12-15

## 2023-09-18 NOTE — TELEPHONE ENCOUNTER
One refill sent, needs to schedule his annual visit with another provider in Dr. Teran's absence.   Candice Crockett, CNP

## 2023-09-19 DIAGNOSIS — I48.11 LONGSTANDING PERSISTENT ATRIAL FIBRILLATION (H): ICD-10-CM

## 2023-09-19 NOTE — TELEPHONE ENCOUNTER
Pending Prescriptions:                       Disp   Refills    digoxin (LANOXIN) 250 MCG tablet          70 tab*0            Sig: TAKE 1 TAB BY MOUTH ON EVEN DAYS AND 1/2 TAB ON           ODD DAYS OF THE MONTH

## 2023-09-20 RX ORDER — DIGOXIN 250 MCG
TABLET ORAL
Qty: 70 TABLET | Refills: 0 | OUTPATIENT
Start: 2023-09-20

## 2023-10-06 ENCOUNTER — ANTICOAGULATION THERAPY VISIT (OUTPATIENT)
Dept: ANTICOAGULATION | Facility: CLINIC | Age: 73
End: 2023-10-06

## 2023-10-06 ENCOUNTER — LAB (OUTPATIENT)
Dept: LAB | Facility: CLINIC | Age: 73
End: 2023-10-06
Payer: COMMERCIAL

## 2023-10-06 ENCOUNTER — ALLIED HEALTH/NURSE VISIT (OUTPATIENT)
Dept: FAMILY MEDICINE | Facility: CLINIC | Age: 73
End: 2023-10-06
Payer: COMMERCIAL

## 2023-10-06 DIAGNOSIS — Z79.01 LONG TERM CURRENT USE OF ANTICOAGULANT THERAPY: ICD-10-CM

## 2023-10-06 DIAGNOSIS — Z79.01 LONG TERM CURRENT USE OF ANTICOAGULANT THERAPY: Primary | ICD-10-CM

## 2023-10-06 DIAGNOSIS — I48.11 LONGSTANDING PERSISTENT ATRIAL FIBRILLATION (H): ICD-10-CM

## 2023-10-06 DIAGNOSIS — Z23 NEED FOR PROPHYLACTIC VACCINATION AND INOCULATION AGAINST INFLUENZA: Primary | ICD-10-CM

## 2023-10-06 LAB — INR BLD: 2.2 (ref 0.9–1.1)

## 2023-10-06 PROCEDURE — 99207 PR NO CHARGE NURSE ONLY: CPT

## 2023-10-06 PROCEDURE — 36416 COLLJ CAPILLARY BLOOD SPEC: CPT

## 2023-10-06 PROCEDURE — 90662 IIV NO PRSV INCREASED AG IM: CPT

## 2023-10-06 PROCEDURE — 85610 PROTHROMBIN TIME: CPT

## 2023-10-06 PROCEDURE — G0008 ADMIN INFLUENZA VIRUS VAC: HCPCS

## 2023-10-06 NOTE — PROGRESS NOTES
ANTICOAGULATION MANAGEMENT     Benjamín Hyman 72 year old male is on warfarin with therapeutic INR result. (Goal INR 2.0-3.0)    Recent labs: (last 7 days)     10/06/23  0924   INR 2.2*       ASSESSMENT     Source(s): Chart Review and Patient/Caregiver Call     Warfarin doses taken: Warfarin taken as instructed  Diet: No new diet changes identified  Medication/supplement changes: None noted  New illness, injury, or hospitalization: No  Signs or symptoms of bleeding or clotting: No  Previous result: Therapeutic last 2(+) visits  Additional findings: None       PLAN     Recommended plan for no diet, medication or health factor changes affecting INR     Dosing Instructions: Continue your current warfarin dose with next INR in 10 weeks       Summary  As of 10/6/2023      Full warfarin instructions:  3.75 mg every Fri; 7.5 mg all other days   Next INR check:  12/15/2023               Telephone call with Benjamín who verbalizes understanding and agrees to plan    Lab visit scheduled    Education provided:   Please call back if any changes to your diet, medications or how you've been taking warfarin  Contact [phone]  with any changes, questions or concerns.     Plan made per [facility] anticoagulation protocol    Bing Sahu RN  Anticoagulation Clinic  10/6/2023    _______________________________________________________________________     Anticoagulation Episode Summary       Current INR goal:  2.0-3.0   TTR:  100.0 % (1 y)   Target end date:  Indefinite   Send INR reminders to:  [facility]    Indications    Long term current use of anticoagulant therapy [Z79.01]  Longstanding persistent atrial fibrillation (H) [I48.11]             Comments:  Dr. Teran Ok with 12 week rechecks.             Anticoagulation Care Providers       Provider Role Specialty Phone number    Ruben Teran MD Referring Family Medicine [phone]

## 2023-10-10 DIAGNOSIS — I25.10 CORONARY ARTERY DISEASE INVOLVING NATIVE CORONARY ARTERY OF NATIVE HEART WITHOUT ANGINA PECTORIS: ICD-10-CM

## 2023-10-11 RX ORDER — ATORVASTATIN CALCIUM 40 MG/1
40 TABLET, FILM COATED ORAL DAILY
Qty: 90 TABLET | Refills: 0 | Status: SHIPPED | OUTPATIENT
Start: 2023-10-11 | End: 2023-10-16

## 2023-10-16 ENCOUNTER — OFFICE VISIT (OUTPATIENT)
Dept: FAMILY MEDICINE | Facility: CLINIC | Age: 73
End: 2023-10-16
Payer: COMMERCIAL

## 2023-10-16 VITALS
HEART RATE: 68 BPM | HEIGHT: 64 IN | WEIGHT: 261.2 LBS | BODY MASS INDEX: 44.59 KG/M2 | TEMPERATURE: 98.6 F | DIASTOLIC BLOOD PRESSURE: 78 MMHG | RESPIRATION RATE: 16 BRPM | SYSTOLIC BLOOD PRESSURE: 128 MMHG

## 2023-10-16 DIAGNOSIS — I48.11 LONGSTANDING PERSISTENT ATRIAL FIBRILLATION (H): ICD-10-CM

## 2023-10-16 DIAGNOSIS — E66.01 MORBID OBESITY DUE TO EXCESS CALORIES (H): ICD-10-CM

## 2023-10-16 DIAGNOSIS — I50.22 CHRONIC SYSTOLIC HEART FAILURE (H): ICD-10-CM

## 2023-10-16 DIAGNOSIS — I10 BENIGN ESSENTIAL HYPERTENSION: ICD-10-CM

## 2023-10-16 DIAGNOSIS — Z00.00 ENCOUNTER FOR MEDICARE ANNUAL WELLNESS EXAM: Primary | ICD-10-CM

## 2023-10-16 DIAGNOSIS — I25.10 CORONARY ARTERY DISEASE INVOLVING NATIVE CORONARY ARTERY OF NATIVE HEART WITHOUT ANGINA PECTORIS: ICD-10-CM

## 2023-10-16 PROCEDURE — 99214 OFFICE O/P EST MOD 30 MIN: CPT | Mod: 25 | Performed by: FAMILY MEDICINE

## 2023-10-16 PROCEDURE — G0439 PPPS, SUBSEQ VISIT: HCPCS | Performed by: FAMILY MEDICINE

## 2023-10-16 RX ORDER — RESPIRATORY SYNCYTIAL VIRUS VACCINE 120MCG/0.5
0.5 KIT INTRAMUSCULAR ONCE
Qty: 1 EACH | Refills: 0 | Status: CANCELLED | OUTPATIENT
Start: 2023-10-16 | End: 2023-10-16

## 2023-10-16 RX ORDER — DIGOXIN 250 MCG
TABLET ORAL
Qty: 70 TABLET | Refills: 0 | Status: CANCELLED | OUTPATIENT
Start: 2023-10-16

## 2023-10-16 RX ORDER — ATORVASTATIN CALCIUM 40 MG/1
40 TABLET, FILM COATED ORAL DAILY
Qty: 90 TABLET | Refills: 3 | Status: SHIPPED | OUTPATIENT
Start: 2023-10-16 | End: 2024-09-11

## 2023-10-16 RX ORDER — LISINOPRIL 40 MG/1
40 TABLET ORAL DAILY
Qty: 90 TABLET | Refills: 3 | Status: SHIPPED | OUTPATIENT
Start: 2023-10-16 | End: 2024-09-11

## 2023-10-16 RX ORDER — METOPROLOL SUCCINATE 200 MG/1
200 TABLET, EXTENDED RELEASE ORAL DAILY
Qty: 90 TABLET | Refills: 3 | Status: SHIPPED | OUTPATIENT
Start: 2023-10-16 | End: 2024-09-11

## 2023-10-16 ASSESSMENT — PAIN SCALES - GENERAL: PAINLEVEL: NO PAIN (0)

## 2023-10-16 ASSESSMENT — ENCOUNTER SYMPTOMS
ALLERGIC/IMMUNOLOGIC NEGATIVE: 1
GASTROINTESTINAL NEGATIVE: 1
NEUROLOGICAL NEGATIVE: 1
EYES NEGATIVE: 1
PSYCHIATRIC NEGATIVE: 1
ENDOCRINE NEGATIVE: 1
HEMATOLOGIC/LYMPHATIC NEGATIVE: 1
CARDIOVASCULAR NEGATIVE: 1
RESPIRATORY NEGATIVE: 1
CONSTITUTIONAL NEGATIVE: 1
MUSCULOSKELETAL NEGATIVE: 1

## 2023-10-16 ASSESSMENT — ACTIVITIES OF DAILY LIVING (ADL): CURRENT_FUNCTION: NO ASSISTANCE NEEDED

## 2023-10-16 NOTE — PROGRESS NOTES
The patient was provided with suggestions to help him develop a healthy physical lifestyle.  Information on urinary incontinence and treatment options given to patient.  The patient was provided with suggestions to help him develop a healthy emotional lifestyle.

## 2023-10-16 NOTE — PATIENT INSTRUCTIONS
Patient Education   Personalized Prevention Plan  You are due for the preventive services outlined below.  Your care team is available to assist you in scheduling these services.  If you have already completed any of these items, please share that information with your care team to update in your medical record.  Health Maintenance Due   Topic Date Due     Heart Failure Action Plan  Never done     Pneumococcal Vaccine (1 - PCV) Never done     Zoster (Shingles) Vaccine (1 of 2) Never done     Diptheria Tetanus Pertussis (DTAP/TDAP/TD) Vaccine (1 - Tdap) Never done     RSV VACCINE 60+ (1 - 1-dose 60+ series) Never done     COVID-19 Vaccine (3 - Moderna risk series) 04/21/2021     Annual Wellness Visit  08/08/2023     Digoxin Lab  08/29/2023     Your Health Risk Assessment indicates you feel you are not in good health    A healthy lifestyle helps keep the body fit and the mind alert. It helps protect you from disease, helps you fight disease, and helps prevent chronic disease (disease that doesn't go away) from getting worse. This is important as you get older and begin to notice twinges in muscles and joints and a decline in the strength and stamina you once took for granted. A healthy lifestyle includes good healthcare, good nutrition, weight control, recreation, and regular exercise. Avoid harmful substances and do what you can to keep safe. Another part of a healthy lifestyle is stay mentally active and socially involved.    Good healthcare   Have a wellness visit every year.   If you have new symptoms, let us know right away. Don't wait until the next checkup.   Take medicines exactly as prescribed and keep your medicines in a safe place. Tell us if your medicine causes problems.   Healthy diet and weight control   Eat 3 or 4 small, nutritious, low-fat, high-fiber meals a day. Include a variety of fruits, vegetables, and whole-grain foods.   Make sure you get enough calcium in your diet. Calcium, vitamin D, and  exercise help prevent osteoporosis (bone thinning).   If you live alone, try eating with others when you can. That way you get a good meal and have company while you eat it.   Try to keep a healthy weight. If you eat more calories than your body uses for energy, it will be stored as fat and you will gain weight.     Recreation   Recreation is not limited to sports and team events. It includes any activity that provides relaxation, interest, enjoyment, and exercise. Recreation provides an outlet for physical, mental, and social energy. It can give a sense of worth and achievement. It can help you stay healthy.    Mental Exercise and Social Involvement  Mental and emotional health is as important as physical health. Keep in touch with friends and family. Stay as active as possible. Continue to learn and challenge yourself.   Things you can do to stay mentally active are:  Learn something new, like a foreign language or musical instrument.   Play SCRABBLE or do crossword puzzles. If you cannot find people to play these games with you at home, you can play them with others on your computer through the Internet.   Join a games club--anything from card games to chess or checkers or lawn bowling.   Start a new hobby.   Go back to school.   Volunteer.   Read.   Keep up with world events.  Bladder Training: Care Instructions  Your Care Instructions     Bladder training is used to treat urge incontinence and stress incontinence. Urge incontinence means that the need to urinate comes on so fast that you can't get to a toilet in time. Stress incontinence means that you leak urine because of pressure on your bladder. For example, it may happen when you laugh, cough, or lift something heavy.  Bladder training can increase how long you can wait before you have to urinate. It can also help your bladder hold more urine. And it can give you better control over the urge to urinate.  It is important to remember that bladder training  takes a few weeks to a few months to make a difference. You may not see results right away, but don't give up.  Follow-up care is a key part of your treatment and safety. Be sure to make and go to all appointments, and call your doctor if you are having problems. It's also a good idea to know your test results and keep a list of the medicines you take.  How can you care for yourself at home?  Work with your doctor to come up with a bladder training program that is right for you. You may use one or more of the following methods.  Delayed urination  In the beginning, try to keep from urinating for 5 minutes after you first feel the need to go.  While you wait, take deep, slow breaths to relax. Kegel exercises can also help you delay the need to go to the bathroom.  After some practice, when you can easily wait 5 minutes to urinate, try to wait 10 minutes before you urinate.  Slowly increase the waiting period until you are able to control when you have to urinate.  Scheduled urination  Empty your bladder when you first wake up in the morning.  Schedule times throughout the day when you will urinate.  Start by going to the bathroom every hour, even if you don't need to go.  Slowly increase the time between trips to the bathroom.  When you have found a schedule that works well for you, keep doing it.  If you wake up during the night and have to urinate, do it. Apply your schedule to waking hours only.  Kegel exercises  These tighten and strengthen pelvic muscles, which can help you control the flow of urine. (If doing these exercises causes pain, stop doing them and talk with your doctor.) To do Kegel exercises:  Squeeze your muscles as if you were trying not to pass gas. Or squeeze your muscles as if you were stopping the flow of urine. Your belly, legs, and buttocks shouldn't move.  Hold the squeeze for 3 seconds, then relax for 5 to 10 seconds.  Start with 3 seconds, then add 1 second each week until you are able to  "squeeze for 10 seconds.  Repeat the exercise 10 times a session. Do 3 to 8 sessions a day.  When should you call for help?  Watch closely for changes in your health, and be sure to contact your doctor if:    Your incontinence is getting worse.     You do not get better as expected.   Where can you learn more?  Go to https://www.[employer].net/patiented  Enter V684 in the search box to learn more about \"Bladder Training: Care Instructions.\"  Current as of: March 1, 2023               Content Version: 13.7    [id number] [employer].   Care instructions adapted under license by your healthcare professional. If you have questions about a medical condition or this instruction, always ask your healthcare professional. [employer] disclaims any warranty or liability for your use of this information.      Your Health Risk Assessment indicates you feel you are not in good emotional health.    Recreation   Recreation is not limited to sports and team events. It includes any activity that provides relaxation, interest, enjoyment, and exercise. Recreation provides an outlet for physical, mental, and social energy. It can give a sense of worth and achievement. It can help you stay healthy.    Mental Exercise and Social Involvement  Mental and emotional health is as important as physical health. Keep in touch with friends and family. Stay as active as possible. Continue to learn and challenge yourself.   Things you can do to stay mentally active are:  Learn something new, like a foreign language or musical instrument.   Play SCRABBLE or do crossword puzzles. If you cannot find people to play these games with you at home, you can play them with others on your computer through the Internet.   Join a games club--anything from card games to chess or checkers or lawn bowling.   Start a new hobby.   Go back to school.   Volunteer.   Read.   Keep up with world events.     "

## 2023-10-16 NOTE — PROGRESS NOTES
"SUBJECTIVE:   Benjamín is a 72 year old who presents for Preventive Visit.    Patient is a 72-year-old male presenting to the clinic for his physical.  His past medical history significant for GERD, hypertension, proximal chronic atrial fibrillation, hyperlipidemia.  He follows with cardiology at he supposed to see them yearly but has yet to make an appointment.  He needs some medication refills.  Unfortunately is not fasting today so he would not be able to get some labs done.  I stressed the need to follow-up with cardiology.  He has no other complaints today.    Preventive measures discussed  Colonoscopy - done in 2014  Vaccines- declined  Labs ordered. Patient will come back when fasting.       10/16/2023     9:00 AM   Additional Questions   Roomed by Renee CAM   Accompanied by self         10/16/2023     9:00 AM   Patient Reported Additional Medications   Patient reports taking the following new medications none       Are you in the first 12 months of your Medicare coverage?  No    Healthy Habits:     In general, how would you rate your overall health?  Fair    Frequency of exercise:  6-7 days/week    Duration of exercise:  Less than 15 minutes    Do you usually eat at least 4 servings of fruit and vegetables a day, include whole grains    & fiber and avoid regularly eating high fat or \"junk\" foods?  Yes    Taking medications regularly:  Yes    Barriers to taking medications:  Not applicable    Medication side effects:  None    Ability to successfully perform activities of daily living:  No assistance needed    Home Safety:  No safety concerns identified    Hearing Impairment:  No hearing concerns    In the past 6 months, have you been bothered by leaking of urine? Yes    In general, how would you rate your overall mental or emotional health?  Fair    Additional concerns today:  Yes (hepatitis c lab clarity)          Have you ever done Advance Care Planning? (For example, a Health Directive, POLST, or a discussion " with a medical provider or your loved ones about your wishes): Yes, advance care planning is on file.       Fall risk  Fallen 2 or more times in the past year?: No  Any fall with injury in the past year?: No    Cognitive Screening   1) Repeat 3 items (Leader, Season, Table)    2) Clock draw: NORMAL  3) 3 item recall: Recalls 2 objects   Results: NORMAL clock, 1-2 items recalled: COGNITIVE IMPAIRMENT LESS LIKELY    Mini-CogTM Copyright BLAYNE Anderson. Licensed by the author for use in [facility]; reprinted with permission (zaira@[facility]). All rights reserved.          Reviewed and updated as needed this visit by clinical staff   Tobacco  Allergies  Meds  Problems  Med Hx  Surg Hx           Reviewed and updated as needed this visit by Provider    Allergies  Meds  Problems  Med Hx  Surg Hx          Social History     Tobacco Use    Smoking status: Former     Types: Cigarettes     Quit date: 1996     Years since quittin.8    Smokeless tobacco: Never   Substance Use Topics    Alcohol use: Not Currently     Comment: Sober since 2017             10/16/2023     9:02 AM   Alcohol Use   AUDIT SCORE  0     Do you have a current opioid prescription? No  Do you use any other controlled substances or medications that are not prescribed by a provider? None          Hypertension Follow-up    Do you check your blood pressure regularly outside of the clinic? Yes   Are you following a low salt diet? Yes  Are your blood pressures ever more than 140 on the top number (systolic) OR more   than 90 on the bottom number (diastolic), for example 140/90? No    Current providers sharing in care for this patient include:   Patient Care Team:  Ruben Teran MD as PCP - General  Ruben Teran MD as Assigned PCP  Roseann Porter APRN CNP as Assigned Heart and Vascular Provider  Eric Reece MD as Assigned Cancer Care Provider    The following health maintenance items are reviewed in Epic and  correct as of today:  Health Maintenance   Topic Date Due    HF ACTION PLAN  Never done    Pneumococcal Vaccine: 65+ Years (1 - PCV) Never done    ZOSTER IMMUNIZATION (1 of 2) Never done    DTAP/TDAP/TD IMMUNIZATION (1 - Tdap) Never done    RSV VACCINE 60+ (1 - 1-dose 60+ series) Never done    COVID-19 Vaccine (3 - Moderna risk series) 04/21/2021    MEDICARE ANNUAL WELLNESS VISIT  08/08/2023    DIGOXIN  08/29/2023    LIPID  12/29/2023    BMP  01/20/2024    ANNUAL REVIEW OF HM ORDERS  06/09/2024    COLORECTAL CANCER SCREENING  06/28/2024    ALT  07/20/2024    CBC  07/20/2024    FALL RISK ASSESSMENT  10/16/2024    ADVANCE CARE PLANNING  10/16/2028    TSH W/FREE T4 REFLEX  Completed    HEPATITIS C SCREENING  Completed    PHQ-2 (once per calendar year)  Completed    INFLUENZA VACCINE  Completed    AORTIC ANEURYSM SCREENING (SYSTEM ASSIGNED)  Completed    IPV IMMUNIZATION  Aged Out    HPV IMMUNIZATION  Aged Out    MENINGITIS IMMUNIZATION  Aged Out     Labs reviewed in EPIC  BP Readings from Last 3 Encounters:   10/16/23 128/78   07/27/23 133/67   06/09/23 126/82    Wt Readings from Last 3 Encounters:   10/16/23 118.5 kg (261 lb 3.2 oz)   07/27/23 117 kg (258 lb)   06/09/23 114.5 kg (252 lb 6.4 oz)                  Patient Active Problem List   Diagnosis    Long term current use of anticoagulant therapy    FAMILY HISTORY OF GI NEOPLASM    Immunization (Tdap) not carried out because of patient refusal    Umbilical hernia    Health Care Home    Advanced directives, counseling/discussion    Hyperlipidemia LDL goal <70    Stomach ulcer    Morbid obesity due to excess calories (H)    Coronary artery disease involving native coronary artery of native heart without angina pectoris    Chronic systolic heart failure (H)    Chronic atrial fibrillation (H)    Status post coronary angiogram    NICM (nonischemic cardiomyopathy) (H)    Benign essential hypertension    Generalized weakness    Pneumonia of both lungs due to infectious  organism, unspecified part of lung    Monoclonal gammopathy    Medial epicondylitis of elbow, right    Muscle strain of right forearm, initial encounter    Longstanding persistent atrial fibrillation (H)     Past Surgical History:   Procedure Laterality Date    CV CORONARY ANGIOGRAM Left 2019    Procedure: Coronary Angiogram;  Surgeon: Bogdan Ernandez MD;  Location:  [facility] C[facility] LAB    CV LEFT HEART CATH N/A 2019    Procedure: Left Heart Cath;  Surgeon: Bogdan Ernandez MD;  Location: [facility]    CV LEFT VENTRICULOGRAM N/A 2019    Procedure: Left Ventriculogram;  Surgeon: Bogdan Ernandez MD;  Location: [facility]    CV PCI STENT DRUG ELUTING N/A 2019    Procedure: PCI Stent Drug Eluting;  Surgeon: Bogdan Ernandez MD;  Location: [facility]       Social History     Tobacco Use    Smoking status: Former     Types: Cigarettes     Quit date: 1996     Years since quittin.8    Smokeless tobacco: Never   Substance Use Topics    Alcohol use: Not Currently     Comment: Sober since 2017     Family History   Problem Relation Age of Onset    Cancer - colorectal Mother     C.A.D. Father     Heart Disease Father     Unknown/Adopted Maternal Grandmother     Unknown/Adopted Paternal Grandmother     Cerebrovascular Disease Paternal Grandfather     Unknown/Adopted Brother     Unknown/Adopted Sister     Depression Daughter     Unknown/Adopted Sister          Current Outpatient Medications   Medication Sig Dispense Refill    acetaminophen (TYLENOL) 325 MG tablet Take 650 mg by mouth every 6 hours as needed for mild pain      aspirin 81 MG EC tablet Take 81 mg by mouth daily      atorvastatin (LIPITOR) 40 MG tablet Take 1 tablet (40 mg) by mouth daily 90 tablet 3    digoxin (LANOXIN) 250 MCG tablet TAKE 1 TAB BY MOUTH ON EVEN DAYS AND 1/2 TAB ON ODD DAYS OF THE MONTH 70 tablet 0    lisinopril (ZESTRIL) 40 MG tablet Take 1  "tablet (40 mg) by mouth daily 90 tablet 3    metoprolol succinate ER (TOPROL XL) 200 MG 24 hr tablet Take 1 tablet (200 mg) by mouth daily 90 tablet 3    omeprazole (PRILOSEC OTC) 20 MG tablet Take 20 mg by mouth daily as needed 30 tablet     order for DME Equipment being ordered: Digital home blood pressure monitor kit 1 each 0    warfarin ANTICOAGULANT (COUMADIN) 7.5 MG tablet TAKE 1/2 (ONE-HALF) TABLET BY MOUTH ONCE DAILY ON  FRIDAYS,  TAKE  1  TAB  BY  MOUTH  ALL  OTHER  DAYS  OR  AS  DIRECTED 78 tablet 0     Allergies   Allergen Reactions    Latex     Adhesive Tape Rash     Reacted to the EKG stickers             Review of Systems   Constitutional: Negative.    HENT: Negative.     Eyes: Negative.    Respiratory: Negative.     Cardiovascular: Negative.    Gastrointestinal: Negative.    Endocrine: Negative.    Genitourinary: Negative.    Musculoskeletal: Negative.    Skin: Negative.    Allergic/Immunologic: Negative.    Neurological: Negative.    Hematological: Negative.    Psychiatric/Behavioral: Negative.           OBJECTIVE:   /78 (BP Location: Right arm, Patient Position: Sitting, Cuff Size: Adult Regular)   Pulse 68   Temp 98.6  F (37  C) (Tympanic)   Resp 16   Ht 1.613 m (5' 3.5\")   Wt 118.5 kg (261 lb 3.2 oz)   BMI 45.54 kg/m   Estimated body mass index is 45.54 kg/m  as calculated from the following:    Height as of this encounter: 1.613 m (5' 3.5\").    Weight as of this encounter: 118.5 kg (261 lb 3.2 oz).  Physical Exam  GENERAL: healthy, alert and no distress  EYES: Eyes grossly normal to inspection, PERRL and conjunctivae and sclerae normal  HENT: ear canals and TM's normal, nose and mouth without ulcers or lesions  NECK: no adenopathy, no asymmetry, masses, or scars and thyroid normal to palpation  RESP: lungs clear to auscultation - no rales, rhonchi or wheezes  CV: regular rate and rhythm, normal S1 S2, no S3 or S4, no murmur, click or rub, no peripheral edema and peripheral pulses " "strong  ABDOMEN: soft, nontender, no hepatosplenomegaly, no masses and bowel sounds normal  MS: no gross musculoskeletal defects noted, no edema  SKIN: no suspicious lesions or rashes  PSYCH: mentation appears normal, affect normal/bright    Diagnostic Test Results:  Labs reviewed in Epic  none     ASSESSMENT / PLAN:       ICD-10-CM    1. Encounter for Medicare annual wellness exam  Z00.00       2. Coronary artery disease involving native coronary artery of native heart without angina pectoris  I25.10 atorvastatin (LIPITOR) 40 MG tablet     Lipid panel reflex to direct LDL Fasting      3. Benign essential hypertension  I10 lisinopril (ZESTRIL) 40 MG tablet     metoprolol succinate ER (TOPROL XL) 200 MG 24 hr tablet     Basic metabolic panel  (Ca, Cl, CO2, Creat, Gluc, K, Na, BUN)      4. Longstanding persistent atrial fibrillation (H)  I48.11 Digoxin level      5. Chronic systolic heart failure (H)  I50.22       6. Morbid obesity due to excess calories (H)  E66.01         72 yr old male here for his annual physical.    He denies any acute symptoms. He is requesting some medication refills    Labs ordered. Encouraged that patient schedule follow up with cardiologist.   Patient has been advised of split billing requirements and indicates understanding: Yes      COUNSELING:  Reviewed preventive health counseling, as reflected in patient instructions       Regular exercise       Healthy diet/nutrition      BMI:   Estimated body mass index is 45.54 kg/m  as calculated from the following:    Height as of this encounter: 1.613 m (5' 3.5\").    Weight as of this encounter: 118.5 kg (261 lb 3.2 oz).   Weight management plan: Discussed healthy diet and exercise guidelines      He reports that he quit smoking about 27 years ago. His smoking use included cigarettes. He has never used smokeless tobacco.      Appropriate preventive services were discussed with this patient, including applicable screening as appropriate for fall " prevention, nutrition, physical activity, Tobacco-use cessation, weight loss and cognition.  Checklist reviewing preventive services available has been given to the patient.    Reviewed patients plan of care and provided an AVS. The Basic Care Plan (routine screening as documented in Health Maintenance) for Benjamín meets the Care Plan requirement. This Care Plan has been established and reviewed with the Patient.        Mk Babin MD  [facility]    Identified Health Risks:

## 2023-10-17 DIAGNOSIS — I10 BENIGN ESSENTIAL HYPERTENSION: ICD-10-CM

## 2023-10-17 RX ORDER — METOPROLOL SUCCINATE 200 MG/1
200 TABLET, EXTENDED RELEASE ORAL DAILY
Qty: 90 TABLET | Refills: 3 | OUTPATIENT
Start: 2023-10-17

## 2023-10-17 NOTE — TELEPHONE ENCOUNTER
Pending Prescriptions:                       Disp   Refills    metoprolol succinate ER (TOPROL XL) 200 M*90 tab*3            Sig: Take 1 tablet (200 mg) by mouth daily

## 2023-10-18 ENCOUNTER — LAB (OUTPATIENT)
Dept: LAB | Facility: CLINIC | Age: 73
End: 2023-10-18
Payer: COMMERCIAL

## 2023-10-18 DIAGNOSIS — I10 BENIGN ESSENTIAL HYPERTENSION: ICD-10-CM

## 2023-10-18 DIAGNOSIS — I25.10 CORONARY ARTERY DISEASE INVOLVING NATIVE CORONARY ARTERY OF NATIVE HEART WITHOUT ANGINA PECTORIS: ICD-10-CM

## 2023-10-18 DIAGNOSIS — I48.11 LONGSTANDING PERSISTENT ATRIAL FIBRILLATION (H): ICD-10-CM

## 2023-10-18 LAB
ANION GAP SERPL CALCULATED.3IONS-SCNC: 12 MMOL/L (ref 7–15)
BUN SERPL-MCNC: 15 MG/DL (ref 8–23)
CALCIUM SERPL-MCNC: 8.6 MG/DL (ref 8.8–10.2)
CHLORIDE SERPL-SCNC: 103 MMOL/L (ref 98–107)
CHOLEST SERPL-MCNC: 93 MG/DL
CREAT SERPL-MCNC: 0.96 MG/DL (ref 0.67–1.17)
DEPRECATED HCO3 PLAS-SCNC: 25 MMOL/L (ref 22–29)
DIGOXIN SERPL-MCNC: 0.7 NG/ML (ref 0.6–2)
EGFRCR SERPLBLD CKD-EPI 2021: 84 ML/MIN/1.73M2
GLUCOSE SERPL-MCNC: 104 MG/DL (ref 70–99)
HDLC SERPL-MCNC: 29 MG/DL
LDLC SERPL CALC-MCNC: 35 MG/DL
NONHDLC SERPL-MCNC: 64 MG/DL
POTASSIUM SERPL-SCNC: 4.2 MMOL/L (ref 3.4–5.3)
SODIUM SERPL-SCNC: 140 MMOL/L (ref 135–145)
TRIGL SERPL-MCNC: 143 MG/DL

## 2023-10-18 PROCEDURE — 80048 BASIC METABOLIC PNL TOTAL CA: CPT

## 2023-10-18 PROCEDURE — 36415 COLL VENOUS BLD VENIPUNCTURE: CPT

## 2023-10-18 PROCEDURE — 80061 LIPID PANEL: CPT

## 2023-10-18 PROCEDURE — 80162 ASSAY OF DIGOXIN TOTAL: CPT

## 2023-10-18 NOTE — LETTER
October 18, 2023      Benjamín Hyman  [address] [place]   \[facility]\"" [id number]        Dear ,    We are writing to inform you of your test results.    Your test results fall within the expected range(s) or remain unchanged from previous results.  Please continue with current treatment plan.    Resulted Orders   Lipid panel reflex to direct LDL Fasting   Result Value Ref Range    Cholesterol 93 <200 mg/dL    Triglycerides 143 <150 mg/dL    Direct Measure HDL 29 (L) >=40 mg/dL    LDL Cholesterol Calculated 35 <=100 mg/dL    Non HDL Cholesterol 64 <130 mg/dL    Narrative    Cholesterol  Desirable:  <200 mg/dL    Triglycerides  Normal:  Less than 150 mg/dL  Borderline High:  150-199 mg/dL  High:  200-499 mg/dL  Very High:  Greater than or equal to 500 mg/dL    Direct Measure HDL  Female:  Greater than or equal to 50 mg/dL   Male:  Greater than or equal to 40 mg/dL    LDL Cholesterol  Desirable:  <100mg/dL  Above Desirable:  100-129 mg/dL   Borderline High:  130-159 mg/dL   High:  160-189 mg/dL   Very High:  >= 190 mg/dL    Non HDL Cholesterol  Desirable:  130 mg/dL  Above Desirable:  130-159 mg/dL  Borderline High:  160-189 mg/dL  High:  190-219 mg/dL  Very High:  Greater than or equal to 220 mg/dL   Basic metabolic panel  (Ca, Cl, CO2, Creat, Gluc, K, Na, BUN)   Result Value Ref Range    Sodium 140 135 - 145 mmol/L      Comment:      Reference intervals for this test were updated on 09/26/2023 to more accurately reflect our healthy population. There may be differences in the flagging of prior results with similar values performed with this method. Interpretation of those prior results can be made in the context of the updated reference intervals.     Potassium 4.2 3.4 - 5.3 mmol/L    Chloride 103 98 - 107 mmol/L    Carbon Dioxide (CO2) 25 22 - 29 mmol/L    Anion Gap 12 7 - 15 mmol/L    Urea Nitrogen 15.0 8.0 - 23.0 mg/dL    Creatinine 0.96 0.67 - 1.17 mg/dL    GFR Estimate 84 >60 mL/min/1.73m2     Calcium 8.6 (L) 8.8 - 10.2 mg/dL    Glucose 104 (H) 70 - 99 mg/dL   Digoxin level   Result Value Ref Range    Digoxin 0.7 0.6 - 2.0 ng/mL      Comment:      Therapeutic Range:  Adults: 0.6-1.2 ng/mL    The reference range for infants (less than 3 mo) is thought to be 3.0-4.0 ng/mL; infants tolerate more digoxin because they have more binding sites and an increased metabolic rate.       If you have any questions or concerns, please call the clinic at the number listed above.       Sincerely,      Mk aBbin MD

## 2023-10-18 NOTE — RESULT ENCOUNTER NOTE
Please inform patient that test result was within normal parameters except the good cholesterol was quite low.   Blood glucose was a bit abnormal.  Thank you.     Mk Babin M.D.

## 2023-10-27 DIAGNOSIS — I48.19 PERSISTENT ATRIAL FIBRILLATION (H): Primary | ICD-10-CM

## 2023-10-27 RX ORDER — WARFARIN SODIUM 7.5 MG/1
TABLET ORAL
Qty: 90 TABLET | Refills: 1 | Status: SHIPPED | OUTPATIENT
Start: 2023-10-27 | End: 2024-05-24

## 2023-10-27 NOTE — TELEPHONE ENCOUNTER
Pending Prescriptions:                       Disp   Refills    warfarin ANTICOAGULANT (COUMADIN) 7.5 MG t*78 tab*0        Sig: TAKE 1/2 (ONE-HALF) TABLET BY MOUTH ONCE DAILY ON            FRIDAYS  AND  1  TAB  BY  MOUTH  ALL  OTHER  DAYS           OR  AS  DIRECTED    Routing refill request to provider for review/approval because:  Routing to [facility] for review    Josselin Wray RN  [facility]

## 2023-10-27 NOTE — TELEPHONE ENCOUNTER
ANTICOAGULATION MANAGEMENT:  Medication Refill    Anticoagulation Summary  As of 10/6/2023      Warfarin maintenance plan:  3.75 mg (7.5 mg x 0.5) every Fri; 7.5 mg (7.5 mg x 1) all other days   Next INR check:  12/15/2023   Target end date:  Indefinite    Indications    Long term current use of anticoagulant therapy [Z79.01]  Longstanding persistent atrial fibrillation (H) [I48.11]                 Anticoagulation Care Providers       Provider Role Specialty Phone number    Ruben Teran MD Referring Family Medicine [phone]            Refill Criteria    Visit with referring provider/group: Meets criteria: office visit within referring provider group in the last 1 year on 10/16/23    [facility] referral signed last signed: 06/29/2023; within last year: Yes    Lab monitoring not exceeding 2 weeks overdue: Yes    Benjamín meets all criteria for refill. Rx instructions and quantity supplied updated to match patient's current dosing plan. Warfarin 90 day supply with 1 refill granted per [facility] protocol     Tahmina Nice RN  Anticoagulation Clinic

## 2023-12-15 DIAGNOSIS — I48.11 LONGSTANDING PERSISTENT ATRIAL FIBRILLATION (H): ICD-10-CM

## 2023-12-15 RX ORDER — DIGOXIN 250 MCG
TABLET ORAL
Qty: 70 TABLET | Refills: 0 | Status: SHIPPED | OUTPATIENT
Start: 2023-12-15 | End: 2024-03-15

## 2023-12-15 NOTE — TELEPHONE ENCOUNTER
Patient calls into clinic to request refill of digoxin.   He has 7 tablets left. Last seen in clinic 10/16/23. Digoxin level 0.7.   Med pended.     Lore Beal RN

## 2023-12-18 DIAGNOSIS — I48.11 LONGSTANDING PERSISTENT ATRIAL FIBRILLATION (H): ICD-10-CM

## 2023-12-19 RX ORDER — DIGOXIN 250 MCG
TABLET ORAL
Qty: 70 TABLET | Refills: 0 | OUTPATIENT
Start: 2023-12-19

## 2023-12-21 ENCOUNTER — ANTICOAGULATION THERAPY VISIT (OUTPATIENT)
Dept: ANTICOAGULATION | Facility: CLINIC | Age: 73
End: 2023-12-21

## 2023-12-21 ENCOUNTER — LAB (OUTPATIENT)
Dept: LAB | Facility: CLINIC | Age: 73
End: 2023-12-21
Payer: COMMERCIAL

## 2023-12-21 DIAGNOSIS — I48.11 LONGSTANDING PERSISTENT ATRIAL FIBRILLATION (H): ICD-10-CM

## 2023-12-21 DIAGNOSIS — Z79.01 LONG TERM CURRENT USE OF ANTICOAGULANT THERAPY: ICD-10-CM

## 2023-12-21 DIAGNOSIS — Z79.01 LONG TERM CURRENT USE OF ANTICOAGULANT THERAPY: Primary | ICD-10-CM

## 2023-12-21 LAB — INR BLD: 3.2 (ref 0.9–1.1)

## 2023-12-21 PROCEDURE — 85610 PROTHROMBIN TIME: CPT

## 2023-12-21 PROCEDURE — 36416 COLLJ CAPILLARY BLOOD SPEC: CPT

## 2023-12-21 NOTE — PROGRESS NOTES
ANTICOAGULATION MANAGEMENT     Benjamín Hyman 73 year old male is on warfarin with supratherapeutic INR result. (Goal INR 2.0-3.0)    Recent labs: (last 7 days)     12/21/23  1409   INR 3.2*       ASSESSMENT     Source(s): Chart Review and Patient/Caregiver Call     Warfarin doses taken: Warfarin taken as instructed  Diet: No new diet changes identified  Medication/supplement changes:  taking probiotics  New illness, injury, or hospitalization: Yes: was sick a couple of weeks ago  Signs or symptoms of bleeding or clotting: No  Previous result: Therapeutic last 2(+) visits  Additional findings: None       PLAN     Recommended plan for no diet, medication or health factor changes affecting INR     Dosing Instructions: Continue your current warfarin dose with next INR in 2 weeks       Summary  As of 12/21/2023      Full warfarin instructions:  3.75 mg every Fri; 7.5 mg all other days   Next INR check:  1/5/2024               Telephone call with Benjamín who verbalizes understanding and agrees to plan    Lab visit scheduled    Education provided:   Goal range and lab monitoring: goal range and significance of current result    Plan made per [facility] anticoagulation protocol    Lenore Campo RN  Anticoagulation Clinic  12/21/2023    _______________________________________________________________________     Anticoagulation Episode Summary       Current INR goal:  2.0-3.0   TTR:  95.8% (1 y)   Target end date:  Indefinite   Send INR reminders to:  [facility]    Indications    Long term current use of anticoagulant therapy [Z79.01]  Longstanding persistent atrial fibrillation (H) [I48.11]             Comments:  Dr. Teran Ok with 12 week rechecks.             Anticoagulation Care Providers       Provider Role Specialty Phone number    Ruben Teran MD Referring Family Medicine [phone]

## 2024-01-05 ENCOUNTER — ANTICOAGULATION THERAPY VISIT (OUTPATIENT)
Dept: ANTICOAGULATION | Facility: CLINIC | Age: 74
End: 2024-01-05

## 2024-01-05 ENCOUNTER — LAB (OUTPATIENT)
Dept: LAB | Facility: CLINIC | Age: 74
End: 2024-01-05
Payer: COMMERCIAL

## 2024-01-05 DIAGNOSIS — I48.11 LONGSTANDING PERSISTENT ATRIAL FIBRILLATION (H): ICD-10-CM

## 2024-01-05 DIAGNOSIS — Z79.01 LONG TERM CURRENT USE OF ANTICOAGULANT THERAPY: ICD-10-CM

## 2024-01-05 DIAGNOSIS — Z79.01 LONG TERM CURRENT USE OF ANTICOAGULANT THERAPY: Primary | ICD-10-CM

## 2024-01-05 LAB — INR BLD: 3 (ref 0.9–1.1)

## 2024-01-05 PROCEDURE — 85610 PROTHROMBIN TIME: CPT

## 2024-01-05 PROCEDURE — 36416 COLLJ CAPILLARY BLOOD SPEC: CPT

## 2024-01-05 NOTE — PROGRESS NOTES
ANTICOAGULATION MANAGEMENT     Benjamín Hyman 73 year old male is on warfarin with therapeutic INR result. (Goal INR 2.0-3.0)    Recent labs: (last 7 days)     01/05/24  0841   INR 3.0*       ASSESSMENT     Source(s): Chart Review and Patient/Caregiver Call     Warfarin doses taken: More warfarin taken than planned which may be affecting INR  Diet: No new diet changes identified  Medication/supplement changes: None noted  New illness, injury, or hospitalization: No  Signs or symptoms of bleeding or clotting: No  Previous result: Supratherapeutic  Additional findings: None       PLAN     Recommended plan for temporary change(s) affecting INR     Dosing Instructions: Continue your current warfarin dose with next INR in 3 weeks       Summary  As of 1/5/2024      Full warfarin instructions:  3.75 mg every Fri; 7.5 mg all other days   Next INR check:  1/22/2024               Telephone call with Benjamín who verbalizes understanding and agrees to plan    Lab visit scheduled    Education provided:   Please call back if any changes to your diet, medications or how you've been taking warfarin  Goal range and lab monitoring: goal range and significance of current result    Plan made per [facility] anticoagulation protocol    Tahmina Nice RN  Anticoagulation Clinic  1/5/2024    _______________________________________________________________________     Anticoagulation Episode Summary       Current INR goal:  2.0-3.0   TTR:  91.8% (1 y)   Target end date:  Indefinite   Send INR reminders to:  [facility]    Indications    Long term current use of anticoagulant therapy [Z79.01]  Longstanding persistent atrial fibrillation (H) [I48.11]             Comments:  Dr. Teran Ok with 12 week rechecks.             Anticoagulation Care Providers       Provider Role Specialty Phone number    Ruben Teran MD Referring Family Medicine [phone]

## 2024-01-22 ENCOUNTER — LAB (OUTPATIENT)
Dept: LAB | Facility: CLINIC | Age: 74
End: 2024-01-22
Payer: COMMERCIAL

## 2024-01-22 ENCOUNTER — DOCUMENTATION ONLY (OUTPATIENT)
Dept: CARDIOLOGY | Facility: CLINIC | Age: 74
End: 2024-01-22

## 2024-01-22 ENCOUNTER — ANTICOAGULATION THERAPY VISIT (OUTPATIENT)
Dept: ANTICOAGULATION | Facility: CLINIC | Age: 74
End: 2024-01-22

## 2024-01-22 DIAGNOSIS — D47.2 MONOCLONAL GAMMOPATHY: ICD-10-CM

## 2024-01-22 DIAGNOSIS — Z79.01 LONG TERM CURRENT USE OF ANTICOAGULANT THERAPY: ICD-10-CM

## 2024-01-22 DIAGNOSIS — I48.11 LONGSTANDING PERSISTENT ATRIAL FIBRILLATION (H): ICD-10-CM

## 2024-01-22 DIAGNOSIS — Z79.01 LONG TERM CURRENT USE OF ANTICOAGULANT THERAPY: Primary | ICD-10-CM

## 2024-01-22 LAB
ALBUMIN SERPL BCG-MCNC: 3.9 G/DL (ref 3.5–5.2)
ALP SERPL-CCNC: 120 U/L (ref 40–150)
ALT SERPL W P-5'-P-CCNC: 12 U/L (ref 0–70)
ANION GAP SERPL CALCULATED.3IONS-SCNC: 12 MMOL/L (ref 7–15)
AST SERPL W P-5'-P-CCNC: 24 U/L (ref 0–45)
BASOPHILS # BLD AUTO: 0 10E3/UL (ref 0–0.2)
BASOPHILS NFR BLD AUTO: 0 %
BILIRUB SERPL-MCNC: 0.5 MG/DL
BUN SERPL-MCNC: 18.1 MG/DL (ref 8–23)
CALCIUM SERPL-MCNC: 9.4 MG/DL (ref 8.8–10.2)
CHLORIDE SERPL-SCNC: 104 MMOL/L (ref 98–107)
CREAT SERPL-MCNC: 1.01 MG/DL (ref 0.67–1.17)
DEPRECATED HCO3 PLAS-SCNC: 22 MMOL/L (ref 22–29)
EGFRCR SERPLBLD CKD-EPI 2021: 79 ML/MIN/1.73M2
EOSINOPHIL # BLD AUTO: 0 10E3/UL (ref 0–0.7)
EOSINOPHIL NFR BLD AUTO: 0 %
ERYTHROCYTE [DISTWIDTH] IN BLOOD BY AUTOMATED COUNT: 12.7 % (ref 10–15)
GLUCOSE SERPL-MCNC: 103 MG/DL (ref 70–99)
HCT VFR BLD AUTO: 48.2 % (ref 40–53)
HGB BLD-MCNC: 15.5 G/DL (ref 13.3–17.7)
HOLD SPECIMEN: NORMAL
IMM GRANULOCYTES # BLD: 0 10E3/UL
IMM GRANULOCYTES NFR BLD: 0 %
INR BLD: 2.5 (ref 0.9–1.1)
LYMPHOCYTES # BLD AUTO: 2.9 10E3/UL (ref 0.8–5.3)
LYMPHOCYTES NFR BLD AUTO: 23 %
MCH RBC QN AUTO: 30.3 PG (ref 26.5–33)
MCHC RBC AUTO-ENTMCNC: 32.2 G/DL (ref 31.5–36.5)
MCV RBC AUTO: 94 FL (ref 78–100)
MONOCYTES # BLD AUTO: 0.8 10E3/UL (ref 0–1.3)
MONOCYTES NFR BLD AUTO: 7 %
NEUTROPHILS # BLD AUTO: 8.7 10E3/UL (ref 1.6–8.3)
NEUTROPHILS NFR BLD AUTO: 69 %
PLATELET # BLD AUTO: 272 10E3/UL (ref 150–450)
POTASSIUM SERPL-SCNC: 5.1 MMOL/L (ref 3.4–5.3)
PROT SERPL-MCNC: 7.5 G/DL (ref 6.4–8.3)
RBC # BLD AUTO: 5.12 10E6/UL (ref 4.4–5.9)
SODIUM SERPL-SCNC: 138 MMOL/L (ref 135–145)
TOTAL PROTEIN SERUM FOR ELP: 7.2 G/DL (ref 6.4–8.3)
WBC # BLD AUTO: 12.5 10E3/UL (ref 4–11)

## 2024-01-22 PROCEDURE — 84165 PROTEIN E-PHORESIS SERUM: CPT | Performed by: PATHOLOGY

## 2024-01-22 PROCEDURE — 85610 PROTHROMBIN TIME: CPT

## 2024-01-22 PROCEDURE — 84155 ASSAY OF PROTEIN SERUM: CPT | Mod: 59

## 2024-01-22 PROCEDURE — 85025 COMPLETE CBC W/AUTO DIFF WBC: CPT

## 2024-01-22 PROCEDURE — 36415 COLL VENOUS BLD VENIPUNCTURE: CPT

## 2024-01-22 PROCEDURE — 83521 IG LIGHT CHAINS FREE EACH: CPT

## 2024-01-22 PROCEDURE — 80053 COMPREHEN METABOLIC PANEL: CPT

## 2024-01-22 NOTE — PROGRESS NOTES
Patient was in for labs and the cardiology labs were old and the same labs had been completed in between the two timeframes.  However, I did collect the specimens needed for those orders.  If cardiology labs are still needed please place add-on orders.  Thanks!

## 2024-01-22 NOTE — PROGRESS NOTES
ANTICOAGULATION MANAGEMENT     Benjamín Hyman 73 year old male is on warfarin with therapeutic INR result. (Goal INR 2.0-3.0)    Recent labs: (last 7 days)     01/22/24  0941   INR 2.5*       ASSESSMENT     Source(s): Chart Review and Patient/Caregiver Call     Warfarin doses taken: Warfarin taken as instructed  Diet: No new diet changes identified  Medication/supplement changes: None noted  New illness, injury, or hospitalization: No  Signs or symptoms of bleeding or clotting: No  Previous result: Therapeutic last visit; previously outside of goal range  Additional findings: None       PLAN     Recommended plan for no diet, medication or health factor changes affecting INR     Dosing Instructions: Continue your current warfarin dose with next INR in 4 weeks       Summary  As of 1/22/2024      Full warfarin instructions:  3.75 mg every Fri; 7.5 mg all other days   Next INR check:  2/19/2024               Telephone call with Benjamín who verbalizes understanding and agrees to plan    Lab visit scheduled    Education provided:   Please call back if any changes to your diet, medications or how you've been taking warfarin  Contact [phone]  with any changes, questions or concerns.     Plan made per [facility] anticoagulation protocol    Bing Sahu RN  Anticoagulation Clinic  1/22/2024    _______________________________________________________________________     Anticoagulation Episode Summary       Current INR goal:  2.0-3.0   TTR:  91.8% (1 y)   Target end date:  Indefinite   Send INR reminders to:  [facility]    Indications    Long term current use of anticoagulant therapy [Z79.01]  Longstanding persistent atrial fibrillation (H) [I48.11]             Comments:  Dr. Teran Ok with 12 week rechecks.             Anticoagulation Care Providers       Provider Role Specialty Phone number    Ruben Teran MD Referring Family Medicine [phone]

## 2024-01-23 LAB
ALBUMIN SERPL ELPH-MCNC: 3.9 G/DL (ref 3.7–5.1)
ALPHA1 GLOB SERPL ELPH-MCNC: 0.3 G/DL (ref 0.2–0.4)
ALPHA2 GLOB SERPL ELPH-MCNC: 0.9 G/DL (ref 0.5–0.9)
B-GLOBULIN SERPL ELPH-MCNC: 0.7 G/DL (ref 0.6–1)
GAMMA GLOB SERPL ELPH-MCNC: 1.3 G/DL (ref 0.7–1.6)
KAPPA LC FREE SER-MCNC: 4.3 MG/DL (ref 0.33–1.94)
KAPPA LC FREE/LAMBDA FREE SER NEPH: 1.42 {RATIO} (ref 0.26–1.65)
LAMBDA LC FREE SERPL-MCNC: 3.02 MG/DL (ref 0.57–2.63)
LOCATION OF TASK: ABNORMAL
M PROTEIN SERPL ELPH-MCNC: 0.5 G/DL
PROT PATTERN SERPL ELPH-IMP: ABNORMAL

## 2024-01-29 ENCOUNTER — ONCOLOGY VISIT (OUTPATIENT)
Dept: ONCOLOGY | Facility: CLINIC | Age: 74
End: 2024-01-29
Attending: NURSE PRACTITIONER
Payer: COMMERCIAL

## 2024-01-29 VITALS
HEART RATE: 79 BPM | OXYGEN SATURATION: 97 % | BODY MASS INDEX: 46.25 KG/M2 | HEIGHT: 63 IN | SYSTOLIC BLOOD PRESSURE: 134 MMHG | RESPIRATION RATE: 12 BRPM | WEIGHT: 261 LBS | TEMPERATURE: 98 F | DIASTOLIC BLOOD PRESSURE: 58 MMHG

## 2024-01-29 DIAGNOSIS — D47.2 MONOCLONAL GAMMOPATHY: Primary | ICD-10-CM

## 2024-01-29 PROCEDURE — G0463 HOSPITAL OUTPT CLINIC VISIT: HCPCS | Performed by: NURSE PRACTITIONER

## 2024-01-29 PROCEDURE — 99214 OFFICE O/P EST MOD 30 MIN: CPT | Performed by: NURSE PRACTITIONER

## 2024-01-29 ASSESSMENT — PAIN SCALES - GENERAL: PAINLEVEL: NO PAIN (0)

## 2024-01-29 NOTE — PROGRESS NOTES
"Oncology Rooming Note    January 29, 2024 1:07 PM   Benjamín Hyman is a 73 year old male who presents for:    Chief Complaint   Patient presents with    Hematology     Monoclonal gammopathy - Provider visit only     Initial Vitals: /58 (BP Location: Right arm, Patient Position: Sitting, Cuff Size: Adult Large)   Pulse 79   Temp 98  F (36.7  C) (Tympanic)   Resp 12   Ht 1.604 m (5' 3.15\")   Wt 118.4 kg (261 lb)   SpO2 97%   BMI 46.01 kg/m   Estimated body mass index is 46.01 kg/m  as calculated from the following:    Height as of this encounter: 1.604 m (5' 3.15\").    Weight as of this encounter: 118.4 kg (261 lb). Body surface area is 2.3 meters squared.  No Pain (0) Comment: Data Unavailable   No LMP for male patient.  Allergies reviewed: Yes  Medications reviewed: Yes    Medications: Medication refills not needed today.  Pharmacy name entered into [employer]: [facility] [place] [address] [facility]    Frailty Screening:   Is the patient here for a new oncology consult visit in cancer care? 2. No      Clinical concerns:  None      Marisa Rivera CMA              "

## 2024-01-29 NOTE — PROGRESS NOTES
[facility] Hematology and Oncology Outpatient Progress Note    Patient: Benjamín Hyman  MRN: [id number]  Date of Service: Jan 29, 2024          Reason for Visit    MGUS      Assessment/Plan  MGUS, IgG lambda  Kappa LC 4.3, Lambda LC 3.02, K/L ratio WNL  M peak: 0.5  CBC: WBC 12.5/ANC 8.7; hgb and plat WNL  CMP WNL    Labs are stable.  Clinically, stable.     Plan:  -Continue every 6 mth lab surveillance. Return in 1 yr    2.  Urinary changes  Recommend he follow up with PCP    3.  Cardiomyopathy  Appears to be stable. Managed by Cardiology.  ______________________________________________________________________________    History of Present Illness/ Interval History    Mr. Benjamín Hyman  is a 73 year old with MGUS. Returns for 6-mth eval and labs.    No weight loss, night sweats, new pain.     Difficulty with emptying bladder over last 6 mths. No hematuria. No dysuria.    ECOG Performance    0      Oncology History/Treatment  Diagnosis/Stage:   2020: MGUS, IgG lambda  -diagnosed with cardiomyopathy with long-standing cardiac hx (HTN, atrial fib, CAD). Cardiac MRI showed borderline L ventricle dilatation, EF 31% with global  hypokinesia, mild RV hypokinesis, mod-severe biatrial enlargement. Raised question of cardiac amyloidosis, so referred to hematology.  -[place] LC 2.8, lambda LC 2.8, K/L ratio 0.99. IgG 1530, IgA 190, IgM 22.  SPEP: m peak 0.3  -Urine negative for gammopathy.  -Serum immunofixation: one large and second very small monoclonal IgG of lambda light chain type noted and possible very small monoclonal IgG immunoglobulin of kappa light chain type.    -Bone marrow biopsy discussed, pt refused    Treatment:  Observation      Physical Exam    GENERAL: Alert and oriented to time place and person. Seated comfortably. In no distress.  HEAD: Atraumatic and normocephalic. No alopecia.  EYES: YULISSA, EOMI. No erythema. No icterus.  LYMPH NODES: No palpable supraclavicular, cervical, axillary  lymphadenopathy.  CHEST: clear to auscultation bilaterally. Resonant to percussion throughout bilaterally. Symmetrical breath movements bilaterally.  CVS: RRR  ABDOMEN: Soft. Not tender. Not distended. No palpable hepatomegaly or splenomegaly. No other mass palpable. Bowel sounds present.  EXTREMITIES: Warm. No peripheral edema.  SKIN: no rash, or bruising or purpura.   NEURO: No gross deficit noted. Non-antalgic gait.      Billing  Total time 30 minutes, to include face to face visit, review of EMR, ordering, documentation and coordination of care on date of service    Signed by: Susan Saleh NP

## 2024-01-29 NOTE — LETTER
1/29/2024         RE: Benjamín Hyman  [address]  [place] [id number]        Dear Colleague,    Thank you for referring your patient, Benjamín Hyman, to the [facility] [place]. Please see a copy of my visit note below.    [facility] Hematology and Oncology Outpatient Progress Note    Patient: Benjamín Hyman  MRN: [id number]  Date of Service: Jan 29, 2024          Reason for Visit    MGUS      Assessment/Plan  MGUS, IgG lambda  Kappa LC 4.3, Lambda LC 3.02, K/L ratio WNL  M peak: 0.5  CBC: WBC 12.5/ANC 8.7; hgb and plat WNL  CMP WNL    Labs are stable.  Clinically, stable.     Plan:  -Continue every 6 mth lab surveillance. Return in 1 yr    2.  Urinary changes  Recommend he follow up with PCP    3.  Cardiomyopathy  Appears to be stable. Managed by Cardiology.  ______________________________________________________________________________    History of Present Illness/ Interval History    Mr. Benjamín Hyman  is a 73 year old with MGUS. Returns for 6-mth eval and labs.    No weight loss, night sweats, new pain.     Difficulty with emptying bladder over last 6 mths. No hematuria. No dysuria.    ECOG Performance    0      Oncology History/Treatment  Diagnosis/Stage:   2020: MGUS, IgG lambda  -diagnosed with cardiomyopathy with long-standing cardiac hx (HTN, atrial fib, CAD). Cardiac MRI showed borderline L ventricle dilatation, EF 31% with global  hypokinesia, mild RV hypokinesis, mod-severe biatrial enlargement. Raised question of cardiac amyloidosis, so referred to hematology.  -[place] LC 2.8, lambda LC 2.8, K/L ratio 0.99. IgG 1530, IgA 190, IgM 22.  SPEP: m peak 0.3  -Urine negative for gammopathy.  -Serum immunofixation: one large and second very small monoclonal IgG of lambda light chain type noted and possible very small monoclonal IgG immunoglobulin of kappa light chain type.    -Bone marrow biopsy discussed, pt  "refused    Treatment:  Observation      Physical Exam    GENERAL: Alert and oriented to time place and person. Seated comfortably. In no distress.  HEAD: Atraumatic and normocephalic. No alopecia.  EYES: YULISSA, EOMI. No erythema. No icterus.  LYMPH NODES: No palpable supraclavicular, cervical, axillary lymphadenopathy.  CHEST: clear to auscultation bilaterally. Resonant to percussion throughout bilaterally. Symmetrical breath movements bilaterally.  CVS: RRR  ABDOMEN: Soft. Not tender. Not distended. No palpable hepatomegaly or splenomegaly. No other mass palpable. Bowel sounds present.  EXTREMITIES: Warm. No peripheral edema.  SKIN: no rash, or bruising or purpura.   NEURO: No gross deficit noted. Non-antalgic gait.      Billing  Total time 30 minutes, to include face to face visit, review of EMR, ordering, documentation and coordination of care on date of service    Signed by: Susan Saleh NP      Oncology Rooming Note    January 29, 2024 1:07 PM   Benjamín Hyman is a 73 year old male who presents for:    Chief Complaint   Patient presents with     Hematology     Monoclonal gammopathy - Provider visit only     Initial Vitals: /58 (BP Location: Right arm, Patient Position: Sitting, Cuff Size: Adult Large)   Pulse 79   Temp 98  F (36.7  C) (Tympanic)   Resp 12   Ht 1.604 m (5' 3.15\")   Wt 118.4 kg (261 lb)   SpO2 97%   BMI 46.01 kg/m   Estimated body mass index is 46.01 kg/m  as calculated from the following:    Height as of this encounter: 1.604 m (5' 3.15\").    Weight as of this encounter: 118.4 kg (261 lb). Body surface area is 2.3 meters squared.  No Pain (0) Comment: Data Unavailable   No LMP for male patient.  Allergies reviewed: Yes  Medications reviewed: Yes    Medications: Medication refills not needed today.  Pharmacy name entered into [employer]: [id number] - [place], [place] - [address]     Frailty Screening:   Is the patient here for a new oncology consult visit in cancer care? 2. " No      Clinical concerns:  None      Marisa Rivera, EDUARDO                Again, thank you for allowing me to participate in the care of your patient.        Sincerely,        Susan Saleh, NP

## 2024-02-19 ENCOUNTER — LAB (OUTPATIENT)
Dept: LAB | Facility: CLINIC | Age: 74
End: 2024-02-19
Payer: COMMERCIAL

## 2024-02-19 ENCOUNTER — ANTICOAGULATION THERAPY VISIT (OUTPATIENT)
Dept: ANTICOAGULATION | Facility: CLINIC | Age: 74
End: 2024-02-19

## 2024-02-19 DIAGNOSIS — I48.11 LONGSTANDING PERSISTENT ATRIAL FIBRILLATION (H): ICD-10-CM

## 2024-02-19 DIAGNOSIS — Z79.01 LONG TERM CURRENT USE OF ANTICOAGULANT THERAPY: Primary | ICD-10-CM

## 2024-02-19 DIAGNOSIS — Z79.01 LONG TERM CURRENT USE OF ANTICOAGULANT THERAPY: ICD-10-CM

## 2024-02-19 LAB — INR BLD: 2.8 (ref 0.9–1.1)

## 2024-02-19 PROCEDURE — 36416 COLLJ CAPILLARY BLOOD SPEC: CPT

## 2024-02-19 PROCEDURE — 85610 PROTHROMBIN TIME: CPT

## 2024-02-19 NOTE — PROGRESS NOTES
ANTICOAGULATION MANAGEMENT     Benjamín Hyman 73 year old male is on warfarin with therapeutic INR result. (Goal INR 2.0-3.0)    Recent labs: (last 7 days)     02/19/24  0922   INR 2.8*       ASSESSMENT     Source(s): Chart Review and Patient/Caregiver Call     Warfarin doses taken: Warfarin taken as instructed  Diet: No new diet changes identified  Medication/supplement changes: None noted  New illness, injury, or hospitalization: No  Signs or symptoms of bleeding or clotting: No  Previous result: Therapeutic last 2(+) visits  Additional findings: None       PLAN     Recommended plan for no diet, medication or health factor changes affecting INR     Dosing Instructions: Continue your current warfarin dose with next INR in 6 weeks       Summary  As of 2/19/2024      Full warfarin instructions:  3.75 mg every Fri; 7.5 mg all other days   Next INR check:  4/15/2024               Telephone call with Benjamín who verbalizes understanding and agrees to plan    Patient elected to schedule next visit in 8 weeks    Education provided:   Contact [phone]  with any changes, questions or concerns.     Plan made per [facility] anticoagulation protocol    Genesis Rosa RN  Anticoagulation Clinic  2/19/2024    _______________________________________________________________________     Anticoagulation Episode Summary       Current INR goal:  2.0-3.0   TTR:  91.8% (1 y)   Target end date:  Indefinite   [facility]    Indications    Long term current use of anticoagulant therapy [Z79.01]  Longstanding persistent atrial fibrillation (H) [I48.11]             Comments:  Dr. Teran Ok with 12 week rechecks.             Anticoagulation Care Providers       Provider Role Specialty Phone number    Ruben Teran MD Referring Family Medicine [phone]

## 2024-03-15 DIAGNOSIS — I48.11 LONGSTANDING PERSISTENT ATRIAL FIBRILLATION (H): ICD-10-CM

## 2024-03-15 RX ORDER — DIGOXIN 250 MCG
TABLET ORAL
Qty: 70 TABLET | Refills: 0 | Status: SHIPPED | OUTPATIENT
Start: 2024-03-15 | End: 2024-07-05

## 2024-03-15 NOTE — TELEPHONE ENCOUNTER
Pending Prescriptions:                       Disp   Refills    digoxin (LANOXIN) 250 MCG tablet [Pharmacy*70 tab*0        Sig: TAKE 1 TABLET BY MOUTH ON ALL EVEN DAYS OF THE MONTH           AND TAKE 1/2 (ONE-HALF) TABLET BY MOUTH ON ALL           ODD DAYS OF THE MONTH    Routing refill request to provider for review/approval because:  Previous ordering provider no longer practicing at this clinic. Routing to provider who last saw pt on 10/16/23 for review and approval.    Josselin Wray RN  [facility]

## 2024-04-03 ENCOUNTER — TRANSCRIBE ORDERS (OUTPATIENT)
Dept: OTHER | Age: 74
End: 2024-04-03

## 2024-04-03 DIAGNOSIS — N13.9 LOWER URINARY OBSTRUCTIVE SYMPTOM: Primary | ICD-10-CM

## 2024-04-24 ENCOUNTER — OFFICE VISIT (OUTPATIENT)
Dept: FAMILY MEDICINE | Facility: CLINIC | Age: 74
End: 2024-04-24
Payer: COMMERCIAL

## 2024-04-24 VITALS
HEART RATE: 60 BPM | OXYGEN SATURATION: 96 % | RESPIRATION RATE: 20 BRPM | WEIGHT: 253.6 LBS | SYSTOLIC BLOOD PRESSURE: 130 MMHG | DIASTOLIC BLOOD PRESSURE: 66 MMHG | HEIGHT: 63 IN | TEMPERATURE: 96.8 F | BODY MASS INDEX: 44.93 KG/M2

## 2024-04-24 DIAGNOSIS — J11.1 INFLUENZA WITH RESPIRATORY MANIFESTATION OTHER THAN PNEUMONIA: Primary | ICD-10-CM

## 2024-04-24 DIAGNOSIS — R39.12 WEAK URINARY STREAM: ICD-10-CM

## 2024-04-24 DIAGNOSIS — N30.00 ACUTE CYSTITIS WITHOUT HEMATURIA: ICD-10-CM

## 2024-04-24 PROBLEM — Z98.890 STATUS POST CORONARY ANGIOGRAM: Status: RESOLVED | Noted: 2019-05-31 | Resolved: 2024-04-24

## 2024-04-24 PROBLEM — E78.5 DYSLIPIDEMIA: Status: ACTIVE | Noted: 2020-11-26

## 2024-04-24 PROBLEM — R29.6 RECURRENT FALLS: Status: ACTIVE | Noted: 2024-04-02

## 2024-04-24 PROBLEM — I42.8 NICM (NONISCHEMIC CARDIOMYOPATHY) (H): Status: RESOLVED | Noted: 2019-06-12 | Resolved: 2024-04-24

## 2024-04-24 PROBLEM — I48.20 CHRONIC ATRIAL FIBRILLATION (H): Status: ACTIVE | Noted: 2019-05-20

## 2024-04-24 PROBLEM — E78.5 DYSLIPIDEMIA: Status: RESOLVED | Noted: 2020-11-26 | Resolved: 2024-04-24

## 2024-04-24 PROBLEM — I25.10 CORONARY ARTERY DISEASE INVOLVING NATIVE CORONARY ARTERY OF NATIVE HEART WITHOUT ANGINA PECTORIS: Status: ACTIVE | Noted: 2019-05-20

## 2024-04-24 PROBLEM — I10 ESSENTIAL HYPERTENSION: Status: ACTIVE | Noted: 2019-06-12

## 2024-04-24 PROBLEM — S56.911A: Status: RESOLVED | Noted: 2022-08-08 | Resolved: 2024-04-24

## 2024-04-24 PROBLEM — I48.20 CHRONIC ATRIAL FIBRILLATION (H): Status: RESOLVED | Noted: 2019-05-20 | Resolved: 2024-04-24

## 2024-04-24 PROBLEM — M77.01 MEDIAL EPICONDYLITIS OF ELBOW, RIGHT: Status: RESOLVED | Noted: 2022-08-08 | Resolved: 2024-04-24

## 2024-04-24 PROBLEM — I50.22 CHRONIC SYSTOLIC CONGESTIVE HEART FAILURE (H): Status: ACTIVE | Noted: 2019-05-20

## 2024-04-24 PROCEDURE — 99214 OFFICE O/P EST MOD 30 MIN: CPT | Performed by: PHYSICIAN ASSISTANT

## 2024-04-24 RX ORDER — TAMSULOSIN HYDROCHLORIDE 0.4 MG/1
0.4 CAPSULE ORAL DAILY
Qty: 90 CAPSULE | Refills: 3 | Status: SHIPPED | OUTPATIENT
Start: 2024-04-24 | End: 2024-09-11

## 2024-04-24 RX ORDER — TAMSULOSIN HYDROCHLORIDE 0.4 MG/1
CAPSULE ORAL
COMMUNITY
End: 2024-04-24

## 2024-04-24 RX ORDER — RESPIRATORY SYNCYTIAL VIRUS VACCINE 120MCG/0.5
0.5 KIT INTRAMUSCULAR ONCE
Qty: 1 EACH | Refills: 0 | Status: CANCELLED | OUTPATIENT
Start: 2024-04-24 | End: 2024-04-24

## 2024-04-24 ASSESSMENT — PAIN SCALES - GENERAL: PAINLEVEL: MODERATE PAIN (4)

## 2024-04-24 NOTE — PROGRESS NOTES
"  Assessment & Plan   Influenza with respiratory manifestation other than pneumonia  Influenza A positive result 4/1/24. Symptoms have largely resolved, with exception of cough that has become more productive. Strength continues to improve, no fever or chills. Body strength and breath strength continue to improve, though have not returned to baseline level yet. Expect this to happen in the next few weeks. Warning signs and symptoms discussed on when to return to clinic or go to the ER. Pt verbalized understanding.      Acute cystitis without hematuria  Symptoms included increased frequency. UA from 4/1/24 with moderate leukocyte esterase. Ceftriaxone round completed, urinary frequency has resolved. RTC prn for any new, changing or worsening symptoms.      Weak urinary stream  Flomax started during hospitalization, symptoms have resolved but it is unclear if this is due to flomax or resolution of UTI. Patient feels the medication is helping. Refilled.   - tamsulosin (FLOMAX) 0.4 MG capsule  Dispense: 90 capsule; Refill: 3    MED REC REQUIRED  Post Medication Reconciliation Status:  Discharge medications reconciled, continue medications without change  BMI  Estimated body mass index is 44.71 kg/m  as calculated from the following:    Height as of this encounter: 1.604 m (5' 3.15\").    Weight as of this encounter: 115 kg (253 lb 9.6 oz).     Romy Butts is a 73 year old, presenting for the following health issues:  hospital follow up         4/24/2024     1:43 PM   Additional Questions   Roomed by Twila MINAYA CMA   Accompanied by Self       HPI     Benjamín Hyman is a 72yo male with history of HTN, HLD, atrial fibrillation, CHF, and class 4 obesity who presents for a hospitalization follow-up. He had a fall due to weakness, urinary retention/infection, and flu-like symptoms on April 1, and was admitted to [facility] in [place]. After a 3-night stay during which he was started on Tamiflu,   Ceftriaxone, and Flomax, he " "was discharged to U ([facility]) where he spent 11 days. Since discharge from U to home 9 days ago, his strength continues to improve and his flu and urination symptoms have mostly resolved. He does continue to have a left-sided pain just below his left armpit that is dull, constant, not worse with deep breathing, and sometimes keeps him from sleeping. He notes he was climbing in and out of a bed in U that rubbed on that location. His cough from his [facility] admission has never fully resolved, thought it has gone from dry to wet. It feels as thought it originates lower in his chest, and he's having some sore throat due to prolonged coughing. He's had no fever, chills, runny nose, sneezing, abdominal pain, nausea, leg swelling, or bowel issues. He takes all of his outpatient medications as prescribed except for omeprazole, which he stopped taking \"a long time ago because it didn't work.\"    Hospital Follow-up Visit:    Hospital/Nursing Home/ Rehab Facility:  [facility]   Date of Admission: [facility] 04/01/2024, [facility] 04/04/2024  Date of Discharge: [facility] 04/04/2024, [facility] 04/15/2024  Reason(s) for Admission: Influenza   Was the patient in the ICU or did the patient experience delirium during hospitalization?  No  Do you have any other stressors you would like to discuss with your provider? No    Problems taking medications regularly:  None  Medication changes since discharge: None  Problems adhering to non-medication therapy:  None    Summary of hospitalization:  [facility] discharge summary reviewed  Diagnostic Tests/Treatments reviewed.  Follow up needed: none  Other Healthcare Providers Involved in Patient s Care:         None  Update since discharge: improved.         Plan of care communicated with patient           Review of Systems  Constitutional, HEENT, cardiovascular, pulmonary, gi and gu systems are negative, except as otherwise noted.      Objective    BP " "130/66 (BP Location: Right arm, Patient Position: Sitting, Cuff Size: Adult Large)   Pulse 60   Temp 96.8  F (36  C) (Tympanic)   Resp 20   Ht 1.604 m (5' 3.15\")   Wt 115 kg (253 lb 9.6 oz)   SpO2 96%   BMI 44.71 kg/m    Body mass index is 44.71 kg/m .  Physical Exam   GENERAL: alert, obese, frail, elderly, and fatigued. Mildly diaphoretic.  NECK: no adenopathy, no asymmetry, masses, or scars  RESP: rhonchi bibasilar and mildly diminished breath sounds bilaterally. He is breathing through his mouth, but is speaking in full sentences.  CV: irregularly irregular rhythm, no murmur, click or rub, peripheral pulses strong, and no peripheral edema  ABDOMEN: soft, nontender, no hepatosplenomegaly, no masses and bowel sounds normal  MS: Left lateral point tenderness just inferior to axilla. No erythema or ecchymosis to the area.  PSYCH: mentation appears normal, affect normal/bright        Physician Attestation   I, Jorgito Lyn PA-C, was present with the medical/PREM student who participated in the service and in the documentation of the note.  I have verified the history and personally performed the physical exam and medical decision making.  I agree with the assessment and plan of care as documented in the note.      Jorgito Lyn PA-C     "

## 2024-04-26 ENCOUNTER — TELEPHONE (OUTPATIENT)
Dept: ANTICOAGULATION | Facility: CLINIC | Age: 74
End: 2024-04-26
Payer: COMMERCIAL

## 2024-04-26 DIAGNOSIS — I48.19 PERSISTENT ATRIAL FIBRILLATION (H): ICD-10-CM

## 2024-04-26 DIAGNOSIS — Z79.01 LONG TERM CURRENT USE OF ANTICOAGULANT THERAPY: Primary | ICD-10-CM

## 2024-04-26 DIAGNOSIS — I48.11 LONGSTANDING PERSISTENT ATRIAL FIBRILLATION (H): ICD-10-CM

## 2024-04-26 RX ORDER — WARFARIN SODIUM 7.5 MG/1
TABLET ORAL
Qty: 90 TABLET | Refills: 1 | Status: CANCELLED | OUTPATIENT
Start: 2024-04-26

## 2024-04-26 NOTE — TELEPHONE ENCOUNTER
"ANTICOAGULATION  MANAGEMENT: Discharge Review    Benjamín Hyman chart reviewed for anticoagulation continuity of care    Hospital Admission on - for fall in home.    Left-sided chest wall pain;   Anticoagulated;   Influenza A;   Complicated UTI (urinary tract infection);   Lower urinary obstructive symptom;   Sepsis, due to unspecified organism, unspecified     Active Problems:  Generalized weakness  Chronic atrial fibrillation (HC)  Recurrent falls  Influenza  Acute cystitis without hematuria  EDMAR (acute kidney injury) (HC)     Discharge disposition:  [facility] for rehab    Results:    No results for input(s): \"INR\", \"ILRUCT63YOMI\", \"F2\", \"ALMWH\", \"AAUFH\" in the last 168 hours.  Anticoagulation inpatient management:     7.5 mg   and 4/3    Anticoagulation discharge instructions:     Warfarin dosin/5 - 3.75 mg   Bridging: No   INR goal change: No      Medication changes affecting anticoagulation: Yes: tamiflu and macrobid during hospital stay    Tamsulosin changed     Additional factors affecting anticoagulation: see problem list above    Per  encounter - may have missed dose on 24  10:05 AM   ANTICOAGULATION MONITORING   Last INR 2.0   Last 7 Day Dose Total 22.5 mg   Next 7 Day Dose Total 48.75 mg   Maintenance Plan 3.75 mg every Fri; 7.5 mg all other days   Dose Description INR in range. Continue current dose and recheck next Wednesday.     Sun Dose 7.5 mg   Mon Dose 7.5 mg   Tue Dose 7.5 mg   Wed Dose -   Thu Dose -   Fri Dose 3.75 mg   Sat Dose 7.5 mg   Return Date 4/10/2024     4/10/2024  11:40 AM   ANTICOAGULATION MONITORING   Last INR 2.7   Last 7 Day Dose Total 48.75 mg   Next 7 Day Dose Total 48.75 mg   Dosing Trend Same   Maintenance Plan 3.75 mg every Fri; 7.5 mg all other days   Dose Description INR in range. Continue current dose and recheck in 2 weeks.     Sun Dose 7.5 mg   Mon Dose 7.5 mg   Tue Dose 7.5 mg   Wed Dose 7.5 mg   Thu Dose 7.5 mg   Fri Dose 3.75 mg "   Sat Dose 7.5 mg   Return Date 4/24/2024      PLAN     Recommend to check INR on 4/30    Spoke with sintia regarding refill today - updated chart with dosing and hospital admission    Anticoagulation Calendar updated    Kirti Merrill RN

## 2024-04-26 NOTE — TELEPHONE ENCOUNTER
ANTICOAGULATION MANAGEMENT:  Medication Refill    Anticoagulation Summary  As of 2/19/2024      Warfarin maintenance plan:  3.75 mg (7.5 mg x 0.5) every Fri; 7.5 mg (7.5 mg x 1) all other days   Next INR check:  4/15/2024   Target end date:  Indefinite    Indications    Long term current use of anticoagulant therapy [Z79.01]  Longstanding persistent atrial fibrillation (H) [I48.11]                 Anticoagulation Care Providers       Provider Role Specialty Phone number    Ruben Teran MD Referring Family Medicine [phone]            Refill Criteria    Visit with referring provider/group: Meets criteria: office visit within referring provider group in the last 1 year on 4/24/24    [facility] referral last signed: 06/29/2023; within last year: Yes    Lab monitoring not exceeding 2 weeks overdue: Yes    Patient was hospitalized with influenza A and did follow up with [facility] for an INR. Patient plans to continue with [facility] for warfarin management.    Patient does not need a refill at this time.    Kirti Merrill RN  Anticoagulation Clinic

## 2024-04-26 NOTE — TELEPHONE ENCOUNTER
Pending Prescriptions:                       Disp   Refills    warfarin ANTICOAGULANT (COUMADIN) 7.5 MG t*90 tab*1        Sig: Take 3.75mg on Friday, and 7.5mg all other days of           the week, or as directed by INR clinic    Routing refill request to provider for review/approval because:  Routing to [facility] for review.    Josselin Wray RN  [facility]

## 2024-04-30 ENCOUNTER — LAB (OUTPATIENT)
Dept: LAB | Facility: CLINIC | Age: 74
End: 2024-04-30
Payer: COMMERCIAL

## 2024-04-30 ENCOUNTER — ANTICOAGULATION THERAPY VISIT (OUTPATIENT)
Dept: ANTICOAGULATION | Facility: CLINIC | Age: 74
End: 2024-04-30

## 2024-04-30 DIAGNOSIS — Z79.01 LONG TERM CURRENT USE OF ANTICOAGULANT THERAPY: Primary | ICD-10-CM

## 2024-04-30 DIAGNOSIS — I48.11 LONGSTANDING PERSISTENT ATRIAL FIBRILLATION (H): ICD-10-CM

## 2024-04-30 DIAGNOSIS — Z79.01 LONG TERM CURRENT USE OF ANTICOAGULANT THERAPY: ICD-10-CM

## 2024-04-30 LAB — INR BLD: 4.1 (ref 0.9–1.1)

## 2024-04-30 PROCEDURE — 36415 COLL VENOUS BLD VENIPUNCTURE: CPT

## 2024-04-30 PROCEDURE — 85610 PROTHROMBIN TIME: CPT

## 2024-04-30 NOTE — PROGRESS NOTES
ANTICOAGULATION MANAGEMENT     Benjamín Hyman 73 year old male is on warfarin with supratherapeutic INR result. (Goal INR 2.0-3.0)    Recent labs: (last 7 days)     04/30/24  1358   INR 4.1*       ASSESSMENT     Source(s): Chart Review and Patient/Caregiver Call     Warfarin doses taken: Warfarin taken as instructed  Diet:  low appetite since home from TCU  Medication/supplement changes: None noted  New illness, injury, or hospitalization: No  Signs or symptoms of bleeding or clotting: No  Previous result: Therapeutic last 2(+) visits at rehab unit  Additional findings:  patient has been home from inpatient rehab for two weeks. His appetite is still not back to usual but he is eating protein daily       PLAN     Recommended plan for ongoing change(s) affecting INR     Dosing Instructions: hold dose then decrease your warfarin dose (15% change) with next INR in 9 days       Summary  As of 4/30/2024      Full warfarin instructions:  4/30: Hold; Otherwise 3.75 mg every Mon, Wed, Fri; 7.5 mg all other days   Next INR check:  5/9/2024               Telephone call with Benjamín who verbalizes understanding and agrees to plan    Lab visit scheduled    Education provided:   Please call back if any changes to your diet, medications or how you've been taking warfarin  Dietary considerations: Impact of protein intake on INR   Contact [phone]  with any changes, questions or concerns.     Plan made per [facility] anticoagulation protocol    Bing Sahu RN  Anticoagulation Clinic  4/30/2024    _______________________________________________________________________     Anticoagulation Episode Summary       Current INR goal:  2.0-3.0   TTR:  75.3% (1 y)   Target end date:  Indefinite   Send INR reminders to:  [facility] OSORIO    Indications    Long term current use of anticoagulant therapy [Z79.01]  Longstanding persistent atrial fibrillation (H) [I48.11]             Comments:  Dr. Jeffry English with 12 week rechecks.              Anticoagulation Care Providers       Provider Role Specialty Phone number    Ruben Teran MD Referring Family Medicine [phone]

## 2024-04-30 NOTE — PROGRESS NOTES
ANTICOAGULATION MANAGEMENT     Benjamín Hyman 73 year old male is on warfarin with supratherapeutic INR result. (Goal INR 2.0-3.0)    Recent labs: (last 7 days)     04/30/24  1358   INR 4.1*       ASSESSMENT     {accassessment:[id number]}       PLAN     {INRPLAN:[id number]}

## 2024-05-03 ENCOUNTER — TELEPHONE (OUTPATIENT)
Dept: FAMILY MEDICINE | Facility: CLINIC | Age: 74
End: 2024-05-03
Payer: COMMERCIAL

## 2024-05-03 NOTE — TELEPHONE ENCOUNTER
S-(situation): Patient calling regarding left sided rib cage pain.His strength has gotten better since discharge from TCU.    B-(background): Was hospitalized in [place] after a fall and + Influenza. He was discharged 04/15/24 to TCU for 5 days. Is now home. SAw Rah Lyn on 04/24/24 for hospital follow up.   On Warfarin and ASA     A-(assessment): Has ongoing left sided rib cage pain rated 2-3/10 at this time. At night when he lays down it is worse, 5-6/10 and says it is enough to keep him awake. He is taking Tylenol  mg 2 tabs at bedtime which does not always help. No chest pain, dizziness, shortness of breath, fever of signs of bleeding. Abdomen is soft and non-distended.     R-(recommendations): Scheduled follow up with Rah Lyn for 05/06/24. Can add plain acetaminophen for a total of 3000 mg in 24 hours at least until Monday's appt. Advised UC if symptoms worsen, ER if develops chest pain or shortness of breath. Patient verbalized understanding.   Robina VILLA RN

## 2024-05-06 ENCOUNTER — OFFICE VISIT (OUTPATIENT)
Dept: FAMILY MEDICINE | Facility: CLINIC | Age: 74
End: 2024-05-06
Payer: COMMERCIAL

## 2024-05-06 VITALS
BODY MASS INDEX: 44.75 KG/M2 | OXYGEN SATURATION: 96 % | HEART RATE: 110 BPM | SYSTOLIC BLOOD PRESSURE: 102 MMHG | DIASTOLIC BLOOD PRESSURE: 80 MMHG | RESPIRATION RATE: 20 BRPM | WEIGHT: 253.8 LBS | TEMPERATURE: 97.9 F

## 2024-05-06 DIAGNOSIS — E66.01 MORBID OBESITY WITH BMI OF 40.0-44.9, ADULT (H): ICD-10-CM

## 2024-05-06 DIAGNOSIS — I50.22 CHRONIC SYSTOLIC CONGESTIVE HEART FAILURE (H): ICD-10-CM

## 2024-05-06 DIAGNOSIS — R10.9 LEFT FLANK PAIN: Primary | ICD-10-CM

## 2024-05-06 DIAGNOSIS — I48.11 LONGSTANDING PERSISTENT ATRIAL FIBRILLATION (H): ICD-10-CM

## 2024-05-06 PROCEDURE — 93000 ELECTROCARDIOGRAM COMPLETE: CPT | Performed by: PHYSICIAN ASSISTANT

## 2024-05-06 PROCEDURE — 99214 OFFICE O/P EST MOD 30 MIN: CPT | Performed by: PHYSICIAN ASSISTANT

## 2024-05-06 ASSESSMENT — PAIN SCALES - GENERAL: PAINLEVEL: MODERATE PAIN (5)

## 2024-05-06 NOTE — PROGRESS NOTES
"  Assessment & Plan   Left flank pain  Persistent left flank pain since the last day at his TCU.  He remembers moving trying to get out of bed and had a sharp pain over the left lateral ribs/flank area.  It has not improved much over the last several weeks.  We did discuss this at his last appointment and he was quite tender with palpation on exam.  There is no area of ecchymosis or swelling or any concern for a broken rib at that time due to his lack of trauma.  Since his symptoms have persisted and EKG was done in clinic today and did not show any new changes consistent with an acute coronary syndrome.  He  on exam less so.  His symptoms have improved since last time he was seen.  I still think that this is musculoskeletal in nature and likely will take several more weeks to recover.  Warning signs and symptoms were discussed with him on when to return to clinic or go to the ER.  Patient verbalized understanding.  He is followed by cardiology for both his heart failure and atrial fibrillation.  He is on a regimen of  - EKG 12-lead complete w/read - Clinics    Chronic systolic congestive heart failure (H)  Longstanding persistent atrial fibrillation (H)  Metoprolol, lisinopril, digoxin as well as warfarin for anticoagulation.  He will continue these medicines and follow-up with his cardiologist as scheduled.    Morbid obesity with BMI of 40.0-44.9, adult (H)  Body mass index is 44.75 kg/m .. Associated with CHF, afib, HLD, CAD which would improve with weight loss. Encouraged healthy lifestyle changes.     BMI  Estimated body mass index is 44.75 kg/m  as calculated from the following:    Height as of 4/24/24: 1.604 m (5' 3.15\").    Weight as of this encounter: 115.1 kg (253 lb 12.8 oz).     Romy Butts is a 73 year old, presenting for the following health issues:  Rib Pain        5/6/2024    10:46 AM   Additional Questions   Roomed by Twila MINAYA CMA   Accompanied by Self       History of Present " Illness       Reason for visit:  Back pain (Says that he is stronger but the pain is still there)  Symptom onset:  More than a month    He eats 2-3 servings of fruits and vegetables daily.He consumes 3 sweetened beverage(s) daily.He exercises with enough effort to increase his heart rate 10 to 19 minutes per day.    He is taking medications regularly.     Dull ache 5/10, 2-3/10  Taking Tylenol - with 50% resolution of pain.   Random takes breath away.   Did not take metoprolol this morning.     Review of Systems  See HPI       Objective    /80 (BP Location: Right arm, Patient Position: Sitting, Cuff Size: Adult Large)   Pulse 110   Temp 97.9  F (36.6  C) (Tympanic)   Resp 20   Wt 115.1 kg (253 lb 12.8 oz)   SpO2 96%   BMI 44.75 kg/m    Body mass index is 44.75 kg/m .  Physical Exam   Constitutional: healthy, alert, and no distress  Head: Normocephalic. Atraumatic  Eyes: No conjunctival injection, sclera anicteric  Neck: supple, no thyromegaly, nodules or asymmetry of the thyroid. No cervical LAD.  Cardiovascular: Irregularly irregular rhythm. No murmurs, clicks, gallops, or rubs. No peripheral edema.   Respiratory: No resp distress. Lungs CTAB bilaterally.   Musculoskeletal: extremities normal- no gross deformities noted, and normal muscle tone.  Tenderness over the left flank and left lateral ribs.  Tenderness is improved since last exam several weeks ago.  No ecchymosis or step-offs.    Skin: no suspicious lesions or rashes  Neurologic: Gait normal. CN 2-12 grossly intact  Psychiatric: mentation appears normal and affect normal/bright       Signed Electronically by: Jorgito Lyn PA-C

## 2024-05-06 NOTE — PATIENT INSTRUCTIONS
Continue Tylenol daily. You can take 1000 mg (two tablets up to 3-4 times per day) for pain.     I think this is muscular and will likely take a few more weeks to improve.     Take your metoprolol.

## 2024-05-10 ENCOUNTER — ANTICOAGULATION THERAPY VISIT (OUTPATIENT)
Dept: ANTICOAGULATION | Facility: CLINIC | Age: 74
End: 2024-05-10

## 2024-05-10 ENCOUNTER — LAB (OUTPATIENT)
Dept: LAB | Facility: CLINIC | Age: 74
End: 2024-05-10
Payer: COMMERCIAL

## 2024-05-10 DIAGNOSIS — Z79.01 LONG TERM CURRENT USE OF ANTICOAGULANT THERAPY: ICD-10-CM

## 2024-05-10 DIAGNOSIS — I48.11 LONGSTANDING PERSISTENT ATRIAL FIBRILLATION (H): ICD-10-CM

## 2024-05-10 DIAGNOSIS — Z79.01 LONG TERM CURRENT USE OF ANTICOAGULANT THERAPY: Primary | ICD-10-CM

## 2024-05-10 LAB — INR BLD: 1.9 (ref 0.9–1.1)

## 2024-05-10 PROCEDURE — 85610 PROTHROMBIN TIME: CPT

## 2024-05-10 PROCEDURE — 36416 COLLJ CAPILLARY BLOOD SPEC: CPT

## 2024-05-10 NOTE — PROGRESS NOTES
ANTICOAGULATION MANAGEMENT     Benjamín Hyman 73 year old male is on warfarin with subtherapeutic INR result. (Goal INR 2.0-3.0)    Recent labs: (last 7 days)     05/10/24  0917   INR 1.9*       ASSESSMENT     Source(s): Chart Review and Patient/Caregiver Call     Warfarin doses taken: Warfarin taken as instructed  Diet: No new diet changes identified  Medication/supplement changes: None noted  New illness, injury, or hospitalization: No  Signs or symptoms of bleeding or clotting: No  Previous result: Supratherapeutic  Additional findings: None       PLAN     Recommended plan for no diet, medication or health factor changes affecting INR     Dosing Instructions: Continue your current warfarin dose with next INR in 2 weeks       Summary  As of 5/10/2024      Full warfarin instructions:  3.75 mg every Mon, Wed, Fri; 7.5 mg all other days   Next INR check:  5/24/2024               Telephone call with Benjamín who verbalizes understanding and agrees to plan    Lab visit scheduled    Education provided:   Goal range and lab monitoring: goal range and significance of current result    Plan made per [facility] anticoagulation protocol    Lenore Campo RN  Anticoagulation Clinic  5/10/2024    _______________________________________________________________________     Anticoagulation Episode Summary       Current INR goal:  2.0-3.0   TTR:  73.8% (1 y)   Target end date:  Indefinite   [facility]    Indications    Long term current use of anticoagulant therapy [Z79.01]  Longstanding persistent atrial fibrillation (H) [I48.11]             Comments:  Dr. Teran Ok with 12 week rechecks.             Anticoagulation Care Providers       Provider Role Specialty Phone number    Ruben Teran MD Referring Family Medicine [phone]

## 2024-05-24 ENCOUNTER — DOCUMENTATION ONLY (OUTPATIENT)
Dept: ANTICOAGULATION | Facility: CLINIC | Age: 74
End: 2024-05-24

## 2024-05-24 ENCOUNTER — LAB (OUTPATIENT)
Dept: LAB | Facility: CLINIC | Age: 74
End: 2024-05-24
Payer: COMMERCIAL

## 2024-05-24 ENCOUNTER — ANTICOAGULATION THERAPY VISIT (OUTPATIENT)
Dept: ANTICOAGULATION | Facility: CLINIC | Age: 74
End: 2024-05-24

## 2024-05-24 DIAGNOSIS — I48.19 PERSISTENT ATRIAL FIBRILLATION (H): ICD-10-CM

## 2024-05-24 DIAGNOSIS — I48.11 LONGSTANDING PERSISTENT ATRIAL FIBRILLATION (H): ICD-10-CM

## 2024-05-24 DIAGNOSIS — Z79.01 LONG TERM CURRENT USE OF ANTICOAGULANT THERAPY: ICD-10-CM

## 2024-05-24 DIAGNOSIS — Z79.01 LONG TERM CURRENT USE OF ANTICOAGULANT THERAPY: Primary | ICD-10-CM

## 2024-05-24 DIAGNOSIS — I48.11 LONGSTANDING PERSISTENT ATRIAL FIBRILLATION (H): Primary | ICD-10-CM

## 2024-05-24 LAB — INR BLD: 2.3 (ref 0.9–1.1)

## 2024-05-24 PROCEDURE — 36416 COLLJ CAPILLARY BLOOD SPEC: CPT

## 2024-05-24 PROCEDURE — 85610 PROTHROMBIN TIME: CPT

## 2024-05-24 RX ORDER — WARFARIN SODIUM 7.5 MG/1
TABLET ORAL
Qty: 71 TABLET | Refills: 1 | Status: SHIPPED | OUTPATIENT
Start: 2024-05-24

## 2024-05-24 NOTE — PROGRESS NOTES
ANTICOAGULATION CLINIC REFERRAL RENEWAL REQUEST       An annual renewal order is required for all patients referred to [facility].?  Please review and sign the pended referral order for Benjamín Hyman.   Are you okay with 8-12 week INR rechecks when in range? Dr. Teran ([place]) had previously agreed with that but patient states you are to be his PCP now since Jeffry is gone.      ANTICOAGULATION SUMMARY      Warfarin indication(s)   Atrial Fibrillation    Mechanical heart valve present?  NO       Current goal range   INR: 2.0-3.0     Goal appropriate for indication? Goal INR 2-3, standard for indication(s) above     Time in Therapeutic Range (TTR)  (Goal > 60%) 72.9%       Office visit with referring provider's group within last year yes on 5-6-24       Bing Sahu RN  [facility]

## 2024-05-24 NOTE — PROGRESS NOTES
ANTICOAGULATION MANAGEMENT     Benjamín Hyman 73 year old male is on warfarin with therapeutic INR result. (Goal INR 2.0-3.0)    Recent labs: (last 7 days)     05/24/24  0933   INR 2.3*       ASSESSMENT     Source(s): Chart Review and Patient/Caregiver Call     Warfarin doses taken: Warfarin taken as instructed  Diet: No new diet changes identified  Medication/supplement changes: None noted  New illness, injury, or hospitalization: No  Signs or symptoms of bleeding or clotting: No  Previous result: Subtherapeutic  Additional findings: Refill needed today. Benjamín meets all criteria for refill (current [facility] referral, office visit with referring provider/group in last 1 year unless directed to return in 2 years in last referring provider visit note, lab monitoring up to date or not exceeding 2 weeks overdue). Rx instructions and quantity supplied updated to match patient's current dosing plan. Warfarin 90 day supply with 1 refill granted per [facility] protocol. Sent refill co sign to new PCP along with updated referral today per adebayo from [facility].       PLAN     Recommended plan for no diet, medication or health factor changes affecting INR     Dosing Instructions: Continue your current warfarin dose with next INR in 3 weeks       Summary  As of 5/24/2024      Full warfarin instructions:  3.75 mg every Mon, Wed, Fri; 7.5 mg all other days   Next INR check:  6/14/2024               Telephone call with Benjamín who verbalizes understanding and agrees to plan    Lab visit scheduled    Education provided:   Please call back if any changes to your diet, medications or how you've been taking warfarin  Contact [phone]  with any changes, questions or concerns.     Plan made per [facility] anticoagulation protocol    Bing Sahu RN  Anticoagulation Clinic  5/24/2024    _______________________________________________________________________     Anticoagulation Episode Summary       Current INR goal:  2.0-3.0   TTR:  72.9% (1  y)   Target end date:  Indefinite   Send INR reminders to:  [facility]    Indications    Long term current use of anticoagulant therapy [Z79.01]  Longstanding persistent atrial fibrillation (H) [I48.11]             Comments:               Anticoagulation Care Providers       Provider Role Specialty Phone number    Ruben Teran MD Referring Family Medicine [phone]

## 2024-06-14 ENCOUNTER — LAB (OUTPATIENT)
Dept: LAB | Facility: CLINIC | Age: 74
End: 2024-06-14
Payer: COMMERCIAL

## 2024-06-14 ENCOUNTER — ANTICOAGULATION THERAPY VISIT (OUTPATIENT)
Dept: ANTICOAGULATION | Facility: CLINIC | Age: 74
End: 2024-06-14

## 2024-06-14 DIAGNOSIS — Z79.01 LONG TERM CURRENT USE OF ANTICOAGULANT THERAPY: ICD-10-CM

## 2024-06-14 DIAGNOSIS — I48.11 LONGSTANDING PERSISTENT ATRIAL FIBRILLATION (H): ICD-10-CM

## 2024-06-14 DIAGNOSIS — Z79.01 LONG TERM CURRENT USE OF ANTICOAGULANT THERAPY: Primary | ICD-10-CM

## 2024-06-14 LAB — INR BLD: 1.7 (ref 0.9–1.1)

## 2024-06-14 PROCEDURE — 85610 PROTHROMBIN TIME: CPT

## 2024-06-14 PROCEDURE — 36416 COLLJ CAPILLARY BLOOD SPEC: CPT

## 2024-06-14 NOTE — PROGRESS NOTES
ANTICOAGULATION MANAGEMENT     Benjamín Hyman 73 year old male is on warfarin with subtherapeutic INR result. (Goal INR 2.0-3.0)    Recent labs: (last 7 days)     06/14/24  0900   INR 1.7*       ASSESSMENT     Source(s): Chart Review and Patient/Caregiver Call     Warfarin doses taken: Warfarin taken as instructed  Diet: No new diet changes identified  Medication/supplement changes: None noted  New illness, injury, or hospitalization: No  Signs or symptoms of bleeding or clotting: No  Previous result: Therapeutic last visit; previously outside of goal range  Additional findings: None       PLAN     Recommended plan for no diet, medication or health factor changes affecting INR     Dosing Instructions: Increase your warfarin dose (9.1% change) with next INR in 2 weeks       Summary  As of 6/14/2024      Full warfarin instructions:  3.75 mg every Mon, Wed, Fri; 7.5 mg all other days   Next INR check:  6/28/2024               Telephone call with Benjamín who verbalizes understanding and agrees to plan    Lab visit scheduled    Education provided:   Goal range and lab monitoring: goal range and significance of current result    Plan made per [facility] anticoagulation protocol    Lenore Campo RN  Anticoagulation Clinic  6/14/2024    _______________________________________________________________________     Anticoagulation Episode Summary       Current INR goal:  2.0-3.0   TTR:  70.0% (1 y)   Target end date:  Indefinite   Send INR reminders to:  [facility]    Indications    Long term current use of anticoagulant therapy [Z79.01]  Longstanding persistent atrial fibrillation (H) [I48.11]             Comments:               Anticoagulation Care Providers       Provider Role Specialty Phone number    Ruben Teran MD Referring Family Medicine [phone]    Jorgito Lyn PA-C Referring Family Medicine [phone]

## 2024-06-14 NOTE — PROGRESS NOTES
ANTICOAGULATION MANAGEMENT     Benjamín Hyman 73 year old male is on warfarin with subtherapeutic INR result. (Goal INR 2.0-3.0)    Recent labs: (last 7 days)     06/14/24  0900   INR 1.7*       ASSESSMENT     Source(s): Chart Review  Previous INR was Therapeutic last visit; previously outside of goal range  Medication, diet, health changes since last INR chart reviewed; none identified         PLAN     Unable to reach Benjamín today.    LMTCB    Follow up required to confirm warfarin dose taken and assess for changes and discuss out of range result     Lenore Campo RN  Anticoagulation Clinic  6/14/2024

## 2024-06-28 ENCOUNTER — ANTICOAGULATION THERAPY VISIT (OUTPATIENT)
Dept: ANTICOAGULATION | Facility: CLINIC | Age: 74
End: 2024-06-28

## 2024-06-28 ENCOUNTER — LAB (OUTPATIENT)
Dept: LAB | Facility: CLINIC | Age: 74
End: 2024-06-28
Payer: COMMERCIAL

## 2024-06-28 DIAGNOSIS — I48.11 LONGSTANDING PERSISTENT ATRIAL FIBRILLATION (H): ICD-10-CM

## 2024-06-28 DIAGNOSIS — Z79.01 LONG TERM CURRENT USE OF ANTICOAGULANT THERAPY: ICD-10-CM

## 2024-06-28 DIAGNOSIS — Z79.01 LONG TERM CURRENT USE OF ANTICOAGULANT THERAPY: Primary | ICD-10-CM

## 2024-06-28 LAB — INR BLD: 1.8 (ref 0.9–1.1)

## 2024-06-28 PROCEDURE — 85610 PROTHROMBIN TIME: CPT

## 2024-06-28 PROCEDURE — 36416 COLLJ CAPILLARY BLOOD SPEC: CPT

## 2024-06-28 NOTE — PROGRESS NOTES
Hi. Thank you for signing the referral. Not sure if you saw the other question in there but are you okay with 8-12 week rechecks when the patient is in range and doesn't have any changes?    Bing Sahu, RN, BSN, PHN

## 2024-06-28 NOTE — PROGRESS NOTES
ANTICOAGULATION MANAGEMENT     Benjamín Hyman 73 year old male is on warfarin with subtherapeutic INR result. (Goal INR 2.0-3.0)    Recent labs: (last 7 days)     06/28/24  0849   INR 1.8*       ASSESSMENT     Source(s): Chart Review and Patient/Caregiver Call     Warfarin doses taken: Warfarin taken as instructed  Diet: No new diet changes identified  Medication/supplement changes: None noted  New illness, injury, or hospitalization: No  Signs or symptoms of bleeding or clotting: No  Previous result: Subtherapeutic  Additional findings: None       PLAN     Recommended plan for no diet, medication or health factor changes affecting INR     Dosing Instructions: Increase your warfarin dose (8.3% change) with next INR in 2 weeks       Summary  As of 6/28/2024      Full warfarin instructions:  3.75 mg every Mon; 7.5 mg all other days   Next INR check:  7/12/2024               Telephone call with Benjamín who verbalizes understanding and agrees to plan    Lab visit scheduled    Education provided: Please call back if any changes to your diet, medications or how you've been taking warfarin  Contact [phone] with any changes, questions or concerns.     Plan made per [facility] anticoagulation protocol    Bing Sahu RN  Anticoagulation Clinic  6/28/2024    _______________________________________________________________________     Anticoagulation Episode Summary       Current INR goal:  2.0-3.0   TTR:  66.2% (1 y)   Target end date:  Indefinite   Send INR reminders to:  [facility]    Indications    Long term current use of anticoagulant therapy [Z79.01]  Longstanding persistent atrial fibrillation (H) [I48.11]             Comments:               Anticoagulation Care Providers       Provider Role Specialty Phone number    Ruben Teran MD Referring Family Medicine [phone]    Jorgito Lyn PA-C Referring Family Medicine [phone]

## 2024-07-03 DIAGNOSIS — I48.11 LONGSTANDING PERSISTENT ATRIAL FIBRILLATION (H): ICD-10-CM

## 2024-07-05 RX ORDER — DIGOXIN 250 MCG
TABLET ORAL
Qty: 70 TABLET | Refills: 0 | Status: SHIPPED | OUTPATIENT
Start: 2024-07-05 | End: 2024-09-11

## 2024-07-12 ENCOUNTER — ANTICOAGULATION THERAPY VISIT (OUTPATIENT)
Dept: ANTICOAGULATION | Facility: CLINIC | Age: 74
End: 2024-07-12

## 2024-07-12 ENCOUNTER — LAB (OUTPATIENT)
Dept: LAB | Facility: CLINIC | Age: 74
End: 2024-07-12
Payer: COMMERCIAL

## 2024-07-12 DIAGNOSIS — I48.11 LONGSTANDING PERSISTENT ATRIAL FIBRILLATION (H): ICD-10-CM

## 2024-07-12 DIAGNOSIS — Z79.01 LONG TERM CURRENT USE OF ANTICOAGULANT THERAPY: Primary | ICD-10-CM

## 2024-07-12 DIAGNOSIS — Z79.01 LONG TERM CURRENT USE OF ANTICOAGULANT THERAPY: ICD-10-CM

## 2024-07-12 LAB — INR BLD: 2.1 (ref 0.9–1.1)

## 2024-07-12 PROCEDURE — 36416 COLLJ CAPILLARY BLOOD SPEC: CPT

## 2024-07-12 PROCEDURE — 85610 PROTHROMBIN TIME: CPT

## 2024-07-12 NOTE — PROGRESS NOTES
ANTICOAGULATION MANAGEMENT     Benjamín Hyman 73 year old male is on warfarin with therapeutic INR result. (Goal INR 2.0-3.0)    Recent labs: (last 7 days)     07/12/24  1031   INR 2.1*       ASSESSMENT     Source(s): Chart Review  Previous INR was Subtherapeutic  Medication, diet, health changes since last INR chart reviewed; none identified         PLAN     Recommended plan for no diet, medication or health factor changes affecting INR     Dosing Instructions: Continue your current warfarin dose with next INR in 3 weeks       Summary  As of 7/12/2024      Full warfarin instructions:  3.75 mg every Mon; 7.5 mg all other days   Next INR check:  8/2/2024               Detailed voice message left for Benjamín with dosing instructions and follow up date.     Contact [phone] to schedule and with any changes, questions or concerns.     Education provided: Please call back if any changes to your diet, medications or how you've been taking warfarin    Plan made per [facility] anticoagulation protocol    Bing Sahu RN  Anticoagulation Clinic  7/12/2024    _______________________________________________________________________     Anticoagulation Episode Summary       Current INR goal:  2.0-3.0   TTR:  63.6% (1 y)   Target end date:  Indefinite   Send INR reminders to:  [facility]    Indications    Long term current use of anticoagulant therapy [Z79.01]  Longstanding persistent atrial fibrillation (H) [I48.11]             Comments:  8-12 week rechecks okay per Jorgito Lyn in 5- referral request             Anticoagulation Care Providers       Provider Role Specialty Phone number    Ruben Teran MD Referring Family Medicine [phone]    Jorgito Lyn PA-C Referring Family Medicine [phone]

## 2024-08-02 ENCOUNTER — ANTICOAGULATION THERAPY VISIT (OUTPATIENT)
Dept: ANTICOAGULATION | Facility: CLINIC | Age: 74
End: 2024-08-02

## 2024-08-02 ENCOUNTER — LAB (OUTPATIENT)
Dept: LAB | Facility: CLINIC | Age: 74
End: 2024-08-02
Payer: COMMERCIAL

## 2024-08-02 DIAGNOSIS — Z79.01 LONG TERM CURRENT USE OF ANTICOAGULANT THERAPY: Primary | ICD-10-CM

## 2024-08-02 DIAGNOSIS — I48.11 LONGSTANDING PERSISTENT ATRIAL FIBRILLATION (H): ICD-10-CM

## 2024-08-02 DIAGNOSIS — Z79.01 LONG TERM CURRENT USE OF ANTICOAGULANT THERAPY: ICD-10-CM

## 2024-08-02 LAB — INR BLD: 2.2 (ref 0.9–1.1)

## 2024-08-02 PROCEDURE — 36416 COLLJ CAPILLARY BLOOD SPEC: CPT

## 2024-08-02 PROCEDURE — 85610 PROTHROMBIN TIME: CPT

## 2024-08-02 NOTE — PROGRESS NOTES
ANTICOAGULATION MANAGEMENT     Benjamín Hyman 73 year old male is on warfarin with therapeutic INR result. (Goal INR 2.0-3.0)    Recent labs: (last 7 days)     08/02/24  0946   INR 2.2*       ASSESSMENT     Source(s): Chart Review and Patient/Caregiver Call     Warfarin doses taken: Warfarin taken as instructed  Diet: No new diet changes identified  Medication/supplement changes: None noted  New illness, injury, or hospitalization: No  Signs or symptoms of bleeding or clotting: No  Previous result: Therapeutic last visit; previously outside of goal range  Additional findings: None     PLAN     Recommended plan for no diet, medication or health factor changes affecting INR     Dosing Instructions: Continue your current warfarin dose with next INR in 4 weeks       Summary  As of 8/2/2024      Full warfarin instructions:  3.75 mg every Mon; 7.5 mg all other days   Next INR check:  8/30/2024               Telephone call with Benjamín who verbalizes understanding and agrees to plan    Lab visit scheduled    Education provided: Please call back if any changes to your diet, medications or how you've been taking warfarin    Plan made per [facility] anticoagulation protocol    Kirti Merrill RN  Anticoagulation Clinic  8/2/2024    _______________________________________________________________________     Anticoagulation Episode Summary       Current INR goal:  2.0-3.0   TTR:  63.6% (1 y)   Target end date:  Indefinite   Send INR reminders to:  [facility]    Indications    Long term current use of anticoagulant therapy [Z79.01]  Longstanding persistent atrial fibrillation (H) [I48.11]             Comments:  8-12 week rechecks okay per Jorgito Lyn in 5- referral request             Anticoagulation Care Providers       Provider Role Specialty Phone number    Ruben Teran MD Referring Family Medicine [phone]    Jorgito Lyn PA-C Referring Family Medicine [phone]

## 2024-08-06 ENCOUNTER — LAB (OUTPATIENT)
Dept: LAB | Facility: CLINIC | Age: 74
End: 2024-08-06
Payer: COMMERCIAL

## 2024-08-06 DIAGNOSIS — D47.2 MONOCLONAL GAMMOPATHY: ICD-10-CM

## 2024-08-07 ENCOUNTER — PATIENT OUTREACH (OUTPATIENT)
Dept: ONCOLOGY | Facility: CLINIC | Age: 74
End: 2024-08-07
Payer: COMMERCIAL

## 2024-08-07 NOTE — CONFIDENTIAL NOTE
"[facility]: Cancer Care                                                                                        Per [facility] lab today:   \"this patient was here for labs today and after the tech already had the needle in he demanded she take it out for pain and refused to let anyone else try\"       Signature:  Carol Beth RN   "

## 2024-08-30 ENCOUNTER — LAB (OUTPATIENT)
Dept: LAB | Facility: CLINIC | Age: 74
End: 2024-08-30
Payer: COMMERCIAL

## 2024-08-30 ENCOUNTER — ANTICOAGULATION THERAPY VISIT (OUTPATIENT)
Dept: ANTICOAGULATION | Facility: CLINIC | Age: 74
End: 2024-08-30

## 2024-08-30 DIAGNOSIS — Z79.01 LONG TERM CURRENT USE OF ANTICOAGULANT THERAPY: Primary | ICD-10-CM

## 2024-08-30 DIAGNOSIS — I48.11 LONGSTANDING PERSISTENT ATRIAL FIBRILLATION (H): ICD-10-CM

## 2024-08-30 DIAGNOSIS — Z79.01 LONG TERM CURRENT USE OF ANTICOAGULANT THERAPY: ICD-10-CM

## 2024-08-30 LAB — INR BLD: 2.3 (ref 0.9–1.1)

## 2024-08-30 PROCEDURE — 36416 COLLJ CAPILLARY BLOOD SPEC: CPT

## 2024-08-30 PROCEDURE — 85610 PROTHROMBIN TIME: CPT

## 2024-08-30 NOTE — PROGRESS NOTES
ANTICOAGULATION MANAGEMENT     Benjamín Hyman 73 year old male is on warfarin with therapeutic INR result. (Goal INR 2.0-3.0)    Recent labs: (last 7 days)     08/30/24  0822   INR 2.3*       ASSESSMENT     Source(s): Chart Review and Patient/Caregiver Call     Warfarin doses taken: Warfarin taken as instructed  Diet: No new diet changes identified  Medication/supplement changes: None noted  New illness, injury, or hospitalization: No  Signs or symptoms of bleeding or clotting: No  Previous result: Therapeutic last 2(+) visits  Additional findings: None     PLAN     Recommended plan for no diet, medication or health factor changes affecting INR     Dosing Instructions: Continue your current warfarin dose with next INR in 5 weeks       Summary  As of 8/30/2024      Full warfarin instructions:  3.75 mg every Mon; 7.5 mg all other days   Next INR check:  10/4/2024               Telephone call with Benjamín who verbalizes understanding and agrees to plan    Lab visit scheduled    Education provided: Please call back if any changes to your diet, medications or how you've been taking warfarin    Plan made per [facility] anticoagulation protocol    Kirti Merrill RN  Anticoagulation Clinic  8/30/2024    _______________________________________________________________________     Anticoagulation Episode Summary       Current INR goal:  2.0-3.0   TTR:  63.6% (1 y)   Target end date:  Indefinite   Send INR reminders to:  [facility]    Indications    Long term current use of anticoagulant therapy [Z79.01]  Longstanding persistent atrial fibrillation (H) [I48.11]             Comments:  8-12 week rechecks okay per Jorgito Lyn in 5- referral request             Anticoagulation Care Providers       Provider Role Specialty Phone number    Ruben Teran MD Referring Family Medicine     Jorgito Lyn PA-C Referring Family Medicine [phone]

## 2024-09-09 ENCOUNTER — OFFICE VISIT (OUTPATIENT)
Dept: FAMILY MEDICINE | Facility: CLINIC | Age: 74
End: 2024-09-09
Payer: COMMERCIAL

## 2024-09-09 VITALS
TEMPERATURE: 97 F | SYSTOLIC BLOOD PRESSURE: 132 MMHG | DIASTOLIC BLOOD PRESSURE: 70 MMHG | RESPIRATION RATE: 14 BRPM | BODY MASS INDEX: 45.89 KG/M2 | OXYGEN SATURATION: 98 % | HEIGHT: 63 IN | HEART RATE: 71 BPM | WEIGHT: 259 LBS

## 2024-09-09 DIAGNOSIS — H61.21 IMPACTED CERUMEN OF RIGHT EAR: Primary | ICD-10-CM

## 2024-09-09 DIAGNOSIS — H61.22 IMPACTED CERUMEN OF LEFT EAR: ICD-10-CM

## 2024-09-09 PROCEDURE — 69209 REMOVE IMPACTED EAR WAX UNI: CPT | Mod: 50

## 2024-09-09 PROCEDURE — 99213 OFFICE O/P EST LOW 20 MIN: CPT | Mod: 25

## 2024-09-09 RX ORDER — METOPROLOL SUCCINATE 200 MG/1
200 TABLET, EXTENDED RELEASE ORAL DAILY
Qty: 90 TABLET | Refills: 3 | Status: CANCELLED | OUTPATIENT
Start: 2024-09-09

## 2024-09-09 RX ORDER — ATORVASTATIN CALCIUM 40 MG/1
40 TABLET, FILM COATED ORAL DAILY
Qty: 90 TABLET | Refills: 3 | Status: CANCELLED | OUTPATIENT
Start: 2024-09-09

## 2024-09-09 RX ORDER — DIGOXIN 250 MCG
TABLET ORAL
Qty: 70 TABLET | Refills: 0 | Status: CANCELLED | OUTPATIENT
Start: 2024-09-09

## 2024-09-09 RX ORDER — LISINOPRIL 40 MG/1
40 TABLET ORAL DAILY
Qty: 90 TABLET | Refills: 3 | Status: CANCELLED | OUTPATIENT
Start: 2024-09-09

## 2024-09-09 ASSESSMENT — PAIN SCALES - GENERAL: PAINLEVEL: NO PAIN (0)

## 2024-09-09 NOTE — PROGRESS NOTES
"  Assessment & Plan     Impacted cerumen of right ear  Impacted cerumen of left ear  Benjamín is a 73 year old male who presents to clinic today for ear cerumen removal. Bilateral ear canal with impacted cerumen. Tolerated bilateral ear irrigation. After procedure he reported tenderness to the left ear. TM appears pearly grey, intact. Left ear with slight amount of thick cerumen. Discussed use of mineral oil or Debrox ear drops over the counter. Use of warm water to ears. Printed earwax blockage care instructions.    Patient also asked about getting medications refilled. He has appointment coming up on 9/11/2024 with his PCP. He reports he has enough medications, declined further needs of a refill today. He denied any further assistance with today's appointment.     Follow up on 9/11/2024 with PCP or sooner if needed. Return of symptoms worsen or do not improve.     Patient verbalizes understanding and is in agreement with the plan of care. All questions and concerns addressed.     Subjective   Benjamín is a 73 year old, presenting for the following health issues:  Ear Problem.    Benjamín also has an appointment coming up on 9/11/2024 with his PCP for hypertension follow up.         9/9/2024    12:54 PM   Additional Questions   Roomed by Emely     Ear Problem       Ear feels full ear of wax  More the right ear than the left   No pain   Last time he had ears irrigated was a year ago.   Denies headaches, dizziness. Reports hearing loss more in the right ear.     Hypertension Follow-up    Do you check your blood pressure regularly outside of the clinic? No   Are you following a low salt diet? Yes  Are your blood pressures ever more than 140 on the top number (systolic) OR more   than 90 on the bottom number (diastolic), for example 140/90? Yes          Objective    /70   Pulse 71   Temp 97  F (36.1  C) (Tympanic)   Resp 14   Ht 1.6 m (5' 3\")   Wt 117.5 kg (259 lb)   SpO2 98%   BMI 45.88 kg/m    Body mass index " is 45.88 kg/m .  Physical Exam  Vitals and nursing note reviewed.   Constitutional:       Appearance: Normal appearance.   HENT:      Right Ear: Tympanic membrane normal. There is impacted cerumen.      Left Ear: Tenderness present. There is impacted cerumen. Tympanic membrane is not erythematous.      Ears:      Comments: Tenderness reported to left ear after lavage.   Cardiovascular:      Rate and Rhythm: Rhythm irregular.      Heart sounds: No murmur heard.     No friction rub.   Pulmonary:      Breath sounds: No wheezing, rhonchi or rales.   Neurological:      Mental Status: He is alert.   Psychiatric:         Behavior: Behavior normal.                Signed Electronically by: BRENNA Bolton CNP

## 2024-09-11 ENCOUNTER — OFFICE VISIT (OUTPATIENT)
Dept: FAMILY MEDICINE | Facility: CLINIC | Age: 74
End: 2024-09-11
Payer: COMMERCIAL

## 2024-09-11 VITALS
TEMPERATURE: 97.6 F | SYSTOLIC BLOOD PRESSURE: 138 MMHG | BODY MASS INDEX: 45.96 KG/M2 | DIASTOLIC BLOOD PRESSURE: 78 MMHG | HEIGHT: 63 IN | OXYGEN SATURATION: 98 % | WEIGHT: 259.4 LBS | RESPIRATION RATE: 20 BRPM | HEART RATE: 74 BPM

## 2024-09-11 DIAGNOSIS — L98.9 SKIN LESION: ICD-10-CM

## 2024-09-11 DIAGNOSIS — R73.9 HYPERGLYCEMIA: ICD-10-CM

## 2024-09-11 DIAGNOSIS — I10 BENIGN ESSENTIAL HYPERTENSION: Primary | ICD-10-CM

## 2024-09-11 DIAGNOSIS — I25.10 CORONARY ARTERY DISEASE INVOLVING NATIVE CORONARY ARTERY OF NATIVE HEART WITHOUT ANGINA PECTORIS: ICD-10-CM

## 2024-09-11 DIAGNOSIS — I48.11 LONGSTANDING PERSISTENT ATRIAL FIBRILLATION (H): ICD-10-CM

## 2024-09-11 DIAGNOSIS — Z12.11 SCREEN FOR COLON CANCER: ICD-10-CM

## 2024-09-11 LAB
ANION GAP SERPL CALCULATED.3IONS-SCNC: 12 MMOL/L (ref 7–15)
BASOPHILS # BLD AUTO: 0 10E3/UL (ref 0–0.2)
BASOPHILS NFR BLD AUTO: 0 %
BUN SERPL-MCNC: 16.4 MG/DL (ref 8–23)
CALCIUM SERPL-MCNC: 9.3 MG/DL (ref 8.8–10.4)
CHLORIDE SERPL-SCNC: 102 MMOL/L (ref 98–107)
CHOLEST SERPL-MCNC: 105 MG/DL
CREAT SERPL-MCNC: 0.99 MG/DL (ref 0.67–1.17)
EGFRCR SERPLBLD CKD-EPI 2021: 80 ML/MIN/1.73M2
EOSINOPHIL # BLD AUTO: 0 10E3/UL (ref 0–0.7)
EOSINOPHIL NFR BLD AUTO: 0 %
ERYTHROCYTE [DISTWIDTH] IN BLOOD BY AUTOMATED COUNT: 12.7 % (ref 10–15)
FASTING STATUS PATIENT QL REPORTED: NO
FASTING STATUS PATIENT QL REPORTED: NO
GLUCOSE SERPL-MCNC: 106 MG/DL (ref 70–99)
HBA1C MFR BLD: 5.8 % (ref 0–5.6)
HCO3 SERPL-SCNC: 23 MMOL/L (ref 22–29)
HCT VFR BLD AUTO: 49.6 % (ref 40–53)
HDLC SERPL-MCNC: 30 MG/DL
HGB BLD-MCNC: 15.5 G/DL (ref 13.3–17.7)
IMM GRANULOCYTES # BLD: 0 10E3/UL
IMM GRANULOCYTES NFR BLD: 0 %
LDLC SERPL CALC-MCNC: 41 MG/DL
LYMPHOCYTES # BLD AUTO: 2.8 10E3/UL (ref 0.8–5.3)
LYMPHOCYTES NFR BLD AUTO: 21 %
MCH RBC QN AUTO: 29.6 PG (ref 26.5–33)
MCHC RBC AUTO-ENTMCNC: 31.3 G/DL (ref 31.5–36.5)
MCV RBC AUTO: 95 FL (ref 78–100)
MONOCYTES # BLD AUTO: 0.8 10E3/UL (ref 0–1.3)
MONOCYTES NFR BLD AUTO: 6 %
NEUTROPHILS # BLD AUTO: 9.3 10E3/UL (ref 1.6–8.3)
NEUTROPHILS NFR BLD AUTO: 72 %
NONHDLC SERPL-MCNC: 75 MG/DL
PLATELET # BLD AUTO: 274 10E3/UL (ref 150–450)
POTASSIUM SERPL-SCNC: 5 MMOL/L (ref 3.4–5.3)
RBC # BLD AUTO: 5.24 10E6/UL (ref 4.4–5.9)
SODIUM SERPL-SCNC: 137 MMOL/L (ref 135–145)
TOTAL PROTEIN SERUM FOR ELP: 7.2 G/DL (ref 6.4–8.3)
TRIGL SERPL-MCNC: 170 MG/DL
WBC # BLD AUTO: 12.9 10E3/UL (ref 4–11)

## 2024-09-11 PROCEDURE — 83521 IG LIGHT CHAINS FREE EACH: CPT | Performed by: PHYSICIAN ASSISTANT

## 2024-09-11 PROCEDURE — 36415 COLL VENOUS BLD VENIPUNCTURE: CPT | Performed by: PHYSICIAN ASSISTANT

## 2024-09-11 PROCEDURE — 84165 PROTEIN E-PHORESIS SERUM: CPT | Performed by: STUDENT IN AN ORGANIZED HEALTH CARE EDUCATION/TRAINING PROGRAM

## 2024-09-11 PROCEDURE — G2211 COMPLEX E/M VISIT ADD ON: HCPCS | Performed by: PHYSICIAN ASSISTANT

## 2024-09-11 PROCEDURE — 84155 ASSAY OF PROTEIN SERUM: CPT | Performed by: PHYSICIAN ASSISTANT

## 2024-09-11 PROCEDURE — 80061 LIPID PANEL: CPT | Performed by: PHYSICIAN ASSISTANT

## 2024-09-11 PROCEDURE — 83036 HEMOGLOBIN GLYCOSYLATED A1C: CPT | Performed by: PHYSICIAN ASSISTANT

## 2024-09-11 PROCEDURE — 99214 OFFICE O/P EST MOD 30 MIN: CPT | Performed by: PHYSICIAN ASSISTANT

## 2024-09-11 PROCEDURE — 80048 BASIC METABOLIC PNL TOTAL CA: CPT | Performed by: PHYSICIAN ASSISTANT

## 2024-09-11 PROCEDURE — 82784 ASSAY IGA/IGD/IGG/IGM EACH: CPT | Performed by: PHYSICIAN ASSISTANT

## 2024-09-11 PROCEDURE — 85025 COMPLETE CBC W/AUTO DIFF WBC: CPT | Performed by: PHYSICIAN ASSISTANT

## 2024-09-11 RX ORDER — DIGOXIN 250 MCG
TABLET ORAL
Qty: 70 TABLET | Refills: 3 | Status: SHIPPED | OUTPATIENT
Start: 2024-09-11

## 2024-09-11 RX ORDER — LISINOPRIL 40 MG/1
40 TABLET ORAL DAILY
Qty: 90 TABLET | Refills: 3 | Status: SHIPPED | OUTPATIENT
Start: 2024-09-11

## 2024-09-11 RX ORDER — ATORVASTATIN CALCIUM 40 MG/1
40 TABLET, FILM COATED ORAL DAILY
Qty: 90 TABLET | Refills: 3 | Status: SHIPPED | OUTPATIENT
Start: 2024-09-11

## 2024-09-11 RX ORDER — METOPROLOL SUCCINATE 200 MG/1
200 TABLET, EXTENDED RELEASE ORAL DAILY
Qty: 90 TABLET | Refills: 3 | Status: SHIPPED | OUTPATIENT
Start: 2024-09-11

## 2024-09-11 ASSESSMENT — PAIN SCALES - GENERAL: PAINLEVEL: NO PAIN (0)

## 2024-09-11 NOTE — PROGRESS NOTES
"Assessment & Plan   Benign essential hypertension  Normotensive here. Due for refill. Recheck BMP.   - lisinopril (ZESTRIL) 40 MG tablet; Take 1 tablet (40 mg) by mouth daily.  - metoprolol succinate ER (TOPROL XL) 200 MG 24 hr tablet; Take 1 tablet (200 mg) by mouth daily.  - Basic metabolic panel  (Ca, Cl, CO2, Creat, Gluc, K, Na, BUN); Future    Coronary artery disease involving native coronary artery of native heart without angina pectoris  Asymptomatic from a cardiac perspective. Refilled atorvastatin. Recheck lipids.   - atorvastatin (LIPITOR) 40 MG tablet; Take 1 tablet (40 mg) by mouth daily.  - Lipid panel reflex to direct LDL Fasting; Future  - Basic metabolic panel  (Ca, Cl, CO2, Creat, Gluc, K, Na, BUN); Future  - Lipid panel reflex to direct LDL Fasting    Hyperglycemia  Due for recheck.   - Hemoglobin A1c; Future  - Hemoglobin A1c    Skin lesion  Requested referral to derm for possible cyst and mole on face.   - Adult Dermatology  Referral; Future    Longstanding persistent atrial fibrillation (H)  Stable on Amio and wafarin. Refilled.   - digoxin (LANOXIN) 250 MCG tablet; TAKE 1 TABLET BY MOUTH ON ALL EVEN DAYS OF THE MONTH AND TAKE 1/2 (ONE HALF TABLET BY MOUTH ON ALL ODD DAYS OF THE MONTH    Screen for colon cancer  Due for screening.   - Fecal colorectal cancer screen FIT - Future (S+30); Future    BMI  Estimated body mass index is 45.95 kg/m  as calculated from the following:    Height as of this encounter: 1.6 m (5' 3\").    Weight as of this encounter: 117.7 kg (259 lb 6.4 oz).     Romy Butts is a 73 year old, presenting for the following health issues:  Medication Refill        9/11/2024     9:44 AM   Additional Questions   Roomed by Emily KAHN LPN   Accompanied by Self     HPI   Hyperlipidemia Follow-Up  Are you regularly taking any medication or supplement to lower your cholesterol?   yes  Are you having muscle aches or other side effects that you think could be caused by " "your cholesterol lowering medication?  No    Hypertension Follow-up  Do you check your blood pressure regularly outside of the clinic? No   Are you following a low salt diet? Yes  Are your blood pressures ever more than 140 on the top number (systolic) OR more   than 90 on the bottom number (diastolic), for example 140/90? No  How many servings of fruits and vegetables do you eat daily?  2-3  On average, how many sweetened beverages do you drink each day (Examples: soda, juice, sweet tea, etc.  Do NOT count diet or artificially sweetened beverages)?   0  How many days per week do you exercise enough to make your heart beat faster? 5  How many minutes a day do you exercise enough to make your heart beat faster? 10 - 19  How many days per week do you miss taking your medication? 1  What makes it hard for you to take your medications?  remembering to take  Would like a referral for Dermatology for spot on Left eye and under chin.  Also stopped Flomax as it made symptoms worse.     Review of Systems  See HPI       Objective    /78   Pulse 74   Temp 97.6  F (36.4  C) (Tympanic)   Resp 20   Ht 1.6 m (5' 3\")   Wt 117.7 kg (259 lb 6.4 oz)   SpO2 98%   BMI 45.95 kg/m    Body mass index is 45.95 kg/m .  Physical Exam   Constitutional: healthy, alert, and no distress  Head: Normocephalic. Atraumatic  Eyes: No conjunctival injection, sclera anicteric  Neck: supple, no thyromegaly, nodules or asymmetry of the thyroid. No cervical LAD.  Cardiovascular: RRR. No murmurs, clicks, gallops, or rubs. No peripheral edema.   Respiratory: No resp distress. Lungs CTAB bilaterally.   Musculoskeletal: extremities normal- no gross deformities noted, and normal muscle tone  Skin: Possible cyst next to the left eye. Nevus along the left lower mandible.   Neurologic: Gait normal. CN 2-12 grossly intact  Psychiatric: mentation appears normal and affect normal/bright         Signed Electronically by: Jorgito Lyn PA-C    "

## 2024-09-12 DIAGNOSIS — R73.9 HYPERGLYCEMIA: Primary | ICD-10-CM

## 2024-09-12 LAB
ALBUMIN SERPL ELPH-MCNC: 3.7 G/DL (ref 3.7–5.1)
ALPHA1 GLOB SERPL ELPH-MCNC: 0.3 G/DL (ref 0.2–0.4)
ALPHA2 GLOB SERPL ELPH-MCNC: 0.9 G/DL (ref 0.5–0.9)
B-GLOBULIN SERPL ELPH-MCNC: 0.8 G/DL (ref 0.6–1)
GAMMA GLOB SERPL ELPH-MCNC: 1.5 G/DL (ref 0.7–1.6)
IGA SERPL-MCNC: 226 MG/DL (ref 84–499)
IGG SERPL-MCNC: 1466 MG/DL (ref 610–1616)
IGM SERPL-MCNC: 24 MG/DL (ref 35–242)
KAPPA LC FREE SER-MCNC: 3.5 MG/DL (ref 0.33–1.94)
KAPPA LC FREE/LAMBDA FREE SER NEPH: 1 {RATIO} (ref 0.26–1.65)
LAMBDA LC FREE SERPL-MCNC: 3.51 MG/DL (ref 0.57–2.63)
M PROTEIN SERPL ELPH-MCNC: 0.7 G/DL
PROT PATTERN SERPL ELPH-IMP: ABNORMAL

## 2024-09-14 PROCEDURE — 82274 ASSAY TEST FOR BLOOD FECAL: CPT | Performed by: PHYSICIAN ASSISTANT

## 2024-09-18 LAB — HEMOCCULT STL QL IA: NEGATIVE

## 2024-10-04 ENCOUNTER — ANTICOAGULATION THERAPY VISIT (OUTPATIENT)
Dept: ANTICOAGULATION | Facility: CLINIC | Age: 74
End: 2024-10-04

## 2024-10-04 ENCOUNTER — LAB (OUTPATIENT)
Dept: LAB | Facility: CLINIC | Age: 74
End: 2024-10-04
Payer: COMMERCIAL

## 2024-10-04 DIAGNOSIS — I48.11 LONGSTANDING PERSISTENT ATRIAL FIBRILLATION (H): ICD-10-CM

## 2024-10-04 DIAGNOSIS — Z79.01 LONG TERM CURRENT USE OF ANTICOAGULANT THERAPY: ICD-10-CM

## 2024-10-04 DIAGNOSIS — Z79.01 LONG TERM CURRENT USE OF ANTICOAGULANT THERAPY: Primary | ICD-10-CM

## 2024-10-04 LAB — INR BLD: 2.3 (ref 0.9–1.1)

## 2024-10-04 PROCEDURE — 36416 COLLJ CAPILLARY BLOOD SPEC: CPT

## 2024-10-04 PROCEDURE — 85610 PROTHROMBIN TIME: CPT

## 2024-10-04 NOTE — PROGRESS NOTES
ANTICOAGULATION MANAGEMENT     Benjamín Hyman 73 year old male is on warfarin with therapeutic INR result. (Goal INR 2.0-3.0)    Recent labs: (last 7 days)     10/04/24  0913   INR 2.3*       ASSESSMENT     Source(s): Chart Review and Patient/Caregiver Call     Warfarin doses taken: Warfarin taken as instructed  Diet: No new diet changes identified  Medication/supplement changes: None noted  New illness, injury, or hospitalization: No  Signs or symptoms of bleeding or clotting: No  Previous result: Therapeutic last 2(+) visits  Additional findings: None       PLAN     Recommended plan for no diet, medication or health factor changes affecting INR     Dosing Instructions: Continue your current warfarin dose with next INR in 6 weeks       Summary  As of 10/4/2024      Full warfarin instructions:  3.75 mg every Mon; 7.5 mg all other days   Next INR check:  11/15/2024               Telephone call with Benjamín who verbalizes understanding and agrees to plan    Lab visit scheduled    Education provided: Goal range and lab monitoring: goal range and significance of current result  Contact [phone] with any changes, questions or concerns.     Plan made per [facility] anticoagulation protocol    Julienne Fernandez RN  10/4/2024  Anticoagulation Clinic  [employer] for routing messages: valente OSORIO  [facility] patient phone line: [phone]        _______________________________________________________________________     Anticoagulation Episode Summary       Current INR goal:  2.0-3.0   TTR:  63.6% (1 y)   Target end date:  Indefinite   Send INR reminders to:  MARY OSORIO    Indications    Long term current use of anticoagulant therapy [Z79.01]  Longstanding persistent atrial fibrillation (H) [I48.11]             Comments:  8-12 week rechecks okraven per Jorgito Lyn in 5- referral request             Anticoagulation Care Providers       Provider Role Specialty Phone number    Ruben Teran MD Referring  Family Medicine     Jorgito Lyn PA-C Referring Family Medicine [phone]

## 2024-10-24 DIAGNOSIS — R39.12 WEAK URINARY STREAM: ICD-10-CM

## 2024-10-24 NOTE — TELEPHONE ENCOUNTER
Verified with Pharmacy no refills remaining.    Last Written Prescription Date:  4/24/24  Last Fill Quantity: 90,  # refills: 3   Last office visit: 9/11/2024 ; last virtual visit: Visit date not found with prescribing provider:     Future Office Visit:      Pended for consideration.  Kavitha Jon RN on 10/24/2024 at 4:28 PM

## 2024-10-24 NOTE — TELEPHONE ENCOUNTER
Medication Question or Refill    Contacts       Contact Date/Time Type Contact Phone/Fax    10/24/2024 10:44 AM CDT Phone (Incoming) Benjamín Hyman (Self) [phone] (H)            What medication are you calling about (include dose and sig)?: Tamsulosin - Nathan     Preferred Pharmacy:   [facility] [address] [place] [address] [address]  [address] [facility] [address]  Phone: [phone] Fax: [phone]      Controlled Substance Agreement on file:   CSA -- Patient Level:    CSA: None found at the patient level.       Who prescribed the medication?: Jorgito Lyn    Do you need a refill? Yes- Pt would like to renew this prescription     When did you use the medication last? Over a month ago     Patient offered an appointment? No    Do you have any questions or concerns?  No      Okay to leave a detailed message?: Yes at Cell number on file:    Telephone Information:   Mobile [phone]

## 2024-10-25 RX ORDER — TAMSULOSIN HYDROCHLORIDE 0.4 MG/1
0.4 CAPSULE ORAL DAILY
Qty: 90 CAPSULE | Refills: 3 | Status: SHIPPED | OUTPATIENT
Start: 2024-10-25

## 2024-10-31 DIAGNOSIS — I48.19 PERSISTENT ATRIAL FIBRILLATION (H): ICD-10-CM

## 2024-10-31 RX ORDER — WARFARIN SODIUM 7.5 MG/1
TABLET ORAL
Qty: 88 TABLET | Refills: 1 | Status: SHIPPED | OUTPATIENT
Start: 2024-10-31

## 2024-10-31 NOTE — TELEPHONE ENCOUNTER
ANTICOAGULATION MANAGEMENT:  Medication Refill    Anticoagulation Summary  As of 10/4/2024      Warfarin maintenance plan:  3.75 mg (7.5 mg x 0.5) every Mon; 7.5 mg (7.5 mg x 1) all other days   Next INR check:  11/15/2024   Target end date:  Indefinite    Indications    Long term current use of anticoagulant therapy [Z79.01]  Longstanding persistent atrial fibrillation (H) [I48.11]                 Anticoagulation Care Providers       Provider Role Specialty Phone number    Ruben Teran MD Referring Family Medicine     Jorgito Lyn PA-C Referring Family Medicine [phone]            Refill Criteria    Visit with referring provider/group: Meets criteria: visit within referring provider group in the last 15 months on 9/24    [facility] referral last signed: 05/24/2024; within last year: Yes    Lab monitoring not exceeding 2 weeks overdue: Yes    Benjamín meets all criteria for refill. Rx instructions and quantity supplied updated to match patient's current dosing plan. Warfarin 90 day supply with 1 refill granted per [facility] protocol     Genesis Rosa RN  Anticoagulation Clinic

## 2024-10-31 NOTE — TELEPHONE ENCOUNTER
Pending Prescriptions:                       Disp   Refills    warfarin ANTICOAGULANT (COUMADIN) 7.5 MG *71 tab*1            Sig: Take 3.75mg on MWF, and 7.5mg all other days of           the week, or as directed by INR clinic

## 2024-11-15 ENCOUNTER — LAB (OUTPATIENT)
Dept: LAB | Facility: CLINIC | Age: 74
End: 2024-11-15
Payer: COMMERCIAL

## 2024-11-15 ENCOUNTER — ANTICOAGULATION THERAPY VISIT (OUTPATIENT)
Dept: ANTICOAGULATION | Facility: CLINIC | Age: 74
End: 2024-11-15

## 2024-11-15 DIAGNOSIS — I48.11 LONGSTANDING PERSISTENT ATRIAL FIBRILLATION (H): ICD-10-CM

## 2024-11-15 DIAGNOSIS — Z79.01 LONG TERM CURRENT USE OF ANTICOAGULANT THERAPY: ICD-10-CM

## 2024-11-15 DIAGNOSIS — Z79.01 LONG TERM CURRENT USE OF ANTICOAGULANT THERAPY: Primary | ICD-10-CM

## 2024-11-15 LAB — INR BLD: 2.2 (ref 0.9–1.1)

## 2024-11-15 PROCEDURE — 36416 COLLJ CAPILLARY BLOOD SPEC: CPT

## 2024-11-15 PROCEDURE — 85610 PROTHROMBIN TIME: CPT

## 2024-11-15 NOTE — PROGRESS NOTES
ANTICOAGULATION MANAGEMENT     Benjamín Hyman 73 year old male is on warfarin with therapeutic INR result. (Goal INR 2.0-3.0)    Recent labs: (last 7 days)     11/15/24  0924   INR 2.2*       ASSESSMENT     Source(s): Chart Review and Patient/Caregiver Call     Warfarin doses taken: Warfarin taken as instructed  Diet: No new diet changes identified  Medication/supplement changes: None noted  New illness, injury, or hospitalization: No   Has been doing good.  Signs or symptoms of bleeding or clotting: No  Previous result: Therapeutic last 4 INR visits  Additional findings: None       PLAN     Recommended plan for no diet, medication or health factor changes affecting INR     Dosing Instructions: Continue your current warfarin dose with next INR in 7 weeks       Summary  As of 11/15/2024      Full warfarin instructions:  3.75 mg every Mon; 7.5 mg all other days   Next INR check:  1/3/2025               Telephone call with Benjamín who verbalizes understanding and agrees to plan    Lab visit scheduled - INR on 1/2/25 @ [facility]    Education provided: Taking warfarin: Importance of taking warfarin as instructed  Goal range and lab monitoring: goal range and significance of current result  Dietary considerations: importance of consistent vitamin K intake  Symptom monitoring: monitoring for bleeding signs and symptoms    Plan made per [facility] anticoagulation protocol    Johanny Ferro RN  11/15/2024  Anticoagulation Clinic  [employer] for routing messages: valente OSORIO  [facility] patient phone line: [phone]        _______________________________________________________________________     Anticoagulation Episode Summary       Current INR goal:  2.0-3.0   TTR:  63.6% (1 y)   Target end date:  Indefinite   Send INR reminders to:  MARY OSORIO    Indications    Long term current use of anticoagulant therapy [Z79.01]  Longstanding persistent atrial fibrillation (H) [I48.11]             Comments:  8-12 week  anais fiore per Jorgito Lyn in 5- referral request             Anticoagulation Care Providers       Provider Role Specialty Phone number    Ruben Teran MD Referring Family Medicine     Jorgito Lyn PA-C Referring Family Medicine [phone]

## 2025-01-02 ENCOUNTER — ANTICOAGULATION THERAPY VISIT (OUTPATIENT)
Dept: ANTICOAGULATION | Facility: CLINIC | Age: 75
End: 2025-01-02

## 2025-01-02 ENCOUNTER — LAB (OUTPATIENT)
Dept: LAB | Facility: CLINIC | Age: 75
End: 2025-01-02
Payer: COMMERCIAL

## 2025-01-02 DIAGNOSIS — I48.11 LONGSTANDING PERSISTENT ATRIAL FIBRILLATION (H): ICD-10-CM

## 2025-01-02 DIAGNOSIS — Z79.01 LONG TERM CURRENT USE OF ANTICOAGULANT THERAPY: Primary | ICD-10-CM

## 2025-01-02 DIAGNOSIS — Z79.01 LONG TERM CURRENT USE OF ANTICOAGULANT THERAPY: ICD-10-CM

## 2025-01-02 LAB — INR BLD: 2.2 (ref 0.9–1.1)

## 2025-01-02 NOTE — PROGRESS NOTES
ANTICOAGULATION MANAGEMENT     Benjamín Hyman 74 year old male is on warfarin with therapeutic INR result. (Goal INR 2.0-3.0)    Recent labs: (last 7 days)     01/02/25  0949   INR 2.2*       ASSESSMENT     Source(s): Chart Review and Patient/Caregiver Call     Warfarin doses taken: Warfarin taken as instructed  Diet: No new diet changes identified  Medication/supplement changes: None noted  New illness, injury, or hospitalization: No  Signs or symptoms of bleeding or clotting: No  Previous result: Therapeutic last 2(+) visits  Additional findings: None       PLAN     Recommended plan for no diet, medication or health factor changes affecting INR     Dosing Instructions: Continue your current warfarin dose with next INR in 8 weeks       Summary  As of 1/2/2025      Full warfarin instructions:  3.75 mg every Mon; 7.5 mg all other days   Next INR check:  2/27/2025               Telephone call with Benjamín who verbalizes understanding and agrees to plan and who agrees to plan and repeated back plan correctly    Lab visit scheduled    Education provided: Contact [phone] with any changes, questions or concerns.     Plan made per [facility] anticoagulation protocol    Genesis Rosa RN  1/2/2025  Anticoagulation Clinic  [employer] for routing messages: valente OSORIO  [facility] patient phone line: [phone]        _______________________________________________________________________     Anticoagulation Episode Summary       Current INR goal:  2.0-3.0   TTR:  71.2% (1 y)   Target end date:  Indefinite   Send INR reminders to:  MARY OSORIO    Indications    Long term current use of anticoagulant therapy [Z79.01]  Longstanding persistent atrial fibrillation (H) [I48.11]             Comments:  8-12 week rechecks okay per Jorgito Lyn in 5- referral request             Anticoagulation Care Providers       Provider Role Specialty Phone number    Ruben Teran MD Referring Family Medicine     Riki  LATONYA Bull [facility] [phone]

## 2025-01-22 ENCOUNTER — LAB (OUTPATIENT)
Dept: LAB | Facility: CLINIC | Age: 75
End: 2025-01-22
Payer: COMMERCIAL

## 2025-01-22 DIAGNOSIS — D47.2 MONOCLONAL GAMMOPATHY: ICD-10-CM

## 2025-01-22 LAB
ANION GAP SERPL CALCULATED.3IONS-SCNC: 13 MMOL/L (ref 7–15)
BASOPHILS # BLD AUTO: 0 10E3/UL (ref 0–0.2)
BASOPHILS NFR BLD AUTO: 0 %
BUN SERPL-MCNC: 18.9 MG/DL (ref 8–23)
CALCIUM SERPL-MCNC: 9.2 MG/DL (ref 8.8–10.4)
CHLORIDE SERPL-SCNC: 101 MMOL/L (ref 98–107)
CREAT SERPL-MCNC: 0.98 MG/DL (ref 0.67–1.17)
EGFRCR SERPLBLD CKD-EPI 2021: 81 ML/MIN/1.73M2
EOSINOPHIL # BLD AUTO: 0 10E3/UL (ref 0–0.7)
EOSINOPHIL NFR BLD AUTO: 0 %
ERYTHROCYTE [DISTWIDTH] IN BLOOD BY AUTOMATED COUNT: 12.8 % (ref 10–15)
GLUCOSE SERPL-MCNC: 104 MG/DL (ref 70–99)
HCO3 SERPL-SCNC: 22 MMOL/L (ref 22–29)
HCT VFR BLD AUTO: 48.4 % (ref 40–53)
HGB BLD-MCNC: 15.5 G/DL (ref 13.3–17.7)
IGA SERPL-MCNC: 226 MG/DL (ref 84–499)
IGG SERPL-MCNC: 1474 MG/DL (ref 610–1616)
IGM SERPL-MCNC: 24 MG/DL (ref 35–242)
IMM GRANULOCYTES # BLD: 0 10E3/UL
IMM GRANULOCYTES NFR BLD: 0 %
KAPPA LC FREE SER-MCNC: 3.15 MG/DL (ref 0.33–1.94)
KAPPA LC FREE/LAMBDA FREE SER NEPH: 0.95 {RATIO} (ref 0.26–1.65)
LAMBDA LC FREE SERPL-MCNC: 3.3 MG/DL (ref 0.57–2.63)
LYMPHOCYTES # BLD AUTO: 2.8 10E3/UL (ref 0.8–5.3)
LYMPHOCYTES NFR BLD AUTO: 21 %
MCH RBC QN AUTO: 29.7 PG (ref 26.5–33)
MCHC RBC AUTO-ENTMCNC: 32 G/DL (ref 31.5–36.5)
MCV RBC AUTO: 93 FL (ref 78–100)
MONOCYTES # BLD AUTO: 0.9 10E3/UL (ref 0–1.3)
MONOCYTES NFR BLD AUTO: 7 %
NEUTROPHILS # BLD AUTO: 9.4 10E3/UL (ref 1.6–8.3)
NEUTROPHILS NFR BLD AUTO: 72 %
PLATELET # BLD AUTO: 216 10E3/UL (ref 150–450)
POTASSIUM SERPL-SCNC: 5.8 MMOL/L (ref 3.4–5.3)
RBC # BLD AUTO: 5.22 10E6/UL (ref 4.4–5.9)
SODIUM SERPL-SCNC: 136 MMOL/L (ref 135–145)
TOTAL PROTEIN SERUM FOR ELP: 7.1 G/DL (ref 6.4–8.3)
WBC # BLD AUTO: 13.1 10E3/UL (ref 4–11)

## 2025-01-22 PROCEDURE — 80048 BASIC METABOLIC PNL TOTAL CA: CPT

## 2025-01-22 PROCEDURE — 82784 ASSAY IGA/IGD/IGG/IGM EACH: CPT

## 2025-01-22 PROCEDURE — 36415 COLL VENOUS BLD VENIPUNCTURE: CPT

## 2025-01-22 PROCEDURE — 83521 IG LIGHT CHAINS FREE EACH: CPT

## 2025-01-22 PROCEDURE — 85025 COMPLETE CBC W/AUTO DIFF WBC: CPT

## 2025-01-22 PROCEDURE — 84155 ASSAY OF PROTEIN SERUM: CPT

## 2025-01-29 ENCOUNTER — ONCOLOGY VISIT (OUTPATIENT)
Dept: ONCOLOGY | Facility: CLINIC | Age: 75
End: 2025-01-29
Attending: NURSE PRACTITIONER
Payer: COMMERCIAL

## 2025-01-29 VITALS
OXYGEN SATURATION: 97 % | BODY MASS INDEX: 46.42 KG/M2 | RESPIRATION RATE: 16 BRPM | WEIGHT: 262 LBS | DIASTOLIC BLOOD PRESSURE: 56 MMHG | HEIGHT: 63 IN | SYSTOLIC BLOOD PRESSURE: 126 MMHG | HEART RATE: 60 BPM | TEMPERATURE: 97.6 F

## 2025-01-29 DIAGNOSIS — D47.2 MONOCLONAL GAMMOPATHY: Primary | ICD-10-CM

## 2025-01-29 DIAGNOSIS — E87.5 HYPERKALEMIA: ICD-10-CM

## 2025-01-29 PROCEDURE — G0463 HOSPITAL OUTPT CLINIC VISIT: HCPCS | Performed by: NURSE PRACTITIONER

## 2025-01-29 PROCEDURE — G2211 COMPLEX E/M VISIT ADD ON: HCPCS | Performed by: NURSE PRACTITIONER

## 2025-01-29 PROCEDURE — 99214 OFFICE O/P EST MOD 30 MIN: CPT | Performed by: NURSE PRACTITIONER

## 2025-01-29 ASSESSMENT — PAIN SCALES - GENERAL: PAINLEVEL_OUTOF10: NO PAIN (0)

## 2025-01-29 NOTE — PROGRESS NOTES
"Oncology Rooming Note    January 29, 2025 11:18 AM   Benjamín Hyman is a 74 year old male who presents for:    No chief complaint on file.    Initial Vitals: /56 (BP Location: Right arm, Patient Position: Sitting, Cuff Size: Adult Large)   Pulse 60   Temp 97.6  F (36.4  C) (Tympanic)   Resp 16   Ht 1.6 m (5' 3\")   Wt 118.8 kg (262 lb)   SpO2 97%   BMI 46.41 kg/m   Estimated body mass index is 46.41 kg/m  as calculated from the following:    Height as of this encounter: 1.6 m (5' 3\").    Weight as of this encounter: 118.8 kg (262 lb). Body surface area is 2.3 meters squared.  No Pain (0) Comment: Data Unavailable   No LMP for male patient.  Allergies reviewed: Yes  Medications reviewed: Yes    Medications: Medication refills not needed today.  Pharmacy name entered into [employer]: [facility] - [place], [place] - [facility] [address]     Frailty Screening:   Is the patient here for a new oncology consult visit in cancer care? 2. No      Clinical concerns:  None today      Marisa Rivera CMA              "

## 2025-01-29 NOTE — LETTER
1/29/2025      Benjamín Hyman  [address]  [place] [address]      Dear Colleague,    Thank you for referring your patient, Benjamín Hyman, to the [facility]. Please see a copy of my visit note below.    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     Labs are stable.  Clinically, stable.     Plan:  -1 yr labs and return visit (a week after labs)    2.   Elevated potassium, mild  Last week on BMP, K 5.8. They had trouble with lab draw, so could falsley elevated due to lysis.     Plan:  -Suggested repeat potassium lab draw today to verify, but he refused. Risks of hyperkalemia reviewed     ______________________________________________________________________________    History of Present Illness/ Interval History    Mr. Benjamín Hyman  is a 74 year old with MGUS. Returns for 1-yr eval and labs.    No weight loss, new pain. Occasional hot flashes with mild sweating overnight, chronically. No drenching night sweats. No recurrent infections.       ECOG Performance    0      Oncology History/Treatment  Diagnosis/Stage:   2020: MGUS, IgG lambda  -diagnosed with cardiomyopathy with long-standing cardiac hx (HTN, atrial fib, CAD). Cardiac MRI showed borderline L ventricle dilatation, EF 31% with global  hypokinesia, mild RV hypokinesis, mod-severe biatrial enlargement. Raised question of cardiac amyloidosis, so referred to hematology.  -[place] LC 2.8, lambda LC 2.8, K/L ratio 0.99. IgG 1530, IgA 190, IgM 22.  SPEP: m peak 0.3  -Urine negative for gammopathy.  -Serum immunofixation: one  "large and second very small monoclonal IgG of lambda light chain type noted and possible very small monoclonal IgG immunoglobulin of kappa light chain type.    -Bone marrow biopsy discussed, pt refused    Treatment:  Observation      Physical Exam    GENERAL: Alert and oriented to time place and person. Seated comfortably. In no distress.  HEAD: Atraumatic and normocephalic. No alopecia.  EYES: YULISSA, EOMI. No erythema. No icterus.  CHEST: regular respiratory effort  EXTREMITIES: Warm. No peripheral edema.  SKIN: no rash, or bruising or purpura.   NEURO: No gross deficit noted. Non-antalgic gait.      Billing  Total time 25 minutes, to include face to face visit, review of EMR, ordering, documentation and coordination of care on date of service.  complexity modifier for longitudinal care.     Signed by: Susan Saleh NP      Oncology Rooming Note    January 29, 2025 11:18 AM   Benjamín Hyman is a 74 year old male who presents for:    No chief complaint on file.    Initial Vitals: /56 (BP Location: Right arm, Patient Position: Sitting, Cuff Size: Adult Large)   Pulse 60   Temp 97.6  F (36.4  C) (Tympanic)   Resp 16   Ht 1.6 m (5' 3\")   Wt 118.8 kg (262 lb)   SpO2 97%   BMI 46.41 kg/m   Estimated body mass index is 46.41 kg/m  as calculated from the following:    Height as of this encounter: 1.6 m (5' 3\").    Weight as of this encounter: 118.8 kg (262 lb). Body surface area is 2.3 meters squared.  No Pain (0) Comment: Data Unavailable   No LMP for male patient.  Allergies reviewed: Yes  Medications reviewed: Yes    Medications: Medication refills not needed today.  Pharmacy name entered into [employer]: [id number] - [place], [place] - [address]     Frailty Screening:   Is the patient here for a new oncology consult visit in cancer care? 2. No      Clinical concerns:  None today      Marisa Rivera CMA                Again, thank you for allowing me to participate in the care of your " patient.        Sincerely,        Susan Saleh NP    Electronically signed

## 2025-01-29 NOTE — PROGRESS NOTES
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     Labs are stable.  Clinically, stable.     Plan:  -1 yr labs and return visit (a week after labs)    2.   Elevated potassium, mild  Last week on BMP, K 5.8. They had trouble with lab draw, so could falsley elevated due to lysis.     Plan:  -Suggested repeat potassium lab draw today to verify, but he refused. Risks of hyperkalemia reviewed     ______________________________________________________________________________    History of Present Illness/ Interval History    Mr. Benjamín Hyman  is a 74 year old with MGUS. Returns for 1-yr eval and labs.    No weight loss, new pain. Occasional hot flashes with mild sweating overnight, chronically. No drenching night sweats. No recurrent infections.       ECOG Performance    0      Oncology History/Treatment  Diagnosis/Stage:   2020: MGUS, IgG lambda  -diagnosed with cardiomyopathy with long-standing cardiac hx (HTN, atrial fib, CAD). Cardiac MRI showed borderline L ventricle dilatation, EF 31% with global  hypokinesia, mild RV hypokinesis, mod-severe biatrial enlargement. Raised question of cardiac amyloidosis, so referred to hematology.  -[place] LC 2.8, lambda LC 2.8, K/L ratio 0.99. IgG 1530, IgA 190, IgM 22.  SPEP: m peak 0.3  -Urine negative for gammopathy.  -Serum immunofixation: one large and second very small monoclonal IgG of lambda light chain type noted and possible very small monoclonal IgG immunoglobulin of kappa light chain type.    -Bone marrow biopsy discussed, pt refused    Treatment:  Observation      Physical  Exam    GENERAL: Alert and oriented to time place and person. Seated comfortably. In no distress.  HEAD: Atraumatic and normocephalic. No alopecia.  EYES: YULISSA, EOMI. No erythema. No icterus.  CHEST: regular respiratory effort  EXTREMITIES: Warm. No peripheral edema.  SKIN: no rash, or bruising or purpura.   NEURO: No gross deficit noted. Non-antalgic gait.      Billing  Total time 25 minutes, to include face to face visit, review of EMR, ordering, documentation and coordination of care on date of service.  complexity modifier for longitudinal care.     Signed by: Susan Saleh NP

## 2025-02-27 ENCOUNTER — LAB (OUTPATIENT)
Dept: LAB | Facility: CLINIC | Age: 75
End: 2025-02-27
Payer: COMMERCIAL

## 2025-02-27 ENCOUNTER — ANTICOAGULATION THERAPY VISIT (OUTPATIENT)
Dept: ANTICOAGULATION | Facility: CLINIC | Age: 75
End: 2025-02-27

## 2025-02-27 DIAGNOSIS — Z79.01 LONG TERM CURRENT USE OF ANTICOAGULANT THERAPY: Primary | ICD-10-CM

## 2025-02-27 DIAGNOSIS — I48.11 LONGSTANDING PERSISTENT ATRIAL FIBRILLATION (H): ICD-10-CM

## 2025-02-27 DIAGNOSIS — Z79.01 LONG TERM CURRENT USE OF ANTICOAGULANT THERAPY: ICD-10-CM

## 2025-02-27 LAB — INR BLD: 2.2 (ref 0.9–1.1)

## 2025-02-27 NOTE — PROGRESS NOTES
ANTICOAGULATION MANAGEMENT     Benjamín Hyman 74 year old male is on warfarin with therapeutic INR result. (Goal INR 2.0-3.0)    Recent labs: (last 7 days)     02/27/25  0942   INR 2.2*       ASSESSMENT     Source(s): Chart Review and Patient/Caregiver Call     Warfarin doses taken: Warfarin taken as instructed  Diet: No new diet changes identified  Medication/supplement changes: None noted  New illness, injury, or hospitalization: No  Signs or symptoms of bleeding or clotting: No  Previous result: Therapeutic last 2(+) visits  Additional findings: None       PLAN     Recommended plan for no diet, medication or health factor changes affecting INR     Dosing Instructions: Continue your current warfarin dose with next INR in 10 weeks       Summary  As of 2/27/2025      Full warfarin instructions:  3.75 mg every Mon; 7.5 mg all other days   Next INR check:  5/8/2025               Telephone call with Benjamín who verbalizes understanding and agrees to plan and who agrees to plan and repeated back plan correctly    Lab visit scheduled    Education provided: Contact [phone] with any changes, questions or concerns.     Plan made per [facility] anticoagulation protocol    Genesis Rosa RN  2/27/2025  Anticoagulation Clinic  [employer] for routing messages: valente OSORIO  [facility] patient phone line: [phone]        _______________________________________________________________________     Anticoagulation Episode Summary       Current INR goal:  2.0-3.0   TTR:  71.8% (1 y)   Target end date:  Indefinite   Send INR reminders to:  MAYR OSORIO    Indications    Long term current use of anticoagulant therapy [Z79.01]  Longstanding persistent atrial fibrillation (H) [I48.11]             Comments:  8-12 week rechecks okay per Jorgito Lyn in 5- referral request             Anticoagulation Care Providers       Provider Role Specialty Phone number    Ruben Teran MD Referring Family Medicine      Jorgito Lyn PA-C [facility] [phone]

## 2025-04-28 DIAGNOSIS — I48.19 PERSISTENT ATRIAL FIBRILLATION (H): ICD-10-CM

## 2025-04-28 RX ORDER — WARFARIN SODIUM 7.5 MG/1
TABLET ORAL
Qty: 90 TABLET | Refills: 1 | Status: SHIPPED | OUTPATIENT
Start: 2025-04-28

## 2025-04-28 NOTE — TELEPHONE ENCOUNTER
ANTICOAGULATION MANAGEMENT:  Medication Refill    Anticoagulation Summary  As of 2/27/2025      Warfarin maintenance plan:  3.75 mg (7.5 mg x 0.5) every Mon; 7.5 mg (7.5 mg x 1) all other days   Next INR check:  5/8/2025   Target end date:  Indefinite    Indications    Long term current use of anticoagulant therapy [Z79.01]  Longstanding persistent atrial fibrillation (H) [I48.11]                 Anticoagulation Care Providers       Provider Role Specialty Phone number    Ruben Teran MD Referring Family Medicine     Jorgito Lyn PA-C Referring Family Medicine [phone]            Refill Criteria    Visit with referring provider/group: Meets criteria: visit within referring provider group in the last 15 months on 9/11/24    [facility] referral last signed: 05/24/2024; within last year:  Yes    Lab monitoring is up to date (not exceeding 2 weeks overdue): Yes    Benjamín meets all criteria for refill. Rx instructions and quantity supplied updated to match patient's current dosing plan. Warfarin 90 day supply with 1 refill granted per [facility] protocol     Kirti Merrill RN  Anticoagulation Clinic

## 2025-04-28 NOTE — TELEPHONE ENCOUNTER
Medication Question or Refill    Contacts       Contact Date/Time Type Contact Phone/Fax    04/28/2025 08:14 AM CDT Fax (Incoming) [facility] [address] [place] [address] (Pharmacy) [phone]            What medication are you calling about (include dose and sig)?: Pending Prescriptions:                       Disp   Refills    warfarin ANTICOAGULANT (COUMADIN) 7.5 MG *88 tab*1            Sig: Take 3.75mg on Mondays and 7.5mg all other days           of the week, or as directed by INR clinic        Preferred Pharmacy:   [facility] [address] [place] [address]  [address] [place] [id number]  Phone: [phone] Fax: [phone]      Controlled Substance Agreement on file:   CSA -- Patient Level:    CSA: None found at the patient level.       Who prescribed the medication?: PCP:   Jorgito Lyn PA-C     Do you need a refill? Yes    Patient offered an appointment? No    Do you have any questions or concerns?  No      Okay to leave a detailed message?: Yes at Home number on file [phone] (home)    Gillian Mcgowan/ Patient

## 2025-05-08 ENCOUNTER — ANTICOAGULATION THERAPY VISIT (OUTPATIENT)
Dept: ANTICOAGULATION | Facility: CLINIC | Age: 75
End: 2025-05-08

## 2025-05-08 ENCOUNTER — DOCUMENTATION ONLY (OUTPATIENT)
Dept: ANTICOAGULATION | Facility: CLINIC | Age: 75
End: 2025-05-08

## 2025-05-08 ENCOUNTER — RESULTS FOLLOW-UP (OUTPATIENT)
Dept: NURSING | Facility: CLINIC | Age: 75
End: 2025-05-08

## 2025-05-08 ENCOUNTER — LAB (OUTPATIENT)
Dept: LAB | Facility: CLINIC | Age: 75
End: 2025-05-08
Payer: COMMERCIAL

## 2025-05-08 DIAGNOSIS — I48.11 LONGSTANDING PERSISTENT ATRIAL FIBRILLATION (H): ICD-10-CM

## 2025-05-08 DIAGNOSIS — I48.19 PERSISTENT ATRIAL FIBRILLATION (H): Primary | ICD-10-CM

## 2025-05-08 DIAGNOSIS — Z79.01 LONG TERM CURRENT USE OF ANTICOAGULANT THERAPY: Primary | ICD-10-CM

## 2025-05-08 DIAGNOSIS — Z79.01 LONG TERM CURRENT USE OF ANTICOAGULANT THERAPY: ICD-10-CM

## 2025-05-08 LAB — INR BLD: 2.1 (ref 0.9–1.1)

## 2025-05-08 NOTE — PROGRESS NOTES
ANTICOAGULATION MANAGEMENT     Benjamín Hyman 74 year old male is on warfarin with therapeutic INR result. (Goal INR 2.0-3.0)    Recent labs: (last 7 days)     05/08/25  0938   INR 2.1*       ASSESSMENT     Source(s): Chart Review  Previous INR was Therapeutic last 2(+) visits  Medication, diet, health changes since last INR chart reviewed; none identified         PLAN     Recommended plan for no diet, medication or health factor changes affecting INR     Dosing Instructions: Continue your current warfarin dose with next INR in 10 weeks       Summary  As of 5/8/2025      Full warfarin instructions:  3.75 mg every Mon; 7.5 mg all other days   Next INR check:  7/17/2025               Detailed voice message left for Benjamín with dosing instructions and follow up date.     Contact [phone] to schedule and with any changes, questions or concerns.     Education provided: Please call back if any changes to your diet, medications or how you've been taking warfarin  Goal range and lab monitoring: goal range and significance of current result    Plan made per [facility] anticoagulation protocol    Lenore Campo RN  5/8/2025  Anticoagulation Clinic  [employer] for routing messages: valente OSORIO  [facility] patient phone line: [phone]        _______________________________________________________________________     Anticoagulation Episode Summary       Current INR goal:  2.0-3.0   TTR:  89.7% (1 y)   Target end date:  Indefinite   Send INR reminders to:  MARY OSORIO    Indications    Long term current use of anticoagulant therapy [Z79.01]  Longstanding persistent atrial fibrillation (H) [I48.11]             Comments:  8-12 week rechecks okay per Jorgito Lyn in 5- referral request             Anticoagulation Care Providers       Provider Role Specialty Phone number    Ruben Teran MD Referring Family Medicine     Jorgito Lyn PA-C Referring Family Medicine [phone]

## 2025-05-08 NOTE — PROGRESS NOTES
ANTICOAGULATION CLINIC REFERRAL RENEWAL REQUEST       An annual renewal order is required for all patients referred to [facility].?  Please review and sign the pended referral order for Benjamín Hyman.       ANTICOAGULATION SUMMARY      Warfarin indication(s)   Atrial Fibrillation    Mechanical heart valve present?  NO       Current goal range   INR: 2.0-3.0     Goal appropriate for indication? Goal INR 2-3, standard for indication(s) above     Time in Therapeutic Range (TTR)  (Goal > 60%) 89.7%       Office visit with referring provider's group within last year yes on 9/11/24       Lenore Campo RN  [facility]

## 2025-07-17 ENCOUNTER — RESULTS FOLLOW-UP (OUTPATIENT)
Dept: NURSING | Facility: CLINIC | Age: 75
End: 2025-07-17

## 2025-07-17 ENCOUNTER — LAB (OUTPATIENT)
Dept: LAB | Facility: CLINIC | Age: 75
End: 2025-07-17
Payer: COMMERCIAL

## 2025-07-17 ENCOUNTER — ANTICOAGULATION THERAPY VISIT (OUTPATIENT)
Dept: ANTICOAGULATION | Facility: CLINIC | Age: 75
End: 2025-07-17

## 2025-07-17 DIAGNOSIS — I48.19 PERSISTENT ATRIAL FIBRILLATION (H): ICD-10-CM

## 2025-07-17 DIAGNOSIS — I48.11 LONGSTANDING PERSISTENT ATRIAL FIBRILLATION (H): ICD-10-CM

## 2025-07-17 DIAGNOSIS — Z79.01 LONG TERM CURRENT USE OF ANTICOAGULANT THERAPY: Primary | ICD-10-CM

## 2025-07-17 LAB — INR BLD: 2.1 (ref 0.9–1.1)

## 2025-07-17 NOTE — PROGRESS NOTES
ANTICOAGULATION MANAGEMENT     Benjamín Hyman 74 year old male is on warfarin with therapeutic INR result. (Goal INR 2.0-3.0)    Recent labs: (last 7 days)     07/17/25  0920   INR 2.1*       ASSESSMENT     Source(s): Chart Review and Patient/Caregiver Call     Warfarin doses taken: Warfarin taken as instructed  Diet: No new diet changes identified  Medication/supplement changes: None noted  New illness, injury, or hospitalization: No  Signs or symptoms of bleeding or clotting: No  Previous result: Therapeutic last 2(+) visits  Additional findings: None       PLAN     Recommended plan for no diet, medication or health factor changes affecting INR     Dosing Instructions: Continue your current warfarin dose with next INR in 10 weeks       Summary  As of 7/17/2025      Full warfarin instructions:  3.75 mg every Mon; 7.5 mg all other days   Next INR check:  9/25/2025               Telephone call with Benjamín who verbalizes understanding and agrees to plan    Lab visit scheduled    Education provided: Goal range and lab monitoring: goal range and significance of current result    Plan made per [facility] anticoagulation protocol    Lenore Campo RN  7/17/2025  Anticoagulation Clinic  [employer] for routing messages: valente OSORIO  [employer] patient phone line: [phone]        _______________________________________________________________________     Anticoagulation Episode Summary       Current INR goal:  2.0-3.0   TTR:  100.0% (1 y)   Target end date:  Indefinite   Send INR reminders to:  MARY OSORIO    Indications    Long term current use of anticoagulant therapy [Z79.01]  Longstanding persistent atrial fibrillation (H) [I48.11]  Persistent atrial fibrillation (H) [I48.19]             Comments:  8-12 week rechecks okay per Jorgito Lyn in 5- referral request             Anticoagulation Care Providers       Provider Role Specialty Phone number    Ruben Teran MD Referring Family Medicine      Jorgito Lyn PA-C [facility] [phone]

## 2025-08-13 ENCOUNTER — TELEPHONE (OUTPATIENT)
Dept: FAMILY MEDICINE | Facility: CLINIC | Age: 75
End: 2025-08-13

## 2025-08-20 ENCOUNTER — PATIENT OUTREACH (OUTPATIENT)
Dept: CARE COORDINATION | Facility: CLINIC | Age: 75
End: 2025-08-20
Payer: COMMERCIAL

## 2025-08-21 DIAGNOSIS — Z12.11 COLON CANCER SCREENING: ICD-10-CM

## 2025-08-28 DIAGNOSIS — I48.11 LONGSTANDING PERSISTENT ATRIAL FIBRILLATION (H): ICD-10-CM

## 2025-08-28 RX ORDER — DIGOXIN 250 MCG
TABLET ORAL
Qty: 70 TABLET | Refills: 0 | Status: SHIPPED | OUTPATIENT
Start: 2025-08-28

## (undated) DEVICE — MANIFOLD KIT ANGIO AUTOMATED 014613

## (undated) DEVICE — SLEEVE TR BAND RADIAL COMPRESSION DEVICE 24CM TRB24-REG

## (undated) DEVICE — CATH LAUNCHER 6FR LA6EBU375

## (undated) DEVICE — GUIDEWIRE VASC 0.014INX180CM RUNTHROUGH 25-1011

## (undated) DEVICE — WIRE GUIDE 0.035"X260CM SAFE-T-J EXCHANGE G00517

## (undated) DEVICE — DEFIB PRO-PADZ LVP LQD GEL ADULT 8900-2105-01

## (undated) DEVICE — CATH JACKY 5FR 3.5 CURVE 40-5023

## (undated) DEVICE — INTRO GLIDESHEATH SLENDER 6FR 10X45CM 60-1060

## (undated) DEVICE — CATH BALLOON NC EMERGE 3.00X8MM H7493926708300

## (undated) DEVICE — CATH LAUNCHER 6FR AL 1.0 LA6AL10

## (undated) DEVICE — CATH LAUNCHER 6FR AL 2.0 LA6AL20

## (undated) DEVICE — PACK CUSTOM CATH LAB RIDGES LF PC15CCFCJ

## (undated) DEVICE — CATH GUIDING 100CM X 5FR M3030 LNCH

## (undated) DEVICE — KIT HAND CONTROL ANGIOTOUCH ACIST 65CM AT-P65

## (undated) DEVICE — RAD INFLATOR BASIC COMPAK  IN4130

## (undated) DEVICE — CATH ANGIO INFINITI PIGTAIL 145 6 SH 6FRX110CM  534-652S

## (undated) DEVICE — Device

## (undated) DEVICE — CATH LAUNCHER 6FR JL 3.5 LA6JL35

## (undated) DEVICE — KIT LG BORE TOUHY ACCESS PLUS MAP152

## (undated) DEVICE — CATH LAUNCHER 6FR EBU 3.5 LA6EBU35